# Patient Record
Sex: MALE | Race: WHITE | NOT HISPANIC OR LATINO | Employment: OTHER | URBAN - METROPOLITAN AREA
[De-identification: names, ages, dates, MRNs, and addresses within clinical notes are randomized per-mention and may not be internally consistent; named-entity substitution may affect disease eponyms.]

---

## 2017-02-07 ENCOUNTER — OFFICE VISIT (OUTPATIENT)
Dept: INTERNAL MEDICINE | Facility: CLINIC | Age: 72
End: 2017-02-07

## 2017-02-07 VITALS
BODY MASS INDEX: 24.46 KG/M2 | HEART RATE: 95 BPM | OXYGEN SATURATION: 97 % | HEIGHT: 68 IN | SYSTOLIC BLOOD PRESSURE: 122 MMHG | WEIGHT: 161.4 LBS | DIASTOLIC BLOOD PRESSURE: 78 MMHG

## 2017-02-07 DIAGNOSIS — Z79.899 HIGH RISK MEDICATION USE: ICD-10-CM

## 2017-02-07 DIAGNOSIS — Z00.00 HEALTHCARE MAINTENANCE: ICD-10-CM

## 2017-02-07 DIAGNOSIS — R26.89 POOR BALANCE: ICD-10-CM

## 2017-02-07 DIAGNOSIS — G40.909 SEIZURE DISORDER (HCC): ICD-10-CM

## 2017-02-07 DIAGNOSIS — K64.8 OTHER HEMORRHOIDS: Primary | ICD-10-CM

## 2017-02-07 DIAGNOSIS — F30.9 BIPOLAR I DISORDER, SINGLE MANIC EPISODE (HCC): ICD-10-CM

## 2017-02-07 PROCEDURE — 99214 OFFICE O/P EST MOD 30 MIN: CPT | Performed by: INTERNAL MEDICINE

## 2017-02-07 NOTE — PROGRESS NOTES
Subjective     Andrew Redmond is a 71 y.o. male, who presents with a chief complaint of   Chief Complaint   Patient presents with   • Balance Issues     X1YR. Fell at [employer] and [employer].    • Hemorrhoids     X1YR       HPI   The pt is here because of balance issues and hemorrhoids.  His last OV was Feb of last year.  He has fallen several times.  He is on lamotrigine per neurology.  His dose was decreased and he thinks this has helped his balance issues some.  He has an appt with neurology tomorrow.  He doesn't want to do physical therapy. He is using his walker now     His hemorrhoid issues are chronic.  His colonoscopy was done in April of last year and he is good for 10 years.  His scope showed polyps, extensive diverticulosis and internal hemorrhoids.  He denies constipation or blood in his stool.     He has a hx of bipolar and is on olanzapine.  He has dementia nd takes namenda.      He has been exercising at [employer].  He eats lots of chocolate.      The following portions of the patient's history were reviewed and updated as appropriate: allergies, current medications, past family history, past medical history, past social history, past surgical history and problem list.    Allergies: Review of patient's allergies indicates no known allergies.    Review of Systems   Constitutional: Negative.    HENT: Negative.    Eyes: Negative.    Respiratory: Negative.    Cardiovascular: Negative.    Gastrointestinal: Negative for blood in stool and constipation.        Hemorrhoids   Endocrine: Negative.    Genitourinary: Negative.    Musculoskeletal: Negative.    Skin: Negative.    Allergic/Immunologic: Negative.    Neurological: Negative.    Hematological: Negative.    Psychiatric/Behavioral: Negative.    All other systems reviewed and are negative.      Objective     Wt Readings from Last 3 Encounters:   02/07/17 161 lb 6.4 oz (73.2 kg)   08/15/16 150 lb (68 kg)   04/01/16 164 lb 6.4 oz (74.6 kg)     Temp  Readings from Last 3 Encounters:   08/15/16 97.6 °F (36.4 °C) (Oral)   04/01/16 98.1 °F (36.7 °C) (Oral)   02/17/16 98 °F (36.7 °C)     BP Readings from Last 3 Encounters:   02/07/17 122/78   08/15/16 130/64   04/01/16 133/66     Pulse Readings from Last 3 Encounters:   02/07/17 95   08/15/16 64   04/01/16 72     Body mass index is 24.54 kg/(m^2).  SpO2 Readings from Last 3 Encounters:   02/07/17 97%   08/15/16 95%   04/01/16 97%       Physical Exam   Constitutional: He is oriented to person, place, and time. He appears well-developed and well-nourished. No distress.   HENT:   Head: Normocephalic and atraumatic.   Right Ear: External ear normal.   Left Ear: External ear normal.   Nose: Nose normal.   Mouth/Throat: Oropharynx is clear and moist.   Eyes: Conjunctivae and EOM are normal. Pupils are equal, round, and reactive to light.   Neck: Normal range of motion. Neck supple.   Cardiovascular: Normal rate, regular rhythm, normal heart sounds and intact distal pulses.    Pulmonary/Chest: Effort normal and breath sounds normal. No respiratory distress. He has no wheezes.   Musculoskeletal: He exhibits no edema.   Walks with rollator   Neurological: He is alert and oriented to person, place, and time.   Skin: Skin is warm and dry.   Psychiatric: He has a normal mood and affect. His behavior is normal. Judgment and thought content normal.   Nursing note and vitals reviewed.      Results for orders placed or performed during the hospital encounter of 08/15/16   POCT glucose fingerstick   Result Value Ref Range    Glucose 93 70 - 130 mg/dL       Assessment/Plan   Andrew was seen today for balance issues and hemorrhoids.    Diagnoses and all orders for this visit:    Other hemorrhoidsn- pt wants to try otc meds.   -     CBC & Differential; Future    Seizure disorder - pt needs to discuss meds with neurology.    -     Comprehensive Metabolic Panel; Future    Bipolar I disorder, single manic episode - monitor labs closely since  pt on zyprexa  -     Comprehensive Metabolic Panel; Future  -     CBC & Differential; Future  -     T4, Free; Future  -     TSH; Future  -     Lipid Panel With LDL / HDL Ratio; Future    Healthcare maintenance  -     Comprehensive Metabolic Panel; Future  -     CBC & Differential; Future  -     T4, Free; Future  -     TSH; Future  -     Lipid Panel With LDL / HDL Ratio; Future  -     PSA; Future    High risk medication use - on lamotrigine and zyprexa  -     Comprehensive Metabolic Panel; Future  -     CBC & Differential; Future  -     T4, Free; Future  -     TSH; Future  -     Lipid Panel With LDL / HDL Ratio; Future    Poor balance - offered physical therapy but pt declined.   -     Comprehensive Metabolic Panel; Future  -     CBC & Differential; Future  -     T4, Free; Future  -     TSH; Future    Cont current meds.      Next ov 6 mo    Needs lab appt also - Labs due 2/24/16 or after (fasting)    Outpatient Medications Prior to Visit   Medication Sig Dispense Refill   • lamoTRIgine (LaMICtal) 200 MG tablet Take 1 tablet by mouth 2 (two) times a day.     • memantine (NAMENDA) 10 MG tablet Take by mouth 2 (two) times a day.     • OLANZapine (ZyPREXA) 20 MG tablet Take by mouth daily.     • rivastigmine (EXELON) 6 MG capsule Take by mouth 2 (two) times a day.     • aspirin 325 MG tablet Take 325 mg by mouth every 6 (six) hours as needed for mild pain (1-3).     • diphenhydrAMINE (BENADRYL) 25 mg capsule Take 25 mg by mouth every 6 (six) hours as needed for itching.     • hydrocortisone (ANUSOL-HC) 25 MG suppository Insert 1 suppository into the rectum 2 (two) times a day as needed for hemorrhoids (rectal discomfort). 30 suppository 0   • hydrocortisone-pramoxine (PROCTOFOAM HC) rectal foam Insert 1 applicator into the rectum 2 (two) times a day. 10 g 1   • polyethylene glycol (MIRALAX) powder Take 17 g by mouth daily. 850 g 1     No facility-administered medications prior to visit.      No orders of the defined  types were placed in this encounter.    There are no discontinued medications.      Return in about 6 months (around 8/7/2017) for Recheck.

## 2017-02-12 ENCOUNTER — RESULTS ENCOUNTER (OUTPATIENT)
Dept: INTERNAL MEDICINE | Facility: CLINIC | Age: 72
End: 2017-02-12

## 2017-02-12 DIAGNOSIS — Z79.899 HIGH RISK MEDICATION USE: ICD-10-CM

## 2017-02-12 DIAGNOSIS — Z00.00 HEALTHCARE MAINTENANCE: ICD-10-CM

## 2017-02-12 DIAGNOSIS — R26.89 POOR BALANCE: ICD-10-CM

## 2017-02-12 DIAGNOSIS — F30.9 BIPOLAR I DISORDER, SINGLE MANIC EPISODE (HCC): ICD-10-CM

## 2017-02-12 DIAGNOSIS — G40.909 SEIZURE DISORDER (HCC): ICD-10-CM

## 2017-02-12 DIAGNOSIS — K64.8 OTHER HEMORRHOIDS: ICD-10-CM

## 2017-02-27 LAB
ALBUMIN SERPL-MCNC: 4 G/DL (ref 3.5–5.2)
ALBUMIN/GLOB SERPL: 1.7 G/DL
ALP SERPL-CCNC: 109 U/L (ref 40–129)
ALT SERPL-CCNC: 12 U/L (ref 5–41)
AST SERPL-CCNC: 16 U/L (ref 5–40)
BASOPHILS # BLD AUTO: 0.07 10*3/MM3 (ref 0–0.2)
BASOPHILS NFR BLD AUTO: 1.2 % (ref 0–2)
BILIRUB SERPL-MCNC: 0.5 MG/DL (ref 0.2–1.2)
BUN SERPL-MCNC: 7 MG/DL (ref 8–23)
BUN/CREAT SERPL: 8.4 (ref 7–25)
CALCIUM SERPL-MCNC: 9 MG/DL (ref 8.8–10.5)
CHLORIDE SERPL-SCNC: 97 MMOL/L (ref 98–107)
CHOLEST SERPL-MCNC: 196 MG/DL (ref 0–200)
CO2 SERPL-SCNC: 27.3 MMOL/L (ref 22–29)
CREAT SERPL-MCNC: 0.83 MG/DL (ref 0.76–1.27)
EOSINOPHIL # BLD AUTO: 0.06 10*3/MM3 (ref 0.1–0.3)
EOSINOPHIL NFR BLD AUTO: 1 % (ref 0–4)
ERYTHROCYTE [DISTWIDTH] IN BLOOD BY AUTOMATED COUNT: 16.7 % (ref 11.5–14.5)
GLOBULIN SER CALC-MCNC: 2.3 GM/DL
GLUCOSE SERPL-MCNC: 95 MG/DL (ref 65–99)
HCT VFR BLD AUTO: 45.7 % (ref 42–52)
HDLC SERPL-MCNC: 66 MG/DL (ref 40–60)
HGB BLD-MCNC: 14.8 G/DL (ref 14–18)
IMM GRANULOCYTES # BLD: 0.04 10*3/MM3 (ref 0–0.03)
IMM GRANULOCYTES NFR BLD: 0.7 % (ref 0–0.5)
LDLC SERPL CALC-MCNC: 110 MG/DL (ref 0–100)
LDLC/HDLC SERPL: 1.67 {RATIO}
LYMPHOCYTES # BLD AUTO: 1.84 10*3/MM3 (ref 0.6–4.8)
LYMPHOCYTES NFR BLD AUTO: 30.5 % (ref 20–45)
MCH RBC QN AUTO: 27.2 PG (ref 27–31)
MCHC RBC AUTO-ENTMCNC: 32.4 G/DL (ref 31–37)
MCV RBC AUTO: 84 FL (ref 80–94)
MONOCYTES # BLD AUTO: 0.49 10*3/MM3 (ref 0–1)
MONOCYTES NFR BLD AUTO: 8.1 % (ref 3–8)
NEUTROPHILS # BLD AUTO: 3.54 10*3/MM3 (ref 1.5–8.3)
NEUTROPHILS NFR BLD AUTO: 58.5 % (ref 45–70)
NRBC BLD AUTO-RTO: 0 /100 WBC (ref 0–0)
PLATELET # BLD AUTO: 325 10*3/MM3 (ref 140–500)
POTASSIUM SERPL-SCNC: 4.6 MMOL/L (ref 3.5–5.2)
PROT SERPL-MCNC: 6.3 G/DL (ref 6–8.5)
PSA SERPL-MCNC: 1.26 NG/ML (ref 0–4)
RBC # BLD AUTO: 5.44 10*6/MM3 (ref 4.7–6.1)
SODIUM SERPL-SCNC: 139 MMOL/L (ref 136–145)
T4 FREE SERPL-MCNC: 1.28 NG/DL (ref 0.93–1.7)
TRIGL SERPL-MCNC: 100 MG/DL (ref 0–150)
TSH SERPL DL<=0.005 MIU/L-ACNC: 1.89 MIU/ML (ref 0.27–4.2)
VLDLC SERPL CALC-MCNC: 20 MG/DL (ref 8–32)
WBC # BLD AUTO: 6.04 10*3/MM3 (ref 4.8–10.8)

## 2017-03-02 ENCOUNTER — TELEPHONE (OUTPATIENT)
Dept: INTERNAL MEDICINE | Facility: CLINIC | Age: 72
End: 2017-03-02

## 2017-03-02 NOTE — TELEPHONE ENCOUNTER
Patient has been advised and voiced understanding.    ----- Message from Jessica Birch MD sent at 3/2/2017  8:41 AM EST -----  Call pt about labs.  Labs ok

## 2017-05-24 ENCOUNTER — TELEPHONE (OUTPATIENT)
Dept: INTERNAL MEDICINE | Facility: CLINIC | Age: 72
End: 2017-05-24

## 2017-06-05 ENCOUNTER — OFFICE VISIT (OUTPATIENT)
Dept: INTERNAL MEDICINE | Facility: CLINIC | Age: 72
End: 2017-06-05

## 2017-06-05 VITALS
DIASTOLIC BLOOD PRESSURE: 72 MMHG | HEART RATE: 70 BPM | SYSTOLIC BLOOD PRESSURE: 118 MMHG | WEIGHT: 148 LBS | OXYGEN SATURATION: 96 % | BODY MASS INDEX: 22.43 KG/M2 | HEIGHT: 68 IN

## 2017-06-05 DIAGNOSIS — R06.02 SHORTNESS OF BREATH: Primary | ICD-10-CM

## 2017-06-05 PROCEDURE — 99213 OFFICE O/P EST LOW 20 MIN: CPT | Performed by: INTERNAL MEDICINE

## 2017-06-05 NOTE — PROGRESS NOTES
Subjective     Andrew Redmond is a 71 y.o. male, who presents with a chief complaint of   Chief Complaint   Patient presents with   • Breathing Problem     X1week, he was singing in [employer] and noticed that he could not sing very long due to sob.       HPI   The pt reports that last week he was in [employer] and while singing couldn't sing a stanza without taking a breath.  He denies wheezing.  No increased ayon, cough, congestion, fever, or LE edema.  No chest pain.  No hx lung issues.  He smoked 2ppd x  20 years and quit at age 40.  He denies hx allergies.     The pt has lost some weight but has been exercising at [employer] regularly.   No sx while exercising.    The following portions of the patient's history were reviewed and updated as appropriate: allergies, current medications, past family history, past medical history, past social history, past surgical history and problem list.    Allergies: Review of patient's allergies indicates no known allergies.    Review of Systems   Constitutional: Negative.    HENT: Negative.    Eyes: Negative.    Respiratory: Positive for shortness of breath. Negative for cough and wheezing.    Cardiovascular: Negative.    Gastrointestinal: Negative.    Endocrine: Negative.    Genitourinary: Negative.    Musculoskeletal: Negative.    Skin: Negative.    Allergic/Immunologic: Negative.    Neurological: Negative.    Hematological: Negative.    Psychiatric/Behavioral: Negative.    All other systems reviewed and are negative.      Objective     Wt Readings from Last 3 Encounters:   06/05/17 148 lb (67.1 kg)   02/07/17 161 lb 6.4 oz (73.2 kg)   08/15/16 150 lb (68 kg)     Temp Readings from Last 3 Encounters:   08/15/16 97.6 °F (36.4 °C) (Oral)   04/01/16 98.1 °F (36.7 °C) (Oral)   02/17/16 98 °F (36.7 °C)     BP Readings from Last 3 Encounters:   06/05/17 118/72   02/07/17 122/78   08/15/16 130/64     Pulse Readings from Last 3 Encounters:   06/05/17 70   02/07/17 95   08/15/16 64     Body  mass index is 22.5 kg/(m^2).  SpO2 Readings from Last 3 Encounters:   06/05/17 96%   02/07/17 97%   08/15/16 95%       Physical Exam   Constitutional: He is oriented to person, place, and time. He appears well-developed and well-nourished. No distress.   HENT:   Head: Normocephalic and atraumatic.   Right Ear: External ear normal.   Left Ear: External ear normal.   Nose: Nose normal.   Mouth/Throat: Oropharynx is clear and moist.   Eyes: Conjunctivae and EOM are normal. Pupils are equal, round, and reactive to light.   Neck: Normal range of motion. Neck supple.   Cardiovascular: Normal rate, regular rhythm, normal heart sounds and intact distal pulses.    Pulmonary/Chest: Effort normal and breath sounds normal. No respiratory distress. He has no wheezes.   Musculoskeletal: Normal range of motion.   Normal gait   Neurological: He is alert and oriented to person, place, and time.   Skin: Skin is warm and dry.   Psychiatric: He has a normal mood and affect. His behavior is normal. Judgment and thought content normal.   Nursing note and vitals reviewed.      Results for orders placed or performed in visit on 02/12/17   Comprehensive Metabolic Panel   Result Value Ref Range    Glucose 95 65 - 99 mg/dL    BUN 7 (L) 8 - 23 mg/dL    Creatinine 0.83 0.76 - 1.27 mg/dL    eGFR Non African Am 91 >60 mL/min/1.73    eGFR African Am 111 >60 mL/min/1.73    BUN/Creatinine Ratio 8.4 7.0 - 25.0    Sodium 139 136 - 145 mmol/L    Potassium 4.6 3.5 - 5.2 mmol/L    Chloride 97 (L) 98 - 107 mmol/L    Total CO2 27.3 22.0 - 29.0 mmol/L    Calcium 9.0 8.8 - 10.5 mg/dL    Total Protein 6.3 6.0 - 8.5 g/dL    Albumin 4.00 3.50 - 5.20 g/dL    Globulin 2.3 gm/dL    A/G Ratio 1.7 g/dL    Total Bilirubin 0.5 0.2 - 1.2 mg/dL    Alkaline Phosphatase 109 40 - 129 U/L    AST (SGOT) 16 5 - 40 U/L    ALT (SGPT) 12 5 - 41 U/L   T4, Free   Result Value Ref Range    Free T4 1.28 0.93 - 1.70 ng/dL   TSH   Result Value Ref Range    TSH 1.890 0.270 - 4.200  mIU/mL   Lipid Panel With LDL / HDL Ratio   Result Value Ref Range    Total Cholesterol 196 0 - 200 mg/dL    Triglycerides 100 0 - 150 mg/dL    HDL Cholesterol 66 (H) 40 - 60 mg/dL    VLDL Cholesterol 20 8 - 32 mg/dL    LDL Cholesterol  110 (H) 0 - 100 mg/dL    LDL/HDL Ratio 1.67    PSA   Result Value Ref Range    PSA 1.260 0.000 - 4.000 ng/mL   CBC & Differential   Result Value Ref Range    WBC 6.04 4.80 - 10.80 10*3/mm3    RBC 5.44 4.70 - 6.10 10*6/mm3    Hemoglobin 14.8 14.0 - 18.0 g/dL    Hematocrit 45.7 42.0 - 52.0 %    MCV 84.0 80.0 - 94.0 fL    MCH 27.2 27.0 - 31.0 pg    MCHC 32.4 31.0 - 37.0 g/dL    RDW 16.7 (H) 11.5 - 14.5 %    Platelets 325 140 - 500 10*3/mm3    Neutrophil Rel % 58.5 45.0 - 70.0 %    Lymphocyte Rel % 30.5 20.0 - 45.0 %    Monocyte Rel % 8.1 (H) 3.0 - 8.0 %    Eosinophil Rel % 1.0 0.0 - 4.0 %    Basophil Rel % 1.2 0.0 - 2.0 %    Neutrophils Absolute 3.54 1.50 - 8.30 10*3/mm3    Lymphocytes Absolute 1.84 0.60 - 4.80 10*3/mm3    Monocytes Absolute 0.49 0.00 - 1.00 10*3/mm3    Eosinophils Absolute 0.06 (L) 0.10 - 0.30 10*3/mm3    Basophils Absolute 0.07 0.00 - 0.20 10*3/mm3    Immature Granulocyte Rel % 0.7 (H) 0.0 - 0.5 %    Immature Grans Absolute 0.04 (H) 0.00 - 0.03 10*3/mm3    nRBC 0.0 0.0 - 0.0 /100 WBC       Assessment/Plan   Andrew was seen today for breathing problem.    Diagnoses and all orders for this visit:    Shortness of breath      Sx intermittent when pt singing.  No other episodes or symptoms.  Pt asymptomatic in office.  Monitor for now.  Pt to f/u if any worsening.      Outpatient Medications Prior to Visit   Medication Sig Dispense Refill   • lamoTRIgine (LaMICtal) 200 MG tablet Take 1 tablet by mouth 2 (two) times a day.     • memantine (NAMENDA) 10 MG tablet Take by mouth 2 (two) times a day.     • OLANZapine (ZyPREXA) 20 MG tablet Take by mouth daily.     • rivastigmine (EXELON) 6 MG capsule Take by mouth 2 (two) times a day.     • diphenhydrAMINE (BENADRYL) 25 mg capsule  Take 25 mg by mouth every 6 (six) hours as needed for itching.     • polyethylene glycol (MIRALAX) powder Take 17 g by mouth daily. 850 g 1   • aspirin 325 MG tablet Take 325 mg by mouth every 6 (six) hours as needed for mild pain (1-3).     • hydrocortisone (ANUSOL-HC) 25 MG suppository Insert 1 suppository into the rectum 2 (two) times a day as needed for hemorrhoids (rectal discomfort). 30 suppository 0   • hydrocortisone-pramoxine (PROCTOFOAM HC) rectal foam Insert 1 applicator into the rectum 2 (two) times a day. 10 g 1     No facility-administered medications prior to visit.      No orders of the defined types were placed in this encounter.      Medications Discontinued During This Encounter   Medication Reason   • aspirin 325 MG tablet    • hydrocortisone-pramoxine (PROCTOFOAM HC) rectal foam    • hydrocortisone (ANUSOL-HC) 25 MG suppository          Return if symptoms worsen or fail to improve.

## 2017-10-06 ENCOUNTER — OFFICE VISIT (OUTPATIENT)
Dept: INTERNAL MEDICINE | Facility: CLINIC | Age: 72
End: 2017-10-06

## 2017-10-06 VITALS
DIASTOLIC BLOOD PRESSURE: 80 MMHG | BODY MASS INDEX: 22.73 KG/M2 | SYSTOLIC BLOOD PRESSURE: 116 MMHG | WEIGHT: 150 LBS | HEIGHT: 68 IN | HEART RATE: 74 BPM | OXYGEN SATURATION: 97 % | RESPIRATION RATE: 18 BRPM

## 2017-10-06 DIAGNOSIS — K64.8 OTHER HEMORRHOIDS: ICD-10-CM

## 2017-10-06 DIAGNOSIS — Z79.899 HIGH RISK MEDICATION USE: ICD-10-CM

## 2017-10-06 DIAGNOSIS — F02.80 ALZHEIMER'S DEMENTIA WITHOUT BEHAVIORAL DISTURBANCE, UNSPECIFIED TIMING OF DEMENTIA ONSET: ICD-10-CM

## 2017-10-06 DIAGNOSIS — R53.83 LOW ENERGY: Primary | ICD-10-CM

## 2017-10-06 DIAGNOSIS — G30.9 ALZHEIMER'S DEMENTIA WITHOUT BEHAVIORAL DISTURBANCE, UNSPECIFIED TIMING OF DEMENTIA ONSET: ICD-10-CM

## 2017-10-06 DIAGNOSIS — N52.9 ERECTILE DYSFUNCTION, UNSPECIFIED ERECTILE DYSFUNCTION TYPE: ICD-10-CM

## 2017-10-06 DIAGNOSIS — F30.9 BIPOLAR I DISORDER, SINGLE MANIC EPISODE (HCC): ICD-10-CM

## 2017-10-06 DIAGNOSIS — K59.09 CHRONIC CONSTIPATION: ICD-10-CM

## 2017-10-06 PROCEDURE — 99214 OFFICE O/P EST MOD 30 MIN: CPT | Performed by: INTERNAL MEDICINE

## 2017-10-06 RX ORDER — POLYETHYLENE GLYCOL 3350 17 G/17G
17 POWDER, FOR SOLUTION ORAL DAILY
Qty: 850 G | Refills: 5 | Status: SHIPPED | OUTPATIENT
Start: 2017-10-06

## 2017-10-06 NOTE — PROGRESS NOTES
Subjective     Andrew Redmond is a 72 y.o. male, who presents with a chief complaint of   Chief Complaint   Patient presents with   • Manic Behavior   follow  up    HPI   The pt is here for follow up.  He is worried that he has low testosterone levels.  He has been working out at the gym and is worried bc he doesn't feel like he is building muscle mass.  He says he is eating some protein with most meals.  His ex-wife cooks about 6 days a week when he sees her.  He does have a hx of ED. He denies prostate issues such as nocturia or poor stream  He c/o chronically low energy levels.  He thinks he might sleep a little too much.  He walks with a rolator to prevent falls.    He has alzheimer's and bipolar and is on multiple meds for this.  He is due for monitoring labs.       Constipation - he uses miralax every other day to help with bm's.     The following portions of the patient's history were reviewed and updated as appropriate: allergies, current medications, past family history, past medical history, past social history, past surgical history and problem list.    Allergies: Review of patient's allergies indicates no known allergies.    Review of Systems   Constitutional: Positive for fatigue.   HENT: Negative.    Eyes: Negative.    Respiratory: Negative.    Cardiovascular: Negative.    Gastrointestinal: Negative.    Endocrine: Negative.    Genitourinary: Negative.    Musculoskeletal: Negative.    Skin: Negative.    Allergic/Immunologic: Negative.    Neurological: Negative.    Hematological: Negative.    Psychiatric/Behavioral: Negative.    All other systems reviewed and are negative.      Objective     Wt Readings from Last 3 Encounters:   10/06/17 150 lb (68 kg)   06/05/17 148 lb (67.1 kg)   02/07/17 161 lb 6.4 oz (73.2 kg)     Temp Readings from Last 3 Encounters:   08/15/16 97.6 °F (36.4 °C) (Oral)   04/01/16 98.1 °F (36.7 °C) (Oral)   02/17/16 98 °F (36.7 °C)     BP Readings from Last 3 Encounters:   10/06/17  116/80   06/05/17 118/72   02/07/17 122/78     Pulse Readings from Last 3 Encounters:   10/06/17 74   06/05/17 70   02/07/17 95     Body mass index is 22.81 kg/(m^2).  SpO2 Readings from Last 3 Encounters:   10/06/17 97%   06/05/17 96%   02/07/17 97%       Physical Exam   Constitutional: He is oriented to person, place, and time. He appears well-developed and well-nourished. No distress.   HENT:   Head: Normocephalic and atraumatic.   Right Ear: External ear normal.   Left Ear: External ear normal.   Nose: Nose normal.   Mouth/Throat: Oropharynx is clear and moist.   Eyes: Conjunctivae and EOM are normal. Pupils are equal, round, and reactive to light.   Neck: Normal range of motion. Neck supple.   Cardiovascular: Normal rate, regular rhythm, normal heart sounds and intact distal pulses.    Pulmonary/Chest: Effort normal and breath sounds normal. No respiratory distress. He has no wheezes.   Musculoskeletal:   Walks with rollator    Neurological: He is alert and oriented to person, place, and time.   Skin: Skin is warm and dry.   Psychiatric: He has a normal mood and affect. His behavior is normal. Judgment and thought content normal.   Nursing note and vitals reviewed.      Results for orders placed or performed in visit on 02/12/17   Comprehensive Metabolic Panel   Result Value Ref Range    Glucose 95 65 - 99 mg/dL    BUN 7 (L) 8 - 23 mg/dL    Creatinine 0.83 0.76 - 1.27 mg/dL    eGFR Non African Am 91 >60 mL/min/1.73    eGFR African Am 111 >60 mL/min/1.73    BUN/Creatinine Ratio 8.4 7.0 - 25.0    Sodium 139 136 - 145 mmol/L    Potassium 4.6 3.5 - 5.2 mmol/L    Chloride 97 (L) 98 - 107 mmol/L    Total CO2 27.3 22.0 - 29.0 mmol/L    Calcium 9.0 8.8 - 10.5 mg/dL    Total Protein 6.3 6.0 - 8.5 g/dL    Albumin 4.00 3.50 - 5.20 g/dL    Globulin 2.3 gm/dL    A/G Ratio 1.7 g/dL    Total Bilirubin 0.5 0.2 - 1.2 mg/dL    Alkaline Phosphatase 109 40 - 129 U/L    AST (SGOT) 16 5 - 40 U/L    ALT (SGPT) 12 5 - 41 U/L   T4,  Free   Result Value Ref Range    Free T4 1.28 0.93 - 1.70 ng/dL   TSH   Result Value Ref Range    TSH 1.890 0.270 - 4.200 mIU/mL   Lipid Panel With LDL / HDL Ratio   Result Value Ref Range    Total Cholesterol 196 0 - 200 mg/dL    Triglycerides 100 0 - 150 mg/dL    HDL Cholesterol 66 (H) 40 - 60 mg/dL    VLDL Cholesterol 20 8 - 32 mg/dL    LDL Cholesterol  110 (H) 0 - 100 mg/dL    LDL/HDL Ratio 1.67    PSA   Result Value Ref Range    PSA 1.260 0.000 - 4.000 ng/mL   CBC & Differential   Result Value Ref Range    WBC 6.04 4.80 - 10.80 10*3/mm3    RBC 5.44 4.70 - 6.10 10*6/mm3    Hemoglobin 14.8 14.0 - 18.0 g/dL    Hematocrit 45.7 42.0 - 52.0 %    MCV 84.0 80.0 - 94.0 fL    MCH 27.2 27.0 - 31.0 pg    MCHC 32.4 31.0 - 37.0 g/dL    RDW 16.7 (H) 11.5 - 14.5 %    Platelets 325 140 - 500 10*3/mm3    Neutrophil Rel % 58.5 45.0 - 70.0 %    Lymphocyte Rel % 30.5 20.0 - 45.0 %    Monocyte Rel % 8.1 (H) 3.0 - 8.0 %    Eosinophil Rel % 1.0 0.0 - 4.0 %    Basophil Rel % 1.2 0.0 - 2.0 %    Neutrophils Absolute 3.54 1.50 - 8.30 10*3/mm3    Lymphocytes Absolute 1.84 0.60 - 4.80 10*3/mm3    Monocytes Absolute 0.49 0.00 - 1.00 10*3/mm3    Eosinophils Absolute 0.06 (L) 0.10 - 0.30 10*3/mm3    Basophils Absolute 0.07 0.00 - 0.20 10*3/mm3    Immature Granulocyte Rel % 0.7 (H) 0.0 - 0.5 %    Immature Grans Absolute 0.04 (H) 0.00 - 0.03 10*3/mm3    nRBC 0.0 0.0 - 0.0 /100 WBC       Assessment/Plan   Andrew was seen today for manic behavior.    Diagnoses and all orders for this visit:    Low energy  -     Comprehensive Metabolic Panel; Future  -     CBC & Differential; Future  -     Testosterone, Free, Total; Future    Alzheimer's dementia without behavioral disturbance, unspecified timing of dementia onset  -     Comprehensive Metabolic Panel; Future  -     CBC & Differential; Future    Erectile dysfunction, unspecified erectile dysfunction type  -     Testosterone, Free, Total; Future    High risk medication use  -     Comprehensive Metabolic  Panel; Future  -     CBC & Differential; Future    Bipolar I disorder, single manic episode  -     Comprehensive Metabolic Panel; Future  -     CBC & Differential; Future    Other hemorrhoids  -     polyethylene glycol (MIRALAX) powder; Take 17 g by mouth Daily.    Chronic constipation  -     polyethylene glycol (MIRALAX) powder; Take 17 g by mouth Daily.          Outpatient Medications Prior to Visit   Medication Sig Dispense Refill   • diphenhydrAMINE (BENADRYL) 25 mg capsule Take 25 mg by mouth every 6 (six) hours as needed for itching.     • lamoTRIgine (LaMICtal) 200 MG tablet Take 1 tablet by mouth 2 (two) times a day.     • memantine (NAMENDA) 10 MG tablet Take by mouth 2 (two) times a day.     • OLANZapine (ZyPREXA) 20 MG tablet Take by mouth daily.     • rivastigmine (EXELON) 6 MG capsule Take by mouth 2 (two) times a day.     • polyethylene glycol (MIRALAX) powder Take 17 g by mouth daily. 850 g 1     No facility-administered medications prior to visit.      New Medications Ordered This Visit   Medications   • polyethylene glycol (MIRALAX) powder     Sig: Take 17 g by mouth Daily.     Dispense:  850 g     Refill:  5       Medications Discontinued During This Encounter   Medication Reason   • polyethylene glycol (MIRALAX) powder Reorder         Return in about 6 months (around 4/6/2018) for Recheck, labs.

## 2017-10-08 LAB
ALBUMIN SERPL-MCNC: 4.2 G/DL (ref 3.5–5.2)
ALBUMIN/GLOB SERPL: 1.8 G/DL
ALP SERPL-CCNC: 94 U/L (ref 40–129)
ALT SERPL-CCNC: 12 U/L (ref 5–41)
AST SERPL-CCNC: 19 U/L (ref 5–40)
BASOPHILS # BLD AUTO: 0.08 10*3/MM3 (ref 0–0.2)
BASOPHILS NFR BLD AUTO: 1.3 % (ref 0–2)
BILIRUB SERPL-MCNC: 0.5 MG/DL (ref 0.2–1.2)
BUN SERPL-MCNC: 8 MG/DL (ref 8–23)
BUN/CREAT SERPL: 10.5 (ref 7–25)
CALCIUM SERPL-MCNC: 9.1 MG/DL (ref 8.8–10.5)
CHLORIDE SERPL-SCNC: 97 MMOL/L (ref 98–107)
CO2 SERPL-SCNC: 26.3 MMOL/L (ref 22–29)
CREAT SERPL-MCNC: 0.76 MG/DL (ref 0.76–1.27)
EOSINOPHIL # BLD AUTO: 0.25 10*3/MM3 (ref 0.1–0.3)
EOSINOPHIL NFR BLD AUTO: 4 % (ref 0–4)
ERYTHROCYTE [DISTWIDTH] IN BLOOD BY AUTOMATED COUNT: 17.2 % (ref 11.5–14.5)
GLOBULIN SER CALC-MCNC: 2.3 GM/DL
GLUCOSE SERPL-MCNC: 89 MG/DL (ref 65–99)
HCT VFR BLD AUTO: 46.1 % (ref 42–52)
HGB BLD-MCNC: 15.3 G/DL (ref 14–18)
IMM GRANULOCYTES # BLD: 0.03 10*3/MM3 (ref 0–0.03)
IMM GRANULOCYTES NFR BLD: 0.5 % (ref 0–0.5)
LYMPHOCYTES # BLD AUTO: 1.7 10*3/MM3 (ref 0.6–4.8)
LYMPHOCYTES NFR BLD AUTO: 27.1 % (ref 20–45)
MCH RBC QN AUTO: 28.4 PG (ref 27–31)
MCHC RBC AUTO-ENTMCNC: 33.2 G/DL (ref 31–37)
MCV RBC AUTO: 85.5 FL (ref 80–94)
MONOCYTES # BLD AUTO: 0.62 10*3/MM3 (ref 0–1)
MONOCYTES NFR BLD AUTO: 9.9 % (ref 3–8)
NEUTROPHILS # BLD AUTO: 3.59 10*3/MM3 (ref 1.5–8.3)
NEUTROPHILS NFR BLD AUTO: 57.2 % (ref 45–70)
NRBC BLD AUTO-RTO: 0 /100 WBC (ref 0–0)
PLATELET # BLD AUTO: 285 10*3/MM3 (ref 140–500)
POTASSIUM SERPL-SCNC: 4.7 MMOL/L (ref 3.5–5.2)
PROT SERPL-MCNC: 6.5 G/DL (ref 6–8.5)
RBC # BLD AUTO: 5.39 10*6/MM3 (ref 4.7–6.1)
SODIUM SERPL-SCNC: 139 MMOL/L (ref 136–145)
TESTOST FREE SERPL-MCNC: 4.7 PG/ML (ref 6.6–18.1)
TESTOST SERPL-MCNC: 381 NG/DL (ref 264–916)
WBC # BLD AUTO: 6.27 10*3/MM3 (ref 4.8–10.8)

## 2017-10-11 ENCOUNTER — TELEPHONE (OUTPATIENT)
Dept: INTERNAL MEDICINE | Facility: CLINIC | Age: 72
End: 2017-10-11

## 2017-10-11 NOTE — TELEPHONE ENCOUNTER
Patient notified.   ----- Message from Jessica Birch MD sent at 10/11/2017  2:53 PM EDT -----  Call pt about labs.  Testosterone borderline low.  Other labs ok.

## 2018-01-13 ENCOUNTER — HOSPITAL ENCOUNTER (EMERGENCY)
Facility: HOSPITAL | Age: 73
Discharge: HOME | End: 2018-01-14
Attending: EMERGENCY MEDICINE | Admitting: EMERGENCY MEDICINE

## 2018-01-13 ENCOUNTER — APPOINTMENT (OUTPATIENT)
Dept: GENERAL RADIOLOGY | Facility: HOSPITAL | Age: 73
End: 2018-01-13

## 2018-01-13 ENCOUNTER — APPOINTMENT (OUTPATIENT)
Dept: CT IMAGING | Facility: HOSPITAL | Age: 73
End: 2018-01-13

## 2018-01-13 VITALS
WEIGHT: 150 LBS | TEMPERATURE: 98.1 F | DIASTOLIC BLOOD PRESSURE: 60 MMHG | RESPIRATION RATE: 18 BRPM | SYSTOLIC BLOOD PRESSURE: 142 MMHG | OXYGEN SATURATION: 95 % | BODY MASS INDEX: 22.73 KG/M2 | HEIGHT: 68 IN | HEART RATE: 83 BPM

## 2018-01-13 DIAGNOSIS — S32.010A CLOSED COMPRESSION FRACTURE OF FIRST LUMBAR VERTEBRA, INITIAL ENCOUNTER: Primary | ICD-10-CM

## 2018-01-13 PROCEDURE — 72100 X-RAY EXAM L-S SPINE 2/3 VWS: CPT

## 2018-01-13 PROCEDURE — 72170 X-RAY EXAM OF PELVIS: CPT

## 2018-01-13 PROCEDURE — 99284 EMERGENCY DEPT VISIT MOD MDM: CPT | Performed by: EMERGENCY MEDICINE

## 2018-01-13 PROCEDURE — 72131 CT LUMBAR SPINE W/O DYE: CPT

## 2018-01-13 PROCEDURE — 99283 EMERGENCY DEPT VISIT LOW MDM: CPT

## 2018-01-14 RX ORDER — DOCUSATE SODIUM 100 MG/1
100 CAPSULE, LIQUID FILLED ORAL 2 TIMES DAILY PRN
Qty: 30 CAPSULE | Refills: 0 | Status: SHIPPED | OUTPATIENT
Start: 2018-01-14 | End: 2019-03-06

## 2018-01-14 RX ORDER — HYDROCODONE BITARTRATE AND ACETAMINOPHEN 5; 325 MG/1; MG/1
TABLET ORAL
Qty: 20 TABLET | Refills: 0 | Status: SHIPPED | OUTPATIENT
Start: 2018-01-14 | End: 2018-01-18

## 2018-01-14 RX ORDER — HYDROCODONE BITARTRATE AND ACETAMINOPHEN 5; 325 MG/1; MG/1
1 TABLET ORAL ONCE
Status: COMPLETED | OUTPATIENT
Start: 2018-01-14 | End: 2018-01-14

## 2018-01-14 RX ADMIN — HYDROCODONE BITARTRATE AND ACETAMINOPHEN 1 TABLET: 5; 325 TABLET ORAL at 00:25

## 2018-01-14 NOTE — ED PROVIDER NOTES
Subjective   History of Present Illness  History of Present Illness    Chief complaint: Low back discomfort    Location: Midline, radiating to right    Quality/Severity:  Mild to moderate    Timing/Duration: Abrupt onset this evening status post fall    Modifying Factors: Certain movements and palpation of the back    Associated Symptoms: No focal numbness or weakness, no incontinence, no saddle anesthesia.  Denies any other injuries    Narrative: 72-year-old male sitting up quickly and lost his balance falling backwards striking his low back and buttock against the ground is unsure if he hit anything else.  He's had ongoing discomfort since the fall and decided to come to the emergency department having checked out.  Denies any other injuries.    Review of Systems  All other systems reviewed and are otherwise negative.  Past Medical History:   Diagnosis Date   • Alzheimer disease    • Anxiety and depression    • Bipolar 1 disorder    • Colon polyp    • Constipation    • Depression    • Diarrhea    • Erectile dysfunction    • GERD (gastroesophageal reflux disease)    • Seizures     absence seizures.  well controlled.       No Known Allergies    Past Surgical History:   Procedure Laterality Date   • COLONOSCOPY N/A 4/1/2016    Procedure: COLONOSCOPY with polypectomy;  Surgeon: Akiko Scales MD;  Location: [facility];  Service:    • GALLBLADDER SURGERY     • LIVER SURGERY     • TONSILLECTOMY         Family History   Problem Relation Age of Onset   • Lung cancer Father    • No Known Problems Mother    • No Known Problems Maternal Grandmother    • No Known Problems Maternal Grandfather    • No Known Problems Paternal Grandmother    • No Known Problems Paternal Grandfather        Social History     Social History   • Marital status:      Spouse name: N/A   • Number of children: N/A   • Years of education: N/A     Occupational History   • professor      taught anatomy at [employer]     Social History Main  Topics   • Smoking status: Former Smoker     Packs/day: 2.00     Years: 20.00   • Smokeless tobacco: Former User     Quit date: 1987   • Alcohol use Yes      Comment: few drinks a month    • Drug use: No   • Sexual activity: Defer     Other Topics Concern   • None     Social History Narrative       ED Triage Vitals   Temp Heart Rate Resp BP SpO2   01/13/18 2010 01/13/18 2010 01/13/18 2010 01/13/18 2010 01/13/18 2010   98.1 °F (36.7 °C) 83 18 142/60 95 %      Temp src Heart Rate Source Patient Position BP Location FiO2 (%)   01/13/18 2010 01/13/18 2010 01/13/18 2010 01/13/18 2010 --   Oral Monitor Sitting Right arm          Objective   Physical Exam   Constitutional: He is oriented to person, place, and time. He appears well-developed. No distress.   HENT:   Head: Normocephalic and atraumatic.   Neck:   Painless range of motion.  No midline tenderness to palpation or step-off   Cardiovascular: Normal rate and intact distal pulses.    Pulmonary/Chest: Effort normal and breath sounds normal. No respiratory distress.   Abdominal: Soft. He exhibits no distension. There is no tenderness.   Musculoskeletal:        Back:    Moves all extremities equally.  Normal strength throughout.  Sensation intact throughout bilateral lower extremities.   Neurological: He is alert and oriented to person, place, and time.   Stable gait and station   Skin: Skin is warm and dry.   Psychiatric: He has a normal mood and affect. Thought content normal.   Vitals reviewed.      Procedures         ED Course  ED Course   Comment By Time   CONSULT        Provider: Dr. Molina-neurosurgery    Discussion: Agreeable with pain control, LSO brace, and outpatient follow-up on Monday in the office.    Agreeable c treatment and planned disposition. Jb Baker MD 01/14 4009      RADIOLOGY        Study: L spine/pelvis    Findings: Approximate 30% compression fracture superior endplate L1; no other acute findings identified    Interpreted  contemporaneously with treatment by me, independently viewed by me    RADIOLOGY        Study: CT lumbar spine    Findings: Superior endplate compression fracture of L1 with 10% loss of height.  No other acute findings.  Degenerative change noted    Interpreted contemporaneously with treatment by Dr. Pressley-radiologist, independently viewed by me            NANCY Gauthier query complete. Treatment plan to include limited course of prescribed controlled substance.  Risks including addiction, tolerance, sedation, benefits and alternatives presented to patient.  Final diagnoses:   Closed compression fracture of first lumbar vertebra, initial encounter              Medication List      New Prescriptions          docusate sodium 100 MG capsule   Commonly known as:  COLACE   Take 1 capsule by mouth 2 (Two) Times a Day As Needed for Constipation.   Take 1 tablet by mouth 2 times daily as needed for constipation       HYDROcodone-acetaminophen 5-325 MG per tablet   Commonly known as:  NORCO   Take 1-2 tabs by mouth every 4-6 hours as needed for pain           Follow-up Information     Schedule an appointment as soon as possible for a visit with Jessica Birch MD.    Specialties:  Internal Medicine & Pediatrics, Internal Medicine, Pediatrics    Why:  As needed    Contact information:    [address]  [facility] [address] [phone]          Follow up with Elijah Molina MD. Call in 1 day.    Specialty:  Neurosurgery    Contact information:    [address]  [facility] [address]  [facility] [address] [phone]                 Jb Baker MD  01/14/18 0011

## 2018-01-15 ENCOUNTER — EPISODE CHANGES (OUTPATIENT)
Dept: CASE MANAGEMENT | Facility: OTHER | Age: 73
End: 2018-01-15

## 2018-01-15 ENCOUNTER — OFFICE VISIT (OUTPATIENT)
Dept: INTERNAL MEDICINE | Facility: CLINIC | Age: 73
End: 2018-01-15

## 2018-01-15 VITALS
HEART RATE: 95 BPM | DIASTOLIC BLOOD PRESSURE: 80 MMHG | BODY MASS INDEX: 24.55 KG/M2 | OXYGEN SATURATION: 97 % | WEIGHT: 162 LBS | HEIGHT: 68 IN | SYSTOLIC BLOOD PRESSURE: 124 MMHG

## 2018-01-15 DIAGNOSIS — S32.018D OTHER CLOSED FRACTURE OF FIRST LUMBAR VERTEBRA WITH ROUTINE HEALING, SUBSEQUENT ENCOUNTER: Primary | ICD-10-CM

## 2018-01-15 DIAGNOSIS — G30.9 ALZHEIMER'S DEMENTIA WITHOUT BEHAVIORAL DISTURBANCE, UNSPECIFIED TIMING OF DEMENTIA ONSET: ICD-10-CM

## 2018-01-15 DIAGNOSIS — F02.80 ALZHEIMER'S DEMENTIA WITHOUT BEHAVIORAL DISTURBANCE, UNSPECIFIED TIMING OF DEMENTIA ONSET: ICD-10-CM

## 2018-01-15 PROCEDURE — 99214 OFFICE O/P EST MOD 30 MIN: CPT | Performed by: INTERNAL MEDICINE

## 2018-01-15 RX ORDER — CALCITONIN SALMON 200 [IU]/.09ML
1 SPRAY, METERED NASAL DAILY
Qty: 3.7 ML | Refills: 3 | Status: SHIPPED | OUTPATIENT
Start: 2018-01-15 | End: 2019-07-11

## 2018-01-15 RX ORDER — OXYCODONE AND ACETAMINOPHEN 10; 325 MG/1; MG/1
1 TABLET ORAL EVERY 4 HOURS PRN
Qty: 18 TABLET | Refills: 0 | Status: SHIPPED | OUTPATIENT
Start: 2018-01-15 | End: 2019-02-05

## 2018-01-15 NOTE — PROGRESS NOTES
Subjective     Andrew Redmond is a 72 y.o. male, who presents with a chief complaint of   Chief Complaint   Patient presents with   • Back Pain     Broken back, happened on Saturday, got up out of chair and fell on back, broke first vertebrae       HPI   The pt is here for follow up.  He fell over the weekend and went to the ER with back pain.  He had a fracture of L1.  Pt placed in LSO brace.  He was told to call [facility] for follow up.  The pt has norco for pain control but this isnt touching the pain.  The pt also fell again yesterday.  He isnt sure if the pain is worse or not.  He just feels bad all over.  He denies numbness, weakness, or tingling in his legs.     The pt has alzheimer's disease.  He lives alone and does not drive.  He can call an ex-brother-in-law for emergencies.        The following portions of the patient's history were reviewed and updated as appropriate: allergies, current medications, past family history, past medical history, past social history, past surgical history and problem list.    Allergies: Review of patient's allergies indicates no known allergies.    Review of Systems   Constitutional: Negative.    HENT: Negative.    Eyes: Negative.    Respiratory: Negative.    Cardiovascular: Negative.    Gastrointestinal: Negative.    Endocrine: Negative.    Genitourinary: Negative.    Musculoskeletal: Positive for back pain and gait problem.   Skin: Negative.    Allergic/Immunologic: Negative.    Hematological: Negative.    Psychiatric/Behavioral: Negative.    All other systems reviewed and are negative.      Objective     Wt Readings from Last 3 Encounters:   01/15/18 73.5 kg (162 lb)   01/13/18 68 kg (150 lb)   10/06/17 68 kg (150 lb)     Temp Readings from Last 3 Encounters:   01/13/18 98.1 °F (36.7 °C) (Oral)   08/15/16 97.6 °F (36.4 °C) (Oral)   04/01/16 98.1 °F (36.7 °C) (Oral)     BP Readings from Last 3 Encounters:   01/15/18 124/80   01/13/18 142/60   10/06/17 116/80     Pulse Readings  from Last 3 Encounters:   01/15/18 95   01/13/18 83   10/06/17 74     Body mass index is 24.64 kg/(m^2).  SpO2 Readings from Last 3 Encounters:   01/15/18 97%   01/13/18 95%   10/06/17 97%       Physical Exam   Constitutional: He is oriented to person, place, and time. He appears well-developed and well-nourished.   Pt uncomfortable   HENT:   Head: Normocephalic and atraumatic.   Right Ear: External ear normal.   Left Ear: External ear normal.   Nose: Nose normal.   Mouth/Throat: Oropharynx is clear and moist.   Eyes: Conjunctivae and EOM are normal. Pupils are equal, round, and reactive to light.   Neck: Normal range of motion. Neck supple.   Cardiovascular: Normal rate, regular rhythm, normal heart sounds and intact distal pulses.    Pulmonary/Chest: Effort normal and breath sounds normal. No respiratory distress. He has no wheezes.   Musculoskeletal: Normal range of motion.   Antalgic gait  Low back pain/ttp of spine   Neurological: He is alert and oriented to person, place, and time.   Skin: Skin is warm and dry.   Psychiatric: He has a normal mood and affect. His behavior is normal. Judgment and thought content normal.   Nursing note and vitals reviewed.      Results for orders placed or performed in visit on 10/06/17   Testosterone, Free, Total   Result Value Ref Range    Testosterone, Total 381 264 - 916 ng/dL    Testosterone, Free 4.7 (L) 6.6 - 18.1 pg/mL   Comprehensive Metabolic Panel   Result Value Ref Range    Glucose 89 65 - 99 mg/dL    BUN 8 8 - 23 mg/dL    Creatinine 0.76 0.76 - 1.27 mg/dL    eGFR Non African Am 101 >60 mL/min/1.73    eGFR African Am 122 >60 mL/min/1.73    BUN/Creatinine Ratio 10.5 7.0 - 25.0    Sodium 139 136 - 145 mmol/L    Potassium 4.7 3.5 - 5.2 mmol/L    Chloride 97 (L) 98 - 107 mmol/L    Total CO2 26.3 22.0 - 29.0 mmol/L    Calcium 9.1 8.8 - 10.5 mg/dL    Total Protein 6.5 6.0 - 8.5 g/dL    Albumin 4.20 3.50 - 5.20 g/dL    Globulin 2.3 gm/dL    A/G Ratio 1.8 g/dL    Total  Bilirubin 0.5 0.2 - 1.2 mg/dL    Alkaline Phosphatase 94 40 - 129 U/L    AST (SGOT) 19 5 - 40 U/L    ALT (SGPT) 12 5 - 41 U/L   CBC & Differential   Result Value Ref Range    WBC 6.27 4.80 - 10.80 10*3/mm3    RBC 5.39 4.70 - 6.10 10*6/mm3    Hemoglobin 15.3 14.0 - 18.0 g/dL    Hematocrit 46.1 42.0 - 52.0 %    MCV 85.5 80.0 - 94.0 fL    MCH 28.4 27.0 - 31.0 pg    MCHC 33.2 31.0 - 37.0 g/dL    RDW 17.2 (H) 11.5 - 14.5 %    Platelets 285 140 - 500 10*3/mm3    Neutrophil Rel % 57.2 45.0 - 70.0 %    Lymphocyte Rel % 27.1 20.0 - 45.0 %    Monocyte Rel % 9.9 (H) 3.0 - 8.0 %    Eosinophil Rel % 4.0 0.0 - 4.0 %    Basophil Rel % 1.3 0.0 - 2.0 %    Neutrophils Absolute 3.59 1.50 - 8.30 10*3/mm3    Lymphocytes Absolute 1.70 0.60 - 4.80 10*3/mm3    Monocytes Absolute 0.62 0.00 - 1.00 10*3/mm3    Eosinophils Absolute 0.25 0.10 - 0.30 10*3/mm3    Basophils Absolute 0.08 0.00 - 0.20 10*3/mm3    Immature Granulocyte Rel % 0.5 0.0 - 0.5 %    Immature Grans Absolute 0.03 0.00 - 0.03 10*3/mm3    nRBC 0.0 0.0 - 0.0 /100 WBC       Assessment/Plan   Andrew was seen today for back pain.    Diagnoses and all orders for this visit:    Other closed fracture of first lumbar vertebra with routine healing, subsequent encounter  -     Ambulatory Referral to Neurosurgery  -     oxyCODONE-acetaminophen (PERCOCET)  MG per tablet; Take 1 tablet by mouth Every 4 (Four) Hours As Needed for Severe Pain .  -     calcitonin, salmon, (MIACALCIN) 200 UNIT/ACT nasal spray; 1 spray into each nostril Daily.  -     Ambulatory Referral to Home Health    Alzheimer's dementia without behavioral disturbance, unspecified timing of dementia onset        25 min spent with patient with >50% spent in counseling about lumbar fracture, home health, prevention of opoid induced constipation.  Instructions printed in AVS for patient.     Outpatient Medications Prior to Visit   Medication Sig Dispense Refill   • diphenhydrAMINE (BENADRYL) 25 mg capsule Take 25 mg by mouth  every 6 (six) hours as needed for itching.     • docusate sodium (COLACE) 100 MG capsule Take 1 capsule by mouth 2 (Two) Times a Day As Needed for Constipation. Take 1 tablet by mouth 2 times daily as needed for constipation 30 capsule 0   • HYDROcodone-acetaminophen (NORCO) 5-325 MG per tablet Take 1-2 tabs by mouth every 4-6 hours as needed for pain 20 tablet 0   • lamoTRIgine (LaMICtal) 200 MG tablet Take 1 tablet by mouth 2 (two) times a day.     • memantine (NAMENDA) 10 MG tablet Take by mouth 2 (two) times a day.     • OLANZapine (ZyPREXA) 20 MG tablet Take by mouth daily.     • polyethylene glycol (MIRALAX) powder Take 17 g by mouth Daily. 850 g 5   • rivastigmine (EXELON) 6 MG capsule Take by mouth 2 (two) times a day.       No facility-administered medications prior to visit.      New Medications Ordered This Visit   Medications   • oxyCODONE-acetaminophen (PERCOCET)  MG per tablet     Sig: Take 1 tablet by mouth Every 4 (Four) Hours As Needed for Severe Pain .     Dispense:  18 tablet     Refill:  0   • calcitonin, salmon, (MIACALCIN) 200 UNIT/ACT nasal spray     Si spray into each nostril Daily.     Dispense:  3.7 mL     Refill:  3     [unfilled]  There are no discontinued medications.      Return in about 4 weeks (around 2018), or if symptoms worsen or fail to improve.

## 2018-01-16 ENCOUNTER — EPISODE CHANGES (OUTPATIENT)
Dept: CASE MANAGEMENT | Facility: OTHER | Age: 73
End: 2018-01-16

## 2018-01-18 ENCOUNTER — OFFICE VISIT (OUTPATIENT)
Dept: NEUROSURGERY | Facility: CLINIC | Age: 73
End: 2018-01-18

## 2018-01-18 VITALS
BODY MASS INDEX: 22.73 KG/M2 | HEART RATE: 68 BPM | SYSTOLIC BLOOD PRESSURE: 126 MMHG | HEIGHT: 68 IN | WEIGHT: 150 LBS | DIASTOLIC BLOOD PRESSURE: 80 MMHG

## 2018-01-18 DIAGNOSIS — S32.010A COMPRESSION FRACTURE OF L1 LUMBAR VERTEBRA, CLOSED, INITIAL ENCOUNTER (HCC): Primary | ICD-10-CM

## 2018-01-18 PROCEDURE — 99203 OFFICE O/P NEW LOW 30 MIN: CPT | Performed by: NURSE PRACTITIONER

## 2018-01-18 NOTE — PROGRESS NOTES
Subjective   Patient ID: Andrew Redmond is a 72 y.o. male is being seen for consultation today at the request of Jessica Birch* for lower back pain associated with a fall. Pt was in the ER on 01/13/2018. Per Dr. Molina this patient needed to be worked in. Pt states this was the first incident falling.   Pt has CT lumbar spine, XRAY lumbar spine and XRAY of the pelvis at [facility].   Pt is currently taking Percocet  mg PRN pain.       HPI Comments: This is a 72-year-old male with a history of fall at home 5 days ago.  He states that he landed on his buttocks.  He fell in his bedroom.  He denied any dizziness or syncope leading up to the fall. He was seen in the ER. He states that he had a vertebral fracture in the thoracic spine many years ago. He denies any leg pain or LE weakness/numbness. Lumbar CT revealed an L1 compression fracture and he was fitted with a lumbar brace. He was seen by Dr. Birch and referred her for a neurosurgical opinion.  Notes from today's visit will be forwarded upon completion.     Back Pain   This is a new problem. The current episode started in the past 7 days. The problem occurs constantly. The problem has been gradually worsening since onset. The pain is present in the lumbar spine. The quality of the pain is described as shooting. The pain does not radiate. The pain is at a severity of 5/10. The pain is mild. The symptoms are aggravated by standing, twisting, bending, position and lying down. Stiffness is present all day. Pertinent negatives include no bladder incontinence, bowel incontinence, chest pain, headaches, numbness, tingling or weakness. Risk factors include recent trauma (Patient experienced a fall 5 days ago.). He has tried muscle relaxant for the symptoms. The treatment provided mild relief.       The following portions of the patient's history were reviewed and updated as appropriate: allergies, current medications, past family history, past medical  history, past social history, past surgical history and problem list.    Review of Systems   Constitutional: Positive for activity change.   Respiratory: Negative for chest tightness and shortness of breath.    Cardiovascular: Negative for chest pain.   Gastrointestinal: Negative for bowel incontinence.   Genitourinary: Negative for bladder incontinence.   Musculoskeletal: Positive for back pain. Arthralgias: back    Neurological: Positive for seizures. Negative for dizziness, tingling, weakness, numbness and headaches.   All other systems reviewed and are negative.      Objective   Physical Exam   Constitutional: He is oriented to person, place, and time. He appears well-developed and well-nourished. He is cooperative. No distress.   HENT:   Head: Normocephalic and atraumatic.   Eyes: Conjunctivae are normal.   Neck: Normal range of motion. Neck supple.   Cardiovascular: Normal rate.    Pulmonary/Chest: Effort normal. No respiratory distress.   Abdominal: Soft. He exhibits no distension. There is no tenderness.   Musculoskeletal: Normal range of motion. He exhibits tenderness (To palpation in the upper lumbar spine. ). He exhibits no edema.   Neurological: He is alert and oriented to person, place, and time. He has normal strength and normal reflexes. He displays normal reflexes. No sensory deficit. He exhibits normal muscle tone. Coordination normal. GCS eye subscore is 4. GCS verbal subscore is 5. GCS motor subscore is 6.   Reflex Scores:       Tricep reflexes are 2+ on the right side and 2+ on the left side.       Bicep reflexes are 2+ on the right side and 2+ on the left side.       Brachioradialis reflexes are 2+ on the right side and 2+ on the left side.       Patellar reflexes are 2+ on the right side and 2+ on the left side.       Achilles reflexes are 2+ on the right side and 2+ on the left side.  No motor or sensory deficits on exam. Patient is wearing the lumbar brace. DTR's are normal. Negative  Joyner's; negative clonus. SLR negative bilaterally.    Skin: Skin is warm and dry. No rash noted. He is not diaphoretic.   Psychiatric: He has a normal mood and affect. Thought content normal.   Vitals reviewed.    Neurologic Exam     Mental Status   Oriented to person, place, and time.     Motor Exam     Strength   Strength 5/5 throughout.     Gait, Coordination, and Reflexes     Reflexes   Right brachioradialis: 2+  Left brachioradialis: 2+  Right biceps: 2+  Left biceps: 2+  Right triceps: 2+  Left triceps: 2+  Right patellar: 2+  Left patellar: 2+  Right achilles: 2+  Left achilles: 2+      Assessment/Plan   Independent Review of Radiographic Studies:      I reviewed the CT images of the lumbar spine performed January 13, 2018 today in the office.  The CT showed 10-15% body height loss at L1 secondary to an endplate fracture.  The CT showed minimal retropulsion and mild canal narrowing.  There are multilevel degenerative changes with moderate to severe facet arthropathy at L3-4, L4-5, and L5-S1.  There is moderate to severe central stenosis at L4-5.    Medical Decision Making:      Mr. Redmond was seen today for the above complaints.  He notes that the lumbar brace does help some with his discomfort.  He is not bedbound.  He is able to walk and get around quite well.  He has no weakness on exam and no sensory deficits.    I would like to get an MRI of the lumbar spine to better evaluate the compression fracture and the canal.  He will follow-up with Dr. Molina after the MRI.  We will continue with the lumbar brace.  If he has any worsening pain symptoms in the meantime, Mr. Redmond was encouraged to call the office.    Andrew was seen today for back pain.    Diagnoses and all orders for this visit:    Compression fracture of L1 lumbar vertebra, closed, initial encounter  -     MRI Lumbar Spine Without Contrast; Future      No Follow-up on file.

## 2018-01-24 ENCOUNTER — HOSPITAL ENCOUNTER (OUTPATIENT)
Dept: MRI IMAGING | Facility: HOSPITAL | Age: 73
Discharge: HOME | End: 2018-01-24
Admitting: NURSE PRACTITIONER

## 2018-01-24 DIAGNOSIS — S32.010A COMPRESSION FRACTURE OF L1 LUMBAR VERTEBRA, CLOSED, INITIAL ENCOUNTER (HCC): ICD-10-CM

## 2018-01-24 PROCEDURE — 72148 MRI LUMBAR SPINE W/O DYE: CPT

## 2018-02-07 ENCOUNTER — OFFICE VISIT (OUTPATIENT)
Dept: NEUROSURGERY | Facility: CLINIC | Age: 73
End: 2018-02-07

## 2018-02-07 VITALS
WEIGHT: 150 LBS | HEART RATE: 96 BPM | BODY MASS INDEX: 22.73 KG/M2 | SYSTOLIC BLOOD PRESSURE: 112 MMHG | HEIGHT: 68 IN | DIASTOLIC BLOOD PRESSURE: 66 MMHG

## 2018-02-07 DIAGNOSIS — M54.50 ACUTE BILATERAL LOW BACK PAIN WITHOUT SCIATICA: ICD-10-CM

## 2018-02-07 DIAGNOSIS — S32.010D CLOSED COMPRESSION FRACTURE OF L1 LUMBAR VERTEBRA WITH ROUTINE HEALING, SUBSEQUENT ENCOUNTER: Primary | ICD-10-CM

## 2018-02-07 PROBLEM — S32.010A CLOSED COMPRESSION FRACTURE OF L1 LUMBAR VERTEBRA: Status: ACTIVE | Noted: 2018-02-07

## 2018-02-07 PROCEDURE — 99213 OFFICE O/P EST LOW 20 MIN: CPT | Performed by: NEUROLOGICAL SURGERY

## 2018-02-07 NOTE — PROGRESS NOTES
Subjective   Patient ID: Andrew Redmond is a 72 y.o. male is here today for follow-up on compression fracture.    At the patient's last visit he reported lower back pain.    Today the patient is here with a new lumbar spine MRI. He reports that his back pain has resolved. He has some discomfort at times.    Back Pain   This is a recurrent problem. The current episode started 1 to 4 weeks ago. The problem occurs daily. The problem has been rapidly improving since onset. The pain is present in the lumbar spine. Pertinent negatives include no bladder incontinence, bowel incontinence, numbness, tingling or weakness.       The following portions of the patient's history were reviewed and updated as appropriate: allergies, current medications, past family history, past medical history, past social history, past surgical history and problem list.    Review of Systems   Gastrointestinal: Negative for bowel incontinence.   Genitourinary: Negative for bladder incontinence.   Musculoskeletal: Positive for back pain.   Neurological: Negative for tingling, weakness and numbness.   All other systems reviewed and are negative.    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 He slipped and fell at home and began having severe low back pain without leg pain. No leg weakness. He was found to have an acute L1 traumatic compression fracture. He has been put in a brace. We reviewed the new MRI which did not show any other findings, such as an epidural hematoma. He is wearing his brace which does seem to help, and overall his pain is doing better. No focal motor deficits, particularly in the lower extremities. He asked if we could try and keep the financial cost of imaging to a minimum, and I think that is fine. We will see him in 2 months with flexion-extension films. I think we can forego a CT scan. If flexion-extension films look fine in 2 months, which is the 3-month negin, we can take off the brace. We can  just probably show him simple exercises to do if he wants to minimize financial expenditures.      Objective   Physical Exam   Constitutional: He is oriented to person, place, and time. He appears well-developed and well-nourished.   HENT:   Head: Normocephalic and atraumatic.   Eyes: Conjunctivae and EOM are normal. Pupils are equal, round, and reactive to light.   Fundoscopic exam:       The right eye shows no papilledema. The right eye shows venous pulsations.        The left eye shows no papilledema. The left eye shows venous pulsations.   Neck: Carotid bruit is not present.   Neurological: He is oriented to person, place, and time. He has a normal Finger-Nose-Finger Test and a normal Heel to Shin Test. Gait normal.   Reflex Scores:       Tricep reflexes are 2+ on the right side and 2+ on the left side.       Bicep reflexes are 2+ on the right side and 2+ on the left side.       Brachioradialis reflexes are 2+ on the right side and 2+ on the left side.       Patellar reflexes are 2+ on the right side and 2+ on the left side.       Achilles reflexes are 2+ on the right side and 2+ on the left side.  Psychiatric: His speech is normal.     Neurologic Exam     Mental Status   Oriented to person, place, and time.   Registration of memory: Good recent and remote memory.   Attention: normal. Concentration: normal.   Speech: speech is normal   Level of consciousness: alert  Knowledge: consistent with education.     Cranial Nerves     CN II   Visual fields full to confrontation.   Visual acuity: normal    CN III, IV, VI   Pupils are equal, round, and reactive to light.  Extraocular motions are normal.     CN V   Facial sensation intact.   Right corneal reflex: normal  Left corneal reflex: normal    CN VII   Facial expression full, symmetric.   Right facial weakness: none  Left facial weakness: none    CN VIII   Hearing: intact    CN IX, X   Palate: symmetric    CN XI   Right sternocleidomastoid strength: normal  Left  sternocleidomastoid strength: normal    CN XII   Tongue: not atrophic  Tongue deviation: none    Motor Exam   Muscle bulk: normal  Right arm tone: normal  Left arm tone: normal  Right leg tone: normal  Left leg tone: normal    Strength   Strength 5/5 except as noted.     Sensory Exam   Light touch normal.     Gait, Coordination, and Reflexes     Gait  Gait: normal    Coordination   Finger to nose coordination: normal  Heel to shin coordination: normal    Reflexes   Right brachioradialis: 2+  Left brachioradialis: 2+  Right biceps: 2+  Left biceps: 2+  Right triceps: 2+  Left triceps: 2+  Right patellar: 2+  Left patellar: 2+  Right achilles: 2+  Left achilles: 2+  Right : 2+  Left : 2+      Assessment/Plan   Independent Review of Radiographic Studies:    I reviewed the recently completed MRI done 01/18/2018 which shows a subacute L1 compression fracture without any significant retropulsion, about 30% collapse, degenerative changes otherwise. Agree with the report.      Medical Decision Making:    He seems to be doing better with bracing. We will see him in 2 months with flexion-extension films. Because of the financial issues, we will forego the CT scan which I think is expendable. If he is doing okay, we can take off the brace and consider physical therapy, but he just may want to have exercise shown to him, which I can do next time.      Andrew was seen today for back pain.    Diagnoses and all orders for this visit:    Closed compression fracture of L1 lumbar vertebra with routine healing, subsequent encounter  -     XR Spine Lumbar Complete With Flex & Ext; Future    Acute bilateral low back pain without sciatica  -     XR Spine Lumbar Complete With Flex & Ext; Future    Return in about 2 months (around 4/7/2018).

## 2018-02-12 ENCOUNTER — OFFICE VISIT (OUTPATIENT)
Dept: INTERNAL MEDICINE | Facility: CLINIC | Age: 73
End: 2018-02-12

## 2018-02-12 VITALS
WEIGHT: 159.8 LBS | OXYGEN SATURATION: 97 % | HEIGHT: 68 IN | DIASTOLIC BLOOD PRESSURE: 72 MMHG | SYSTOLIC BLOOD PRESSURE: 128 MMHG | BODY MASS INDEX: 24.22 KG/M2 | HEART RATE: 75 BPM

## 2018-02-12 DIAGNOSIS — Z87.891 FORMER SMOKER: ICD-10-CM

## 2018-02-12 DIAGNOSIS — G30.9 ALZHEIMER'S DEMENTIA WITHOUT BEHAVIORAL DISTURBANCE, UNSPECIFIED TIMING OF DEMENTIA ONSET: ICD-10-CM

## 2018-02-12 DIAGNOSIS — F30.9 BIPOLAR I DISORDER, SINGLE MANIC EPISODE (HCC): ICD-10-CM

## 2018-02-12 DIAGNOSIS — R53.83 LOW ENERGY: ICD-10-CM

## 2018-02-12 DIAGNOSIS — Z00.00 MEDICARE ANNUAL WELLNESS VISIT, INITIAL: Primary | ICD-10-CM

## 2018-02-12 DIAGNOSIS — S32.010D CLOSED COMPRESSION FRACTURE OF L1 LUMBAR VERTEBRA WITH ROUTINE HEALING, SUBSEQUENT ENCOUNTER: ICD-10-CM

## 2018-02-12 DIAGNOSIS — Z79.899 HIGH RISK MEDICATION USE: ICD-10-CM

## 2018-02-12 DIAGNOSIS — F02.80 ALZHEIMER'S DEMENTIA WITHOUT BEHAVIORAL DISTURBANCE, UNSPECIFIED TIMING OF DEMENTIA ONSET: ICD-10-CM

## 2018-02-12 DIAGNOSIS — Z12.5 ENCOUNTER FOR SCREENING FOR MALIGNANT NEOPLASM OF PROSTATE: ICD-10-CM

## 2018-02-12 PROCEDURE — G0438 PPPS, INITIAL VISIT: HCPCS | Performed by: INTERNAL MEDICINE

## 2018-02-12 PROCEDURE — 99213 OFFICE O/P EST LOW 20 MIN: CPT | Performed by: INTERNAL MEDICINE

## 2018-02-12 NOTE — PROGRESS NOTES
Subjective     Andrew Redmond is a 72 y.o. male, who presents with a chief complaint of   Chief Complaint   Patient presents with   • Follow-up   • Back Pain     Broken back       HPI   The pt is here for follow up.  He fell and had a fracture in his back.  It was very painful at first.  He is on calcitoin nasal spray.  He is no longer taking percocet.  He has a back brace on from [facility].  No issues with constipation.  No LE weakness, numbness, or tingling in legs.  He was even shoveling snow some recently.      Hx bipolar I - pt on multiple psych meds.  He is also on namenda for alzheimers.  He says that he has been feeling fine.  He has some low energy levels but otherwise doing ok.  Sleeping ok.  Appetite ok .    The following portions of the patient's history were reviewed and updated as appropriate: allergies, current medications, past family history, past medical history, past social history, past surgical history and problem list.    Allergies: Review of patient's allergies indicates no known allergies.    Review of Systems   Constitutional: Negative.    HENT: Negative.    Eyes: Negative.    Respiratory: Negative.    Cardiovascular: Negative.    Gastrointestinal: Negative.    Endocrine: Negative.    Genitourinary: Negative.    Musculoskeletal: Negative.    Skin: Negative.    Allergic/Immunologic: Negative.    Neurological: Negative.    Hematological: Negative.    Psychiatric/Behavioral: Negative.    All other systems reviewed and are negative.      Objective     Wt Readings from Last 3 Encounters:   02/12/18 72.5 kg (159 lb 12.8 oz)   02/07/18 68 kg (150 lb)   01/18/18 68 kg (150 lb)     Temp Readings from Last 3 Encounters:   01/13/18 98.1 °F (36.7 °C) (Oral)   08/15/16 97.6 °F (36.4 °C) (Oral)   04/01/16 98.1 °F (36.7 °C) (Oral)     BP Readings from Last 3 Encounters:   02/12/18 128/72   02/07/18 112/66   01/18/18 126/80     Pulse Readings from Last 3 Encounters:   02/12/18 75   02/07/18 96   01/18/18 68      Body mass index is 24.3 kg/(m^2).  SpO2 Readings from Last 3 Encounters:   02/12/18 97%   01/15/18 97%   01/13/18 95%       Physical Exam   Constitutional: He is oriented to person, place, and time. He appears well-developed and well-nourished. No distress.   HENT:   Head: Normocephalic and atraumatic.   Right Ear: External ear normal.   Left Ear: External ear normal.   Nose: Nose normal.   Mouth/Throat: Oropharynx is clear and moist.   Eyes: Conjunctivae and EOM are normal. Pupils are equal, round, and reactive to light.   Neck: Normal range of motion. Neck supple.   Cardiovascular: Normal rate, regular rhythm, normal heart sounds and intact distal pulses.    Pulmonary/Chest: Effort normal and breath sounds normal. No respiratory distress. He has no wheezes.   Musculoskeletal: Normal range of motion.   Normal gait   Neurological: He is alert and oriented to person, place, and time.   Skin: Skin is warm and dry.   Psychiatric: He has a normal mood and affect. His behavior is normal. Judgment and thought content normal.   Nursing note and vitals reviewed.      Results for orders placed or performed in visit on 10/06/17   Testosterone, Free, Total   Result Value Ref Range    Testosterone, Total 381 264 - 916 ng/dL    Testosterone, Free 4.7 (L) 6.6 - 18.1 pg/mL   Comprehensive Metabolic Panel   Result Value Ref Range    Glucose 89 65 - 99 mg/dL    BUN 8 8 - 23 mg/dL    Creatinine 0.76 0.76 - 1.27 mg/dL    eGFR Non African Am 101 >60 mL/min/1.73    eGFR African Am 122 >60 mL/min/1.73    BUN/Creatinine Ratio 10.5 7.0 - 25.0    Sodium 139 136 - 145 mmol/L    Potassium 4.7 3.5 - 5.2 mmol/L    Chloride 97 (L) 98 - 107 mmol/L    Total CO2 26.3 22.0 - 29.0 mmol/L    Calcium 9.1 8.8 - 10.5 mg/dL    Total Protein 6.5 6.0 - 8.5 g/dL    Albumin 4.20 3.50 - 5.20 g/dL    Globulin 2.3 gm/dL    A/G Ratio 1.8 g/dL    Total Bilirubin 0.5 0.2 - 1.2 mg/dL    Alkaline Phosphatase 94 40 - 129 U/L    AST (SGOT) 19 5 - 40 U/L    ALT  (SGPT) 12 5 - 41 U/L   CBC & Differential   Result Value Ref Range    WBC 6.27 4.80 - 10.80 10*3/mm3    RBC 5.39 4.70 - 6.10 10*6/mm3    Hemoglobin 15.3 14.0 - 18.0 g/dL    Hematocrit 46.1 42.0 - 52.0 %    MCV 85.5 80.0 - 94.0 fL    MCH 28.4 27.0 - 31.0 pg    MCHC 33.2 31.0 - 37.0 g/dL    RDW 17.2 (H) 11.5 - 14.5 %    Platelets 285 140 - 500 10*3/mm3    Neutrophil Rel % 57.2 45.0 - 70.0 %    Lymphocyte Rel % 27.1 20.0 - 45.0 %    Monocyte Rel % 9.9 (H) 3.0 - 8.0 %    Eosinophil Rel % 4.0 0.0 - 4.0 %    Basophil Rel % 1.3 0.0 - 2.0 %    Neutrophils Absolute 3.59 1.50 - 8.30 10*3/mm3    Lymphocytes Absolute 1.70 0.60 - 4.80 10*3/mm3    Monocytes Absolute 0.62 0.00 - 1.00 10*3/mm3    Eosinophils Absolute 0.25 0.10 - 0.30 10*3/mm3    Basophils Absolute 0.08 0.00 - 0.20 10*3/mm3    Immature Granulocyte Rel % 0.5 0.0 - 0.5 %    Immature Grans Absolute 0.03 0.00 - 0.03 10*3/mm3    nRBC 0.0 0.0 - 0.0 /100 WBC       Assessment/Plan   Andrew was seen today for follow-up and back pain.    Diagnoses and all orders for this visit:    Medicare annual wellness visit, initial  -      AAA Screen Limited; Future  -     Comprehensive Metabolic Panel; Future  -     CBC & Differential; Future  -     T4, Free; Future  -     TSH; Future  -     Lipid Panel With LDL / HDL Ratio; Future  -     Hemoglobin A1c; Future  -     PSA Screen; Future    Former smoker  -      AAA Screen Limited; Future    Alzheimer's dementia without behavioral disturbance, unspecified timing of dementia onset  -     Comprehensive Metabolic Panel; Future  -     CBC & Differential; Future  -     T4, Free; Future  -     TSH; Future  -     Lipid Panel With LDL / HDL Ratio; Future    Closed compression fracture of L1 lumbar vertebra with routine healing, subsequent encounter  -     Comprehensive Metabolic Panel; Future  -     CBC & Differential; Future  -     T4, Free; Future  -     TSH; Future  -     Lipid Panel With LDL / HDL Ratio; Future    Bipolar I disorder, single  manic episode    High risk medication use  -     Comprehensive Metabolic Panel; Future  -     CBC & Differential; Future  -     T4, Free; Future  -     TSH; Future  -     Lipid Panel With LDL / HDL Ratio; Future  -     Hemoglobin A1c; Future  -     PSA Screen; Future    Low energy    Encounter for screening for malignant neoplasm of prostate   -     PSA Screen; Future      Cont current meds.  Ov/labs in 3 mo.     Outpatient Medications Prior to Visit   Medication Sig Dispense Refill   • calcitonin, salmon, (MIACALCIN) 200 UNIT/ACT nasal spray 1 spray into each nostril Daily. 3.7 mL 3   • diphenhydrAMINE (BENADRYL) 25 mg capsule Take 25 mg by mouth every 6 (six) hours as needed for itching.     • docusate sodium (COLACE) 100 MG capsule Take 1 capsule by mouth 2 (Two) Times a Day As Needed for Constipation. Take 1 tablet by mouth 2 times daily as needed for constipation 30 capsule 0   • lamoTRIgine (LaMICtal) 200 MG tablet Take 1 tablet by mouth 2 (two) times a day.     • memantine (NAMENDA) 10 MG tablet Take by mouth 2 (two) times a day.     • OLANZapine (ZyPREXA) 20 MG tablet Take by mouth daily.     • oxyCODONE-acetaminophen (PERCOCET)  MG per tablet Take 1 tablet by mouth Every 4 (Four) Hours As Needed for Severe Pain . 18 tablet 0   • polyethylene glycol (MIRALAX) powder Take 17 g by mouth Daily. 850 g 5   • rivastigmine (EXELON) 6 MG capsule Take by mouth 2 (two) times a day.       No facility-administered medications prior to visit.      No orders of the defined types were placed in this encounter.    [unfilled]  There are no discontinued medications.      Return in about 3 months (around 5/12/2018) for Recheck, labs.

## 2018-02-12 NOTE — PROGRESS NOTES
QUICK REFERENCE INFORMATION:  The ABCs of the Annual Wellness Visit    Initial Medicare Wellness Visit    HEALTH RISK ASSESSMENT    1945    Recent Hospitalizations:  No hospitalization(s) within the last year..    Pt did recently fall and have compression fx of vertebrae    Current Medical Providers:  Patient Care Team:  Jessica Birch MD as PCP - General (Internal Medicine & Pediatrics)  Huan Romano MD as PCP - Family Medicine  [facility], MD as PCP - Claims Attributed  Lachelle Marquez RN as Care Coordinator (Population Health)        Smoking Status:  History   Smoking Status   • Former Smoker   • Packs/day: 2.00   • Years: 20.00   Smokeless Tobacco   • Former User   • Quit date: 1987       Alcohol Consumption:  History   Alcohol Use   • Yes     Comment: few drinks a month        Depression Screen:   PHQ-2/PHQ-9 Depression Screening 2/12/2018   Little interest or pleasure in doing things 0   Feeling down, depressed, or hopeless 0   Total Score 0       Health Habits and Functional and Cognitive Screening:  Functional & Cognitive Status 2/12/2018   Do you have difficulty preparing food and eating? No   Do you have difficulty bathing yourself, getting dressed or grooming yourself? No   Do you have difficulty using the toilet? No   Do you have difficulty moving around from place to place? No   Do you have trouble with steps or getting out of a bed or a chair? No   In the past year have you fallen or experienced a near fall? Yes   Current Diet Well Balanced Diet   Dental Exam Up to date   Eye Exam Up to date   Exercise (times per week) 0 times per week   Current Exercise Activities Include None   Do you need help using the phone?  No   Are you deaf or do you have serious difficulty hearing?  No   Do you need help with transportation? No   Do you need help shopping? No   Do you need help preparing meals?  No   Do you need help with housework?  No   Do you need help with laundry? No   Do  you need help taking your medications? No   Do you need help managing money? No   Have you felt unusual stress, anger or loneliness in the last month? No   Who do you live with? Alone   If you need help, do you have trouble finding someone available to you? No   Have you been bothered in the last four weeks by sexual problems? Yes   Do you have difficulty concentrating, remembering or making decisions? No           Does the patient have evidence of cognitive impairment? No    Asiprin use counseling: Taking ASA appropriately as indicated      Recent Lab Results:    Visual Acuity:  No exam data present    Age-appropriate Screening Schedule:  Refer to the list below for future screening recommendations based on patient's age, sex and/or medical conditions. Orders for these recommended tests are listed in the plan section. The patient has been provided with a written plan.    Health Maintenance   Topic Date Due   • PNEUMOCOCCAL VACCINES (65+ LOW/MEDIUM RISK) (1 of 2 - PCV13) 09/25/2010   • ZOSTER VACCINE  02/17/2016   • TDAP/TD VACCINES (1 - Tdap) 08/16/2016   • COLONOSCOPY  04/01/2026   • INFLUENZA VACCINE  Addressed        Subjective   History of Present Illness    Andrew Redmond is a 72 y.o. male who presents for an Annual Wellness Visit.    The following portions of the patient's history were reviewed and updated as appropriate: allergies, current medications, past family history, past medical history, past social history, past surgical history and problem list.    Outpatient Medications Prior to Visit   Medication Sig Dispense Refill   • calcitonin, salmon, (MIACALCIN) 200 UNIT/ACT nasal spray 1 spray into each nostril Daily. 3.7 mL 3   • diphenhydrAMINE (BENADRYL) 25 mg capsule Take 25 mg by mouth every 6 (six) hours as needed for itching.     • docusate sodium (COLACE) 100 MG capsule Take 1 capsule by mouth 2 (Two) Times a Day As Needed for Constipation. Take 1 tablet by mouth 2 times daily as needed for  constipation 30 capsule 0   • lamoTRIgine (LaMICtal) 200 MG tablet Take 1 tablet by mouth 2 (two) times a day.     • memantine (NAMENDA) 10 MG tablet Take by mouth 2 (two) times a day.     • OLANZapine (ZyPREXA) 20 MG tablet Take by mouth daily.     • oxyCODONE-acetaminophen (PERCOCET)  MG per tablet Take 1 tablet by mouth Every 4 (Four) Hours As Needed for Severe Pain . 18 tablet 0   • polyethylene glycol (MIRALAX) powder Take 17 g by mouth Daily. 850 g 5   • rivastigmine (EXELON) 6 MG capsule Take by mouth 2 (two) times a day.       No facility-administered medications prior to visit.        Patient Active Problem List   Diagnosis   • Alzheimer's disease   • Bipolar I disorder, single manic episode   • Epilepsy   • Chronic constipation   • Hemorrhoids   • Polyp of colon   • Erectile dysfunction   • High risk medication use   • Poor balance   • Subdural hematoma   • Seizure   • Low energy   • Closed compression fracture of L1 lumbar vertebra   • Acute bilateral low back pain without sciatica       Advance Care Planning:  has an advance directive - a copy HAS NOT been provided. Have asked the patient to send this to us to add to record.    Identification of Risk Factors:  Risk factors include: cardiovascular risk, inactivity, increased fall risk, chronic pain and polypharmacy.    Review of Systems   Constitutional: Negative.    HENT: Negative.    Eyes: Negative.    Respiratory: Negative.    Cardiovascular: Negative.    Gastrointestinal: Negative.    Endocrine: Negative.    Genitourinary: Negative.    Musculoskeletal: Negative.    Skin: Negative.    Allergic/Immunologic: Negative.    Neurological: Negative.    Hematological: Negative.    Psychiatric/Behavioral: Negative.    All other systems reviewed and are negative.      Compared to one year ago, the patient feels his physical health is the same.  Compared to one year ago, the patient feels his mental health is the same.    Objective     Physical Exam  "  Constitutional: He is oriented to person, place, and time. He appears well-developed and well-nourished. No distress.   HENT:   Head: Normocephalic and atraumatic.   Right Ear: External ear normal.   Left Ear: External ear normal.   Nose: Nose normal.   Mouth/Throat: Oropharynx is clear and moist.   Eyes: Conjunctivae and EOM are normal. Pupils are equal, round, and reactive to light.   Neck: Normal range of motion. Neck supple.   Cardiovascular: Normal rate, regular rhythm, normal heart sounds and intact distal pulses.    Pulmonary/Chest: Effort normal and breath sounds normal. No respiratory distress. He has no wheezes.   Musculoskeletal: Normal range of motion.   Normal gait   Neurological: He is alert and oriented to person, place, and time.   Skin: Skin is warm and dry.   Psychiatric: He has a normal mood and affect. His behavior is normal. Judgment and thought content normal.   Nursing note and vitals reviewed.      Vitals:    02/12/18 1309   BP: 128/72   Pulse: 75   SpO2: 97%   Weight: 72.5 kg (159 lb 12.8 oz)   Height: 172.7 cm (67.99\")       Body mass index is 24.3 kg/(m^2).  Discussed the patient's BMI with him. BMI is within normal parameters. No follow-up required.    Assessment/Plan   Patient Self-Management and Personalized Health Advice  The patient has been provided with information about: diet, exercise and prevention of cardiac or vascular disease and preventive services including:   · Bone densitometry screening, Fall Risk assessment done, Fall Risk plan of care done, Nutrition counseling provided, Pneumococcal vaccine .    Visit Diagnoses:    ICD-10-CM ICD-9-CM   1. Medicare annual wellness visit, initial Z00.00 V70.0   2. Former smoker Z87.891 V15.82   3. Alzheimer's dementia without behavioral disturbance, unspecified timing of dementia onset G30.9 331.0    F02.80 294.10   4. Closed compression fracture of L1 lumbar vertebra with routine healing, subsequent encounter S32.010D V54.17   5. " Bipolar I disorder, single manic episode F30.9 296.00   6. High risk medication use Z79.899 V58.69   7. Low energy R53.83 780.79   8. Encounter for screening for malignant neoplasm of prostate  Z12.5 V76.44       Orders Placed This Encounter   Procedures   • US AAA Screen Limited     Standing Status:   Future     Standing Expiration Date:   2/12/2019     Order Specific Question:   Is the patient male between 65-75 years old?   The screening exam is only eligible for Medicare patients that are male, between 65-75 years old, have a family history of AAA or has smoked at least 100 cigarettes in their lifetime.     Answer:   Yes     Order Specific Question:   Reason for Exam:     Answer:   screening   • Comprehensive Metabolic Panel     Standing Status:   Future     Standing Expiration Date:   2/13/2019   • T4, Free     Standing Status:   Future     Standing Expiration Date:   2/13/2019   • TSH     Standing Status:   Future     Standing Expiration Date:   2/13/2019   • Lipid Panel With LDL / HDL Ratio     Standing Status:   Future     Standing Expiration Date:   2/13/2019   • Hemoglobin A1c     Standing Status:   Future     Standing Expiration Date:   2/13/2019   • PSA Screen     Standing Status:   Future     Standing Expiration Date:   2/12/2019   • CBC & Differential     Standing Status:   Future     Standing Expiration Date:   2/13/2019     Order Specific Question:   Manual Differential     Answer:   No       Outpatient Encounter Prescriptions as of 2/12/2018   Medication Sig Dispense Refill   • calcitonin, salmon, (MIACALCIN) 200 UNIT/ACT nasal spray 1 spray into each nostril Daily. 3.7 mL 3   • diphenhydrAMINE (BENADRYL) 25 mg capsule Take 25 mg by mouth every 6 (six) hours as needed for itching.     • docusate sodium (COLACE) 100 MG capsule Take 1 capsule by mouth 2 (Two) Times a Day As Needed for Constipation. Take 1 tablet by mouth 2 times daily as needed for constipation 30 capsule 0   • lamoTRIgine (LaMICtal)  200 MG tablet Take 1 tablet by mouth 2 (two) times a day.     • memantine (NAMENDA) 10 MG tablet Take by mouth 2 (two) times a day.     • OLANZapine (ZyPREXA) 20 MG tablet Take by mouth daily.     • oxyCODONE-acetaminophen (PERCOCET)  MG per tablet Take 1 tablet by mouth Every 4 (Four) Hours As Needed for Severe Pain . 18 tablet 0   • polyethylene glycol (MIRALAX) powder Take 17 g by mouth Daily. 850 g 5   • rivastigmine (EXELON) 6 MG capsule Take by mouth 2 (two) times a day.       No facility-administered encounter medications on file as of 2/12/2018.        Reviewed use of high risk medication in the elderly: yes  Reviewed for potential of harmful drug interactions in the elderly: yes    Follow Up:  Return in about 3 months (around 5/12/2018) for Recheck, labs.     An After Visit Summary and PPPS with all of these plans were given to the patient.

## 2018-02-16 ENCOUNTER — APPOINTMENT (OUTPATIENT)
Dept: ULTRASOUND IMAGING | Facility: HOSPITAL | Age: 73
End: 2018-02-16
Attending: INTERNAL MEDICINE

## 2018-02-17 ENCOUNTER — RESULTS ENCOUNTER (OUTPATIENT)
Dept: INTERNAL MEDICINE | Facility: CLINIC | Age: 73
End: 2018-02-17

## 2018-02-17 DIAGNOSIS — Z79.899 HIGH RISK MEDICATION USE: ICD-10-CM

## 2018-02-17 DIAGNOSIS — F02.80 ALZHEIMER'S DEMENTIA WITHOUT BEHAVIORAL DISTURBANCE, UNSPECIFIED TIMING OF DEMENTIA ONSET: ICD-10-CM

## 2018-02-17 DIAGNOSIS — S32.010D CLOSED COMPRESSION FRACTURE OF L1 LUMBAR VERTEBRA WITH ROUTINE HEALING, SUBSEQUENT ENCOUNTER: ICD-10-CM

## 2018-02-17 DIAGNOSIS — Z12.5 ENCOUNTER FOR SCREENING FOR MALIGNANT NEOPLASM OF PROSTATE: ICD-10-CM

## 2018-02-17 DIAGNOSIS — G30.9 ALZHEIMER'S DEMENTIA WITHOUT BEHAVIORAL DISTURBANCE, UNSPECIFIED TIMING OF DEMENTIA ONSET: ICD-10-CM

## 2018-02-17 DIAGNOSIS — Z00.00 MEDICARE ANNUAL WELLNESS VISIT, INITIAL: ICD-10-CM

## 2018-02-23 DIAGNOSIS — Z87.891 HISTORY OF TOBACCO USE: Primary | ICD-10-CM

## 2018-02-23 DIAGNOSIS — Z13.6 SCREENING FOR AAA (AORTIC ABDOMINAL ANEURYSM): ICD-10-CM

## 2018-03-09 ENCOUNTER — HOSPITAL ENCOUNTER (OUTPATIENT)
Dept: ULTRASOUND IMAGING | Facility: HOSPITAL | Age: 73
Discharge: HOME | End: 2018-03-09
Attending: INTERNAL MEDICINE | Admitting: INTERNAL MEDICINE

## 2018-03-09 DIAGNOSIS — Z87.891 FORMER SMOKER: ICD-10-CM

## 2018-03-09 DIAGNOSIS — Z00.00 MEDICARE ANNUAL WELLNESS VISIT, INITIAL: ICD-10-CM

## 2018-03-09 PROCEDURE — 76706 US ABDL AORTA SCREEN AAA: CPT

## 2018-03-12 ENCOUNTER — TELEPHONE (OUTPATIENT)
Dept: INTERNAL MEDICINE | Facility: CLINIC | Age: 73
End: 2018-03-12

## 2018-03-12 NOTE — TELEPHONE ENCOUNTER
LVM with details.     ----- Message from Jessica Birch MD sent at 3/10/2018  7:35 AM EST -----  Call pt about  Us normal

## 2018-04-13 ENCOUNTER — HOSPITAL ENCOUNTER (OUTPATIENT)
Dept: GENERAL RADIOLOGY | Facility: HOSPITAL | Age: 73
Discharge: HOME | End: 2018-04-13
Admitting: NEUROLOGICAL SURGERY

## 2018-04-13 ENCOUNTER — OFFICE VISIT (OUTPATIENT)
Dept: INTERNAL MEDICINE | Facility: CLINIC | Age: 73
End: 2018-04-13

## 2018-04-13 VITALS
DIASTOLIC BLOOD PRESSURE: 74 MMHG | BODY MASS INDEX: 23.95 KG/M2 | WEIGHT: 158 LBS | SYSTOLIC BLOOD PRESSURE: 102 MMHG | HEART RATE: 74 BPM | HEIGHT: 68 IN | OXYGEN SATURATION: 95 %

## 2018-04-13 DIAGNOSIS — Z79.899 HIGH RISK MEDICATION USE: ICD-10-CM

## 2018-04-13 DIAGNOSIS — S32.010D CLOSED COMPRESSION FRACTURE OF L1 LUMBAR VERTEBRA WITH ROUTINE HEALING, SUBSEQUENT ENCOUNTER: ICD-10-CM

## 2018-04-13 DIAGNOSIS — M54.50 ACUTE BILATERAL LOW BACK PAIN WITHOUT SCIATICA: ICD-10-CM

## 2018-04-13 DIAGNOSIS — F30.9 BIPOLAR I DISORDER, SINGLE MANIC EPISODE (HCC): Primary | ICD-10-CM

## 2018-04-13 LAB
ALBUMIN SERPL-MCNC: 4.2 G/DL (ref 3.5–5.2)
ALBUMIN/GLOB SERPL: 1.6 G/DL
ALP SERPL-CCNC: 115 U/L (ref 40–129)
ALT SERPL-CCNC: 14 U/L (ref 5–41)
AST SERPL-CCNC: 17 U/L (ref 5–40)
BASOPHILS # BLD AUTO: 0.09 10*3/MM3 (ref 0–0.2)
BASOPHILS NFR BLD AUTO: 1.5 % (ref 0–2)
BILIRUB SERPL-MCNC: 0.3 MG/DL (ref 0.2–1.2)
BUN SERPL-MCNC: 9 MG/DL (ref 8–23)
BUN/CREAT SERPL: 11.8 (ref 7–25)
CALCIUM SERPL-MCNC: 9.4 MG/DL (ref 8.8–10.5)
CHLORIDE SERPL-SCNC: 96 MMOL/L (ref 98–107)
CO2 SERPL-SCNC: 30.4 MMOL/L (ref 22–29)
CREAT SERPL-MCNC: 0.76 MG/DL (ref 0.76–1.27)
EOSINOPHIL # BLD AUTO: 0.17 10*3/MM3 (ref 0.1–0.3)
EOSINOPHIL NFR BLD AUTO: 2.7 % (ref 0–4)
ERYTHROCYTE [DISTWIDTH] IN BLOOD BY AUTOMATED COUNT: 16.5 % (ref 11.5–14.5)
GFR SERPLBLD CREATININE-BSD FMLA CKD-EPI: 101 ML/MIN/1.73
GFR SERPLBLD CREATININE-BSD FMLA CKD-EPI: 122 ML/MIN/1.73
GLOBULIN SER CALC-MCNC: 2.6 GM/DL
GLUCOSE SERPL-MCNC: 93 MG/DL (ref 65–99)
HCT VFR BLD AUTO: 47.9 % (ref 42–52)
HGB BLD-MCNC: 15.8 G/DL (ref 14–18)
IMM GRANULOCYTES # BLD: 0.04 10*3/MM3 (ref 0–0.03)
IMM GRANULOCYTES NFR BLD: 0.6 % (ref 0–0.5)
LYMPHOCYTES # BLD AUTO: 2.11 10*3/MM3 (ref 0.6–4.8)
LYMPHOCYTES NFR BLD AUTO: 34 % (ref 20–45)
MCH RBC QN AUTO: 28.3 PG (ref 27–31)
MCHC RBC AUTO-ENTMCNC: 33 G/DL (ref 31–37)
MCV RBC AUTO: 85.8 FL (ref 80–94)
MONOCYTES # BLD AUTO: 0.67 10*3/MM3 (ref 0–1)
MONOCYTES NFR BLD AUTO: 10.8 % (ref 3–8)
NEUTROPHILS # BLD AUTO: 3.12 10*3/MM3 (ref 1.5–8.3)
NEUTROPHILS NFR BLD AUTO: 50.4 % (ref 45–70)
NRBC BLD AUTO-RTO: 0 /100 WBC (ref 0–0)
PLATELET # BLD AUTO: 292 10*3/MM3 (ref 140–500)
POTASSIUM SERPL-SCNC: 4.8 MMOL/L (ref 3.5–5.2)
PROT SERPL-MCNC: 6.8 G/DL (ref 6–8.5)
RBC # BLD AUTO: 5.58 10*6/MM3 (ref 4.7–6.1)
SODIUM SERPL-SCNC: 137 MMOL/L (ref 136–145)
WBC # BLD AUTO: 6.2 10*3/MM3 (ref 4.8–10.8)

## 2018-04-13 PROCEDURE — 99214 OFFICE O/P EST MOD 30 MIN: CPT | Performed by: INTERNAL MEDICINE

## 2018-04-13 PROCEDURE — 72114 X-RAY EXAM L-S SPINE BENDING: CPT

## 2018-04-13 NOTE — PROGRESS NOTES
Andrew Redmond is a 72 y.o. male, who presents with a chief complaint of   Chief Complaint   Patient presents with   • Follow-up   • Back Pain       HPI   The pt is here for follow up.  He fell and had a fracture in his back.  It was very painful at first.  He is on calcitoin nasal spray.  He is no longer taking percocet.  He has a back brace on from [facility].  No issues with constipation.  No LE weakness, numbness, or tingling in legs. He is wearing his back brace.  He has follow up x-rays today.  He follows up with dr. brenner on 4/18.  He does well with his rollator and tends to fall if he doesn't use.      Hx bipolar I - pt on multiple psych meds including zyprexa and lamictal.  No hx diabetes or hyperglycemia.  Kidney and liver function have been normal.  He is also on namenda for alzheimers.  He says that he has been feeling fine.  He has some low energy levels but otherwise doing ok.  Sleeping ok.  Appetite ok .    He is doing well on his baby asa.    The following portions of the patient's history were reviewed and updated as appropriate: allergies, current medications, past family history, past medical history, past social history, past surgical history and problem list.    Allergies: Review of patient's allergies indicates no known allergies.    Review of Systems   Constitutional: Negative.    HENT: Negative.    Eyes: Negative.    Respiratory: Negative.    Cardiovascular: Negative.    Gastrointestinal: Negative.    Endocrine: Negative.    Genitourinary: Negative.    Musculoskeletal: Negative.    Skin: Negative.    Allergic/Immunologic: Negative.    Neurological: Negative.    Hematological: Negative.    Psychiatric/Behavioral: Negative.    All other systems reviewed and are negative.            Wt Readings from Last 3 Encounters:   04/13/18 71.7 kg (158 lb)   02/12/18 72.5 kg (159 lb 12.8 oz)   02/07/18 68 kg (150 lb)     Temp Readings from Last 3 Encounters:   01/13/18 98.1 °F (36.7 °C) (Oral)    08/15/16 97.6 °F (36.4 °C) (Oral)   04/01/16 98.1 °F (36.7 °C) (Oral)     BP Readings from Last 3 Encounters:   04/13/18 102/74   02/12/18 128/72   02/07/18 112/66     Pulse Readings from Last 3 Encounters:   04/13/18 74   02/12/18 75   02/07/18 96     Body mass index is 24.03 kg/m².  @LASTSAO2(3)@    Physical Exam   Constitutional: He is oriented to person, place, and time. He appears well-developed and well-nourished. No distress.   HENT:   Head: Normocephalic and atraumatic.   Right Ear: External ear normal.   Left Ear: External ear normal.   Nose: Nose normal.   Mouth/Throat: Oropharynx is clear and moist.   Eyes: Conjunctivae and EOM are normal. Pupils are equal, round, and reactive to light.   Neck: Normal range of motion. Neck supple.   Cardiovascular: Normal rate, regular rhythm, normal heart sounds and intact distal pulses.    Pulmonary/Chest: Effort normal and breath sounds normal. No respiratory distress. He has no wheezes.   Musculoskeletal:   Walks with rollator and has back brace now.    Neurological: He is alert and oriented to person, place, and time.   Skin: Skin is warm and dry.   Psychiatric: He has a normal mood and affect. His behavior is normal. Judgment and thought content normal.   Nursing note and vitals reviewed.      Results for orders placed or performed in visit on 10/06/17   Testosterone, Free, Total   Result Value Ref Range    Testosterone, Total 381 264 - 916 ng/dL    Testosterone, Free 4.7 (L) 6.6 - 18.1 pg/mL   Comprehensive Metabolic Panel   Result Value Ref Range    Glucose 89 65 - 99 mg/dL    BUN 8 8 - 23 mg/dL    Creatinine 0.76 0.76 - 1.27 mg/dL    eGFR Non African Am 101 >60 mL/min/1.73    eGFR African Am 122 >60 mL/min/1.73    BUN/Creatinine Ratio 10.5 7.0 - 25.0    Sodium 139 136 - 145 mmol/L    Potassium 4.7 3.5 - 5.2 mmol/L    Chloride 97 (L) 98 - 107 mmol/L    Total CO2 26.3 22.0 - 29.0 mmol/L    Calcium 9.1 8.8 - 10.5 mg/dL    Total Protein 6.5 6.0 - 8.5 g/dL     Albumin 4.20 3.50 - 5.20 g/dL    Globulin 2.3 gm/dL    A/G Ratio 1.8 g/dL    Total Bilirubin 0.5 0.2 - 1.2 mg/dL    Alkaline Phosphatase 94 40 - 129 U/L    AST (SGOT) 19 5 - 40 U/L    ALT (SGPT) 12 5 - 41 U/L   CBC & Differential   Result Value Ref Range    WBC 6.27 4.80 - 10.80 10*3/mm3    RBC 5.39 4.70 - 6.10 10*6/mm3    Hemoglobin 15.3 14.0 - 18.0 g/dL    Hematocrit 46.1 42.0 - 52.0 %    MCV 85.5 80.0 - 94.0 fL    MCH 28.4 27.0 - 31.0 pg    MCHC 33.2 31.0 - 37.0 g/dL    RDW 17.2 (H) 11.5 - 14.5 %    Platelets 285 140 - 500 10*3/mm3    Neutrophil Rel % 57.2 45.0 - 70.0 %    Lymphocyte Rel % 27.1 20.0 - 45.0 %    Monocyte Rel % 9.9 (H) 3.0 - 8.0 %    Eosinophil Rel % 4.0 0.0 - 4.0 %    Basophil Rel % 1.3 0.0 - 2.0 %    Neutrophils Absolute 3.59 1.50 - 8.30 10*3/mm3    Lymphocytes Absolute 1.70 0.60 - 4.80 10*3/mm3    Monocytes Absolute 0.62 0.00 - 1.00 10*3/mm3    Eosinophils Absolute 0.25 0.10 - 0.30 10*3/mm3    Basophils Absolute 0.08 0.00 - 0.20 10*3/mm3    Immature Granulocyte Rel % 0.5 0.0 - 0.5 %    Immature Grans Absolute 0.03 0.00 - 0.03 10*3/mm3    nRBC 0.0 0.0 - 0.0 /100 WBC           Andrew was seen today for follow-up and back pain.    Diagnoses and all orders for this visit:    Bipolar I disorder, single manic episode  -     Comprehensive Metabolic Panel; Future  -     CBC & Differential; Future    High risk medication use  -     Comprehensive Metabolic Panel; Future  -     CBC & Differential; Future    Closed compression fracture of L1 lumbar vertebra with routine healing, subsequent encounter      Cont f/u with [facility].  Cont current meds. Ov/labs in 6 mo.     Outpatient Medications Prior to Visit   Medication Sig Dispense Refill   • calcitonin, salmon, (MIACALCIN) 200 UNIT/ACT nasal spray 1 spray into each nostril Daily. 3.7 mL 3   • diphenhydrAMINE (BENADRYL) 25 mg capsule Take 25 mg by mouth every 6 (six) hours as needed for itching.     • docusate sodium (COLACE) 100 MG capsule Take 1 capsule by mouth 2  (Two) Times a Day As Needed for Constipation. Take 1 tablet by mouth 2 times daily as needed for constipation 30 capsule 0   • lamoTRIgine (LaMICtal) 200 MG tablet Take 1 tablet by mouth 2 (two) times a day.     • memantine (NAMENDA) 10 MG tablet Take by mouth 2 (two) times a day.     • OLANZapine (ZyPREXA) 20 MG tablet Take by mouth daily.     • oxyCODONE-acetaminophen (PERCOCET)  MG per tablet Take 1 tablet by mouth Every 4 (Four) Hours As Needed for Severe Pain . 18 tablet 0   • polyethylene glycol (MIRALAX) powder Take 17 g by mouth Daily. 850 g 5   • rivastigmine (EXELON) 6 MG capsule Take by mouth 2 (two) times a day.       No facility-administered medications prior to visit.      No orders of the defined types were placed in this encounter.    [unfilled]  There are no discontinued medications.      Return in about 6 months (around 10/13/2018) for Recheck, labs.

## 2018-04-17 ENCOUNTER — TELEPHONE (OUTPATIENT)
Dept: INTERNAL MEDICINE | Facility: CLINIC | Age: 73
End: 2018-04-17

## 2018-04-17 NOTE — PROGRESS NOTES
Subjective   Patient ID: Andrew Redmond is a 72 y.o. male is here today for follow-up on compression fracture.    At the last visit the patient reported that his back pain had resolved. He still had some discomfort at times.    Today the patient is here with a new lumbar spine x-ray. He reports that he has some soreness in his back. He states that today is the first day he has been out of his brace and he can tell a difference.    Back Pain   This is a chronic problem. The current episode started more than 1 month ago. The problem is unchanged. The pain is present in the lumbar spine. The pain does not radiate. Pertinent negatives include no bladder incontinence, bowel incontinence, leg pain, numbness, tingling or weakness.       The following portions of the patient's history were reviewed and updated as appropriate: allergies, current medications, past family history, past medical history, past social history, past surgical history and problem list.    Review of Systems   Gastrointestinal: Negative for bowel incontinence.   Genitourinary: Negative for bladder incontinence.   Musculoskeletal: Positive for back pain.   Neurological: Negative for tingling, weakness and numbness.   All other systems reviewed and are negative.    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  We discussed the x-rays, which showed good healing of the fracture.  The brace can come off. He was not ever using any pain medications.  His activity level never really changed.  He can live with his mild residual pain.  We can keep it open-ended at this point.          Objective   Physical Exam   Constitutional: He is oriented to person, place, and time. He appears well-developed and well-nourished.   HENT:   Head: Normocephalic and atraumatic.   Eyes: Conjunctivae and EOM are normal. Pupils are equal, round, and reactive to light.   Fundoscopic exam:       The right eye shows no  papilledema. The right eye shows venous pulsations.        The left eye shows no papilledema. The left eye shows venous pulsations.   Neck: Carotid bruit is not present.   Neurological: He is oriented to person, place, and time. He has a normal Finger-Nose-Finger Test and a normal Heel to Shin Test. Gait normal.   Reflex Scores:       Tricep reflexes are 2+ on the right side and 2+ on the left side.       Bicep reflexes are 2+ on the right side and 2+ on the left side.       Brachioradialis reflexes are 2+ on the right side and 2+ on the left side.       Patellar reflexes are 2+ on the right side and 2+ on the left side.       Achilles reflexes are 2+ on the right side and 2+ on the left side.  Psychiatric: His speech is normal.     Neurologic Exam     Mental Status   Oriented to person, place, and time.   Registration of memory: Good recent and remote memory.   Attention: normal. Concentration: normal.   Speech: speech is normal   Level of consciousness: alert  Knowledge: consistent with education.     Cranial Nerves     CN II   Visual fields full to confrontation.   Visual acuity: normal    CN III, IV, VI   Pupils are equal, round, and reactive to light.  Extraocular motions are normal.     CN V   Facial sensation intact.   Right corneal reflex: normal  Left corneal reflex: normal    CN VII   Facial expression full, symmetric.   Right facial weakness: none  Left facial weakness: none    CN VIII   Hearing: intact    CN IX, X   Palate: symmetric    CN XI   Right sternocleidomastoid strength: normal  Left sternocleidomastoid strength: normal    CN XII   Tongue: not atrophic  Tongue deviation: none    Motor Exam   Muscle bulk: normal  Right arm tone: normal  Left arm tone: normal  Right leg tone: normal  Left leg tone: normal    Strength   Strength 5/5 except as noted.     Sensory Exam   Light touch normal.     Gait, Coordination, and Reflexes     Gait  Gait: normal    Coordination   Finger to nose coordination:  normal  Heel to shin coordination: normal    Reflexes   Right brachioradialis: 2+  Left brachioradialis: 2+  Right biceps: 2+  Left biceps: 2+  Right triceps: 2+  Left triceps: 2+  Right patellar: 2+  Left patellar: 2+  Right achilles: 2+  Left achilles: 2+  Right : 2+  Left : 2+      Assessment/Plan   Independent Review of Radiographic Studies:    The flexion-extension films done on 4/13/18 show good healing of the fracture with no flexion-extension abnormalities.  Slight increase in the amount of collapse at L1.  Agree with the report.    Medical Decision Making:    Office in a brace can come off.  He doesn't have much significant pain.  We can keep it open-ended.  If symptoms recur, he can semi-come back to the office.      Andrew was seen today for back pain.    Diagnoses and all orders for this visit:    Closed compression fracture of L1 lumbar vertebra with routine healing, subsequent encounter    Acute bilateral low back pain without sciatica      Return if symptoms worsen or fail to improve.

## 2018-04-17 NOTE — TELEPHONE ENCOUNTER
Patient notified.   ----- Message from Jessica Birch MD sent at 4/16/2018  4:56 PM EDT -----  Call pt about labs.  Labs ok

## 2018-04-18 ENCOUNTER — OFFICE VISIT (OUTPATIENT)
Dept: NEUROSURGERY | Facility: CLINIC | Age: 73
End: 2018-04-18

## 2018-04-18 VITALS
HEART RATE: 104 BPM | BODY MASS INDEX: 23.95 KG/M2 | HEIGHT: 68 IN | SYSTOLIC BLOOD PRESSURE: 103 MMHG | WEIGHT: 158 LBS | DIASTOLIC BLOOD PRESSURE: 67 MMHG

## 2018-04-18 DIAGNOSIS — M54.50 ACUTE BILATERAL LOW BACK PAIN WITHOUT SCIATICA: ICD-10-CM

## 2018-04-18 DIAGNOSIS — S32.010D CLOSED COMPRESSION FRACTURE OF L1 LUMBAR VERTEBRA WITH ROUTINE HEALING, SUBSEQUENT ENCOUNTER: Primary | ICD-10-CM

## 2018-04-18 PROCEDURE — 99213 OFFICE O/P EST LOW 20 MIN: CPT | Performed by: NEUROLOGICAL SURGERY

## 2018-09-30 ENCOUNTER — HOSPITAL ENCOUNTER (EMERGENCY)
Facility: HOSPITAL | Age: 73
Discharge: HOME | End: 2018-09-30
Attending: EMERGENCY MEDICINE | Admitting: EMERGENCY MEDICINE

## 2018-09-30 ENCOUNTER — APPOINTMENT (OUTPATIENT)
Dept: GENERAL RADIOLOGY | Facility: HOSPITAL | Age: 73
End: 2018-09-30

## 2018-09-30 VITALS
TEMPERATURE: 97.9 F | HEIGHT: 68 IN | OXYGEN SATURATION: 96 % | BODY MASS INDEX: 22.73 KG/M2 | RESPIRATION RATE: 18 BRPM | WEIGHT: 150 LBS | DIASTOLIC BLOOD PRESSURE: 106 MMHG | HEART RATE: 65 BPM | SYSTOLIC BLOOD PRESSURE: 132 MMHG

## 2018-09-30 DIAGNOSIS — M54.50 ACUTE MIDLINE LOW BACK PAIN WITHOUT SCIATICA: Primary | ICD-10-CM

## 2018-09-30 DIAGNOSIS — S32.010A CLOSED COMPRESSION FRACTURE OF FIRST LUMBAR VERTEBRA, INITIAL ENCOUNTER: ICD-10-CM

## 2018-09-30 PROCEDURE — 99284 EMERGENCY DEPT VISIT MOD MDM: CPT | Performed by: EMERGENCY MEDICINE

## 2018-09-30 PROCEDURE — 72100 X-RAY EXAM L-S SPINE 2/3 VWS: CPT

## 2018-09-30 PROCEDURE — 99283 EMERGENCY DEPT VISIT LOW MDM: CPT

## 2018-10-01 ENCOUNTER — PATIENT OUTREACH (OUTPATIENT)
Dept: CASE MANAGEMENT | Facility: OTHER | Age: 73
End: 2018-10-01

## 2018-10-01 NOTE — OUTREACH NOTE
Care Management Plan 10/1/2018   Lifestyle Goals Routine follow-up with doctor(s)   Barriers Disease education;Transportation issues   Barriers Epilepsy. Uses public transportation/Bus or assitance from family or friends   Self Management Medication Adherence;Other (See Comment)   Self Management At present assistance with personal care and housekeeping   Annual Wellness Visit:  Patient Has Completed   Care Gaps Addressed Flu Shot   Care Gaps Addressed Patient states does not receive flu vaccine   Specific Disease Process Teaching Hypertension   Does patient have depression diagnosis? No   Advanced Directives: Patient Has   Ed Visits past 12 months: 2 or 3   Hospitalizations past 12 months None     The main concerns and/or symptoms the patient would like to address are: Talked with patient. Discussed 9/30/18 ED visit regarding back pain. Patient states compliance with ED recommendations; using back splint and symptoms have improved. He has PCP follow up appointment on 10/5/18. Patient lives alone; has obtained assistance with ADL's regarding personal care through agency while awaiting installment of walk in tub and  housekeeping services. Patient ambulates with walker and has has no recent falls. Patient does not drive due to epilepsy and uses public transportation or has assistance from family or friends. He states to be compliant with medications and medical appointments. He reports no seizures except for one due to forgetting medication which happens rarely. He reports no difficulty with appetite; but has had episodes of  difficulty sleeping and will discuss with PCP.     Education/instruction provided by Care Coordinator: Reviewed with patient information regarding [facility] [phone] at patient request; Advance Directives. My Chart; Medicare Annual Wellness Visit; gaps in care( flu vaccine which he declines) ; and Care Advising program. Patient verbalized understanding. MWV completed;  patient declines  information regarding 24/7 Nurse Line Telephone number and My Chart. No further questions or concerns voiced at this time.     Follow Up Outreach Due: Follow up as needed.     Lachelle Marquez RN

## 2018-10-05 ENCOUNTER — OFFICE VISIT (OUTPATIENT)
Dept: INTERNAL MEDICINE | Facility: CLINIC | Age: 73
End: 2018-10-05

## 2018-10-05 VITALS
DIASTOLIC BLOOD PRESSURE: 60 MMHG | SYSTOLIC BLOOD PRESSURE: 106 MMHG | HEART RATE: 56 BPM | HEIGHT: 67 IN | BODY MASS INDEX: 22.98 KG/M2 | OXYGEN SATURATION: 98 % | WEIGHT: 146.4 LBS

## 2018-10-05 DIAGNOSIS — E78.5 DYSLIPIDEMIA: ICD-10-CM

## 2018-10-05 DIAGNOSIS — G30.9 ALZHEIMER'S DEMENTIA WITHOUT BEHAVIORAL DISTURBANCE, UNSPECIFIED TIMING OF DEMENTIA ONSET: ICD-10-CM

## 2018-10-05 DIAGNOSIS — Z87.891 HISTORY OF TOBACCO USE: ICD-10-CM

## 2018-10-05 DIAGNOSIS — Z13.6 SCREENING FOR AAA (AORTIC ABDOMINAL ANEURYSM): ICD-10-CM

## 2018-10-05 DIAGNOSIS — G47.9 SLEEP DIFFICULTIES: ICD-10-CM

## 2018-10-05 DIAGNOSIS — S32.010D CLOSED COMPRESSION FRACTURE OF L1 LUMBAR VERTEBRA WITH ROUTINE HEALING, SUBSEQUENT ENCOUNTER: Primary | ICD-10-CM

## 2018-10-05 DIAGNOSIS — Z23 NEED FOR VACCINATION: ICD-10-CM

## 2018-10-05 DIAGNOSIS — F02.80 ALZHEIMER'S DEMENTIA WITHOUT BEHAVIORAL DISTURBANCE, UNSPECIFIED TIMING OF DEMENTIA ONSET: ICD-10-CM

## 2018-10-05 PROBLEM — R63.4 WEIGHT LOSS: Status: ACTIVE | Noted: 2018-10-05

## 2018-10-05 LAB
ALBUMIN SERPL-MCNC: 4.4 G/DL (ref 3.5–5.2)
ALBUMIN/GLOB SERPL: 2 G/DL
ALP SERPL-CCNC: 94 U/L (ref 40–129)
ALT SERPL-CCNC: 13 U/L (ref 5–41)
AST SERPL-CCNC: 19 U/L (ref 5–40)
BASOPHILS # BLD AUTO: 0.07 10*3/MM3 (ref 0–0.2)
BASOPHILS NFR BLD AUTO: 0.8 % (ref 0–2)
BILIRUB SERPL-MCNC: 0.6 MG/DL (ref 0.2–1.2)
BUN SERPL-MCNC: 6 MG/DL (ref 8–23)
BUN/CREAT SERPL: 8 (ref 7–25)
CALCIUM SERPL-MCNC: 9.6 MG/DL (ref 8.8–10.5)
CHLORIDE SERPL-SCNC: 96 MMOL/L (ref 98–107)
CHOLEST SERPL-MCNC: 183 MG/DL (ref 0–200)
CO2 SERPL-SCNC: 27.3 MMOL/L (ref 22–29)
CREAT SERPL-MCNC: 0.75 MG/DL (ref 0.76–1.27)
EOSINOPHIL # BLD AUTO: 0.27 10*3/MM3 (ref 0.1–0.3)
EOSINOPHIL NFR BLD AUTO: 3.1 % (ref 0–4)
ERYTHROCYTE [DISTWIDTH] IN BLOOD BY AUTOMATED COUNT: 16.3 % (ref 11.5–14.5)
GLOBULIN SER CALC-MCNC: 2.2 GM/DL
GLUCOSE SERPL-MCNC: 82 MG/DL (ref 65–99)
HCT VFR BLD AUTO: 45.3 % (ref 42–52)
HDLC SERPL-MCNC: 55 MG/DL (ref 40–60)
HGB BLD-MCNC: 14.9 G/DL (ref 14–18)
IMM GRANULOCYTES # BLD: 0.07 10*3/MM3 (ref 0–0.03)
IMM GRANULOCYTES NFR BLD: 0.8 % (ref 0–0.5)
LDLC SERPL CALC-MCNC: 102 MG/DL (ref 0–100)
LDLC/HDLC SERPL: 1.85 {RATIO}
LYMPHOCYTES # BLD AUTO: 2.64 10*3/MM3 (ref 0.6–4.8)
LYMPHOCYTES NFR BLD AUTO: 30.4 % (ref 20–45)
MCH RBC QN AUTO: 27.9 PG (ref 27–31)
MCHC RBC AUTO-ENTMCNC: 32.9 G/DL (ref 31–37)
MCV RBC AUTO: 84.8 FL (ref 80–94)
MONOCYTES # BLD AUTO: 0.77 10*3/MM3 (ref 0–1)
MONOCYTES NFR BLD AUTO: 8.9 % (ref 3–8)
NEUTROPHILS # BLD AUTO: 4.85 10*3/MM3 (ref 1.5–8.3)
NEUTROPHILS NFR BLD AUTO: 56 % (ref 45–70)
NRBC BLD AUTO-RTO: 0 /100 WBC (ref 0–0)
PLATELET # BLD AUTO: 337 10*3/MM3 (ref 140–500)
POTASSIUM SERPL-SCNC: 5.4 MMOL/L (ref 3.5–5.2)
PROT SERPL-MCNC: 6.6 G/DL (ref 6–8.5)
RBC # BLD AUTO: 5.34 10*6/MM3 (ref 4.7–6.1)
SODIUM SERPL-SCNC: 134 MMOL/L (ref 136–145)
T4 FREE SERPL-MCNC: 1.41 NG/DL (ref 0.93–1.7)
TRIGL SERPL-MCNC: 130 MG/DL (ref 0–150)
TSH SERPL DL<=0.005 MIU/L-ACNC: 1.34 MIU/ML (ref 0.27–4.2)
VLDLC SERPL CALC-MCNC: 26 MG/DL (ref 8–32)
WBC # BLD AUTO: 8.67 10*3/MM3 (ref 4.8–10.8)

## 2018-10-05 PROCEDURE — G0009 ADMIN PNEUMOCOCCAL VACCINE: HCPCS | Performed by: INTERNAL MEDICINE

## 2018-10-05 PROCEDURE — 90670 PCV13 VACCINE IM: CPT | Performed by: INTERNAL MEDICINE

## 2018-10-05 PROCEDURE — 99214 OFFICE O/P EST MOD 30 MIN: CPT | Performed by: INTERNAL MEDICINE

## 2018-10-05 RX ORDER — TRAZODONE HYDROCHLORIDE 50 MG/1
50 TABLET ORAL NIGHTLY
Qty: 30 TABLET | Refills: 0 | Status: SHIPPED | OUTPATIENT
Start: 2018-10-05 | End: 2019-03-06

## 2018-10-05 NOTE — PROGRESS NOTES
Andrew Redmond is a 73 y.o. male, who presents with a chief complaint of   Chief Complaint   Patient presents with   • Follow-up   • Back Pain     Tried to put fitted sheet on bed and threw his back out, went to ED on 9/30/18       HPI   The pt is here for follow up.  He was in the ED last week.  He was putting a fitted sheet on his bed and hut his back.  He has a chronic L1 compression fracture.  He is wearing a brace.  The pain is improved.      He is having issues getting in and out of the tub.  He has grab rails and is getting a walk in shower.  He has fallen at home in the past.  He declines home health.  He has someone who can help clean and help him with daily things if needed.      He worries about his money.  He says he has plenty but worries someone else will get it.  He also worries about going in a nursing home some day.      Alzheimer's - stable on exelon.    Poor sleep - he is maxed out out on zyprexa.  Sometimes he just can't fall asleep.    He doesn't drive so would like to do his labs and f/u appt today.  He is losing weight.  He eats out of boxes.  He doesn't like to cook.  He eats with his ex-wife about 1 time a week.        The following portions of the patient's history were reviewed and updated as appropriate: allergies, current medications, past family history, past medical history, past social history, past surgical history and problem list.    Allergies: Patient has no known allergies.    Review of Systems   Constitutional: Negative.    HENT: Negative.    Eyes: Negative.    Respiratory: Negative.    Cardiovascular: Negative.    Gastrointestinal: Negative.    Endocrine: Negative.    Genitourinary: Negative.    Musculoskeletal: Negative.    Skin: Negative.    Allergic/Immunologic: Negative.    Neurological: Negative.    Hematological: Negative.    Psychiatric/Behavioral: Negative.    All other systems reviewed and are negative.            Wt Readings from Last 3 Encounters:   10/05/18 66.4  kg (146 lb 6.4 oz)   09/30/18 68 kg (150 lb)   04/18/18 71.7 kg (158 lb)     Temp Readings from Last 3 Encounters:   09/30/18 97.9 °F (36.6 °C) (Oral)   01/13/18 98.1 °F (36.7 °C) (Oral)   08/15/16 97.6 °F (36.4 °C) (Oral)     BP Readings from Last 3 Encounters:   10/05/18 106/60   09/30/18 (!) 132/106   04/18/18 103/67     Pulse Readings from Last 3 Encounters:   10/05/18 56   09/30/18 65   04/18/18 104     Body mass index is 22.93 kg/m².  @LASTSAO2(3)@    Physical Exam   Constitutional: He is oriented to person, place, and time. He appears well-developed and well-nourished. No distress.   HENT:   Head: Normocephalic and atraumatic.   Right Ear: External ear normal.   Left Ear: External ear normal.   Nose: Nose normal.   Mouth/Throat: Oropharynx is clear and moist.   Eyes: Pupils are equal, round, and reactive to light. Conjunctivae and EOM are normal.   Neck: Normal range of motion. Neck supple.   Cardiovascular: Normal rate, regular rhythm, normal heart sounds and intact distal pulses.    Pulmonary/Chest: Effort normal and breath sounds normal. No respiratory distress. He has no wheezes.   Musculoskeletal: Normal range of motion.   Normal gait   Neurological: He is alert and oriented to person, place, and time.   Skin: Skin is warm and dry.   Psychiatric: He has a normal mood and affect. His behavior is normal. Judgment and thought content normal.   Nursing note and vitals reviewed.      Results for orders placed or performed in visit on 04/13/18   Comprehensive Metabolic Panel   Result Value Ref Range    Glucose 93 65 - 99 mg/dL    BUN 9 8 - 23 mg/dL    Creatinine 0.76 0.76 - 1.27 mg/dL    eGFR Non African Am 101 >60 mL/min/1.73    eGFR African Am 122 >60 mL/min/1.73    BUN/Creatinine Ratio 11.8 7.0 - 25.0    Sodium 137 136 - 145 mmol/L    Potassium 4.8 3.5 - 5.2 mmol/L    Chloride 96 (L) 98 - 107 mmol/L    Total CO2 30.4 (H) 22.0 - 29.0 mmol/L    Calcium 9.4 8.8 - 10.5 mg/dL    Total Protein 6.8 6.0 - 8.5 g/dL     Albumin 4.20 3.50 - 5.20 g/dL    Globulin 2.6 gm/dL    A/G Ratio 1.6 g/dL    Total Bilirubin 0.3 0.2 - 1.2 mg/dL    Alkaline Phosphatase 115 40 - 129 U/L    AST (SGOT) 17 5 - 40 U/L    ALT (SGPT) 14 5 - 41 U/L   CBC & Differential   Result Value Ref Range    WBC 6.20 4.80 - 10.80 10*3/mm3    RBC 5.58 4.70 - 6.10 10*6/mm3    Hemoglobin 15.8 14.0 - 18.0 g/dL    Hematocrit 47.9 42.0 - 52.0 %    MCV 85.8 80.0 - 94.0 fL    MCH 28.3 27.0 - 31.0 pg    MCHC 33.0 31.0 - 37.0 g/dL    RDW 16.5 (H) 11.5 - 14.5 %    Platelets 292 140 - 500 10*3/mm3    Neutrophil Rel % 50.4 45.0 - 70.0 %    Lymphocyte Rel % 34.0 20.0 - 45.0 %    Monocyte Rel % 10.8 (H) 3.0 - 8.0 %    Eosinophil Rel % 2.7 0.0 - 4.0 %    Basophil Rel % 1.5 0.0 - 2.0 %    Neutrophils Absolute 3.12 1.50 - 8.30 10*3/mm3    Lymphocytes Absolute 2.11 0.60 - 4.80 10*3/mm3    Monocytes Absolute 0.67 0.00 - 1.00 10*3/mm3    Eosinophils Absolute 0.17 0.10 - 0.30 10*3/mm3    Basophils Absolute 0.09 0.00 - 0.20 10*3/mm3    Immature Granulocyte Rel % 0.6 (H) 0.0 - 0.5 %    Immature Grans Absolute 0.04 (H) 0.00 - 0.03 10*3/mm3    nRBC 0.0 0.0 - 0.0 /100 WBC           Andrew was seen today for follow-up and back pain.    Diagnoses and all orders for this visit:    Closed compression fracture of L1 lumbar vertebra with routine healing, subsequent encounter  -     Comprehensive Metabolic Panel  -     CBC & Differential    Alzheimer's dementia without behavioral disturbance, unspecified timing of dementia onset  -     Comprehensive Metabolic Panel  -     CBC & Differential  -     TSH  -     T4, Free  -     Lipid Panel With LDL / HDL Ratio    Sleep difficulties  -     traZODone (DESYREL) 50 MG tablet; Take 1 tablet by mouth Every Night.  -     Comprehensive Metabolic Panel  -     CBC & Differential  -     TSH  -     T4, Free    Need for vaccination  -     Pneumococcal Conjugate Vaccine 13-Valent All    Dyslipidemia  -     Comprehensive Metabolic Panel  -     Lipid Panel With LDL /  HDL Ratio      Cont current meds. OV and recheck in 4 mo.    Outpatient Medications Prior to Visit   Medication Sig Dispense Refill   • calcitonin, salmon, (MIACALCIN) 200 UNIT/ACT nasal spray 1 spray into each nostril Daily. 3.7 mL 3   • diphenhydrAMINE (BENADRYL) 25 mg capsule Take 25 mg by mouth every 6 (six) hours as needed for itching.     • docusate sodium (COLACE) 100 MG capsule Take 1 capsule by mouth 2 (Two) Times a Day As Needed for Constipation. Take 1 tablet by mouth 2 times daily as needed for constipation 30 capsule 0   • lamoTRIgine (LaMICtal) 200 MG tablet Take 1 tablet by mouth 2 (two) times a day.     • memantine (NAMENDA) 10 MG tablet Take by mouth 2 (two) times a day.     • OLANZapine (ZyPREXA) 20 MG tablet Take by mouth daily.     • oxyCODONE-acetaminophen (PERCOCET)  MG per tablet Take 1 tablet by mouth Every 4 (Four) Hours As Needed for Severe Pain . 18 tablet 0   • polyethylene glycol (MIRALAX) powder Take 17 g by mouth Daily. 850 g 5   • rivastigmine (EXELON) 6 MG capsule Take by mouth 2 (two) times a day.       No facility-administered medications prior to visit.      New Medications Ordered This Visit   Medications   • traZODone (DESYREL) 50 MG tablet     Sig: Take 1 tablet by mouth Every Night.     Dispense:  30 tablet     Refill:  0     [unfilled]  There are no discontinued medications.      Return in about 4 months (around 2/5/2019) for Recheck.

## 2018-10-10 ENCOUNTER — TELEPHONE (OUTPATIENT)
Dept: INTERNAL MEDICINE | Facility: CLINIC | Age: 73
End: 2018-10-10

## 2018-10-10 DIAGNOSIS — E87.5 SERUM POTASSIUM ELEVATED: ICD-10-CM

## 2018-10-10 DIAGNOSIS — E87.1 LOW SODIUM LEVELS: Primary | ICD-10-CM

## 2018-10-10 NOTE — TELEPHONE ENCOUNTER
Patient has been advised and voiced understanding. Lab apt scheduled for 10/12/18.    ----- Message from Jessica Birch MD sent at 10/8/2018 11:03 AM EDT -----  Call pt about labs. Potassium a little high and sodium a little low.  Recheck cmp this week.

## 2018-10-12 ENCOUNTER — EPISODE CHANGES (OUTPATIENT)
Dept: CASE MANAGEMENT | Facility: OTHER | Age: 73
End: 2018-10-12

## 2018-10-12 LAB
ALBUMIN SERPL-MCNC: 3.7 G/DL (ref 3.5–5.2)
ALBUMIN/GLOB SERPL: 2.1 G/DL
ALP SERPL-CCNC: 102 U/L (ref 40–129)
ALT SERPL-CCNC: 32 U/L (ref 5–41)
AST SERPL-CCNC: 30 U/L (ref 5–40)
BILIRUB SERPL-MCNC: 0.3 MG/DL (ref 0.2–1.2)
BUN SERPL-MCNC: 7 MG/DL (ref 8–23)
BUN/CREAT SERPL: 10.4 (ref 7–25)
CALCIUM SERPL-MCNC: 8.4 MG/DL (ref 8.8–10.5)
CHLORIDE SERPL-SCNC: 103 MMOL/L (ref 98–107)
CO2 SERPL-SCNC: 30.7 MMOL/L (ref 22–29)
CREAT SERPL-MCNC: 0.67 MG/DL (ref 0.76–1.27)
GLOBULIN SER CALC-MCNC: 1.8 GM/DL
GLUCOSE SERPL-MCNC: 115 MG/DL (ref 65–99)
POTASSIUM SERPL-SCNC: 4.4 MMOL/L (ref 3.5–5.2)
PROT SERPL-MCNC: 5.5 G/DL (ref 6–8.5)
SODIUM SERPL-SCNC: 142 MMOL/L (ref 136–145)

## 2018-10-13 ENCOUNTER — RESULTS ENCOUNTER (OUTPATIENT)
Dept: INTERNAL MEDICINE | Facility: CLINIC | Age: 73
End: 2018-10-13

## 2018-10-13 DIAGNOSIS — E87.1 LOW SODIUM LEVELS: ICD-10-CM

## 2018-10-13 DIAGNOSIS — E87.5 SERUM POTASSIUM ELEVATED: ICD-10-CM

## 2018-10-18 ENCOUNTER — TELEPHONE (OUTPATIENT)
Dept: INTERNAL MEDICINE | Facility: CLINIC | Age: 73
End: 2018-10-18

## 2018-10-18 NOTE — TELEPHONE ENCOUNTER
Patient notified.   ----- Message from Jessica Birch MD sent at 10/12/2018  4:37 PM EDT -----  Call pt about labs.  Sodium and potassium better

## 2018-10-29 ENCOUNTER — OFFICE VISIT (OUTPATIENT)
Dept: INTERNAL MEDICINE | Facility: CLINIC | Age: 73
End: 2018-10-29

## 2018-10-29 VITALS
SYSTOLIC BLOOD PRESSURE: 108 MMHG | BODY MASS INDEX: 24.08 KG/M2 | OXYGEN SATURATION: 97 % | HEART RATE: 82 BPM | WEIGHT: 153.4 LBS | HEIGHT: 67 IN | DIASTOLIC BLOOD PRESSURE: 76 MMHG

## 2018-10-29 DIAGNOSIS — Z23 NEEDS FLU SHOT: ICD-10-CM

## 2018-10-29 DIAGNOSIS — R21 RASH: Primary | ICD-10-CM

## 2018-10-29 PROCEDURE — 99213 OFFICE O/P EST LOW 20 MIN: CPT | Performed by: INTERNAL MEDICINE

## 2018-10-29 PROCEDURE — 90662 IIV NO PRSV INCREASED AG IM: CPT | Performed by: INTERNAL MEDICINE

## 2018-10-29 PROCEDURE — G0008 ADMIN INFLUENZA VIRUS VAC: HCPCS | Performed by: INTERNAL MEDICINE

## 2018-10-29 RX ORDER — FLUTICASONE PROPIONATE 0.05 %
CREAM (GRAM) TOPICAL 2 TIMES DAILY
Qty: 30 G | Refills: 0 | Status: SHIPPED | OUTPATIENT
Start: 2018-10-29 | End: 2019-03-06

## 2018-10-29 NOTE — PROGRESS NOTES
Andrew Redmond is a 73 y.o. male, who presents with a chief complaint of   Chief Complaint   Patient presents with   • Follow-up   • Rash     [id number], on L arm, itches a little bit, inititally thought it was from an insect.       HPI   Pt with rash on left antecubital area.  Rash has been there x 6 mo.  He hasnt tried otc treatments.  No itching.  No drainage.  No pain    The following portions of the patient's history were reviewed and updated as appropriate: allergies, current medications, past family history, past medical history, past social history, past surgical history and problem list.    Allergies: Patient has no known allergies.    Review of Systems   Constitutional: Negative.    HENT: Negative.    Eyes: Negative.    Respiratory: Negative.    Cardiovascular: Negative.    Gastrointestinal: Negative.    Endocrine: Negative.    Genitourinary: Negative.    Musculoskeletal: Negative.    Skin: Positive for rash.   Allergic/Immunologic: Negative.    Neurological: Negative.    Hematological: Negative.    Psychiatric/Behavioral: Negative.    All other systems reviewed and are negative.            Wt Readings from Last 3 Encounters:   10/29/18 69.6 kg (153 lb 6.4 oz)   10/05/18 66.4 kg (146 lb 6.4 oz)   09/30/18 68 kg (150 lb)     Temp Readings from Last 3 Encounters:   09/30/18 97.9 °F (36.6 °C) (Oral)   01/13/18 98.1 °F (36.7 °C) (Oral)   08/15/16 97.6 °F (36.4 °C) (Oral)     BP Readings from Last 3 Encounters:   10/29/18 108/76   10/05/18 106/60   09/30/18 (!) 132/106     Pulse Readings from Last 3 Encounters:   10/29/18 82   10/05/18 56   09/30/18 65     Body mass index is 24.03 kg/m².  @LASTSAO2(3)@    Physical Exam   Constitutional: He is oriented to person, place, and time. He appears well-developed and well-nourished. No distress.   HENT:   Head: Normocephalic and atraumatic.   Right Ear: External ear normal.   Left Ear: External ear normal.   Nose: Nose normal.   Mouth/Throat: Oropharynx is clear and  moist.   Eyes: Pupils are equal, round, and reactive to light. Conjunctivae and EOM are normal.   Neck: Normal range of motion. Neck supple.   Cardiovascular: Normal rate, regular rhythm, normal heart sounds and intact distal pulses.    Pulmonary/Chest: Effort normal and breath sounds normal. No respiratory distress. He has no wheezes.   Musculoskeletal: Normal range of motion.   Normal gait   Neurological: He is alert and oriented to person, place, and time.   Skin: Skin is warm and dry.   Left AC area with dry, scaly erythematous colored chronic appearing rash.   Psychiatric: He has a normal mood and affect. His behavior is normal. Judgment and thought content normal.   Nursing note and vitals reviewed.      Results for orders placed or performed in visit on 10/13/18   Comprehensive metabolic panel   Result Value Ref Range    Glucose 115 (H) 65 - 99 mg/dL    BUN 7 (L) 8 - 23 mg/dL    Creatinine 0.67 (L) 0.76 - 1.27 mg/dL    eGFR Non African Am 116 >60 mL/min/1.73    eGFR African Am 141 >60 mL/min/1.73    BUN/Creatinine Ratio 10.4 7.0 - 25.0    Sodium 142 136 - 145 mmol/L    Potassium 4.4 3.5 - 5.2 mmol/L    Chloride 103 98 - 107 mmol/L    Total CO2 30.7 (H) 22.0 - 29.0 mmol/L    Calcium 8.4 (L) 8.8 - 10.5 mg/dL    Total Protein 5.5 (L) 6.0 - 8.5 g/dL    Albumin 3.70 3.50 - 5.20 g/dL    Globulin 1.8 gm/dL    A/G Ratio 2.1 g/dL    Total Bilirubin 0.3 0.2 - 1.2 mg/dL    Alkaline Phosphatase 102 40 - 129 U/L    AST (SGOT) 30 5 - 40 U/L    ALT (SGPT) 32 5 - 41 U/L           Andrew was seen today for follow-up and rash.    Diagnoses and all orders for this visit:    Rash  -     fluticasone (CUTIVATE) 0.05 % cream; Apply  topically to the appropriate area as directed 2 (Two) Times a Day.    Needs flu shot  -     Flu Vaccine High Dose PF 65YR+ ([id number])          Outpatient Medications Prior to Visit   Medication Sig Dispense Refill   • calcitonin, salmon, (MIACALCIN) 200 UNIT/ACT nasal spray 1 spray into each nostril  Daily. 3.7 mL 3   • diphenhydrAMINE (BENADRYL) 25 mg capsule Take 25 mg by mouth every 6 (six) hours as needed for itching.     • docusate sodium (COLACE) 100 MG capsule Take 1 capsule by mouth 2 (Two) Times a Day As Needed for Constipation. Take 1 tablet by mouth 2 times daily as needed for constipation 30 capsule 0   • lamoTRIgine (LaMICtal) 200 MG tablet Take 1 tablet by mouth 2 (two) times a day.     • memantine (NAMENDA) 10 MG tablet Take by mouth 2 (two) times a day.     • OLANZapine (ZyPREXA) 20 MG tablet Take by mouth daily.     • oxyCODONE-acetaminophen (PERCOCET)  MG per tablet Take 1 tablet by mouth Every 4 (Four) Hours As Needed for Severe Pain . 18 tablet 0   • polyethylene glycol (MIRALAX) powder Take 17 g by mouth Daily. 850 g 5   • rivastigmine (EXELON) 6 MG capsule Take by mouth 2 (two) times a day.     • traZODone (DESYREL) 50 MG tablet Take 1 tablet by mouth Every Night. 30 tablet 0     No facility-administered medications prior to visit.      New Medications Ordered This Visit   Medications   • fluticasone (CUTIVATE) 0.05 % cream     Sig: Apply  topically to the appropriate area as directed 2 (Two) Times a Day.     Dispense:  30 g     Refill:  0     [unfilled]  There are no discontinued medications.      No Follow-up on file.

## 2018-12-11 NOTE — PROGRESS NOTES
Subjective   Patient ID: Andrew Redmond is a 73 y.o. male is here today for follow-up for back pain. His pain in his back has resolved.    Back Pain   This is a chronic problem. The problem has been resolved since onset. The pain is at a severity of 0/10. The patient is experiencing no pain. Pertinent negatives include no chest pain. Treatments tried: Brace.       The following portions of the patient's history were reviewed and updated as appropriate: allergies, current medications, past family history, past medical history, past social history, past surgical history and problem list.    Review of Systems   Respiratory: Negative for chest tightness and shortness of breath.    Cardiovascular: Negative for chest pain.   Musculoskeletal: Positive for back pain.   All other systems reviewed and are negative.    I have been treating this patient for an L1 compression fracture which got better with time and bracing. He still wears his brace from time to time. He came back because about 2-1/2 months ago he had an acute episode of back pain while he was lifting an object. He went to the emergency room and x-rays were reviewed which did not show any change in the healed fracture. The symptoms resolved pretty much after 1 or 2 days and he is here for followup. He is back to his baseline. No significant symptoms of pain really at this point. We can keep it open-ended at this point.       Objective   Physical Exam   Constitutional: He is oriented to person, place, and time. He appears well-developed and well-nourished.   HENT:   Head: Normocephalic and atraumatic.   Eyes: Conjunctivae and EOM are normal. Pupils are equal, round, and reactive to light.   Fundoscopic exam:       The right eye shows no papilledema. The right eye shows venous pulsations.        The left eye shows no papilledema. The left eye shows venous pulsations.   Neck: Carotid bruit is not present.   Neurological: He is oriented to person, place, and time. He  has a normal Finger-Nose-Finger Test and a normal Heel to Shin Test. Gait normal.   Reflex Scores:       Tricep reflexes are 2+ on the right side and 2+ on the left side.       Bicep reflexes are 2+ on the right side and 2+ on the left side.       Brachioradialis reflexes are 2+ on the right side and 2+ on the left side.       Patellar reflexes are 2+ on the right side and 2+ on the left side.       Achilles reflexes are 2+ on the right side and 2+ on the left side.  Psychiatric: His speech is normal.     Neurologic Exam     Mental Status   Oriented to person, place, and time.   Registration of memory: Good recent and remote memory.   Attention: normal. Concentration: normal.   Speech: speech is normal   Level of consciousness: alert  Knowledge: consistent with education.     Cranial Nerves     CN II   Visual fields full to confrontation.   Visual acuity: normal    CN III, IV, VI   Pupils are equal, round, and reactive to light.  Extraocular motions are normal.     CN V   Facial sensation intact.   Right corneal reflex: normal  Left corneal reflex: normal    CN VII   Facial expression full, symmetric.   Right facial weakness: none  Left facial weakness: none    CN VIII   Hearing: intact    CN IX, X   Palate: symmetric    CN XI   Right sternocleidomastoid strength: normal  Left sternocleidomastoid strength: normal    CN XII   Tongue: not atrophic  Tongue deviation: none    Motor Exam   Muscle bulk: normal  Right arm tone: normal  Left arm tone: normal  Right leg tone: normal  Left leg tone: normal    Strength   Strength 5/5 except as noted.     Sensory Exam   Light touch normal.     Gait, Coordination, and Reflexes     Gait  Gait: normal    Coordination   Finger to nose coordination: normal  Heel to shin coordination: normal    Reflexes   Right brachioradialis: 2+  Left brachioradialis: 2+  Right biceps: 2+  Left biceps: 2+  Right triceps: 2+  Left triceps: 2+  Right patellar: 2+  Left patellar: 2+  Right achilles:  2+  Left achilles: 2+  Right : 2+  Left : 2+      Assessment/Plan   Independent Review of Radiographic Studies:    I reviewed the plain x-rays that were done in September of admission when he went to the emergency room in the status of the closed compression fracture at L1 is stable.  There was no new fracture.      Medical Decision Making:    He is return to his baseline.  We can keep it open-ended if symptoms recur, he can come back to see us.      Andrew was seen today for back pain.    Diagnoses and all orders for this visit:    Acute bilateral low back pain without sciatica    Closed compression fracture of L1 lumbar vertebra with routine healing, subsequent encounter      Return if symptoms worsen or fail to improve.

## 2018-12-12 ENCOUNTER — OFFICE VISIT (OUTPATIENT)
Dept: NEUROSURGERY | Facility: CLINIC | Age: 73
End: 2018-12-12

## 2018-12-12 VITALS — HEART RATE: 65 BPM | SYSTOLIC BLOOD PRESSURE: 122 MMHG | DIASTOLIC BLOOD PRESSURE: 71 MMHG

## 2018-12-12 DIAGNOSIS — M54.50 ACUTE BILATERAL LOW BACK PAIN WITHOUT SCIATICA: Primary | ICD-10-CM

## 2018-12-12 DIAGNOSIS — S32.010D CLOSED COMPRESSION FRACTURE OF L1 LUMBAR VERTEBRA WITH ROUTINE HEALING, SUBSEQUENT ENCOUNTER: ICD-10-CM

## 2018-12-12 PROCEDURE — 99213 OFFICE O/P EST LOW 20 MIN: CPT | Performed by: NEUROLOGICAL SURGERY

## 2018-12-17 ENCOUNTER — TELEPHONE (OUTPATIENT)
Dept: INTERNAL MEDICINE | Facility: CLINIC | Age: 73
End: 2018-12-17

## 2018-12-17 NOTE — TELEPHONE ENCOUNTER
Patient advised and voiced understanding.     ----- Message from Jessica Birch MD sent at 12/17/2018  4:35 PM EST -----  Regarding: RE: FOOD POISONING  Contact: [phone]  No diarrhea meds.  Let it run its course    ----- Message -----  From: Kathy Oliver CMA  Sent: 12/17/2018   2:30 PM  To: Jessica Birch MD  Subject: FW: FOOD POISONING                               Please advise, thank you !    ----- Message -----  From: Salima Griggs  Sent: 12/17/2018   1:56 PM  To: [facility]  Subject: FOOD POISONING                                   donal    Patient says he has food poisoning x 3-4 days with diarrhea.  Says it has improved some but asking for advice to alleviate the diarrhea.

## 2018-12-22 ENCOUNTER — HOSPITAL ENCOUNTER (EMERGENCY)
Facility: HOSPITAL | Age: 73
Discharge: HOME | End: 2018-12-22
Attending: EMERGENCY MEDICINE | Admitting: EMERGENCY MEDICINE

## 2018-12-22 VITALS
OXYGEN SATURATION: 98 % | RESPIRATION RATE: 18 BRPM | TEMPERATURE: 98.2 F | SYSTOLIC BLOOD PRESSURE: 133 MMHG | HEART RATE: 84 BPM | BODY MASS INDEX: 22.73 KG/M2 | DIASTOLIC BLOOD PRESSURE: 75 MMHG | WEIGHT: 150 LBS | HEIGHT: 68 IN

## 2018-12-22 DIAGNOSIS — S69.92XA INJURY OF NAIL BED OF FINGER OF LEFT HAND, INITIAL ENCOUNTER: Primary | ICD-10-CM

## 2018-12-22 PROCEDURE — 90471 IMMUNIZATION ADMIN: CPT

## 2018-12-22 PROCEDURE — 90714 TD VACC NO PRESV 7 YRS+ IM: CPT | Performed by: EMERGENCY MEDICINE

## 2018-12-22 PROCEDURE — 99283 EMERGENCY DEPT VISIT LOW MDM: CPT

## 2018-12-22 PROCEDURE — 99282 EMERGENCY DEPT VISIT SF MDM: CPT | Performed by: EMERGENCY MEDICINE

## 2018-12-22 PROCEDURE — 25010000002 TETANUS-DIPHTHERIA TOXOIDS (ADULT) PER 0.5 ML: Performed by: EMERGENCY MEDICINE

## 2018-12-22 PROCEDURE — 64450 NJX AA&/STRD OTHER PN/BRANCH: CPT | Performed by: EMERGENCY MEDICINE

## 2018-12-22 RX ORDER — GINSENG 100 MG
CAPSULE ORAL ONCE
Status: DISCONTINUED | OUTPATIENT
Start: 2018-12-22 | End: 2018-12-22 | Stop reason: CLARIF

## 2018-12-22 RX ORDER — LIDOCAINE HYDROCHLORIDE 20 MG/ML
10 INJECTION, SOLUTION INFILTRATION; PERINEURAL ONCE
Status: COMPLETED | OUTPATIENT
Start: 2018-12-22 | End: 2018-12-22

## 2018-12-22 RX ORDER — BACITRACIN ZINC 500 [USP'U]/G
OINTMENT TOPICAL EVERY 12 HOURS SCHEDULED
Status: DISCONTINUED | OUTPATIENT
Start: 2018-12-22 | End: 2018-12-22 | Stop reason: HOSPADM

## 2018-12-22 RX ADMIN — LIDOCAINE HYDROCHLORIDE 10 ML: 20 INJECTION, SOLUTION INFILTRATION; PERINEURAL at 15:14

## 2018-12-22 RX ADMIN — CLOSTRIDIUM TETANI TOXOID ANTIGEN (FORMALDEHYDE INACTIVATED) AND CORYNEBACTERIUM DIPHTHERIAE TOXOID ANTIGEN (FORMALDEHYDE INACTIVATED) 0.5 ML: 5; 2 INJECTION, SUSPENSION INTRAMUSCULAR at 15:10

## 2018-12-22 NOTE — ED PROVIDER NOTES
Subjective   Mr. Andrew Redmond is a 74 yo WM who presents secondary to left thumb injury.   Pt pulling his fingernails off and them sticking them under his thumbnail.  Earlier today he pulled off a fingernail and started under his left thumbnail.  He has been unable to remove it since that time.  Patient has trimmed his thumbnail back considerably.  However he still has a foreign body sensation.  He presents for evaluation.        History provided by:  Patient  Upper Extremity Issue   Location:  Finger  Finger location:  L thumb  Injury: yes    Time since incident:  2 hours  Mechanism of injury comment:  As above  Pain details:     Severity:  Mild  Handedness:  Right-handed  Dislocation: no    Foreign body present: finger nail clipping.  Tetanus status:  Unknown  Prior injury to area:  No  Relieved by:  Nothing  Worsened by:  Nothing  Associated symptoms: no back pain, no fever, no muscle weakness, no neck pain, no numbness, no stiffness, no swelling and no tingling    Risk factors: no concern for non-accidental trauma and no known bone disorder        Review of Systems   Constitutional: Negative for chills, diaphoresis and fever.   HENT: Negative for congestion, ear pain and sore throat.    Eyes: Negative for pain and discharge.   Respiratory: Negative for chest tightness, shortness of breath, wheezing and stridor.    Cardiovascular: Negative for chest pain, palpitations and leg swelling.   Gastrointestinal: Negative for abdominal pain, diarrhea, nausea and vomiting.   Genitourinary: Negative for dysuria, flank pain, frequency and hematuria.   Musculoskeletal: Negative for back pain, myalgias, neck pain, neck stiffness and stiffness.   Skin: Negative for color change, pallor and rash.   Neurological: Negative for dizziness, seizures, syncope and headaches.   Psychiatric/Behavioral: Negative for agitation and confusion. The patient is not nervous/anxious.    All other systems reviewed and are negative.      Past  Medical History:   Diagnosis Date   • Alzheimer disease    • Anxiety and depression    • Bipolar 1 disorder (CMS/HCC)    • Colon polyp    • Constipation    • Depression    • Diarrhea    • Erectile dysfunction    • GERD (gastroesophageal reflux disease)    • L1 vertebral fracture (CMS/HCC)    • Seizures (CMS/HCC)     absence seizures.  well controlled.       No Known Allergies    Past Surgical History:   Procedure Laterality Date   • COLONOSCOPY N/A 4/1/2016    Procedure: COLONOSCOPY with polypectomy;  Surgeon: Akiko Scales MD;  Location: [facility];  Service:    • GALLBLADDER SURGERY     • LIVER SURGERY     • TONSILLECTOMY         Family History   Problem Relation Age of Onset   • Lung cancer Father    • No Known Problems Mother    • No Known Problems Maternal Grandmother    • No Known Problems Maternal Grandfather    • No Known Problems Paternal Grandmother    • No Known Problems Paternal Grandfather        Social History     Socioeconomic History   • Marital status:      Spouse name: Not on file   • Number of children: 0   • Years of education: Ph.D   • Highest education level: Not on file   Occupational History   • Occupation: professor     Comment: taught anatomy at [employer]   Tobacco Use   • Smoking status: Former Smoker     Packs/day: 2.00     Years: 20.00     Pack years: 40.00   • Smokeless tobacco: Former User     Quit date: 1987   Substance and Sexual Activity   • Alcohol use: No   • Drug use: No   • Sexual activity: Defer           Objective   Physical Exam   Constitutional: He is oriented to person, place, and time. He appears well-developed and well-nourished. No distress.   74 yo WM sitting in chair reading on his iPad. He appears in good overall health.  Vital signs unremarkable.   Musculoskeletal: Normal range of motion.        Hands:  Neurological: He is alert and oriented to person, place, and time.   Skin: Skin is warm and dry. He is not diaphoretic.   Psychiatric: He has a normal  mood and affect. His behavior is normal.   Nursing note and vitals reviewed.      Procedures    Digital block performed using lidocaine 2% without epi.  Patient's thumb was cleaned with Betadine.  I closely examined patient's fall.  Using tip of scissors I explored beneath patient's thumbnail distally.  No foreign body seen or found.  Patient tolerated the procedure well.         ED Course  ED Course as of Dec 22 2347   Sat Dec 22, 2018   1519 I do not see a nail clipping. Pt requests that I further explore. Digital block performed using 2% lidocaine.  Will elevate the distal fingernail to see if nail clipping is present.  [SS]   1539 I explored under patient's finger now.  No foreign body found.  Bacitracin applied.  DC home.  [SS]      ED Course User Index  [SS] Gerardo Martinez MD                  MDM  Number of Diagnoses or Management Options  Injury of nail bed of finger of left hand, initial encounter: new and does not require workup  Risk of Complications, Morbidity, and/or Mortality  Presenting problems: low  Diagnostic procedures: moderate  Management options: low    Patient Progress  Patient progress: improved        Final diagnoses:   Injury of nail bed of finger of left hand, initial encounter            Gerardo Martinez MD  12/22/18 0888

## 2018-12-22 NOTE — DISCHARGE INSTRUCTIONS
Wash 3 times a day with antibacterial soap, pat dry and apply bacitracin.  Continue until healed.  Follow-up with Dr. Birch in 5 days for wound check, sooner for signs of infection.  Return to ED for signs of infection, medical emergencies.

## 2018-12-24 ENCOUNTER — EPISODE CHANGES (OUTPATIENT)
Dept: CASE MANAGEMENT | Facility: OTHER | Age: 73
End: 2018-12-24

## 2018-12-27 ENCOUNTER — PATIENT OUTREACH (OUTPATIENT)
Dept: CASE MANAGEMENT | Facility: OTHER | Age: 73
End: 2018-12-27

## 2018-12-27 NOTE — OUTREACH NOTE
"Talked with patient. Discussed 12/22/18 ED visit regarding injury to nail bed. Patient states to be compliant with ED recommendations of cleaning finger 3 times per day; and applying neosporin (unable to obtain Bacitracin). Patient reports symptoms have improved; no redness or warmth to finger. States his finger nail looks \"short\". Patient declines to make PCP appointment for ED follow up at this time. Has PCP follow up in 6 months. Reviewed with patient signs/symptoms of infection; benefit of PCP follow up; 24/7 Nurse Line Telephone number (patient declines phone number) and Care Advising program. Patient verbalized understanding and appreciative of phone call. Patient declines to participate .   "

## 2018-12-31 ENCOUNTER — EPISODE CHANGES (OUTPATIENT)
Dept: CASE MANAGEMENT | Facility: OTHER | Age: 73
End: 2018-12-31

## 2019-02-05 ENCOUNTER — OFFICE VISIT (OUTPATIENT)
Dept: INTERNAL MEDICINE | Facility: CLINIC | Age: 74
End: 2019-02-05

## 2019-02-05 VITALS
HEIGHT: 68 IN | WEIGHT: 155 LBS | SYSTOLIC BLOOD PRESSURE: 116 MMHG | HEART RATE: 73 BPM | TEMPERATURE: 98.1 F | BODY MASS INDEX: 23.49 KG/M2 | RESPIRATION RATE: 18 BRPM | OXYGEN SATURATION: 96 % | DIASTOLIC BLOOD PRESSURE: 60 MMHG

## 2019-02-05 DIAGNOSIS — Z12.5 ENCOUNTER FOR SCREENING FOR MALIGNANT NEOPLASM OF PROSTATE: ICD-10-CM

## 2019-02-05 DIAGNOSIS — R39.12 WEAK URINE STREAM: Primary | ICD-10-CM

## 2019-02-05 LAB
BILIRUB BLD-MCNC: NEGATIVE MG/DL
BUN SERPL-MCNC: 14 MG/DL (ref 8–23)
BUN/CREAT SERPL: 16.1 (ref 7–25)
CALCIUM SERPL-MCNC: 9.1 MG/DL (ref 8.8–10.5)
CHLORIDE SERPL-SCNC: 98 MMOL/L (ref 98–107)
CLARITY, POC: CLEAR
CO2 SERPL-SCNC: 29.2 MMOL/L (ref 22–29)
COLOR UR: YELLOW
CREAT SERPL-MCNC: 0.87 MG/DL (ref 0.76–1.27)
GLUCOSE SERPL-MCNC: 73 MG/DL (ref 65–99)
GLUCOSE UR STRIP-MCNC: NEGATIVE MG/DL
KETONES UR QL: ABNORMAL
LEUKOCYTE EST, POC: NEGATIVE
NITRITE UR-MCNC: NEGATIVE MG/ML
PH UR: 5.5 [PH] (ref 5–8)
POTASSIUM SERPL-SCNC: 4.9 MMOL/L (ref 3.5–5.2)
PROT UR STRIP-MCNC: ABNORMAL MG/DL
PSA SERPL-MCNC: 1.36 NG/ML (ref 0–4)
RBC # UR STRIP: NEGATIVE /UL
SODIUM SERPL-SCNC: 136 MMOL/L (ref 136–145)
SP GR UR: 1.02 (ref 1–1.03)
UROBILINOGEN UR QL: NORMAL

## 2019-02-05 PROCEDURE — 81003 URINALYSIS AUTO W/O SCOPE: CPT | Performed by: INTERNAL MEDICINE

## 2019-02-05 PROCEDURE — 99213 OFFICE O/P EST LOW 20 MIN: CPT | Performed by: INTERNAL MEDICINE

## 2019-02-05 RX ORDER — TAMSULOSIN HYDROCHLORIDE 0.4 MG/1
1 CAPSULE ORAL NIGHTLY
Qty: 30 CAPSULE | Refills: 2 | Status: SHIPPED | OUTPATIENT
Start: 2019-02-05 | End: 2019-05-01

## 2019-02-05 NOTE — PROGRESS NOTES
Andrew Redmond is a 73 y.o. male, who presents with a chief complaint of   Chief Complaint   Patient presents with   • Urinary Retention       HPI   Pt has had weakened urinary stream for a while.  He came intoday bc of this.  No dysuria, frequency.  + occ urgency.  + hesitation.  No nocturia.  He has never had to see urology.     The following portions of the patient's history were reviewed and updated as appropriate: allergies, current medications, past family history, past medical history, past social history, past surgical history and problem list.    Allergies: Patient has no known allergies.    Review of Systems   Constitutional: Negative.    HENT: Negative.    Eyes: Negative.    Respiratory: Negative.    Cardiovascular: Negative.    Gastrointestinal: Negative.    Endocrine: Negative.    Genitourinary: Positive for difficulty urinating.   Musculoskeletal: Negative.    Skin: Negative.    Allergic/Immunologic: Negative.    Neurological: Negative.    Hematological: Negative.    Psychiatric/Behavioral: Negative.    All other systems reviewed and are negative.            Wt Readings from Last 3 Encounters:   02/05/19 70.3 kg (155 lb)   12/22/18 68 kg (150 lb)   10/29/18 69.6 kg (153 lb 6.4 oz)     Temp Readings from Last 3 Encounters:   02/05/19 98.1 °F (36.7 °C) (Oral)   12/22/18 98.2 °F (36.8 °C) (Oral)   09/30/18 97.9 °F (36.6 °C) (Oral)     BP Readings from Last 3 Encounters:   02/05/19 116/60   12/22/18 133/75   12/12/18 122/71     Pulse Readings from Last 3 Encounters:   02/05/19 73   12/22/18 84   12/12/18 65     Body mass index is 23.57 kg/m².  @LASTSAO2(3)@    Physical Exam   Constitutional: He is oriented to person, place, and time. He appears well-developed and well-nourished. No distress.   HENT:   Head: Normocephalic and atraumatic.   Right Ear: External ear normal.   Left Ear: External ear normal.   Nose: Nose normal.   Mouth/Throat: Oropharynx is clear and moist.   Eyes: Conjunctivae and EOM are  normal. Pupils are equal, round, and reactive to light.   Neck: Normal range of motion. Neck supple.   Cardiovascular: Normal rate, regular rhythm, normal heart sounds and intact distal pulses.   Pulmonary/Chest: Effort normal and breath sounds normal. No respiratory distress. He has no wheezes.   Musculoskeletal: Normal range of motion.   Normal gait   Neurological: He is alert and oriented to person, place, and time.   Skin: Skin is warm and dry.   Psychiatric: He has a normal mood and affect. His behavior is normal. Judgment and thought content normal.   Nursing note and vitals reviewed.      Results for orders placed or performed in visit on 02/05/19   POCT urinalysis dipstick, automated   Result Value Ref Range    Color Yellow Yellow, Straw, Dark Yellow, Antonina    Clarity, UA Clear Clear    Specific Gravity  1.025 1.005 - 1.030    pH, Urine 5.5 5.0 - 8.0    Leukocytes Negative Negative    Nitrite, UA Negative Negative    Protein, POC 30 mg/dL (A) Negative mg/dL    Glucose, UA Negative Negative, 1000 mg/dL (3+) mg/dL    Ketones, UA Trace (A) Negative    Urobilinogen, UA Normal Normal    Bilirubin Negative Negative    Blood, UA Negative Negative           Andrew was seen today for urinary retention.    Diagnoses and all orders for this visit:    Weak urine stream  -     POCT urinalysis dipstick, automated  -     PSA SCREENING; Future  -     Ambulatory Referral to Urology  -     Basic Metabolic Panel; Future  -     tamsulosin (FLOMAX) 0.4 MG capsule 24 hr capsule; Take 1 capsule by mouth Every Night.    Encounter for screening for malignant neoplasm of prostate   -     PSA SCREENING; Future          Outpatient Medications Prior to Visit   Medication Sig Dispense Refill   • calcitonin, salmon, (MIACALCIN) 200 UNIT/ACT nasal spray 1 spray into each nostril Daily. 3.7 mL 3   • diphenhydrAMINE (BENADRYL) 25 mg capsule Take 25 mg by mouth every 6 (six) hours as needed for itching.     • docusate sodium (COLACE) 100 MG  capsule Take 1 capsule by mouth 2 (Two) Times a Day As Needed for Constipation. Take 1 tablet by mouth 2 times daily as needed for constipation 30 capsule 0   • fluticasone (CUTIVATE) 0.05 % cream Apply  topically to the appropriate area as directed 2 (Two) Times a Day. 30 g 0   • lamoTRIgine (LaMICtal) 200 MG tablet Take 1 tablet by mouth 2 (two) times a day.     • memantine (NAMENDA) 10 MG tablet Take by mouth 2 (two) times a day.     • OLANZapine (ZyPREXA) 20 MG tablet Take by mouth daily.     • polyethylene glycol (MIRALAX) powder Take 17 g by mouth Daily. 850 g 5   • rivastigmine (EXELON) 6 MG capsule Take by mouth 2 (two) times a day.     • traZODone (DESYREL) 50 MG tablet Take 1 tablet by mouth Every Night. 30 tablet 0   • oxyCODONE-acetaminophen (PERCOCET)  MG per tablet Take 1 tablet by mouth Every 4 (Four) Hours As Needed for Severe Pain . 18 tablet 0     No facility-administered medications prior to visit.      New Medications Ordered This Visit   Medications   • tamsulosin (FLOMAX) 0.4 MG capsule 24 hr capsule     Sig: Take 1 capsule by mouth Every Night.     Dispense:  30 capsule     Refill:  2     [unfilled]  Medications Discontinued During This Encounter   Medication Reason   • oxyCODONE-acetaminophen (PERCOCET)  MG per tablet *Therapy completed         Return for Next scheduled follow up.

## 2019-02-07 ENCOUNTER — TELEPHONE (OUTPATIENT)
Dept: INTERNAL MEDICINE | Facility: CLINIC | Age: 74
End: 2019-02-07

## 2019-02-07 NOTE — TELEPHONE ENCOUNTER
All numbers are disconnected. Will attempted [place] message.     ----- Message from Jessica Birch MD sent at 2/6/2019  9:39 AM EST -----  Call pt about labs.  Labs all ok

## 2019-02-10 ENCOUNTER — RESULTS ENCOUNTER (OUTPATIENT)
Dept: INTERNAL MEDICINE | Facility: CLINIC | Age: 74
End: 2019-02-10

## 2019-02-10 DIAGNOSIS — Z12.5 ENCOUNTER FOR SCREENING FOR MALIGNANT NEOPLASM OF PROSTATE: ICD-10-CM

## 2019-02-10 DIAGNOSIS — R39.12 WEAK URINE STREAM: ICD-10-CM

## 2019-03-06 ENCOUNTER — APPOINTMENT (OUTPATIENT)
Dept: GENERAL RADIOLOGY | Facility: HOSPITAL | Age: 74
End: 2019-03-06

## 2019-03-06 ENCOUNTER — HOSPITAL ENCOUNTER (INPATIENT)
Facility: HOSPITAL | Age: 74
LOS: 5 days | Discharge: NURSING, REHAB OR LONG-TERM CARE | End: 2019-03-11
Attending: EMERGENCY MEDICINE | Admitting: INTERNAL MEDICINE

## 2019-03-06 DIAGNOSIS — S72.001A DISPLACED FRACTURE OF RIGHT FEMORAL NECK (HCC): Primary | ICD-10-CM

## 2019-03-06 LAB
ALBUMIN SERPL-MCNC: 3.7 G/DL (ref 3.5–5.2)
ALBUMIN/GLOB SERPL: 1.5 G/DL
ALP SERPL-CCNC: 122 U/L (ref 40–129)
ALT SERPL W P-5'-P-CCNC: 21 U/L (ref 5–41)
ANION GAP SERPL CALCULATED.3IONS-SCNC: 8 MMOL/L
AST SERPL-CCNC: 20 U/L (ref 5–40)
BASOPHILS # BLD AUTO: 0.09 10*3/MM3 (ref 0–0.2)
BASOPHILS NFR BLD AUTO: 1 % (ref 0–1.5)
BILIRUB SERPL-MCNC: 0.2 MG/DL (ref 0.2–1.2)
BUN BLD-MCNC: 13 MG/DL (ref 8–23)
BUN/CREAT SERPL: 14.6 (ref 7–25)
CALCIUM SPEC-SCNC: 8.8 MG/DL (ref 8.8–10.5)
CHLORIDE SERPL-SCNC: 96 MMOL/L (ref 98–107)
CO2 SERPL-SCNC: 31 MMOL/L (ref 22–29)
CREAT BLD-MCNC: 0.89 MG/DL (ref 0.76–1.27)
DEPRECATED RDW RBC AUTO: 47.8 FL (ref 37–54)
EOSINOPHIL # BLD AUTO: 0.32 10*3/MM3 (ref 0–0.4)
EOSINOPHIL NFR BLD AUTO: 3.4 % (ref 0.3–6.2)
ERYTHROCYTE [DISTWIDTH] IN BLOOD BY AUTOMATED COUNT: 15 % (ref 12.3–15.4)
GFR SERPL CREATININE-BSD FRML MDRD: 84 ML/MIN/1.73
GLOBULIN UR ELPH-MCNC: 2.5 GM/DL
GLUCOSE BLD-MCNC: 102 MG/DL (ref 65–99)
HCT VFR BLD AUTO: 41.3 % (ref 37.5–51)
HGB BLD-MCNC: 13.6 G/DL (ref 13–17.7)
IMM GRANULOCYTES # BLD AUTO: 0.14 10*3/MM3 (ref 0–0.05)
IMM GRANULOCYTES NFR BLD AUTO: 1.5 % (ref 0–0.5)
LYMPHOCYTES # BLD AUTO: 3.11 10*3/MM3 (ref 0.7–3.1)
LYMPHOCYTES NFR BLD AUTO: 32.9 % (ref 19.6–45.3)
MCH RBC QN AUTO: 28.8 PG (ref 26.6–33)
MCHC RBC AUTO-ENTMCNC: 32.9 G/DL (ref 31.5–35.7)
MCV RBC AUTO: 87.5 FL (ref 79–97)
MONOCYTES # BLD AUTO: 0.95 10*3/MM3 (ref 0.1–0.9)
MONOCYTES NFR BLD AUTO: 10.1 % (ref 5–12)
NEUTROPHILS # BLD AUTO: 4.84 10*3/MM3 (ref 1.4–7)
NEUTROPHILS NFR BLD AUTO: 51.1 % (ref 42.7–76)
NRBC BLD AUTO-RTO: 0 /100 WBC (ref 0–0)
PLATELET # BLD AUTO: 303 10*3/MM3 (ref 140–450)
PMV BLD AUTO: 9.1 FL (ref 6–12)
POTASSIUM BLD-SCNC: 4.5 MMOL/L (ref 3.5–5.2)
PROT SERPL-MCNC: 6.2 G/DL (ref 6–8.5)
RBC # BLD AUTO: 4.72 10*6/MM3 (ref 4.14–5.8)
SODIUM BLD-SCNC: 135 MMOL/L (ref 136–145)
WBC NRBC COR # BLD: 9.45 10*3/MM3 (ref 3.4–10.8)

## 2019-03-06 PROCEDURE — 85025 COMPLETE CBC W/AUTO DIFF WBC: CPT | Performed by: EMERGENCY MEDICINE

## 2019-03-06 PROCEDURE — 93010 ELECTROCARDIOGRAM REPORT: CPT | Performed by: INTERNAL MEDICINE

## 2019-03-06 PROCEDURE — 86901 BLOOD TYPING SEROLOGIC RH(D): CPT | Performed by: EMERGENCY MEDICINE

## 2019-03-06 PROCEDURE — 99283 EMERGENCY DEPT VISIT LOW MDM: CPT

## 2019-03-06 PROCEDURE — 73030 X-RAY EXAM OF SHOULDER: CPT

## 2019-03-06 PROCEDURE — 99284 EMERGENCY DEPT VISIT MOD MDM: CPT | Performed by: EMERGENCY MEDICINE

## 2019-03-06 PROCEDURE — 25010000002 HYDROMORPHONE PER 4 MG: Performed by: EMERGENCY MEDICINE

## 2019-03-06 PROCEDURE — 73560 X-RAY EXAM OF KNEE 1 OR 2: CPT

## 2019-03-06 PROCEDURE — 86850 RBC ANTIBODY SCREEN: CPT | Performed by: EMERGENCY MEDICINE

## 2019-03-06 PROCEDURE — 93005 ELECTROCARDIOGRAM TRACING: CPT | Performed by: EMERGENCY MEDICINE

## 2019-03-06 PROCEDURE — 80053 COMPREHEN METABOLIC PANEL: CPT | Performed by: EMERGENCY MEDICINE

## 2019-03-06 PROCEDURE — 86900 BLOOD TYPING SEROLOGIC ABO: CPT | Performed by: EMERGENCY MEDICINE

## 2019-03-06 PROCEDURE — 86901 BLOOD TYPING SEROLOGIC RH(D): CPT

## 2019-03-06 PROCEDURE — 73502 X-RAY EXAM HIP UNI 2-3 VIEWS: CPT

## 2019-03-06 PROCEDURE — 25010000002 ONDANSETRON PER 1 MG: Performed by: EMERGENCY MEDICINE

## 2019-03-06 PROCEDURE — 86900 BLOOD TYPING SEROLOGIC ABO: CPT

## 2019-03-06 RX ORDER — ONDANSETRON 2 MG/ML
INJECTION INTRAMUSCULAR; INTRAVENOUS
Status: DISPENSED
Start: 2019-03-06 | End: 2019-03-07

## 2019-03-06 RX ORDER — HYDROMORPHONE HCL 110MG/55ML
PATIENT CONTROLLED ANALGESIA SYRINGE INTRAVENOUS
Status: DISPENSED
Start: 2019-03-06 | End: 2019-03-07

## 2019-03-06 RX ORDER — SODIUM CHLORIDE 0.9 % (FLUSH) 0.9 %
10 SYRINGE (ML) INJECTION AS NEEDED
Status: DISCONTINUED | OUTPATIENT
Start: 2019-03-06 | End: 2019-03-11 | Stop reason: HOSPADM

## 2019-03-06 RX ORDER — HYDROMORPHONE HCL 110MG/55ML
0.5 PATIENT CONTROLLED ANALGESIA SYRINGE INTRAVENOUS ONCE
Status: COMPLETED | OUTPATIENT
Start: 2019-03-06 | End: 2019-03-06

## 2019-03-06 RX ORDER — ONDANSETRON 2 MG/ML
4 INJECTION INTRAMUSCULAR; INTRAVENOUS ONCE
Status: COMPLETED | OUTPATIENT
Start: 2019-03-06 | End: 2019-03-06

## 2019-03-06 RX ORDER — HYDROMORPHONE HCL 110MG/55ML
1 PATIENT CONTROLLED ANALGESIA SYRINGE INTRAVENOUS ONCE
Status: COMPLETED | OUTPATIENT
Start: 2019-03-06 | End: 2019-03-06

## 2019-03-06 RX ADMIN — ONDANSETRON 4 MG: 2 INJECTION, SOLUTION INTRAMUSCULAR; INTRAVENOUS at 21:49

## 2019-03-06 RX ADMIN — HYDROMORPHONE HYDROCHLORIDE 0.5 MG: 2 INJECTION, SOLUTION INTRAMUSCULAR; INTRAVENOUS; SUBCUTANEOUS at 21:49

## 2019-03-06 RX ADMIN — HYDROMORPHONE HYDROCHLORIDE 1 MG: 2 INJECTION, SOLUTION INTRAMUSCULAR; INTRAVENOUS; SUBCUTANEOUS at 23:19

## 2019-03-07 ENCOUNTER — APPOINTMENT (OUTPATIENT)
Dept: GENERAL RADIOLOGY | Facility: HOSPITAL | Age: 74
End: 2019-03-07

## 2019-03-07 ENCOUNTER — EPISODE CHANGES (OUTPATIENT)
Dept: CASE MANAGEMENT | Facility: OTHER | Age: 74
End: 2019-03-07

## 2019-03-07 LAB
ABO GROUP BLD: NORMAL
ABO GROUP BLD: NORMAL
ANION GAP SERPL CALCULATED.3IONS-SCNC: 8 MMOL/L
APTT PPP: 31.1 SECONDS (ref 24.3–38.1)
BASOPHILS # BLD AUTO: 0.08 10*3/MM3 (ref 0–0.2)
BASOPHILS NFR BLD AUTO: 0.6 % (ref 0–1.5)
BLD GP AB SCN SERPL QL: NEGATIVE
BUN BLD-MCNC: 14 MG/DL (ref 8–23)
BUN/CREAT SERPL: 18.7 (ref 7–25)
CALCIUM SPEC-SCNC: 8.6 MG/DL (ref 8.8–10.5)
CHLORIDE SERPL-SCNC: 100 MMOL/L (ref 98–107)
CO2 SERPL-SCNC: 31 MMOL/L (ref 22–29)
CREAT BLD-MCNC: 0.75 MG/DL (ref 0.76–1.27)
DEPRECATED RDW RBC AUTO: 47.5 FL (ref 37–54)
EOSINOPHIL # BLD AUTO: 0.2 10*3/MM3 (ref 0–0.4)
EOSINOPHIL NFR BLD AUTO: 1.6 % (ref 0.3–6.2)
ERYTHROCYTE [DISTWIDTH] IN BLOOD BY AUTOMATED COUNT: 14.9 % (ref 12.3–15.4)
GFR SERPL CREATININE-BSD FRML MDRD: 102 ML/MIN/1.73
GLUCOSE BLD-MCNC: 93 MG/DL (ref 65–99)
HCT VFR BLD AUTO: 41.7 % (ref 37.5–51)
HGB BLD-MCNC: 13.6 G/DL (ref 13–17.7)
IMM GRANULOCYTES # BLD AUTO: 0.13 10*3/MM3 (ref 0–0.05)
IMM GRANULOCYTES NFR BLD AUTO: 1 % (ref 0–0.5)
INR PPP: 1.08 (ref 0.9–1.1)
LYMPHOCYTES # BLD AUTO: 2.09 10*3/MM3 (ref 0.7–3.1)
LYMPHOCYTES NFR BLD AUTO: 16.2 % (ref 19.6–45.3)
MCH RBC QN AUTO: 28.6 PG (ref 26.6–33)
MCHC RBC AUTO-ENTMCNC: 32.6 G/DL (ref 31.5–35.7)
MCV RBC AUTO: 87.8 FL (ref 79–97)
MONOCYTES # BLD AUTO: 1.03 10*3/MM3 (ref 0.1–0.9)
MONOCYTES NFR BLD AUTO: 8 % (ref 5–12)
NEUTROPHILS # BLD AUTO: 9.35 10*3/MM3 (ref 1.4–7)
NEUTROPHILS NFR BLD AUTO: 72.6 % (ref 42.7–76)
NRBC BLD AUTO-RTO: 0 /100 WBC (ref 0–0)
PLATELET # BLD AUTO: 297 10*3/MM3 (ref 140–450)
PMV BLD AUTO: 9 FL (ref 6–12)
POTASSIUM BLD-SCNC: 4.3 MMOL/L (ref 3.5–5.2)
PROTHROMBIN TIME: 13.7 SECONDS (ref 12.1–15)
RBC # BLD AUTO: 4.75 10*6/MM3 (ref 4.14–5.8)
RH BLD: POSITIVE
RH BLD: POSITIVE
SODIUM BLD-SCNC: 139 MMOL/L (ref 136–145)
T&S EXPIRATION DATE: NORMAL
WBC NRBC COR # BLD: 12.88 10*3/MM3 (ref 3.4–10.8)

## 2019-03-07 PROCEDURE — 94799 UNLISTED PULMONARY SVC/PX: CPT

## 2019-03-07 PROCEDURE — 99222 1ST HOSP IP/OBS MODERATE 55: CPT | Performed by: INTERNAL MEDICINE

## 2019-03-07 PROCEDURE — 80048 BASIC METABOLIC PNL TOTAL CA: CPT | Performed by: INTERNAL MEDICINE

## 2019-03-07 PROCEDURE — 85025 COMPLETE CBC W/AUTO DIFF WBC: CPT | Performed by: INTERNAL MEDICINE

## 2019-03-07 PROCEDURE — 25010000002 MORPHINE PER 10 MG: Performed by: INTERNAL MEDICINE

## 2019-03-07 PROCEDURE — 73552 X-RAY EXAM OF FEMUR 2/>: CPT

## 2019-03-07 PROCEDURE — 85730 THROMBOPLASTIN TIME PARTIAL: CPT | Performed by: ORTHOPAEDIC SURGERY

## 2019-03-07 PROCEDURE — 85610 PROTHROMBIN TIME: CPT | Performed by: ORTHOPAEDIC SURGERY

## 2019-03-07 PROCEDURE — 71045 X-RAY EXAM CHEST 1 VIEW: CPT

## 2019-03-07 RX ORDER — FEXOFENADINE HCL AND PSEUDOEPHEDRINE HCI 60; 120 MG/1; MG/1
1 TABLET, EXTENDED RELEASE ORAL 2 TIMES DAILY PRN
COMMUNITY
End: 2019-07-11

## 2019-03-07 RX ORDER — LAMOTRIGINE 200 MG/1
200 TABLET ORAL NIGHTLY
COMMUNITY

## 2019-03-07 RX ORDER — CETIRIZINE HYDROCHLORIDE, PSEUDOEPHEDRINE HYDROCHLORIDE 5; 120 MG/1; MG/1
1 TABLET, FILM COATED, EXTENDED RELEASE ORAL ONCE
Status: DISCONTINUED | OUTPATIENT
Start: 2019-03-07 | End: 2019-03-11 | Stop reason: HOSPADM

## 2019-03-07 RX ORDER — LAMOTRIGINE 100 MG/1
TABLET ORAL
Status: COMPLETED
Start: 2019-03-07 | End: 2019-03-07

## 2019-03-07 RX ORDER — SODIUM CHLORIDE 0.9 % (FLUSH) 0.9 %
3 SYRINGE (ML) INJECTION EVERY 12 HOURS SCHEDULED
Status: DISCONTINUED | OUTPATIENT
Start: 2019-03-07 | End: 2019-03-08

## 2019-03-07 RX ORDER — OLANZAPINE 5 MG/1
20 TABLET ORAL DAILY
Status: DISCONTINUED | OUTPATIENT
Start: 2019-03-07 | End: 2019-03-09

## 2019-03-07 RX ORDER — NALOXONE HCL 0.4 MG/ML
0.4 VIAL (ML) INJECTION
Status: DISCONTINUED | OUTPATIENT
Start: 2019-03-07 | End: 2019-03-09

## 2019-03-07 RX ORDER — LAMOTRIGINE 100 MG/1
100 TABLET ORAL DAILY
Status: DISCONTINUED | OUTPATIENT
Start: 2019-03-07 | End: 2019-03-11 | Stop reason: HOSPADM

## 2019-03-07 RX ORDER — MEMANTINE HYDROCHLORIDE 5 MG/1
10 TABLET ORAL DAILY
Status: DISCONTINUED | OUTPATIENT
Start: 2019-03-07 | End: 2019-03-11 | Stop reason: HOSPADM

## 2019-03-07 RX ORDER — SODIUM CHLORIDE 9 MG/ML
75 INJECTION, SOLUTION INTRAVENOUS CONTINUOUS
Status: DISCONTINUED | OUTPATIENT
Start: 2019-03-07 | End: 2019-03-09

## 2019-03-07 RX ORDER — SODIUM CHLORIDE 9 MG/ML
40 INJECTION, SOLUTION INTRAVENOUS AS NEEDED
Status: DISCONTINUED | OUTPATIENT
Start: 2019-03-07 | End: 2019-03-11 | Stop reason: HOSPADM

## 2019-03-07 RX ORDER — LAMOTRIGINE 100 MG/1
100 TABLET ORAL DAILY
COMMUNITY
End: 2020-08-12

## 2019-03-07 RX ORDER — CHOLECALCIFEROL (VITAMIN D3) 125 MCG
5 CAPSULE ORAL NIGHTLY PRN
Status: DISCONTINUED | OUTPATIENT
Start: 2019-03-07 | End: 2019-03-09

## 2019-03-07 RX ORDER — LAMOTRIGINE 100 MG/1
200 TABLET ORAL NIGHTLY
Status: DISCONTINUED | OUTPATIENT
Start: 2019-03-07 | End: 2019-03-11 | Stop reason: HOSPADM

## 2019-03-07 RX ORDER — RIVASTIGMINE TARTRATE 1.5 MG/1
6 CAPSULE ORAL 2 TIMES DAILY WITH MEALS
Status: DISCONTINUED | OUTPATIENT
Start: 2019-03-07 | End: 2019-03-11 | Stop reason: HOSPADM

## 2019-03-07 RX ORDER — TAMSULOSIN HYDROCHLORIDE 0.4 MG/1
0.4 CAPSULE ORAL NIGHTLY
Status: DISCONTINUED | OUTPATIENT
Start: 2019-03-07 | End: 2019-03-11 | Stop reason: HOSPADM

## 2019-03-07 RX ORDER — SODIUM CHLORIDE 0.9 % (FLUSH) 0.9 %
3-10 SYRINGE (ML) INJECTION AS NEEDED
Status: DISCONTINUED | OUTPATIENT
Start: 2019-03-07 | End: 2019-03-11 | Stop reason: HOSPADM

## 2019-03-07 RX ADMIN — LAMOTRIGINE 100 MG: 100 TABLET ORAL at 09:14

## 2019-03-07 RX ADMIN — TAMSULOSIN HYDROCHLORIDE 0.4 MG: 0.4 CAPSULE ORAL at 20:35

## 2019-03-07 RX ADMIN — RIVASTIGMINE TARTRATE 6 MG: 1.5 CAPSULE ORAL at 18:51

## 2019-03-07 RX ADMIN — SODIUM CHLORIDE 75 ML/HR: 9 INJECTION, SOLUTION INTRAVENOUS at 15:25

## 2019-03-07 RX ADMIN — SODIUM CHLORIDE 75 ML/HR: 9 INJECTION, SOLUTION INTRAVENOUS at 01:27

## 2019-03-07 RX ADMIN — OLANZAPINE 20 MG: 5 TABLET, FILM COATED ORAL at 09:14

## 2019-03-07 RX ADMIN — Medication 3 ML: at 01:28

## 2019-03-07 RX ADMIN — Medication 3 ML: at 09:15

## 2019-03-07 RX ADMIN — MORPHINE SULFATE 4 MG: 4 INJECTION INTRAVENOUS at 13:20

## 2019-03-07 RX ADMIN — RIVASTIGMINE TARTRATE 6 MG: 1.5 CAPSULE ORAL at 09:15

## 2019-03-07 RX ADMIN — LAMOTRIGINE 200 MG: 100 TABLET ORAL at 20:35

## 2019-03-07 RX ADMIN — LAMOTRIGINE 200 MG: 100 TABLET ORAL at 01:28

## 2019-03-07 RX ADMIN — MEMANTINE HYDROCHLORIDE 10 MG: 5 TABLET ORAL at 09:14

## 2019-03-07 RX ADMIN — MORPHINE SULFATE 4 MG: 4 INJECTION INTRAVENOUS at 19:32

## 2019-03-07 NOTE — NURSING NOTE
Discharge Planning Assessment   Angi Feliciano     Patient Name: Andrew Redmond  MRN: [id number]  Today's Date: 3/7/2019    Admit Date: 3/6/2019    Discharge Needs Assessment     Row Name 03/07/19 1011       Living Environment    Lives With  alone    Current Living Arrangements  home/apartment/condo    Primary Care Provided by  self    Provides Primary Care For  no one    Family Caregiver if Needed  other (see comments) ex wife    Able to Return to Prior Arrangements  other (see comments) plan to be determined       Resource/Environmental Concerns    Resource/Environmental Concerns  none    Transportation Concerns  other (see comments) uses [employer] service       Transition Planning    Transportation Anticipated  other (see comments) plan to be determined       Discharge Needs Assessment    Concerns to be Addressed  basic needs    Equipment Currently Used at Home  walker, rolling;rollator;ramp;other (see comments) walk in tub        Discharge Plan     Row Name 03/07/19 1019       Plan    Plan  Plan to be determined.    Plan Comments  [facility] patient; referral made to [facility] in pharmacy.  Patient in agreement with speaking to  at bedside.  Verified home address, phone number and PCP. He lives alone in a one level ramp accessible home.  He is independent with ADLs.  He has someone that assists with housekeeping and yard work.  He does not work;  he uses the [employer] for tranportation to get to MD appointments, grocery and pharmacy.  He has a rollator, rolling walker, and walk in tub.  He does not have home health, nebulizer, CPAP or home oxygen.  [facility] in [place] and has no issues getting or paying for his medicines.  Discussed disposition options.  Patient in agreement with short term inpatient rehab prior to home.  [facility] choice [facility] be [facility] and second [facility] be [facility].  Patient has had [facility] in past and would like referral  to that agency when ready for discharge home.  Patient's ulimate goal is home.  Gave patient [facility] Action brochure for local assistance. Patient says that his ex wife, Ellie Hill is his POA;  asked for paperwork for medical record.  Per request referral made to Stefani Ocampo to assist with completing a Living Will.    will continue to follow and assist with d/c planning.         Destination      No service coordination in this encounter.      Durable Medical Equipment      No service coordination in this encounter.      Dialysis/Infusion      No service coordination in this encounter.      Home Medical Care      No service coordination in this encounter.      Community Resources      No service coordination in this encounter.          Demographic Summary     Row Name 03/07/19 1011       General Information    Admission Type  inpatient    Arrived From  home    Required Notices Provided  Important Message from Medicare    Referral Source  admission list    Reason for Consult  discharge planning    Preferred Language  English     Used During This Interaction  no       Contact Information    Permission Granted to Share Info With          Functional Status    No documentation.       Psychosocial    No documentation.       Abuse/Neglect    No documentation.       Legal    No documentation.       Substance Abuse    No documentation.       Patient Forms    No documentation.           Nadira Claros RN

## 2019-03-07 NOTE — PLAN OF CARE
Problem: Patient Care Overview  Goal: Plan of Care Review  Outcome: Ongoing (interventions implemented as appropriate)   03/07/19 1826   Coping/Psychosocial   Plan of Care Reviewed With patient   Plan of Care Review   Progress no change   OTHER   Outcome Summary pt will be NPO after midnight for surgery tomorrow. Pain meds given as requested.        Problem: Fall Risk (Adult)  Goal: Absence of Fall  Outcome: Ongoing (interventions implemented as appropriate)   03/07/19 1826   Fall Risk (Adult)   Absence of Fall making progress toward outcome

## 2019-03-07 NOTE — ED PROVIDER NOTES
Subjective   Mr. Andrew Redmond is a 74 yo WM who presents secondary to injury sustained in fall.  Patient states he was getting out of his chair.  He slipped and fell on his right side.  He complains of pain in the right shoulder and right hip.  Any movement of right lower extremity causes severe hip pain.  EMS was called to the scene.  Patient was placed on backboard for transport.  He was stable in route to the ER.  Patient initially stated he might have hit his head on a doorknob.  However he does not have any headache, no loss of consciousness.  Patient denies any other injuries.  Patient received fentanyl 100 mcg in route.  However he continues to have pain.        History provided by:  Patient and EMS personnel  Fall   Mechanism of injury: fall    Injury location:  Pelvis and shoulder/arm  Shoulder/arm injury location:  R shoulder  Pelvic injury location:  R hip  Incident location:  Home  Arrived directly from scene: yes    Fall:     Fall occurred: Patient getting out of his chair.    Impact surface:  Hard floor    Point of impact: Right shoulder and right hip.    Entrapped after fall: no    Suspicion of alcohol use: no    Suspicion of drug use: no    Prior to arrival data:     Patient ambulatory at scene: no      Blood loss:  None    Responsiveness at scene:  Alert    Orientation at scene:  Person, situation and place    Loss of consciousness: no      Amnesic to event: no      Breathing interventions:  None    IV access status:  Established    Fluids administered:  None  Associated symptoms: no abdominal pain, no back pain, no blindness, no chest pain, no difficulty breathing, no headaches, no hearing loss, no loss of consciousness, no nausea, no neck pain, no seizures and no vomiting    Risk factors: no anticoagulation therapy, no asthma, no beta blocker therapy, no CABG, no CAD, no CHF, no COPD, no diabetes, no dialysis, no hemophilia and no kidney disease    Risk factors comment:  Epilepsy, bipolar, anxiety  and depression, GERD      Review of Systems   Constitutional: Negative for diaphoresis.   HENT: Negative for hearing loss.    Eyes: Negative for blindness.   Respiratory: Negative for chest tightness and shortness of breath.    Cardiovascular: Negative for chest pain.   Gastrointestinal: Negative for abdominal pain, nausea and vomiting.   Genitourinary: Negative for difficulty urinating.   Musculoskeletal: Negative for back pain and neck pain.   Skin: Negative for color change.   Neurological: Negative for seizures, loss of consciousness and headaches.       Past Medical History:   Diagnosis Date   • Alzheimer disease    • Alzheimer disease    • Alzheimer disease 03/06/2019   • Anxiety and depression    • Bipolar 1 disorder (CMS/HCC)    • Colon polyp    • Constipation    • Depression    • Diarrhea    • Epilepsy (CMS/HCC)    • Epilepsy (CMS/HCC) 03/06/2019   • Erectile dysfunction    • GERD (gastroesophageal reflux disease)    • L1 vertebral fracture (CMS/HCC)    • Seizures (CMS/HCC)     absence seizures.  well controlled.       No Known Allergies    Past Surgical History:   Procedure Laterality Date   • COLONOSCOPY N/A 4/1/2016    Procedure: COLONOSCOPY with polypectomy;  Surgeon: Akiko Scales MD;  Location: [facility];  Service:    • GALLBLADDER SURGERY     • LIVER SURGERY     • TONSILLECTOMY         Family History   Problem Relation Age of Onset   • Lung cancer Father    • No Known Problems Mother    • No Known Problems Maternal Grandmother    • No Known Problems Maternal Grandfather    • No Known Problems Paternal Grandmother    • No Known Problems Paternal Grandfather        Social History     Socioeconomic History   • Marital status:      Spouse name: Not on file   • Number of children: 0   • Years of education: Ph.D   • Highest education level: Not on file   Occupational History   • Occupation: professor     Comment: taught anatomy at [employer]   Tobacco Use   • Smoking status: Former Smoker      Packs/day: 2.00     Years: 20.00     Pack years: 40.00   • Smokeless tobacco: Former User     Quit date: 1987   Substance and Sexual Activity   • Alcohol use: No   • Drug use: No   • Sexual activity: Defer           Objective   Physical Exam   Constitutional: He appears well-developed and well-nourished. No distress.   73-year-old white male laying in bed.  Patient appears in fair overall health.  Vital signs are unremarkable.   HENT:   Head: Normocephalic and atraumatic.   Right Ear: External ear normal.   Left Ear: External ear normal.   Nose: Nose normal.   Mouth/Throat: Oropharynx is clear and moist.   Eyes: Conjunctivae and EOM are normal. Pupils are equal, round, and reactive to light.   Neck: Normal range of motion. Neck supple.   Cardiovascular: Normal rate, regular rhythm, normal heart sounds and intact distal pulses. Exam reveals no gallop and no friction rub.   No murmur heard.  Pulmonary/Chest: Effort normal and breath sounds normal. No stridor. No respiratory distress. He has no wheezes. He has no rales.   Abdominal: Soft. He exhibits no distension. There is no tenderness.   Musculoskeletal: He exhibits no edema.        Right shoulder: He exhibits tenderness. He exhibits normal range of motion, no bony tenderness, no crepitus and no deformity.        Right hip: He exhibits decreased range of motion, tenderness, bony tenderness and deformity.        Arms:       Legs:  Neurological: He is alert. He has normal reflexes. No cranial nerve deficit.   Skin: Skin is warm and dry. No rash noted. He is not diaphoretic. No erythema.   Psychiatric: He has a normal mood and affect. His behavior is normal.   Nursing note and vitals reviewed.      ECG 12 Lead    Date/Time: 3/6/2019 11:08 PM  Performed by: Gerardo Martinez MD  Authorized by: Gerardo Martinez MD   Interpreted by physician  Previous ECG: no previous ECG available  Rhythm: sinus rhythm  Rate: normal  Conduction: incomplete RBBB and LAFB  Clinical  impression: abnormal ECG                     ED Course  ED Course as of Mar 06 2315   Wed Mar 06, 2019   2144 Patient has pain in her right shoulder and right hip.  Right lower extremity is shortened and externally rotated.  Concerning for hip fracture.  Will obtain x-rays of right shoulder and right hip.  Given pain medications.  Patient had fentanyl 100 mg in route.  However he states he did not help his pain.  We will also obtain baseline labs.  [SS]   2249 Patient has a right femoral neck fracture.  This will require surgical repair.  Shoulder series appears to have a scapular fracture.  However patient does not have scapular pain nor tenderness.  [SS]   2300 Discussed with Dr. Turk - orthopedics.  Calling hospitalist for admission.  [SS]   2314 Discussed with Dr. Elliott.  She will admit.  Dr. Turk will consult.  [SS]      ED Course User Index  [SS] Gerardo Martinez MD      Labs Reviewed   COMPREHENSIVE METABOLIC PANEL - Abnormal; Notable for the following components:       Result Value    Glucose 102 (*)     Sodium 135 (*)     Chloride 96 (*)     CO2 31.0 (*)     All other components within normal limits    Narrative:     The MDRD GFR formula is only valid for adults with stable renal function between ages 18 and 70.   CBC WITH AUTO DIFFERENTIAL - Abnormal; Notable for the following components:    Immature Grans % 1.5 (*)     Lymphocytes, Absolute 3.11 (*)     Monocytes, Absolute 0.95 (*)     Immature Grans, Absolute 0.14 (*)     All other components within normal limits   TYPE AND SCREEN   CBC AND DIFFERENTIAL    Narrative:     The following orders were created for panel order CBC & Differential.  Procedure                               Abnormality         Status                     ---------                               -----------         ------                     CBC Auto Differential[[id number]]        Abnormal            Final result                 Please view results for these tests on the  individual orders.                 MDM  Number of Diagnoses or Management Options  Displaced fracture of right femoral neck (CMS/HCC): new and requires workup     Amount and/or Complexity of Data Reviewed  Clinical lab tests: reviewed and ordered  Tests in the radiology section of CPT®: reviewed  Tests in the medicine section of CPT®: ordered and reviewed  Discuss the patient with other providers: yes (Dr. Elliott-hospitalist  Dr. Turk-orthopedics)  Independent visualization of images, tracings, or specimens: yes    Risk of Complications, Morbidity, and/or Mortality  Presenting problems: moderate  Diagnostic procedures: moderate  Management options: moderate    Patient Progress  Patient progress: stable        Final diagnoses:   Displaced fracture of right femoral neck (CMS/HCC)            Gerardo Martinez MD  03/06/19 8625       Gerardo Martinez MD  03/07/19 0012       Gerardo Martinez MD  03/07/19 0019       Gerardo Martinez MD  03/07/19 0055

## 2019-03-07 NOTE — ED NOTES
Called hospitalist (Dr. Elliott) for consult. Call completed.     Margarita Garcia  03/06/19 2992

## 2019-03-07 NOTE — PROGRESS NOTES
Patient is a 73-year-old male admitted for tripping and falling and injuring his right hip.  Been seen and examined today.  Has been found to have right femoral neck fracture.  Orthopedics is consulted and the timing of possible surgery will be determined by them.  We will continue pain control in the interim.  If no surgery is planned today will be guarded on diet.

## 2019-03-07 NOTE — H&P
[facility] HOSPITALIST     Jessica Birch MD    CHIEF COMPLAINT: fall with right hip, knee and shoulder pain    HISTORY OF PRESENT ILLNESS:    Patient is a 72 y/o male who presented to the ED via EMS after a fall at home. Patient reports he was standing up from a chair when he lost his balance and fell landing on his right hip. He was unable to stand but was able to crawl to a phone and call 911. He reports severe pain in his right hip, knee and shoulder worse with movement. He received fentanyl 100 mcg without much relief in his pain. He does report pain in now tolerable after receiving dilaudid but still very painful with any movements. Patient denies any associated lightheadedness or dizziness prior to fall.   He normally uses a rollator to get around.       Past Medical History:   Diagnosis Date   • Alzheimer disease    • Alzheimer disease    • Alzheimer disease 03/06/2019   • Anxiety and depression    • Bipolar 1 disorder (CMS/HCC)    • Colon polyp    • Constipation    • Depression    • Diarrhea    • Epilepsy (CMS/HCC)    • Epilepsy (CMS/HCC) 03/06/2019   • Erectile dysfunction    • GERD (gastroesophageal reflux disease)    • L1 vertebral fracture (CMS/HCC)    • Seizures (CMS/HCC)     absence seizures.  well controlled.     Past Surgical History:   Procedure Laterality Date   • COLONOSCOPY N/A 4/1/2016    Procedure: COLONOSCOPY with polypectomy;  Surgeon: Akiko Scales MD;  Location: [facility];  Service:    • GALLBLADDER SURGERY     • LIVER SURGERY     • TONSILLECTOMY       Family History   Problem Relation Age of Onset   • Lung cancer Father    • No Known Problems Mother    • No Known Problems Maternal Grandmother    • No Known Problems Maternal Grandfather    • No Known Problems Paternal Grandmother    • No Known Problems Paternal Grandfather      Social History     Tobacco Use   • Smoking status: Former Smoker     Packs/day: 2.00     Years: 20.00     Pack years: 40.00   •  "Smokeless tobacco: Former User     Quit date: 1987   • Tobacco comment: quit 30 yrs ago    Substance Use Topics   • Alcohol use: No   • Drug use: No     Medications Prior to Admission   Medication Sig Dispense Refill Last Dose   • fexofenadine-pseudoephedrine (ALLEGRA-D)  MG per 12 hr tablet Take 1 tablet by mouth 2 (Two) Times a Day As Needed for Allergies.      • lamoTRIgine (LaMICtal) 100 MG tablet Take 100 mg by mouth Daily.      • lamoTRIgine (LAMICTAL) 200 MG tablet Take 200 mg by mouth Every Night.      • memantine (NAMENDA) 10 MG tablet Take 10 mg by mouth 2 (Two) Times a Day.   Taking   • OLANZapine (ZyPREXA) 20 MG tablet Take 20 mg by mouth Daily.   Taking   • polyethylene glycol (MIRALAX) powder Take 17 g by mouth Daily. (Patient taking differently: Take 17 g by mouth Daily As Needed.) 850 g 5 Taking   • rivastigmine (EXELON) 6 MG capsule Take 6 mg by mouth 2 (Two) Times a Day.   Taking   • tamsulosin (FLOMAX) 0.4 MG capsule 24 hr capsule Take 1 capsule by mouth Every Night. 30 capsule 2    • calcitonin, salmon, (MIACALCIN) 200 UNIT/ACT nasal spray 1 spray into each nostril Daily. 3.7 mL 3 Taking   • lamoTRIgine (LaMICtal) 200 MG tablet Take 100 mg by mouth Daily.   Taking     Allergies:  Patient has no known allergies.    Immunization History   Administered Date(s) Administered   • Flu Vaccine High Dose PF 65YR+ 10/29/2018   • Pneumococcal Conjugate 13-Valent (PCV13) 10/05/2018   • TD Preservative Free 12/22/2015, 08/15/2016, 12/22/2018           REVIEW OF SYSTEMS:  Please see the above history of present illness for pertinent positives and negatives.  The remainder of the patient's systems have been reviewed and are negative.    Vital Signs  Temp:  [97.9 °F (36.6 °C)-98.1 °F (36.7 °C)] 97.9 °F (36.6 °C)  Heart Rate:  [74-81] 81  Resp:  [18-19] 19  BP: (122-142)/(71-77) 142/72    Flowsheet Rows      First Filed Value   Admission Height  172.7 cm (68\") Documented at 03/06/2019 2133   Admission " Weight  70.3 kg (155 lb) Documented at 03/06/2019 2133           Physical Exam:  Physical Exam   Constitutional: Patient appears well-developed and well-nourished and in no acute distress     HEENT:   Head: Normocephalic and atraumatic.   Eyes:  Pupils are equal, round, and reactive to light. EOM are intact. Sclera are anicteric and non-injected.  Mouth and Throat: Patient has moist mucous membranes. Oropharynx is clear of any erythema or exudate.     Cardiovascular: Regular rate, regular rhythm, S1 normal and S2 normal.  Exam reveals no gallop and no friction rub.  No murmur heard.  Pulmonary/Chest: Lungs are clear to auscultation bilaterally. No respiratory distress. No wheezes. No rhonchi. No rales.   Abdominal: Soft. Bowel sounds are normal. No distension and no mass. There is no tenderness.   Extremities: No edema. RLE is shortened and externally rotated, knee is nontender to palpation with no swelling noted  Neurological: Patient is alert and oriented to person, place, and time. Cranial nerves II-XII are grossly intact with no focal deficits.  Skin: Skin is warm. No rash noted. Nails show no clubbing.  No cyanosis or erythema.       Results Review:    I reviewed the patient's new clinical results.  Lab Results (most recent)     Procedure Component Value Units Date/Time    Comprehensive Metabolic Panel [[id number]]  (Abnormal) Collected:  03/06/19 2146    Specimen:  Blood Updated:  03/06/19 2214     Glucose 102 mg/dL      BUN 13 mg/dL      Creatinine 0.89 mg/dL      Sodium 135 mmol/L      Potassium 4.5 mmol/L      Chloride 96 mmol/L      CO2 31.0 mmol/L      Calcium 8.8 mg/dL      Total Protein 6.2 g/dL      Albumin 3.70 g/dL      ALT (SGPT) 21 U/L      AST (SGOT) 20 U/L      Alkaline Phosphatase 122 U/L      Total Bilirubin 0.2 mg/dL      eGFR Non African Amer 84 mL/min/1.73      Globulin 2.5 gm/dL      A/G Ratio 1.5 g/dL      BUN/Creatinine Ratio 14.6     Anion Gap 8.0 mmol/L     Narrative:       The MDRD GFR  formula is only valid for adults with stable renal function between ages 18 and 70.    CBC & Differential [[id number]] Collected:  03/06/19 2146    Specimen:  Blood Updated:  03/06/19 2201    Narrative:       The following orders were created for panel order CBC & Differential.  Procedure                               Abnormality         Status                     ---------                               -----------         ------                     CBC Auto Differential[[id number]]        Abnormal            Final result                 Please view results for these tests on the individual orders.    CBC Auto Differential [[id number]]  (Abnormal) Collected:  03/06/19 2146    Specimen:  Blood Updated:  03/06/19 2201     WBC 9.45 10*3/mm3      RBC 4.72 10*6/mm3      Hemoglobin 13.6 g/dL      Hematocrit 41.3 %      MCV 87.5 fL      MCH 28.8 pg      MCHC 32.9 g/dL      RDW 15.0 %      RDW-SD 47.8 fl      MPV 9.1 fL      Platelets 303 10*3/mm3      Neutrophil % 51.1 %      Lymphocyte % 32.9 %      Monocyte % 10.1 %      Eosinophil % 3.4 %      Basophil % 1.0 %      Immature Grans % 1.5 %      Neutrophils, Absolute 4.84 10*3/mm3      Lymphocytes, Absolute 3.11 10*3/mm3      Monocytes, Absolute 0.95 10*3/mm3      Eosinophils, Absolute 0.32 10*3/mm3      Basophils, Absolute 0.09 10*3/mm3      Immature Grans, Absolute 0.14 10*3/mm3      nRBC 0.0 /100 WBC           Imaging Results (most recent)     Procedure Component Value Units Date/Time    XR Chest 1 View [[id number]] Updated:  03/07/19 0023    XR Knee 1 or 2 View Right [[id number]] Resulted:  03/06/19 2303     Updated:  03/06/19 2303    XR Shoulder 2+ View Right [[id number]] Resulted:  03/06/19 2303     Updated:  03/06/19 2303    XR Hip With or Without Pelvis 2 - 3 View Right [[id number]] Resulted:  03/06/19 2302     Updated:  03/06/19 2303        Right hip xray personally reviewed and shows r femoral neck fx    ECG/EMG Results (most recent)     Procedure Component Value Units  Date/Time    ECG 12 Lead [[id number]] Collected:  03/06/19 2308     Updated:  03/07/19 0026    Narrative:       HEART RATE= 76  bpm  RR Interval= 784  ms  SC Interval= 195  ms  P Horizontal Axis= -9  deg  P Front Axis= 59  deg  QRSD Interval= 112  ms  QT Interval= 388  ms  QRS Axis= 0  deg  T Wave Axis= 61  deg  - ABNORMAL ECG -  Sinus rhythm  Incomplete RBBB and LAFB  Borderline ST elevation, lateral leads  Electronically Signed By:   Date and Time of Study: 2019-03-06 23:08:20        reviewed    Assessment/Plan     Right femoral neck fx  - morphine prn pain  - npo after midnight  - consult ortho  - will need PT/OT consult at some point    Epilepsy  - resume home meds     Alzheimer's Dementia  - continue home meds     BPH  - c/w flomax    DVT Prophylaxis  - SCD to left    I discussed the patients findings and my recommendations with patient.     Elyssa Elliott DO  03/07/19  12:38 AM

## 2019-03-07 NOTE — PLAN OF CARE
Problem: Patient Care Overview  Goal: Discharge Needs Assessment  Outcome: Ongoing (interventions implemented as appropriate)   03/07/19 0002 03/07/19 1011   Disability   Equipment Currently Used at Home --  walker, rolling;rollator;ramp;other (see comments)  (walk in tub)   Discharge Needs Assessment   Concerns to be Addressed --  basic needs   Patient/Family Anticipates Transition to home --    Patient/Family Anticipated Services at Transition none --    Transportation Concerns --  other (see comments)  (uses [facility])   Transportation Anticipated --  other (see comments)  (plan to be determined)

## 2019-03-07 NOTE — PLAN OF CARE
Problem: Patient Care Overview  Goal: Plan of Care Review  Outcome: Ongoing (interventions implemented as appropriate)   03/07/19 0546   Coping/Psychosocial   Plan of Care Reviewed With patient   Plan of Care Review   Progress no change   OTHER   Outcome Summary Pt admitted with right hip fracture -- states pain only with movement -- VSS -- no overmight events -- will continue to monitor     Goal: Individualization and Mutuality  Outcome: Ongoing (interventions implemented as appropriate)      Problem: Fall Risk (Adult)  Goal: Identify Related Risk Factors and Signs and Symptoms  Outcome: Ongoing (interventions implemented as appropriate)    Goal: Absence of Fall  Outcome: Ongoing (interventions implemented as appropriate)      Problem: Skin Injury Risk (Adult)  Goal: Identify Related Risk Factors and Signs and Symptoms  Outcome: Ongoing (interventions implemented as appropriate)    Goal: Skin Health and Integrity  Outcome: Ongoing (interventions implemented as appropriate)

## 2019-03-07 NOTE — ACP (ADVANCE CARE PLANNING)
Advance Care Planning   Advance Care Planning Discussion:    Patient agreed to complete and sign a new living will. I am waiting on phone number for healthcare surrogate before notarizing and scanning to chart.        Stefani Ocampo

## 2019-03-08 ENCOUNTER — ANESTHESIA (OUTPATIENT)
Dept: PERIOP | Facility: HOSPITAL | Age: 74
End: 2019-03-08

## 2019-03-08 ENCOUNTER — ANESTHESIA EVENT (OUTPATIENT)
Dept: PERIOP | Facility: HOSPITAL | Age: 74
End: 2019-03-08

## 2019-03-08 ENCOUNTER — APPOINTMENT (OUTPATIENT)
Dept: GENERAL RADIOLOGY | Facility: HOSPITAL | Age: 74
End: 2019-03-08

## 2019-03-08 LAB
HCT VFR BLD AUTO: 37.1 % (ref 37.5–51)
HGB BLD-MCNC: 12 G/DL (ref 13–17.7)

## 2019-03-08 PROCEDURE — C1776 JOINT DEVICE (IMPLANTABLE): HCPCS | Performed by: ORTHOPAEDIC SURGERY

## 2019-03-08 PROCEDURE — 25010000002 PROPOFOL 10 MG/ML EMULSION: Performed by: NURSE ANESTHETIST, CERTIFIED REGISTERED

## 2019-03-08 PROCEDURE — 25010000002 FENTANYL CITRATE (PF) 100 MCG/2ML SOLUTION: Performed by: NURSE ANESTHETIST, CERTIFIED REGISTERED

## 2019-03-08 PROCEDURE — 27236 TREAT THIGH FRACTURE: CPT | Performed by: ORTHOPAEDIC SURGERY

## 2019-03-08 PROCEDURE — 85014 HEMATOCRIT: CPT | Performed by: ORTHOPAEDIC SURGERY

## 2019-03-08 PROCEDURE — 99222 1ST HOSP IP/OBS MODERATE 55: CPT | Performed by: ORTHOPAEDIC SURGERY

## 2019-03-08 PROCEDURE — 94799 UNLISTED PULMONARY SVC/PX: CPT

## 2019-03-08 PROCEDURE — 25010000002 VANCOMYCIN 1 G RECONSTITUTED SOLUTION 1 EACH VIAL: Performed by: ORTHOPAEDIC SURGERY

## 2019-03-08 PROCEDURE — 25010000002 VANCOMYCIN 1 G RECONSTITUTED SOLUTION: Performed by: ORTHOPAEDIC SURGERY

## 2019-03-08 PROCEDURE — 25010000003 BUPIVACAINE LIPOSOME 1.3 % SUSPENSION 20 ML VIAL: Performed by: ORTHOPAEDIC SURGERY

## 2019-03-08 PROCEDURE — C9290 INJ, BUPIVACAINE LIPOSOME: HCPCS | Performed by: ORTHOPAEDIC SURGERY

## 2019-03-08 PROCEDURE — 23570 CLTX SCAPULAR FX W/O MNPJ: CPT | Performed by: ORTHOPAEDIC SURGERY

## 2019-03-08 PROCEDURE — 73501 X-RAY EXAM HIP UNI 1 VIEW: CPT

## 2019-03-08 PROCEDURE — 0SRR0J9 REPLACEMENT OF RIGHT HIP JOINT, FEMORAL SURFACE WITH SYNTHETIC SUBSTITUTE, CEMENTED, OPEN APPROACH: ICD-10-PCS | Performed by: ORTHOPAEDIC SURGERY

## 2019-03-08 PROCEDURE — 85018 HEMOGLOBIN: CPT | Performed by: ORTHOPAEDIC SURGERY

## 2019-03-08 PROCEDURE — 25010000002 MIDAZOLAM PER 1 MG: Performed by: NURSE ANESTHETIST, CERTIFIED REGISTERED

## 2019-03-08 PROCEDURE — C1713 ANCHOR/SCREW BN/BN,TIS/BN: HCPCS | Performed by: ORTHOPAEDIC SURGERY

## 2019-03-08 PROCEDURE — 99231 SBSQ HOSP IP/OBS SF/LOW 25: CPT | Performed by: HOSPITALIST

## 2019-03-08 PROCEDURE — 25010000002 ONDANSETRON PER 1 MG: Performed by: NURSE ANESTHETIST, CERTIFIED REGISTERED

## 2019-03-08 DEVICE — IMPLANTABLE DEVICE: Type: IMPLANTABLE DEVICE | Site: HIP | Status: FUNCTIONAL

## 2019-03-08 DEVICE — CAP PRT HIP BIPOL FX: Type: IMPLANTABLE DEVICE | Site: HIP | Status: FUNCTIONAL

## 2019-03-08 DEVICE — SUT FW #2 W/TPR NDL 1/2 CIR 38IN 97CM 26.5MM BLU: Type: IMPLANTABLE DEVICE | Site: HIP | Status: FUNCTIONAL

## 2019-03-08 DEVICE — SHLL VERSYS M/BIPOL MTL OD 49MM: Type: IMPLANTABLE DEVICE | Site: HIP | Status: FUNCTIONAL

## 2019-03-08 DEVICE — CENTRLZR VERSYS DIST CMT 11MM: Type: IMPLANTABLE DEVICE | Site: HIP | Status: FUNCTIONAL

## 2019-03-08 DEVICE — BONE PREPARATION KIT
Type: IMPLANTABLE DEVICE | Site: HIP | Status: FUNCTIONAL
Brand: BIOPREP

## 2019-03-08 DEVICE — HD FEM/HIP VERSYS COCR 12/14TPR 28MM PLS7MM: Type: IMPLANTABLE DEVICE | Site: HIP | Status: FUNCTIONAL

## 2019-03-08 DEVICE — SMARTSET GMV HIGH PERFORMANCE GENTAMICIN MEDIUM VISCOSITY BONE CEMENT 40G
Type: IMPLANTABLE DEVICE | Site: HIP | Status: FUNCTIONAL
Brand: SMARTSET

## 2019-03-08 DEVICE — LINER VERSYS ASMBL POLY 47/48/49MM OD X28MM: Type: IMPLANTABLE DEVICE | Site: HIP | Status: FUNCTIONAL

## 2019-03-08 RX ORDER — MIDAZOLAM HYDROCHLORIDE 1 MG/ML
1 INJECTION INTRAMUSCULAR; INTRAVENOUS
Status: DISCONTINUED | OUTPATIENT
Start: 2019-03-08 | End: 2019-03-08 | Stop reason: HOSPADM

## 2019-03-08 RX ORDER — PROPOFOL 10 MG/ML
VIAL (ML) INTRAVENOUS CONTINUOUS PRN
Status: DISCONTINUED | OUTPATIENT
Start: 2019-03-08 | End: 2019-03-08 | Stop reason: SURG

## 2019-03-08 RX ORDER — VANCOMYCIN HYDROCHLORIDE 1 G/20ML
INJECTION, POWDER, LYOPHILIZED, FOR SOLUTION INTRAVENOUS AS NEEDED
Status: DISCONTINUED | OUTPATIENT
Start: 2019-03-08 | End: 2019-03-08 | Stop reason: HOSPADM

## 2019-03-08 RX ORDER — MEPERIDINE HYDROCHLORIDE 25 MG/ML
12.5 INJECTION INTRAMUSCULAR; INTRAVENOUS; SUBCUTANEOUS
Status: DISCONTINUED | OUTPATIENT
Start: 2019-03-08 | End: 2019-03-08 | Stop reason: HOSPADM

## 2019-03-08 RX ORDER — NALOXONE HCL 0.4 MG/ML
0.4 VIAL (ML) INJECTION
Status: DISCONTINUED | OUTPATIENT
Start: 2019-03-08 | End: 2019-03-09

## 2019-03-08 RX ORDER — ONDANSETRON 4 MG/1
4 TABLET, ORALLY DISINTEGRATING ORAL EVERY 6 HOURS PRN
Status: DISCONTINUED | OUTPATIENT
Start: 2019-03-08 | End: 2019-03-11 | Stop reason: HOSPADM

## 2019-03-08 RX ORDER — HYDROCODONE BITARTRATE AND ACETAMINOPHEN 7.5; 325 MG/1; MG/1
1 TABLET ORAL EVERY 4 HOURS PRN
Status: DISCONTINUED | OUTPATIENT
Start: 2019-03-08 | End: 2019-03-11 | Stop reason: HOSPADM

## 2019-03-08 RX ORDER — ONDANSETRON 2 MG/ML
4 INJECTION INTRAMUSCULAR; INTRAVENOUS ONCE AS NEEDED
Status: DISCONTINUED | OUTPATIENT
Start: 2019-03-08 | End: 2019-03-08 | Stop reason: HOSPADM

## 2019-03-08 RX ORDER — SODIUM CHLORIDE, SODIUM LACTATE, POTASSIUM CHLORIDE, CALCIUM CHLORIDE 600; 310; 30; 20 MG/100ML; MG/100ML; MG/100ML; MG/100ML
100 INJECTION, SOLUTION INTRAVENOUS CONTINUOUS
Status: DISCONTINUED | OUTPATIENT
Start: 2019-03-08 | End: 2019-03-09

## 2019-03-08 RX ORDER — MAGNESIUM HYDROXIDE 1200 MG/15ML
LIQUID ORAL AS NEEDED
Status: DISCONTINUED | OUTPATIENT
Start: 2019-03-08 | End: 2019-03-08 | Stop reason: HOSPADM

## 2019-03-08 RX ORDER — FENTANYL CITRATE 50 UG/ML
INJECTION, SOLUTION INTRAMUSCULAR; INTRAVENOUS AS NEEDED
Status: DISCONTINUED | OUTPATIENT
Start: 2019-03-08 | End: 2019-03-08 | Stop reason: SURG

## 2019-03-08 RX ORDER — SODIUM CHLORIDE, SODIUM LACTATE, POTASSIUM CHLORIDE, CALCIUM CHLORIDE 600; 310; 30; 20 MG/100ML; MG/100ML; MG/100ML; MG/100ML
9 INJECTION, SOLUTION INTRAVENOUS CONTINUOUS
Status: DISCONTINUED | OUTPATIENT
Start: 2019-03-08 | End: 2019-03-08

## 2019-03-08 RX ORDER — MORPHINE SULFATE 10 MG/ML
6 INJECTION INTRAMUSCULAR; INTRAVENOUS; SUBCUTANEOUS
Status: DISCONTINUED | OUTPATIENT
Start: 2019-03-08 | End: 2019-03-09

## 2019-03-08 RX ORDER — ASPIRIN 325 MG
325 TABLET, DELAYED RELEASE (ENTERIC COATED) ORAL EVERY 12 HOURS SCHEDULED
Status: DISCONTINUED | OUTPATIENT
Start: 2019-03-09 | End: 2019-03-11 | Stop reason: HOSPADM

## 2019-03-08 RX ORDER — ACETAMINOPHEN 650 MG/1
650 SUPPOSITORY RECTAL EVERY 4 HOURS PRN
Status: DISCONTINUED | OUTPATIENT
Start: 2019-03-08 | End: 2019-03-11 | Stop reason: HOSPADM

## 2019-03-08 RX ORDER — BUPIVACAINE HYDROCHLORIDE 5 MG/ML
INJECTION, SOLUTION EPIDURAL; INTRACAUDAL AS NEEDED
Status: DISCONTINUED | OUTPATIENT
Start: 2019-03-08 | End: 2019-03-08 | Stop reason: SURG

## 2019-03-08 RX ORDER — ONDANSETRON 4 MG/1
4 TABLET, FILM COATED ORAL EVERY 6 HOURS PRN
Status: DISCONTINUED | OUTPATIENT
Start: 2019-03-08 | End: 2019-03-11 | Stop reason: HOSPADM

## 2019-03-08 RX ORDER — ONDANSETRON 2 MG/ML
4 INJECTION INTRAMUSCULAR; INTRAVENOUS EVERY 6 HOURS PRN
Status: DISCONTINUED | OUTPATIENT
Start: 2019-03-08 | End: 2019-03-11 | Stop reason: HOSPADM

## 2019-03-08 RX ORDER — HYDROCODONE BITARTRATE AND ACETAMINOPHEN 7.5; 325 MG/1; MG/1
2 TABLET ORAL EVERY 4 HOURS PRN
Status: DISCONTINUED | OUTPATIENT
Start: 2019-03-08 | End: 2019-03-09

## 2019-03-08 RX ORDER — MIDAZOLAM HYDROCHLORIDE 1 MG/ML
2 INJECTION INTRAMUSCULAR; INTRAVENOUS
Status: DISCONTINUED | OUTPATIENT
Start: 2019-03-08 | End: 2019-03-08 | Stop reason: HOSPADM

## 2019-03-08 RX ORDER — ONDANSETRON 2 MG/ML
4 INJECTION INTRAMUSCULAR; INTRAVENOUS ONCE AS NEEDED
Status: COMPLETED | OUTPATIENT
Start: 2019-03-08 | End: 2019-03-08

## 2019-03-08 RX ORDER — ACETAMINOPHEN 120 MG/1
SUPPOSITORY RECTAL EVERY 4 HOURS PRN
Status: CANCELLED | OUTPATIENT
Start: 2019-03-08

## 2019-03-08 RX ORDER — FENTANYL CITRATE 50 UG/ML
50 INJECTION, SOLUTION INTRAMUSCULAR; INTRAVENOUS
Status: DISCONTINUED | OUTPATIENT
Start: 2019-03-08 | End: 2019-03-08 | Stop reason: HOSPADM

## 2019-03-08 RX ADMIN — EPHEDRINE SULFATE 10 MG: 50 INJECTION INTRAMUSCULAR; INTRAVENOUS; SUBCUTANEOUS at 16:35

## 2019-03-08 RX ADMIN — MIDAZOLAM HYDROCHLORIDE 1 MG: 1 INJECTION, SOLUTION INTRAMUSCULAR; INTRAVENOUS at 14:22

## 2019-03-08 RX ADMIN — VANCOMYCIN HYDROCHLORIDE 1000 MG: 1 INJECTION, POWDER, LYOPHILIZED, FOR SOLUTION INTRAVENOUS at 13:35

## 2019-03-08 RX ADMIN — EPHEDRINE SULFATE 10 MG: 50 INJECTION INTRAMUSCULAR; INTRAVENOUS; SUBCUTANEOUS at 16:52

## 2019-03-08 RX ADMIN — PROPOFOL 25 MCG/KG/MIN: 10 INJECTION, EMULSION INTRAVENOUS at 16:05

## 2019-03-08 RX ADMIN — SODIUM CHLORIDE, POTASSIUM CHLORIDE, SODIUM LACTATE AND CALCIUM CHLORIDE 100 ML/HR: 600; 310; 30; 20 INJECTION, SOLUTION INTRAVENOUS at 20:28

## 2019-03-08 RX ADMIN — EPHEDRINE SULFATE 10 MG: 50 INJECTION INTRAMUSCULAR; INTRAVENOUS; SUBCUTANEOUS at 17:00

## 2019-03-08 RX ADMIN — TAMSULOSIN HYDROCHLORIDE 0.4 MG: 0.4 CAPSULE ORAL at 20:24

## 2019-03-08 RX ADMIN — ACETAMINOPHEN 650 MG: 650 SUPPOSITORY RECTAL at 06:04

## 2019-03-08 RX ADMIN — MEMANTINE HYDROCHLORIDE 10 MG: 5 TABLET ORAL at 20:25

## 2019-03-08 RX ADMIN — ONDANSETRON 4 MG: 2 INJECTION, SOLUTION INTRAMUSCULAR; INTRAVENOUS at 14:22

## 2019-03-08 RX ADMIN — SODIUM CHLORIDE 75 ML/HR: 9 INJECTION, SOLUTION INTRAVENOUS at 04:33

## 2019-03-08 RX ADMIN — MELATONIN TAB 5 MG 5 MG: 5 TAB at 20:37

## 2019-03-08 RX ADMIN — BUPIVACAINE HYDROCHLORIDE 3 ML: 5 INJECTION, SOLUTION EPIDURAL; INTRACAUDAL; PERINEURAL at 15:45

## 2019-03-08 RX ADMIN — SODIUM CHLORIDE 1000 MG: 9 INJECTION, SOLUTION INTRAVENOUS at 17:41

## 2019-03-08 RX ADMIN — SODIUM CHLORIDE, POTASSIUM CHLORIDE, SODIUM LACTATE AND CALCIUM CHLORIDE: 600; 310; 30; 20 INJECTION, SOLUTION INTRAVENOUS at 15:54

## 2019-03-08 RX ADMIN — SODIUM CHLORIDE, POTASSIUM CHLORIDE, SODIUM LACTATE AND CALCIUM CHLORIDE 9 ML/HR: 600; 310; 30; 20 INJECTION, SOLUTION INTRAVENOUS at 14:17

## 2019-03-08 RX ADMIN — EPHEDRINE SULFATE 10 MG: 50 INJECTION INTRAMUSCULAR; INTRAVENOUS; SUBCUTANEOUS at 16:30

## 2019-03-08 RX ADMIN — LAMOTRIGINE 200 MG: 100 TABLET ORAL at 20:24

## 2019-03-08 RX ADMIN — OLANZAPINE 20 MG: 5 TABLET, FILM COATED ORAL at 20:25

## 2019-03-08 RX ADMIN — SODIUM CHLORIDE, POTASSIUM CHLORIDE, SODIUM LACTATE AND CALCIUM CHLORIDE: 600; 310; 30; 20 INJECTION, SOLUTION INTRAVENOUS at 15:26

## 2019-03-08 RX ADMIN — FAMOTIDINE 20 MG: 10 INJECTION, SOLUTION INTRAVENOUS at 14:21

## 2019-03-08 RX ADMIN — EPHEDRINE SULFATE 10 MG: 50 INJECTION INTRAMUSCULAR; INTRAVENOUS; SUBCUTANEOUS at 16:42

## 2019-03-08 RX ADMIN — FENTANYL CITRATE 50 MCG: 50 INJECTION, SOLUTION INTRAMUSCULAR; INTRAVENOUS at 15:35

## 2019-03-08 RX ADMIN — FENTANYL CITRATE 50 MCG: 50 INJECTION, SOLUTION INTRAMUSCULAR; INTRAVENOUS at 15:25

## 2019-03-08 NOTE — CONSULTS
Orthopedic Consult      Patient: Andrew Redmond    Date of Admission: 3/6/2019  9:34 PM    YOB: 1945    Medical Record Number: [id number]    Attending Physician: Elyssa Elliott DO  Consulting Physician: Dr. Harrison Turk      Chief Complaints: Displaced fracture of right femoral neck (CMS/HCC) [S72.001A]      History of Present Illness: 73 y.o. male admitted to [facility] to services of Elyssa Elliott DO with Displaced fracture of right femoral neck (CMS/HCC) [S72.001A]. I was consulted for further evaluation and treatment.  Patient was brought to [facility] on the range of motion department after tripping and falling onto his right lower extremity, states that he also hit his right shoulder at that point time. Has noted majority of pain to the right hip and groin region, denies pain to this region prior to the fall.  Denies any significant radiation pain down into his right lower extremity, denies any associated numbness or tingling.  Rates right hip pain is a 8-9 out of 10 and right shoulder pain is a 6 out of 10, both of which are aching in nature and sharp with any attempts at motion, mild improvement with bedrest and IV pain medication.  Denies radiation of pain into the right arm or right leg.  He denies any chest pain, shortness of breath, palpitations, loss of consciousness, diarrhea, or vomiting associated with fall.    No Known Allergies    Medications:   Home Medications:  Medications Prior to Admission   Medication Sig Dispense Refill Last Dose   • fexofenadine-pseudoephedrine (ALLEGRA-D)  MG per 12 hr tablet Take 1 tablet by mouth 2 (Two) Times a Day As Needed for Allergies.      • lamoTRIgine (LaMICtal) 100 MG tablet Take 100 mg by mouth Daily.      • lamoTRIgine (LAMICTAL) 200 MG tablet Take 200 mg by mouth Every Night.      • memantine (NAMENDA) 10 MG tablet Take 10 mg by mouth 2 (Two) Times a Day.   Taking   • OLANZapine (ZyPREXA) 20 MG tablet Take 20 mg by mouth  Daily.   Taking   • polyethylene glycol (MIRALAX) powder Take 17 g by mouth Daily. (Patient taking differently: Take 17 g by mouth Daily As Needed.) 850 g 5 Taking   • rivastigmine (EXELON) 6 MG capsule Take 6 mg by mouth 2 (Two) Times a Day.   Taking   • tamsulosin (FLOMAX) 0.4 MG capsule 24 hr capsule Take 1 capsule by mouth Every Night. 30 capsule 2    • calcitonin, salmon, (MIACALCIN) 200 UNIT/ACT nasal spray 1 spray into each nostril Daily. 3.7 mL 3 Taking   • lamoTRIgine (LaMICtal) 200 MG tablet Take 100 mg by mouth Daily.   Taking       Current Medications:  Scheduled Meds:  bupivacaine liposome 20 mL Injection Once   [MAR Hold] cetirizine-pseudoephedrine 1 tablet Oral Once   famotidine 20 mg Intravenous 60 Min Pre-Op   lamoTRIgine 100 mg Oral Daily   lamoTRIgine 200 mg Oral Nightly   [MAR Hold] memantine 10 mg Oral Daily   [MAR Hold] OLANZapine 20 mg Oral Daily   [MAR Hold] rivastigmine 6 mg Oral BID With Meals   [MAR Hold] sodium chloride 3 mL Intravenous Q12H   [MAR Hold] tamsulosin 0.4 mg Oral Nightly     Continuous Infusions:  lactated ringers 9 mL/hr Last Rate: 9 mL/hr (03/08/19 1417)   sodium chloride 75 mL/hr Last Rate: 75 mL/hr (03/08/19 0433)     PRN Meds:.•  [MAR Hold] acetaminophen  •  [MAR Hold] melatonin  •  midazolam **OR** midazolam  •  [MAR Hold] Morphine **AND** [MAR Hold] naloxone  •  sodium chloride  •  Insert peripheral IV **AND** [MAR Hold] sodium chloride  •  [MAR Hold] sodium chloride  •  [MAR Hold] sodium chloride  •  BH OR LOCAL MIXTURE BUILDER  •  BH OR ANTIBIOTIC IRRIGATION BUILDER  •  sterile water       Past Medical History:   Diagnosis Date   • Alzheimer disease    • Alzheimer disease    • Alzheimer disease 03/06/2019   • Anxiety and depression    • Bipolar 1 disorder (CMS/HCC)    • Colon polyp    • Constipation    • Depression    • Diarrhea    • Epilepsy (CMS/HCC)    • Epilepsy (CMS/HCC) 03/06/2019   • Erectile dysfunction    • GERD (gastroesophageal reflux disease)    • L1  vertebral fracture (CMS/HCC)    • Seizures (CMS/HCC)     absence seizures.  well controlled.        Past Surgical History:   Procedure Laterality Date   • COLONOSCOPY N/A 4/1/2016    Procedure: COLONOSCOPY with polypectomy;  Surgeon: Akiko Scales MD;  Location: [facility];  Service:    • GALLBLADDER SURGERY     • LIVER SURGERY     • TONSILLECTOMY          Social History     Occupational History   • Occupation: professor     Comment: taught anatomy at [employer]   Tobacco Use   • Smoking status: Former Smoker     Packs/day: 2.00     Years: 20.00     Pack years: 40.00   • Smokeless tobacco: Former User     Quit date: 1987   • Tobacco comment: quit 30 yrs ago    Substance and Sexual Activity   • Alcohol use: No   • Drug use: No   • Sexual activity: Defer    Social History     Social History Narrative   • Not on file        Family History   Problem Relation Age of Onset   • Lung cancer Father    • No Known Problems Mother    • No Known Problems Maternal Grandmother    • No Known Problems Maternal Grandfather    • No Known Problems Paternal Grandmother    • No Known Problems Paternal Grandfather          Review of Systems:   HEENT: Patient denies any headaches, vision changes, change in hearing, or tinnitus, Patient denies any rhinorrhea,epistaxis, sinus pain, mouth or dental problems, sore throat or hoarseness, or dysphagia  Pulmonary: Patient denies any cough, congestion, SOA, or wheezing  Cardiovascular: Patient denies any chest pain, dyspnea, palpitations, weakness, intolerance of exercise, varicosities, swelling of extremities, known murmur  Gastrointestinal:  Patient denies nausea, vomiting, diarrhea, constipation, loss  of appetite, change in appetite, dysphagia, gas, heartburn, melena, change in bowel habits, use of laxatives or other drugs to alter the function of the gastrointestinal tract.  Genital/Urinary: Patient denies dysuria, change in color of urine, change in frequency of urination, pain with  urgency, incontinence, retention, or nocturia.  Musculoskeletal: Patient denies increased warmth; redness; or swelling of joints; limitation of function; deformity; crepitation: notes pain in right shoulder and right hip as documented above in HPI.  Denies pain in low back or neck.    Neurological: Patient denies dizziness, tremor, ataxia, difficulty in speaking, change in speech, paresthesia, loss of sensation, seizures, syncope, changes in memory.  Endocrine system: Patient denies tremors, palpitations, intolerance of heat or cold, polyuria, polydipsia, polyphagia, diaphoresis, exophthalmos, or goiter.  Psychological: Patient denies thoughts/plans or harming self or other; depression,  insomnia, night terrors, mariana, memory loss, disorientation.  Skin: Patient denies any bruising, rashes, discoloration, pruritus, wounds, ulcers, decubiti, changes in the hair or nails  Hematopoietic: Patient denies history of spontaneous or excessive bleeding, epistaxis, hematuria, melena, fatigue, enlarged or tender lymph nodes, pallor, history of anemia.    Physical Exam: 73 y.o. male  Vitals:    03/08/19 0847 03/08/19 0920 03/08/19 1205 03/08/19 1330   BP:   108/56 121/54   BP Location:   Left arm Left arm   Patient Position:   Lying Lying   Pulse: 98  82 83   Resp:   18 16   Temp:  (!) 100.7 °F (38.2 °C) 97.4 °F (36.3 °C) 98.2 °F (36.8 °C)   TempSrc:  Oral Oral Temporal   SpO2: 92%  97% 96%   Weight:       Height:         General Appearance:    Alert, cooperative, in no acute distress                      Head:    Normocephalic, without obvious abnormality, atraumatic   Eyes:            Lids and lashes normal, conjunctivae and sclerae normal, no   icterus, no pallor, corneas clear, PERRLA   Ears:    Ears appear intact with no abnormalities noted   Throat:   No oral lesions, no thrush, oral mucosa moist   Neck:   No adenopathy, supple, trachea midline, no thyromegaly, no   carotid bruit, no JVD   Back:     No kyphosis present,  no scoliosis present, no skin lesions,      erythema or scars, no tenderness to percussion or                   palpation,   range of motion normal   Lungs:     Clear to auscultation,respirations regular, even and                  unlabored    Heart:    Regular rhythm and normal rate, normal S1 and S2, no            murmur, no gallop, no rub, no click   Chest Wall:    No abnormalities observed   Abdomen:     Normal bowel sounds, no masses, no organomegaly, soft        non-tender, non-distended, no guarding, no rebound                tenderness   Rectal:     Deferred   Extremities:   Tenderness noted at right hip and groin region exacerbated with gentle logroll exam, no tenderness about the medial or lateral femoral condyle of right knee.  Patient is able to dorsiflex and plantar flex right ankle as well as flex and extend toes with 4 out of 5 strength.  No calf pain, negative Homans sign.  Brisk cap refill all digits, 2+ dorsalis pedis pulse.  Positive sensation light touch all distributions right foot symmetric to the left.  Right shoulder-maximal tenderness over Posterior scapular spine with some localized tenderness over anterior joint line, pain exacerbated with active abduction beyond 60 degrees as well as external rotation beyond 40 degrees, 4+ out of 5 strength on resisted forward flexion and external rotation as well as belly press test. Moves all remaining extremities well, no edema, no cyanosis, no redness   Pulses:   Pulses palpable and equal bilaterally   Skin:   No bleeding, bruising or rash   Lymph nodes:   No palpable adenopathy   Neurologic:   Cranial nerves 2 - 12 grossly intact, sensation intact, DTR       present and equal bilaterally           Diagnostic Tests:  Admission on 03/06/2019   Component Date Value Ref Range Status   • Glucose 03/06/2019 102* 65 - 99 mg/dL Final   • BUN 03/06/2019 13  8 - 23 mg/dL Final   • Creatinine 03/06/2019 0.89  0.76 - 1.27 mg/dL Final   • Sodium 03/06/2019 135*  136 - 145 mmol/L Final   • Potassium 03/06/2019 4.5  3.5 - 5.2 mmol/L Final   • Chloride 03/06/2019 96* 98 - 107 mmol/L Final   • CO2 03/06/2019 31.0* 22.0 - 29.0 mmol/L Final   • Calcium 03/06/2019 8.8  8.8 - 10.5 mg/dL Final   • Total Protein 03/06/2019 6.2  6.0 - 8.5 g/dL Final   • Albumin 03/06/2019 3.70  3.50 - 5.20 g/dL Final   • ALT (SGPT) 03/06/2019 21  5 - 41 U/L Final   • AST (SGOT) 03/06/2019 20  5 - 40 U/L Final   • Alkaline Phosphatase 03/06/2019 122  40 - 129 U/L Final   • Total Bilirubin 03/06/2019 0.2  0.2 - 1.2 mg/dL Final   • eGFR Non African Amer 03/06/2019 84  >60 mL/min/1.73 Final   • Globulin 03/06/2019 2.5  gm/dL Final   • A/G Ratio 03/06/2019 1.5  g/dL Final   • BUN/Creatinine Ratio 03/06/2019 14.6  7.0 - 25.0 Final   • Anion Gap 03/06/2019 8.0  mmol/L Final   • WBC 03/06/2019 9.45  3.40 - 10.80 10*3/mm3 Final   • RBC 03/06/2019 4.72  4.14 - 5.80 10*6/mm3 Final   • Hemoglobin 03/06/2019 13.6  13.0 - 17.7 g/dL Final   • Hematocrit 03/06/2019 41.3  37.5 - 51.0 % Final   • MCV 03/06/2019 87.5  79.0 - 97.0 fL Final   • MCH 03/06/2019 28.8  26.6 - 33.0 pg Final   • MCHC 03/06/2019 32.9  31.5 - 35.7 g/dL Final   • RDW 03/06/2019 15.0  12.3 - 15.4 % Final   • RDW-SD 03/06/2019 47.8  37.0 - 54.0 fl Final   • MPV 03/06/2019 9.1  6.0 - 12.0 fL Final   • Platelets 03/06/2019 303  140 - 450 10*3/mm3 Final   • Neutrophil % 03/06/2019 51.1  42.7 - 76.0 % Final   • Lymphocyte % 03/06/2019 32.9  19.6 - 45.3 % Final   • Monocyte % 03/06/2019 10.1  5.0 - 12.0 % Final   • Eosinophil % 03/06/2019 3.4  0.3 - 6.2 % Final   • Basophil % 03/06/2019 1.0  0.0 - 1.5 % Final   • Immature Grans % 03/06/2019 1.5* 0.0 - 0.5 % Final   • Neutrophils, Absolute 03/06/2019 4.84  1.40 - 7.00 10*3/mm3 Final   • Lymphocytes, Absolute 03/06/2019 3.11* 0.70 - 3.10 10*3/mm3 Final   • Monocytes, Absolute 03/06/2019 0.95* 0.10 - 0.90 10*3/mm3 Final   • Eosinophils, Absolute 03/06/2019 0.32  0.00 - 0.40 10*3/mm3 Final   • Basophils,  Absolute 03/06/2019 0.09  0.00 - 0.20 10*3/mm3 Final   • Immature Grans, Absolute 03/06/2019 0.14* 0.00 - 0.05 10*3/mm3 Final   • nRBC 03/06/2019 0.0  0.0 - 0.0 /100 WBC Final   • ABO Type 03/06/2019 O   Final   • RH type 03/06/2019 Positive   Final   • Antibody Screen 03/06/2019 Negative   Final   • T&S Expiration Date 03/06/2019 3/9/2019 11:59:59 PM   Final   • ABO Type 03/06/2019 O   Final   • RH type 03/06/2019 Positive   Final   • Glucose 03/07/2019 93  65 - 99 mg/dL Final   • BUN 03/07/2019 14  8 - 23 mg/dL Final   • Creatinine 03/07/2019 0.75* 0.76 - 1.27 mg/dL Final   • Sodium 03/07/2019 139  136 - 145 mmol/L Final   • Potassium 03/07/2019 4.3  3.5 - 5.2 mmol/L Final   • Chloride 03/07/2019 100  98 - 107 mmol/L Final   • CO2 03/07/2019 31.0* 22.0 - 29.0 mmol/L Final   • Calcium 03/07/2019 8.6* 8.8 - 10.5 mg/dL Final   • eGFR Non African Amer 03/07/2019 102  >60 mL/min/1.73 Final   • BUN/Creatinine Ratio 03/07/2019 18.7  7.0 - 25.0 Final   • Anion Gap 03/07/2019 8.0  mmol/L Final   • WBC 03/07/2019 12.88* 3.40 - 10.80 10*3/mm3 Final   • RBC 03/07/2019 4.75  4.14 - 5.80 10*6/mm3 Final   • Hemoglobin 03/07/2019 13.6  13.0 - 17.7 g/dL Final   • Hematocrit 03/07/2019 41.7  37.5 - 51.0 % Final   • MCV 03/07/2019 87.8  79.0 - 97.0 fL Final   • MCH 03/07/2019 28.6  26.6 - 33.0 pg Final   • MCHC 03/07/2019 32.6  31.5 - 35.7 g/dL Final   • RDW 03/07/2019 14.9  12.3 - 15.4 % Final   • RDW-SD 03/07/2019 47.5  37.0 - 54.0 fl Final   • MPV 03/07/2019 9.0  6.0 - 12.0 fL Final   • Platelets 03/07/2019 297  140 - 450 10*3/mm3 Final   • Neutrophil % 03/07/2019 72.6  42.7 - 76.0 % Final   • Lymphocyte % 03/07/2019 16.2* 19.6 - 45.3 % Final   • Monocyte % 03/07/2019 8.0  5.0 - 12.0 % Final   • Eosinophil % 03/07/2019 1.6  0.3 - 6.2 % Final   • Basophil % 03/07/2019 0.6  0.0 - 1.5 % Final   • Immature Grans % 03/07/2019 1.0* 0.0 - 0.5 % Final   • Neutrophils, Absolute 03/07/2019 9.35* 1.40 - 7.00 10*3/mm3 Final   • Lymphocytes,  Absolute 03/07/2019 2.09  0.70 - 3.10 10*3/mm3 Final   • Monocytes, Absolute 03/07/2019 1.03* 0.10 - 0.90 10*3/mm3 Final   • Eosinophils, Absolute 03/07/2019 0.20  0.00 - 0.40 10*3/mm3 Final   • Basophils, Absolute 03/07/2019 0.08  0.00 - 0.20 10*3/mm3 Final   • Immature Grans, Absolute 03/07/2019 0.13* 0.00 - 0.05 10*3/mm3 Final   • nRBC 03/07/2019 0.0  0.0 - 0.0 /100 WBC Final   • Protime 03/07/2019 13.7  12.1 - 15.0 Seconds Final   • INR 03/07/2019 1.08  0.90 - 1.10 Final   • PTT 03/07/2019 31.1  24.3 - 38.1 seconds Final     Xr Shoulder 2+ View Right    Result Date: 3/7/2019  Impression: Incomplete fracture through the neck of the right scapula.  This report was finalized on 3/7/2019 8:33 AM by Dr. Jose Ramon Pineda MD.      Xr Knee 1 Or 2 View Right    Result Date: 3/7/2019  Impression: Impression: No acute fracture, dislocation or osseous destructive process of the right knee demonstrated on this study.   This report was finalized on 3/7/2019 8:25 AM by Dr. Jose Ramon Pineda MD.      Xr Chest 1 View    Result Date: 3/7/2019  Impression: Impression: No acute cardiopulmonary pathology.  Incomplete fracture of the neck of the right scapula.   This report was finalized on 3/7/2019 8:35 AM by Dr. Jose Ramon Pineda MD.      Xr Hip With Or Without Pelvis 2 - 3 View Right    Result Date: 3/7/2019  Impression: Transverse fracture through the right femoral neck with lateral angulation.  This report was finalized on 3/7/2019 8:28 AM by Dr. Jose Ramon Pineda MD.        Assessment:  Patient Active Problem List   Diagnosis   • Alzheimer's disease   • Bipolar I disorder, single manic episode (CMS/HCC)   • Epilepsy (CMS/HCC)   • Chronic constipation   • Hemorrhoids   • Polyp of colon   • Erectile dysfunction   • High risk medication use   • Poor balance   • Subdural hematoma (CMS/HCC)   • Seizure (CMS/HCC)   • Low energy   • Closed compression fracture of L1 lumbar vertebra (CMS/HCC)   • Acute bilateral low back pain without sciatica   • Weight loss    • Displaced fracture of right femoral neck (CMS/[facility])       Plan:  The patient voiced understanding of the risks, benefits, and alternative forms of treatment that were discussed and the patient consents to proceed with right hip hemiarthroplasty and all associated procedures.  Patient was explained details of procedure as well as risks, benefits, and alternatives with the risks including but not limited to neurovascular damage, bleeding, infection, chronic pain, periprosthetic fracture, dislocation, loss of motion, leg length discrepancy, weakness, DVT, pulmonary embolus, death, stroke, complex regional pain syndrome, myocardial infarction, need for additional procedures.  Patient understood all these had all questions answered prior to signing the operative consent form.  No guarantees were given in regards to results of the surgery.  We will plan on proceeding with surgery once patient is medically optimized.    Will treat right scapular fracture with sling and closed treatment without manipulation at this time.      Harrison Turk MD      Dictated utilizing Dragon dictation

## 2019-03-08 NOTE — ANESTHESIA PREPROCEDURE EVALUATION
Anesthesia Evaluation     Patient summary reviewed and Nursing notes reviewed   no history of anesthetic complications:  NPO Solid Status: > 8 hours  NPO Liquid Status: > 8 hours           Airway   Mallampati: II  TM distance: <3 FB  Neck ROM: full  no difficulty expected and No difficulty expected  Dental    (+) poor dentition        Pulmonary - normal exam    breath sounds clear to auscultation  (+) a smoker (30 yrs ago) Former,   Cardiovascular - negative cardio ROS and normal exam    Rhythm: regular        Neuro/Psych  (+) seizures (last summer, meds keep stable now) well controlled, dementia,     GI/Hepatic/Renal/Endo    (+)  GERD well controlled,      Musculoskeletal (-) negative ROS    Abdominal  - normal exam   Substance History   (+) alcohol use (occas),      OB/GYN          Other - negative ROS                         Anesthesia Plan    ASA 2     spinal and MAC     intravenous induction   Anesthetic plan, all risks, benefits, and alternatives have been provided, discussed and informed consent has been obtained with: patient.  Use of blood products discussed with patient  Consented to blood products.

## 2019-03-08 NOTE — ACP (ADVANCE CARE PLANNING)
Advance Care Planning   Advance Care Planning Discussion:    Living will completed by patient. Copy scanned to chart.    Stefani Ocampo

## 2019-03-08 NOTE — ANESTHESIA POSTPROCEDURE EVALUATION
Patient: Andrew Redmond    Procedure Summary     Date:  03/08/19 Room / Location:   [facility] /  [facility]    Anesthesia Start:  1559 Anesthesia Stop:  1840    Procedure:  HIP HEMIARTHROPLASTY and all associated procedures (Right Hip) Diagnosis:       Displaced fracture of right femoral neck (CMS/HCC)      (Displaced fracture of right femoral neck (CMS/HCC) [S72.001A])    Surgeon:  Harrison Turk MD Provider:  Alvaro Hopkins CRNA    Anesthesia Type:  spinal, MAC ASA Status:  2          Anesthesia Type: spinal, MAC  Last vitals  BP   121/59 (03/08/19 1550)   Temp   98.2 °F (36.8 °C) (03/08/19 1330)   Pulse   86 (03/08/19 1550)   Resp   18 (03/08/19 1550)     SpO2   98 % (03/08/19 1550)     Post Anesthesia Care and Evaluation    Patient location during evaluation: bedside  Patient participation: complete - patient participated  Level of consciousness: awake and alert  Pain score: 0  Pain management: adequate  Airway patency: patent  Anesthetic complications: No anesthetic complications  PONV Status: none  Cardiovascular status: acceptable  Respiratory status: acceptable  Hydration status: acceptable

## 2019-03-08 NOTE — PROGRESS NOTES
"Hospitalist Team      Patient Care Team:  Jessica Birch MD as PCP - General (Internal Medicine & Pediatrics)  Huan Romano MD as PCP - Family Medicine  Jessica Birch MD as PCP - [facility]  Lachelle Marquez RN as Care Coordinator (Population Health)        Chief Complaint: Follow-up right femoral neck fracture    Subjective    Feels well.  Denies chest pain and dyspnea.  Tolerating PO.  Pain controlled.    Objective    Vital Signs  Temp:  [97.4 °F (36.3 °C)-101.2 °F (38.4 °C)] 97.4 °F (36.3 °C)  Heart Rate:  [] 82  Resp:  [18-20] 18  BP: ()/(51-99) 108/56  Oxygen Therapy  SpO2: 97 %  Pulse Oximetry Type: Intermittent  Device (Oxygen Therapy): room air}    Flowsheet Rows      First Filed Value   Admission Height  172.7 cm (68\") Documented at 03/06/2019 2133   Admission Weight  70.3 kg (155 lb) Documented at 03/06/2019 2133          Physical Exam:    General Appearance:    Alert, cooperative, in no acute distress   Lungs:     Clear to auscultation,respirations regular, even and                  unlabored    Heart:    Regular rhythm and normal rate, normal S1 and S2, no            murmur, no gallop, no rub, no click   Abdomen:     Soft and non-tender w/ active bowel sounds   Extremities:   No edema or cyanosis   Pulses:   Pulses palpable and equal bilaterally   Neurologic:   Cranial nerves 2 - 12 grossly intact, sensation intact, DTR       present and equal bilaterally       Results Review:     I reviewed the patient's new clinical results.    Lab Results (last 24 hours)     ** No results found for the last 24 hours. **          Imaging Results (last 24 hours)     ** No results found for the last 24 hours. **            ECG/EMG Results (most recent)     Procedure Component Value Units Date/Time    ECG 12 Lead [[id number]] Collected:  03/06/19 2308     Updated:  03/07/19 0830    Narrative:       HEART RATE= 76  bpm  RR Interval= 784  ms  FL Interval= 195  ms  P Horizontal " Axis= -9  deg  P Front Axis= 59  deg  QRSD Interval= 112  ms  QT Interval= 388  ms  QRS Axis= 0  deg  T Wave Axis= 61  deg  - ABNORMAL ECG -  Sinus rhythm  Incomplete RBBB and LAFB  Borderline ST elevation, lateral leads  NO SIGNIFICANT CHANGE FROM PREVIOUS ECG  Electronically Signed By: Tristin Talbert ([facility] 07-Mar-2019 08:35:17  Date and Time of Study: 2019-03-06 23:08:20          Medication Review:   I have reviewed the patient's current medication list    Current Facility-Administered Medications:   •  acetaminophen (TYLENOL) suppository 650 mg, 650 mg, Rectal, Q4H PRN, Elliott, Birrilla, DO, 650 mg at 03/08/19 0604  •  cetirizine-pseudoephedrine (ZyrTEC-D) 5-120 MG per 12 hr tablet 1 tablet, 1 tablet, Oral, Once, Elliott, Birrilla, DO  •  lamoTRIgine (LaMICtal) tablet 100 mg, 100 mg, Oral, Daily, Elliott, Birrilla, DO, 100 mg at 03/07/19 0914  •  lamoTRIgine (LaMICtal) tablet 200 mg, 200 mg, Oral, Nightly, Elliott, Birrilla, DO, 200 mg at 03/07/19 2035  •  melatonin tablet 5 mg, 5 mg, Oral, Nightly PRN, Elliott, Birrilla, DO  •  memantine (NAMENDA) tablet 10 mg, 10 mg, Oral, Daily, Elliott, Birrilla, DO, 10 mg at 03/07/19 0914  •  morphine injection 4 mg, 4 mg, Intravenous, Q4H PRN, 4 mg at 03/07/19 1932 **AND** naloxone (NARCAN) injection 0.4 mg, 0.4 mg, Intravenous, Q5 Min PRN, Elliott, Birrilla, DO  •  OLANZapine (zyPREXA) tablet 20 mg, 20 mg, Oral, Daily, Elliott, Birrilla, DO, 20 mg at 03/07/19 0914  •  rivastigmine (EXELON) capsule 6 mg, 6 mg, Oral, BID With Meals, Elliott, Birrilla, DO, 6 mg at 03/07/19 1851  •  Insert peripheral IV, , , Once **AND** sodium chloride 0.9 % flush 10 mL, 10 mL, Intravenous, PRN, Gerardo Martinez MD  •  sodium chloride 0.9 % flush 3 mL, 3 mL, Intravenous, Q12H, Elliott, Birrilla, DO, 3 mL at 03/07/19 0915  •  sodium chloride 0.9 % flush 3-10 mL, 3-10 mL, Intravenous, PRN, Elliott, Birrilla, DO  •  sodium chloride 0.9 % infusion 40 mL, 40 mL, Intravenous, PRN, Elliott, Birrilla, DO  •   sodium chloride 0.9 % infusion, 75 mL/hr, Intravenous, Continuous, Elliott, Birrilla, DO, Last Rate: 75 mL/hr at 03/08/19 0433, 75 mL/hr at 03/08/19 0433  •  tamsulosin (FLOMAX) 24 hr capsule 0.4 mg, 0.4 mg, Oral, Nightly, Ellitot, Birrilla, DO, 0.4 mg at 03/07/19 2035      Assessment/Plan     1.  Right Femoral Neck Fracture: To OR today.    2.  Epilepsy: Nothing acute.  Continue home medicine.    3.  BPH: Continue Flomax    Plan for disposition: Likely rehab.    Talib White MD  03/08/19  12:56 PM

## 2019-03-08 NOTE — OP NOTE
Date of Operation: 3/8/2019    PREOPERATIVE DIAGNOSIS: Right hip femoral neck fracture.     POSTOPERATIVE DIAGNOSIS: Right hip femoral neck fracture.     PROCEDURE PERFORMED: Right hip bipolar hemiarthroplasty for treatment of femoral neck fracture.     SURGEON: Harrison Turk MD     ASSISTANT: Simon    ANESTHESIA: Spinal anesthesia with regional block.     ESTIMATED BLOOD LOSS:100 mL     URINE OUTPUT: Not recorded.     FLUIDS: Per Anesthesia.     COMPLICATION: None.     SPECIMEN:none    DRAIN: None.     IMPLANT: Natalee VerSys bipolar femoral system with a size 13 femoral stem, 49 mm outer diameter shell with 28 mm inner liner, +7 mm neck length, antibiotic cement.     INDICATIONS: The patient is a pleasant 73 y.o. who has been evaluated for right hip pain, noted to have femoral neck fracture on preoperative imaging. I discussed treatment options with the patient and family. Wished to proceed with the above-mentioned surgery. Reviewed risks, benefits, and alternatives of surgery as documented on preoperative history and physical. Patient and family expressed understanding of these and had all questions answered prior to signing operative consent. No guarantees were given in regards to the surgery.     DESCRIPTION OF PROCEDURE: The patient was seen, evaluated, and cleared for surgery by Anesthesia, met in the preoperative holding area. The operative site was marked, consent was reviewed, History and Physical was updated, and preoperative labs were reviewed. A regional block was then placed per Anesthesia. The patient was then taken to the operating room and placed in the supine position on a regular OR table. After spinal placement per Anesthesia, the patient was turned in the lateral decubitus position and positioned with the universal hip positioner. An axillary roll was placed. The patient was secured to the table by a strap. All bony prominences were well-padded. The right lower extremity was then sterilely  prepped and draped in a standard fashion.   Formal timeout was completed, including confirmation of History and Physical, operative consent, surgical site, patient identification number, and preoperative antibiotic administration. The procedure was begun with the a 10 cm curvilinear incision over the right hip through skin and subcutaneous tissue with a #15 blade. The incision was then carried down to the level of the IT band which was opened in a longitudinal fashion in line with the femur and curving posterior proximally at the posterosuperior edge of the greater trochanter. A Charnley retractor was placed at this point in time. Trochanteric bursa was excised. Sciatic nerve was identified and protected throughout the remainder of the case. Piriformis tendon was then released followed by short external rotators consistent with a standard posterior approach to the hip. Capsule was then opened in a T-shaped fashion with the leaflets of the capsule being tagged with #2 FiberWire. The hip was then maximally internally rotated and flexed identifying the fracture site at this point in time. A revised femoral neck cut was made at this point in time with templating from a flag, 1 fingerbreadth above the lesser trochanter. The neck osteotomy was completed laterally with the use of an osteotome protecting the greater trochanter. Once this was completed the bone hook was used to pull the proximal femur out of the way while the femoral head was captured with a corkscrew and extracted from the acetabulum. Ligamentum teres was resected at this point in time. Acetabular cartilage was evaluated with no significant full-thickness focal defect noted. Femoral head was sized as a 49 at this point in time. A 49 trial was placed in the acetabulum with good suction fit noted. Labrum was noted to be carefully preserved.   Acetabulum was then thoroughly irrigated with normal saline at this point in time, as well as proximal femur.  The  proximal femur was then internally rotated and flexed with preparation of the femoral side with a box osteotome, followed by a canal entering reamer, followed by a lateralizing reamer. Attention was then turned to broaching which was then accomplished starting with a size 9 proceeding up to a size 13 with good fit noted proximally. Trial reduction was carried out at this point in time with a +7 neck noted to be appropriate length, assessed for stability on reduction as well as position of femoral head in relation to tip of trochanter, and these were noted to be appropriate.   Trial components were removed at this point in time. The femoral canal was thoroughly irrigated with normal saline and the final components were opened on the back table except for the head and neck portions. The distal cement plug was placed as well as the centralizer on the distal end of the stem. Antibiotic cement was prepared on the back table and inserted with third-generation cement technique under pressurization into the femoral canal. Femoral stem was then placed with appropriate anteversion to match the patient's native anteversion with extraneous cement being removed with freers. Once cement had appropriately cured, a 2nd trial reduction was carried out at this point in time with a +7 neck and leg lengths were noted to be grossly symmetric at this point in time with good stability to the hip to 90° of flexion and 80° of internal rotation. No evidence of anterior or posterior impingement through a full range of motion. Negative quadriceps reflex test.   The femur was once again dislocated at this point in time. The trial head and neck components were removed. Acetabulum and stem were then thoroughly irrigated with normal saline. The bipolar components were assembled on the back table and placed onto the femoral stem and impacted, engaging the Ware taper at this time. The hip was then once again reduced and stability noted to be  appropriate. Capsulotomy was closed with #2 FiberWire. Piriformis was repaired to the gluteus medius insertion with #2 FiberWire. The wound was then thoroughly irrigated additionally in the deep layer at this point in time, with a betadine lavage, followed by irrigation with normal saline.   Charnley retractor was then removed and IT band was closed with 0 Vicryl in interrupted fashion and #1 Strata-fix followed by closure of the subcutaneous layer with 2-0 Vicryl and skin closure with 3-0 Strata-fix, Prineo mesh, and glue. The wound was dressed with Telfa, 4 x 4's, Tegaderm, ABD pad, and Medipore tape. The patient was placed in an abduction pillow.   At the end of the procedure all lap, needle, and sponge, and instrument counts were correct x2. The patient had brisk capillary refill to all digits of the right lower extremity. Compartments were soft and easily compressible at the end of the procedure.   DISPOSITION: The patient was taken to the recovery room in stable condition. Will be admitted to the Hospitalist service postoperatively. Weight-bear as tolerated to right lower extremity and posterior hip precautions for 6 weeks. Results of the procedure were discussed immediately postoperatively with the patient's family and they had all questions answered at that point in time.

## 2019-03-08 NOTE — ANESTHESIA PROCEDURE NOTES
Spinal Block      Patient location during procedure: pre-op  Performed By  CRNA: Alvaro Hopkins CRNA  Preanesthetic Checklist  Completed: patient identified, site marked, surgical consent, pre-op evaluation, timeout performed, IV checked, risks and benefits discussed and monitors and equipment checked  Spinal Block Prep:  Patient Position:sitting  Sterile Tech:cap, gloves, mask and sterile barriers  Prep:Chloraprep  Patient Monitoring:blood pressure monitoring, continuous pulse oximetry and EKG  Spinal Block Procedure  Approach:midline  Guidance:landmark technique and palpation technique  Location:L2-L3  Needle Type:Sprotte  Needle Gauge:24 G  Placement of Spinal needle event:cerebrospinal fluid aspirated  Paresthesia: no  Fluid Appearance:clear     Post Assessment  Patient Tolerance:patient tolerated the procedure well with no apparent complications  Complications no  Additional Notes  1% lido infiltrated skin.  Multiple attempts.  No paresthesias.  VSS

## 2019-03-08 NOTE — PLAN OF CARE
Problem: Patient Care Overview  Goal: Individualization and Mutuality  Outcome: Ongoing (interventions implemented as appropriate)    Goal: Interprofessional Rounds/Family Conf  Outcome: Ongoing (interventions implemented as appropriate)      Problem: Fall Risk (Adult)  Goal: Identify Related Risk Factors and Signs and Symptoms  Outcome: Ongoing (interventions implemented as appropriate)      Problem: Skin Injury Risk (Adult)  Goal: Identify Related Risk Factors and Signs and Symptoms  Outcome: Ongoing (interventions implemented as appropriate)    Goal: Skin Health and Integrity  Outcome: Ongoing (interventions implemented as appropriate)      Problem: Pain, Acute (Adult)  Goal: Identify Related Risk Factors and Signs and Symptoms  Outcome: Ongoing (interventions implemented as appropriate)    Goal: Acceptable Pain Control/Comfort Level  Outcome: Ongoing (interventions implemented as appropriate)      Problem: Surgery Nonspecified (Adult)  Goal: Signs and Symptoms of Listed Potential Problems Will be Absent, Minimized or Managed (Surgery Nonspecified)  Outcome: Ongoing (interventions implemented as appropriate)    Goal: Anesthesia/Sedation Recovery  Outcome: Ongoing (interventions implemented as appropriate)

## 2019-03-09 ENCOUNTER — APPOINTMENT (OUTPATIENT)
Dept: GENERAL RADIOLOGY | Facility: HOSPITAL | Age: 74
End: 2019-03-09

## 2019-03-09 LAB
ANION GAP SERPL CALCULATED.3IONS-SCNC: 9.2 MMOL/L
BUN BLD-MCNC: 11 MG/DL (ref 8–23)
BUN/CREAT SERPL: 14.5 (ref 7–25)
CALCIUM SPEC-SCNC: 7.4 MG/DL (ref 8.8–10.5)
CHLORIDE SERPL-SCNC: 99 MMOL/L (ref 98–107)
CO2 SERPL-SCNC: 23.8 MMOL/L (ref 22–29)
CREAT BLD-MCNC: 0.76 MG/DL (ref 0.76–1.27)
GFR SERPL CREATININE-BSD FRML MDRD: 101 ML/MIN/1.73
GLUCOSE BLD-MCNC: 133 MG/DL (ref 65–99)
HCT VFR BLD AUTO: 33.6 % (ref 37.5–51)
HGB BLD-MCNC: 10.7 G/DL (ref 13–17.7)
POTASSIUM BLD-SCNC: 4 MMOL/L (ref 3.5–5.2)
SODIUM BLD-SCNC: 132 MMOL/L (ref 136–145)

## 2019-03-09 PROCEDURE — 97162 PT EVAL MOD COMPLEX 30 MIN: CPT

## 2019-03-09 PROCEDURE — 80048 BASIC METABOLIC PNL TOTAL CA: CPT | Performed by: ORTHOPAEDIC SURGERY

## 2019-03-09 PROCEDURE — 25010000002 VANCOMYCIN 1 G RECONSTITUTED SOLUTION 1 EACH VIAL: Performed by: ORTHOPAEDIC SURGERY

## 2019-03-09 PROCEDURE — 73030 X-RAY EXAM OF SHOULDER: CPT

## 2019-03-09 PROCEDURE — 85014 HEMATOCRIT: CPT | Performed by: ORTHOPAEDIC SURGERY

## 2019-03-09 PROCEDURE — 82607 VITAMIN B-12: CPT | Performed by: NURSE PRACTITIONER

## 2019-03-09 PROCEDURE — 99231 SBSQ HOSP IP/OBS SF/LOW 25: CPT | Performed by: HOSPITALIST

## 2019-03-09 PROCEDURE — 85018 HEMOGLOBIN: CPT | Performed by: ORTHOPAEDIC SURGERY

## 2019-03-09 PROCEDURE — 82746 ASSAY OF FOLIC ACID SERUM: CPT | Performed by: NURSE PRACTITIONER

## 2019-03-09 RX ORDER — SODIUM CHLORIDE 9 MG/ML
INJECTION, SOLUTION INTRAVENOUS
Status: DISPENSED
Start: 2019-03-09 | End: 2019-03-09

## 2019-03-09 RX ORDER — VANCOMYCIN HYDROCHLORIDE 1 G/20ML
INJECTION, POWDER, LYOPHILIZED, FOR SOLUTION INTRAVENOUS
Status: DISPENSED
Start: 2019-03-09 | End: 2019-03-09

## 2019-03-09 RX ADMIN — MEMANTINE HYDROCHLORIDE 10 MG: 5 TABLET ORAL at 08:41

## 2019-03-09 RX ADMIN — TAMSULOSIN HYDROCHLORIDE 0.4 MG: 0.4 CAPSULE ORAL at 20:48

## 2019-03-09 RX ADMIN — LAMOTRIGINE 200 MG: 100 TABLET ORAL at 20:48

## 2019-03-09 RX ADMIN — VANCOMYCIN HYDROCHLORIDE 1000 MG: 1 INJECTION, POWDER, LYOPHILIZED, FOR SOLUTION INTRAVENOUS at 01:12

## 2019-03-09 RX ADMIN — LAMOTRIGINE 100 MG: 100 TABLET ORAL at 08:41

## 2019-03-09 RX ADMIN — HYDROCODONE BITARTRATE AND ACETAMINOPHEN 1 TABLET: 7.5; 325 TABLET ORAL at 01:30

## 2019-03-09 RX ADMIN — ASPIRIN 325 MG: 325 TABLET, DELAYED RELEASE ORAL at 08:50

## 2019-03-09 RX ADMIN — RIVASTIGMINE TARTRATE 6 MG: 1.5 CAPSULE ORAL at 17:24

## 2019-03-09 RX ADMIN — ASPIRIN 325 MG: 325 TABLET, DELAYED RELEASE ORAL at 20:48

## 2019-03-09 RX ADMIN — OLANZAPINE 20 MG: 5 TABLET, FILM COATED ORAL at 08:41

## 2019-03-09 RX ADMIN — RIVASTIGMINE TARTRATE 6 MG: 1.5 CAPSULE ORAL at 08:41

## 2019-03-09 RX ADMIN — SODIUM CHLORIDE, POTASSIUM CHLORIDE, SODIUM LACTATE AND CALCIUM CHLORIDE 100 ML/HR: 600; 310; 30; 20 INJECTION, SOLUTION INTRAVENOUS at 12:01

## 2019-03-09 RX ADMIN — HYDROCODONE BITARTRATE AND ACETAMINOPHEN 2 TABLET: 7.5; 325 TABLET ORAL at 08:50

## 2019-03-09 NOTE — PLAN OF CARE
"Problem: Patient Care Overview  Goal: Plan of Care Review  Outcome: Ongoing (interventions implemented as appropriate)   03/09/19 1052   Coping/Psychosocial   Plan of Care Reviewed With patient   OTHER   Outcome Summary Physical therapy evaluation complete. Patient with significant confusion, reporting current time as \"december 1917\" and unable to state current state or city. Patient unable to provide any information regarding PLOF and responses frequently off topic with periods of unintelligible speech. Patient does not recall having hip surgery and is unable to verbally recall any of 3 hip precautions provided approximately 30 seconds prior. Patient requires max assist to perform sit to stand from chair and upon standing, demonstrates significant shaking of all 4 extremities and unable to maintain walker contact with floor. Will attempt to see for physical therapy to address deficits in functional mobility, however progress may be limited by current cognitive status. Recommend 24 hour assistance at discharge, pt likely will require further rehab with potential to remain in facility for duration of hip precautions due to current cognitive status.         "

## 2019-03-09 NOTE — NURSING NOTE
Continued Stay Note  RITA Diaz     Patient Name: Andrew Redmond  MRN: [id number]  Today's Date: 3/9/2019    Admit Date: 3/6/2019    Discharge Plan     Row Name 03/09/19 1443       Plan    Plan Comments  Patient sleeping soundly.   will follow up to assist with d/c plannng.          Discharge Codes    No documentation.             Nadira Claros RN

## 2019-03-09 NOTE — PROGRESS NOTES
Patient: Andrew Redmond  Procedure(s):  HIP HEMIARTHROPLASTY and all associated procedures  Anesthesia type: [unfilled]    Patient location: [facility] Surgical Floor  Vitals:    03/09/19 0347 03/09/19 0618 03/09/19 1108 03/09/19 1501   BP: 141/65 141/68 120/67 140/63   BP Location: Left arm Left arm Right arm Left arm   Patient Position: Lying Lying Sitting Lying   Pulse: 85 95 87 84   Resp: 18 20 18 18   Temp: 97.9 °F (36.6 °C) 98.1 °F (36.7 °C) 99.3 °F (37.4 °C) 97.8 °F (36.6 °C)   TempSrc: Oral Oral Oral Oral   SpO2: 94% 94% 94% 95%   Weight:       Height:         Level of consciousness: awake, alert and oriented    Post-anesthesia pain: adequate analgesia  Airway patency: patent  Respiratory: unassisted  Cardiovascular: stable and blood pressure at baseline  Hydration: euvolemic    Anesthetic complications: no

## 2019-03-09 NOTE — NURSING NOTE
Continued Stay Note  RITA Diaz     Patient Name: Andrew Redmond  MRN: [id number]  Today's Date: 3/9/2019    Admit Date: 3/6/2019    Discharge Plan     Row Name 03/09/19 1702       Plan    Plan Comments  Patient up in chair and awake.  Discussed disposition options.  Patient in agreement with inpatient stay at [facility].   will make referral on Monday 3/11.      Row Name 03/09/19 1443       Plan    Plan Comments  Patient sleeping soundly.   will follow up to assist with d/c plannng.          Discharge Codes    No documentation.             Nadira Claros RN

## 2019-03-09 NOTE — PROGRESS NOTES
"Hospitalist Team      Patient Care Team:  Jessica Birch MD as PCP - General (Internal Medicine & Pediatrics)  Huan Romano MD as PCP - Family Medicine  Jessica Birch MD as PCP - [facility]  Lachelle Marquez RN as Care Coordinator (Population Health)        Chief Complaint: Follow-up right femoral neck fracture    Subjective    Mr. Redmond has been pretty sedated this morning and when I just checked on him.  Received two Norco this morning per staff.    Objective    Vital Signs  Temp:  [97.2 °F (36.2 °C)-98.2 °F (36.8 °C)] 98.1 °F (36.7 °C)  Heart Rate:  [77-95] 95  Resp:  [12-20] 20  BP: ()/() 141/68  Oxygen Therapy  SpO2: 94 %  Pulse Oximetry Type: Continuous  Device (Oxygen Therapy): room air  ETCO2 (mmHg): 38 mmHg}    Flowsheet Rows      First Filed Value   Admission Height  172.7 cm (68\") Documented at 03/06/2019 2133   Admission Weight  70.3 kg (155 lb) Documented at 03/06/2019 2133          Physical Exam:    General Appearance:    Elderly appearing male in NAD.   Lungs:     Clear to auscultation,respirations regular, even and                  unlabored    Heart:    Regular rhythm and normal rate, normal S1 and S2, no            murmur, no gallop, no rub, no click   Abdomen:     Soft and non-tender w/ diminished bowel sounds   Extremities:   No edema, no cyanosis, no redness   Pulses:   Pulses palpable and equal bilaterally   Neurologic:   Cranial nerves 2 - 12 grossly intact       Results Review:     I reviewed the patient's new clinical results.    Lab Results (last 24 hours)     Procedure Component Value Units Date/Time    Basic Metabolic Panel [[id number]]  (Abnormal) Collected:  03/09/19 0434    Specimen:  Blood Updated:  03/09/19 0537     Glucose 133 mg/dL      BUN 11 mg/dL      Creatinine 0.76 mg/dL      Sodium 132 mmol/L      Potassium 4.0 mmol/L      Chloride 99 mmol/L      CO2 23.8 mmol/L      Calcium 7.4 mg/dL      eGFR Non African Amer 101 mL/min/1.73     "  BUN/Creatinine Ratio 14.5     Anion Gap 9.2 mmol/L     Narrative:       The MDRD GFR formula is only valid for adults with stable renal function between ages 18 and 70.    Hemoglobin & Hematocrit, Blood [[id number]]  (Abnormal) Collected:  03/09/19 0434    Specimen:  Blood Updated:  03/09/19 0507     Hemoglobin 10.7 g/dL      Hematocrit 33.6 %     Hemoglobin & Hematocrit, Blood [[id number]]  (Abnormal) Collected:  03/08/19 1935    Specimen:  Blood Updated:  03/08/19 1941     Hemoglobin 12.0 g/dL      Hematocrit 37.1 %           Imaging Results (last 24 hours)     Procedure Component Value Units Date/Time    XR Hip With or Without Pelvis 1 View Right [[id number]] Collected:  03/09/19 1003     Updated:  03/09/19 1006    Narrative:       RIGHT HIP 03/08/2019     HISTORY:  Hip fracture. Status post right hip replacement     COMPARISON:  03/07/2019     TECHNIQUE:  AP view of the right hip was obtained     FINDINGS:  A right total hip prosthesis present. There is no fracture or  dislocation       Impression:       Is post right total hip replacement     This report was finalized on 3/9/2019 10:04 AM by Dr. Frantz Pressley MD.             Medication Review:   I have reviewed the patient's current medication list    Current Facility-Administered Medications:   •  acetaminophen (TYLENOL) suppository 650 mg, 650 mg, Rectal, Q4H PRN, Elliott, Birrilla, DO, 650 mg at 03/08/19 0604  •  aspirin EC tablet 325 mg, 325 mg, Oral, Q12H, Harrison Turk MD, 325 mg at 03/09/19 0850  •  cetirizine-pseudoephedrine (ZyrTEC-D) 5-120 MG per 12 hr tablet 1 tablet, 1 tablet, Oral, Once, Elliott, Birrilla, DO  •  HYDROcodone-acetaminophen (NORCO) 7.5-325 MG per tablet 1 tablet, 1 tablet, Oral, Q4H PRN, Harrison Turk MD, 1 tablet at 03/09/19 0130  •  HYDROcodone-acetaminophen (NORCO) 7.5-325 MG per tablet 2 tablet, 2 tablet, Oral, Q4H PRN, Harrison Turk MD, 2 tablet at 03/09/19 0850  •  lactated ringers infusion, 100 mL/hr, Intravenous,  Continuous, Breeding, Alvaro Ley, FELIX, Last Rate: 100 mL/hr at 03/08/19 2028, 100 mL/hr at 03/08/19 2028  •  lamoTRIgine (LaMICtal) tablet 100 mg, 100 mg, Oral, Daily, Elliott, Birrilla, DO, 100 mg at 03/09/19 0841  •  lamoTRIgine (LaMICtal) tablet 200 mg, 200 mg, Oral, Nightly, Elliott, Birrilla, DO, 200 mg at 03/08/19 2024  •  melatonin tablet 5 mg, 5 mg, Oral, Nightly PRN, Elliott, Birrilla, DO, 5 mg at 03/08/19 2037  •  memantine (NAMENDA) tablet 10 mg, 10 mg, Oral, Daily, Elliott, Birrilla, DO, 10 mg at 03/09/19 0841  •  morphine injection 4 mg, 4 mg, Intravenous, Q4H PRN, 4 mg at 03/07/19 1932 **AND** naloxone (NARCAN) injection 0.4 mg, 0.4 mg, Intravenous, Q5 Min PRN, Elliott, Birrilla, DO  •  Morphine injection 6 mg, 6 mg, Intravenous, Q2H PRN **AND** naloxone (NARCAN) injection 0.4 mg, 0.4 mg, Intravenous, Q5 Min PRN, Harrison Turk MD  •  OLANZapine (zyPREXA) tablet 20 mg, 20 mg, Oral, Daily, Elliott, Birrilla, DO, 20 mg at 03/09/19 0841  •  ondansetron (ZOFRAN) tablet 4 mg, 4 mg, Oral, Q6H PRN **OR** ondansetron ODT (ZOFRAN-ODT) disintegrating tablet 4 mg, 4 mg, Oral, Q6H PRN **OR** ondansetron (ZOFRAN) injection 4 mg, 4 mg, Intravenous, Q6H PRN, Harrison Turk MD  •  rivastigmine (EXELON) capsule 6 mg, 6 mg, Oral, BID With Meals, Elliott, Birrilla, DO, 6 mg at 03/09/19 0841  •  Insert peripheral IV, , , Once **AND** sodium chloride 0.9 % flush 10 mL, 10 mL, Intravenous, PRN, Gerardo Martinez MD  •  sodium chloride 0.9 % flush 3-10 mL, 3-10 mL, Intravenous, PRN, Elliott, Birrilla, DO  •  sodium chloride 0.9 % infusion  - ADS Override Pull, , , ,   •  sodium chloride 0.9 % infusion 40 mL, 40 mL, Intravenous, PRN, Elliott, Birrilla, DO  •  sodium chloride 0.9 % infusion, 75 mL/hr, Intravenous, Continuous, Elliott, Birrilla, DO, Last Rate: 75 mL/hr at 03/08/19 0433, 75 mL/hr at 03/08/19 0433  •  tamsulosin (FLOMAX) 24 hr capsule 0.4 mg, 0.4 mg, Oral, Nightly, Elliott, Birrilla, DO, 0.4 mg at 03/08/19  2024      Assessment/Plan     1.  Right Femoral Neck Fracture: POD #1.  Ortho following.  Going to reduce pain medicine regimen.  Would not challenge his kidneys w/ both toradol and full dose ASA.    2.  Epilepsy: Nothing acute.  No change to regimen.    3.  BPH: No acute issues.  Continue Flomax.    Plan for disposition: Likely rehab.    Talib White MD  03/09/19  10:11 AM

## 2019-03-09 NOTE — DISCHARGE INSTRUCTIONS
The Donts  • Don't cross your legs at the knees for at least 6 to 8 weeks.  • Don't bring your knee up higher than your hip.  • Don't lean forward while sitting or as you sit down.  • Don't try to  something on the floor while you are sitting.  • Don't turn your feet excessively inward or outward when you bend down.  • Don't reach down to pull up blankets when lying in bed.  • Don't bend at the waist beyond 90 degrees.  The Dos  • Do keep the leg facing forward.  • Do keep the affected leg in front as you sit or stand.  • Do use a high kitchen or barstool in the kitchen.  • Do kneel on the knee on the operated leg (the bad side).  • Do use ice to reduce pain and swelling, but remember that ice will diminish sensation. Don't apply ice directly to the skin; use an ice pack or wrap it in a damp towel.  • Do apply heat before exercising to assist with range of motion. Use a heating pad or hot, damp towel for 15 to 20 minutes.  • Do cut back on your exercises if your muscles begin to ache, but don't stop doing them!  .nk

## 2019-03-09 NOTE — PROGRESS NOTES
POD#1  s/p right hip hemiarthroplasty, right scapula fracture closed treatment    Subjective: Patient states he is having some pain to his right hip but overall doing fairly well, he is confused and does not recall that he had surgery yesterday on his right hip.  Nurse states that they got him up to a chair and he tolerated this fairly well.  States shoulder is doing well at this time.  He is complaining of some left shoulder pain currently    Objective:  Vitals:    03/08/19 2312 03/09/19 0347 03/09/19 0618 03/09/19 1108   BP: 130/64 141/65 141/68 120/67   BP Location: Left arm Left arm Left arm Right arm   Patient Position: Lying Lying Lying Sitting   Pulse: 89 85 95 87   Resp: 18 18 20 18   Temp: 97.5 °F (36.4 °C) 97.9 °F (36.6 °C) 98.1 °F (36.7 °C) 99.3 °F (37.4 °C)   TempSrc: Oral Oral Oral Oral   SpO2: 96% 94% 94% 94%   Weight:       Height:           Results from last 7 days   Lab Units 03/09/19  0434 03/08/19  1935 03/07/19  0345 03/06/19  2146   WBC 10*3/mm3  --   --  12.88* 9.45   HEMOGLOBIN g/dL 10.7* 12.0* 13.6 13.6   HEMATOCRIT % 33.6* 37.1* 41.7 41.3   PLATELETS 10*3/mm3  --   --  297 303       Results from last 7 days   Lab Units 03/09/19  0434 03/07/19  0345 03/06/19  2146   SODIUM mmol/L 132* 139 135*   POTASSIUM mmol/L 4.0 4.3 4.5   CHLORIDE mmol/L 99 100 96*   CO2 mmol/L 23.8 31.0* 31.0*   BUN mg/dL 11 14 13   CREATININE mg/dL 0.76 0.75* 0.89   GLUCOSE mg/dL 133* 93 102*   CALCIUM mg/dL 7.4* 8.6* 8.8       Exam:    Right hip-dressing clean dry and intact   Flex and extend toes   Brisk cap refill all digits   Positive sensation right foot   Compartment soft easily compressible   No calf pain, negative Homans sign    Impression: s/p right hip hemiarthroplasty    Plan:  1. PT/OT-weight-bear as tolerated right lower extremity with posterior hip precautions, sling to right upper extremity except for ambulation when patient may weight-bear as tolerated through right upper extremity  2. Pain  control-Norco  3. DVT prophylaxis-aspirin  4. Wound care-keep dressing in place until follow-up  5. Disposition-likely will need rehab given underlying dementia, admitted to hospitalist.  6.  We will obtain x-ray left shoulder to rule out any other injury from secondary survey

## 2019-03-09 NOTE — PLAN OF CARE
Problem: Patient Care Overview  Goal: Plan of Care Review   03/09/19 0527   Coping/Psychosocial   Plan of Care Reviewed With patient   Plan of Care Review   Progress improving   OTHER   Outcome Summary VSS; alert and oriented, sometimes confused; stood up at the bedside, pain managed with oral pain medication; will continue to monitor.

## 2019-03-09 NOTE — THERAPY EVALUATION
Acute Care - Physical Therapy Initial Evaluation   Angi Feliciano     Patient Name: Andrew Redmond  : 1945  MRN: [id number]  Today's Date: 3/9/2019   Onset of Illness/Injury or Date of Surgery: 19  Date of Referral to PT: 19  Referring Physician: Dr Turk      Admit Date: 3/6/2019    Visit Dx:     ICD-10-CM ICD-9-CM   1. Displaced fracture of right femoral neck (CMS/HCC) S72.001A 820.8     Patient Active Problem List   Diagnosis   • Alzheimer's disease   • Bipolar I disorder, single manic episode (CMS/HCC)   • Epilepsy (CMS/HCC)   • Chronic constipation   • Hemorrhoids   • Polyp of colon   • Erectile dysfunction   • High risk medication use   • Poor balance   • Subdural hematoma (CMS/HCC)   • Seizure (CMS/HCC)   • Low energy   • Closed compression fracture of L1 lumbar vertebra (CMS/HCC)   • Acute bilateral low back pain without sciatica   • Weight loss   • Displaced fracture of right femoral neck (CMS/HCC)     Past Medical History:   Diagnosis Date   • Alzheimer disease    • Alzheimer disease    • Alzheimer disease 2019   • Anxiety and depression    • Bipolar 1 disorder (CMS/HCC)    • Colon polyp    • Constipation    • Depression    • Diarrhea    • Epilepsy (CMS/HCC)    • Epilepsy (CMS/HCC) 2019   • Erectile dysfunction    • GERD (gastroesophageal reflux disease)    • L1 vertebral fracture (CMS/HCC)    • Seizures (CMS/HCC)     absence seizures.  well controlled.     Past Surgical History:   Procedure Laterality Date   • COLONOSCOPY N/A 2016    Procedure: COLONOSCOPY with polypectomy;  Surgeon: Akiko Scales MD;  Location: [facility];  Service:    • GALLBLADDER SURGERY     • LIVER SURGERY     • TONSILLECTOMY          PT ASSESSMENT (last 12 hours)      Physical Therapy Evaluation     Row Name 19 0926          PT Evaluation Time/Intention    Subjective Information  complains of;fatigue;pain  -JW     Document Type  evaluation  -JW     Mode of Treatment  physical therapy  -JW      "Patient Effort  adequate  -JW     Symptoms Noted During/After Treatment  fatigue  -JW     Comment  pt with frequent responses off topic to questions, requires cues and continued redirection  -JW     Row Name 03/09/19 0926          General Information    Patient Profile Reviewed?  yes  -JW     Onset of Illness/Injury or Date of Surgery  03/06/19  -JW     Referring Physician  Dr Turk  -     Patient Observations  alert;cooperative;agree to therapy  -JW     Patient/Family Observations  pt sitting in chair, agreeable to evaluation with encouragement  -JW     Prior Level of Function  -- please see pertinent history  -JW     Equipment Currently Used at Home  rollator;walker, rolling;ramp  -JW     Pertinent History of Current Functional Problem  pt is poor historian regarding PLOF.  pt responses frequently off topic with periods of unintellible speech.  All PLOF information obtained from chart review.  Per chart, pt lives alone in 1 story house.  pt independent with mobility and has hired assistance for housekeeping and yardwork.  Per chart, pt fell at home while getting up from chair and suffered right femoral neck fracture.  pt s/p right hemiarthroplasty with posterior hip precautions.  -JW     Existing Precautions/Restrictions  fall;right;hip, posterior  -JW     Limitations/Impairments  safety/cognitive  -JW     Risks Reviewed  patient:;increased discomfort  -JW     Benefits Reviewed  patient:;improve function;increase independence;increase strength;increase balance  -JW     Barriers to Rehab  cognitive status  -JW     Row Name 03/09/19 0926          Relationship/Environment    Lives With  alone  -JW     Row Name 03/09/19 0926          Resource/Environmental Concerns    Current Living Arrangements  home/apartment/condo 1 story house  -JW     Row Name 03/09/19 0926          Cognitive Assessment/Intervention- PT/OT    Orientation Status (Cognition)  oriented to;person reports date as \"December 1917\", unable to recall " city/state  -     Follows Commands (Cognition)  follows one step commands;0-24% accuracy;delayed response/completion;increased processing time needed;initiation impaired;physical/tactile prompts required;repetition of directions required;verbal cues/prompting required  -     Safety Deficit (Cognitive)  impulsivity;insight into deficits/self awareness;judgment;problem solving;safety precautions awareness  -     Cognitive Assessment/Intervention Comment  pt does not recall having hip surgery, unable to verbally recall any hip precautions provided approximately 30 seconds prior  -     Row Name 03/09/19 0926          Safety Issues, Functional Mobility    Safety Issues Affecting Function (Mobility)  insight into deficits/self awareness;impulsivity;judgment;problem solving;safety precaution awareness  -[facility] Name 03/09/19 0926          Bed Mobility Assessment/Treatment    Comment (Bed Mobility)  deferred-up in chair  -[facility] Name 03/09/19 0926          Transfer Assessment/Treatment    Transfer Assessment/Treatment  sit-stand transfer;stand-sit transfer  -     Comment (Transfers)  pt requires repeated verbal cues for hand and foot placement.  Patient requires assistance for controlled descent into sitting.  Upon standing, pt with significant jerking of all 4 extremities and unable to maintain walker contact with floor.  -     Sit-Stand [place] (Transfers)  maximum assist (25% patient effort);verbal cues  -     Stand-Sit [place] (Transfers)  moderate assist (50% patient effort);verbal cues  -[facility] Name 03/09/19 0926          Sit-Stand Transfer    Assistive Device (Sit-Stand Transfers)  walker, front-wheeled  -[facility] Name 03/09/19 0926          Stand-Sit Transfer    Assistive Device (Stand-Sit Transfers)  walker, front-wheeled  -[facility] Name 03/09/19 0926          Gait/Stairs Assessment/Training    Comment (Gait/Stairs)  not safe to attempt at this time  -     Row Name 03/09/19  0926          General ROM    GENERAL ROM COMMENTS  pt unable to follow directions for ROM and MMT  -     Row Name 03/09/19 0926          Sensory Assessment/Intervention    Sensory General Assessment  -- unable to appropriately participate  -     Row Name 03/09/19 0926          Pain Assessment    Additional Documentation  -- reports no pain  -     Row Name             Wound 03/08/19 1757 Right hip incision    Wound - Properties Group Date first assessed: 03/08/19  - Time first assessed: 1757  - Side: Right  - Location: hip  -JM Type: incision  -JM    Row Name 03/09/19 0926          Plan of Care Review    Plan of Care Reviewed With  patient  -     Row Name 03/09/19 0926          Physical Therapy Clinical Impression    Date of Referral to PT  03/08/19  -     Patient/Family Goals Statement (PT Clinical Impression)  pt unable to state goals  -     Criteria for Skilled Interventions Met (PT Clinical Impression)  yes;treatment indicated  -     Rehab Potential (PT Clinical Summary)  fair, will monitor progress closely  -     Predicted Duration of Therapy (PT)  3 days  -     Care Plan Review (PT)  evaluation/treatment results reviewed;care plan/treatment goals reviewed;patient/other agree to care plan  -     Row Name 03/09/19 0926          Physical Therapy Goals    Bed Mobility Goal Selection (PT)  bed mobility, PT goal 1  -     Transfer Goal Selection (PT)  transfer, PT goal 1  -     Gait Training Goal Selection (PT)  gait training, PT goal 1  -     Precaution Goal Selection (PT)  precaution, PT goal 1  -     Additional Documentation  Precaution Goal Selection (PT) (Row)  -     Row Name 03/09/19 0926          Bed Mobility Goal 1 (PT)    Activity/Assistive Device (Bed Mobility Goal 1, PT)  bed mobility activities, all  -     [place] Level/Cues Needed (Bed Mobility Goal 1, PT)  minimum assist (75% or more patient effort)  -     Time Frame (Bed Mobility Goal 1, PT)  3 days  -      Barriers (Bed Mobility Goal 1, PT)  cognition  -JW     Progress/Outcomes (Bed Mobility Goal 1, PT)  goal ongoing  -     Row Name 03/09/19 0926          Transfer Goal 1 (PT)    Activity/Assistive Device (Transfer Goal 1, PT)  transfers, all;walker, rolling  -JW     [place] Level/Cues Needed (Transfer Goal 1, PT)  minimum assist (75% or more patient effort)  -JW     Time Frame (Transfer Goal 1, PT)  3 days  -JW     Barriers (Transfers Goal 1, PT)  cognition  -JW     Progress/Outcome (Transfer Goal 1, PT)  goal ongoing  -     Row Name 03/09/19 0926          Gait Training Goal 1 (PT)    Activity/Assistive Device (Gait Training Goal 1, PT)  gait (walking locomotion);assistive device use  -JW     [place] Level (Gait Training Goal 1, PT)  minimum assist (75% or more patient effort)  -JW     Distance (Gait Goal 1, PT)  25  -JW     Time Frame (Gait Training Goal 1, PT)  3 days  -JW     Barriers (Gait Training Goal 1, PT)  cognition  -JW     Progress/Outcome (Gait Training Goal 1, PT)  goal ongoing  -     Row Name 03/09/19 0926          Precaution Goal 1 (PT)    Activity (Precaution Goal 1, PT)  hip precautions  -JW     [place] Level/Cues Needed (Precautions Goal 1, PT)  independently  -JW     Time Frame (Precaution Goal 1, PT)  3 days  -JW     Barriers (Precaution Goal 1, PT)  cognition  -JW     Progress/Outcome (Precaution Goal 1, PT)  goal ongoing  -     Row Name 03/09/19 0926          Positioning and Restraints    Pre-Treatment Position  sitting in chair/recliner  -JW     Post Treatment Position  chair  -JW     In Chair  reclined;call light within reach;encouraged to call for assist;pillow between legs  -     Row Name 03/09/19 0926          Living Environment    Home Accessibility  other (see comments) per chart review, pt has ramp to enter  -JW       User Key  (r) = Recorded By, (t) = Taken By, (c) = Cosigned By    Initials Name Provider Type    Siomara Olsen, PT Physical Therapist    DURAN  "Melinda Ac, RN Registered Nurse        Physical Therapy Education     Title: PT OT SLP Therapies (In Progress)     Topic: Physical Therapy (In Progress)     Point: Mobility training (In Progress)     Learning Progress Summary           Patient Acceptance, E,TB, NR by JENNIFER at 3/9/2019 10:52 AM                   Point: Precautions (In Progress)     Learning Progress Summary           Patient Acceptance, E,TB, NR by JENNIFER at 3/9/2019 10:52 AM                               User Key     Initials Effective Dates Name Provider Type Discipline    JENNIFER 04/03/18 -  Siomara Cedillo, PT Physical Therapist PT              PT Recommendation and Plan  Anticipated Discharge Disposition (PT): skilled nursing facility, extended care facility  Planned Therapy Interventions (PT Eval): balance training, bed mobility training, gait training, home exercise program, strengthening, transfer training, patient/family education  Therapy Frequency (PT Clinical Impression): (QD-BID, 7x/week)  Outcome Summary/Treatment Plan (PT)  Anticipated Equipment Needs at Discharge (PT): bedside commode  Anticipated Discharge Disposition (PT): skilled nursing facility, extended care facility  Plan of Care Reviewed With: patient  Outcome Summary: Physical therapy evaluation complete.  Patient with significant confusion, reporting current time as \"december 1917\" and unable to state current state or city.  Patient unable to provide any information regarding PLOF and responses frequently off topic with periods of unintelligible speech.  Patient does not recall having hip surgery and is unable to verbally recall any of 3 hip precautions provided approximately 30 seconds prior.  Patient requires max assist to perform sit to stand from chair and upon standing, demonstrates significant shaking of all 4 extremities and unable to maintain walker contact with floor.  Will attempt to see for physical therapy to address deficits in functional mobility, however progress may " be limited by current cognitive status.  Recommend 24 hour assistance at discharge, pt likely will require further rehab with potential to remain in facility for duration of hip precautions due to current cognitive status.  Outcome Measures     Row Name 03/09/19 0926             How much help from another person do you currently need...    Turning from your back to your side while in flat bed without using bedrails?  2  -JW      Moving from lying on back to sitting on the side of a flat bed without bedrails?  2  -JW      Moving to and from a bed to a chair (including a wheelchair)?  2  -JW      Standing up from a chair using your arms (e.g., wheelchair, bedside chair)?  2  -JW      Climbing 3-5 steps with a railing?  1  -JW      To walk in hospital room?  1  -JW      AM-PAC 6 Clicks Score  10  -         Functional Assessment    Outcome Measure Options  AM-PAC 6 Clicks Basic Mobility (PT)  -        User Key  (r) = Recorded By, (t) = Taken By, (c) = Cosigned By    Initials Name Provider Type    Siomara Olsen PT Physical Therapist         Time Calculation:   PT Charges     Row Name 03/09/19 1058             Time Calculation    Start Time  0926  -      PT Received On  03/09/19  -JENNIFER      PT - Next Appointment  03/10/19  -        User Key  (r) = Recorded By, (t) = Taken By, (c) = Cosigned By    Initials Name Provider Type    Siomara Olsen PT Physical Therapist        Therapy Suggested Charges     Code   Minutes Charges    None           Therapy Charges for Today     Code Description Service Date Service Provider Modifiers Qty    [id number] HC PT EVAL MOD COMPLEXITY 2 3/9/2019 Siomara Cedillo, PT GP 1          PT G-Codes  Outcome Measure Options: AM-PAC 6 Clicks Basic Mobility (PT)  AM-PAC 6 Clicks Score: 10      Siomara Cedillo PT  3/9/2019

## 2019-03-10 ENCOUNTER — APPOINTMENT (OUTPATIENT)
Dept: GENERAL RADIOLOGY | Facility: HOSPITAL | Age: 74
End: 2019-03-10

## 2019-03-10 LAB
HCT VFR BLD AUTO: 35.5 % (ref 37.5–51)
HGB BLD-MCNC: 11.6 G/DL (ref 13–17.7)

## 2019-03-10 PROCEDURE — 99231 SBSQ HOSP IP/OBS SF/LOW 25: CPT | Performed by: HOSPITALIST

## 2019-03-10 PROCEDURE — 85018 HEMOGLOBIN: CPT | Performed by: ORTHOPAEDIC SURGERY

## 2019-03-10 PROCEDURE — 97166 OT EVAL MOD COMPLEX 45 MIN: CPT

## 2019-03-10 PROCEDURE — 97116 GAIT TRAINING THERAPY: CPT

## 2019-03-10 PROCEDURE — 73030 X-RAY EXAM OF SHOULDER: CPT

## 2019-03-10 PROCEDURE — 85014 HEMATOCRIT: CPT | Performed by: ORTHOPAEDIC SURGERY

## 2019-03-10 PROCEDURE — 97110 THERAPEUTIC EXERCISES: CPT

## 2019-03-10 RX ADMIN — RIVASTIGMINE TARTRATE 6 MG: 1.5 CAPSULE ORAL at 08:26

## 2019-03-10 RX ADMIN — HYDROCODONE BITARTRATE AND ACETAMINOPHEN 1 TABLET: 7.5; 325 TABLET ORAL at 09:52

## 2019-03-10 RX ADMIN — RIVASTIGMINE TARTRATE 6 MG: 1.5 CAPSULE ORAL at 17:12

## 2019-03-10 RX ADMIN — LAMOTRIGINE 200 MG: 100 TABLET ORAL at 20:44

## 2019-03-10 RX ADMIN — MEMANTINE HYDROCHLORIDE 10 MG: 5 TABLET ORAL at 08:25

## 2019-03-10 RX ADMIN — LAMOTRIGINE 100 MG: 100 TABLET ORAL at 08:26

## 2019-03-10 RX ADMIN — SODIUM CHLORIDE, PRESERVATIVE FREE 10 ML: 5 INJECTION INTRAVENOUS at 08:31

## 2019-03-10 RX ADMIN — ASPIRIN 325 MG: 325 TABLET, DELAYED RELEASE ORAL at 20:44

## 2019-03-10 RX ADMIN — ASPIRIN 325 MG: 325 TABLET, DELAYED RELEASE ORAL at 08:29

## 2019-03-10 RX ADMIN — HYDROCODONE BITARTRATE AND ACETAMINOPHEN 1 TABLET: 7.5; 325 TABLET ORAL at 06:06

## 2019-03-10 NOTE — PLAN OF CARE
Problem: Patient Care Overview  Goal: Plan of Care Review   03/10/19 1037   OTHER   Outcome Summary OT evaluation completed. Patient requried mod assist for sit to stand and reported pain 10/10 in the right hip during standing. Patient mod to max assist for LB ADLs due to hip precautions. Patient unable to activiey move right shoulder due to pain from scapula fracture. Patient's L UE rom and strength were WFL. OT to follow while in hospital. Would benefiit from SNF at discharge..

## 2019-03-10 NOTE — PROGRESS NOTES
"Hospitalist Team      Patient Care Team:  Jessica Birch MD as PCP - General (Internal Medicine & Pediatrics)  Huan Romano MD as PCP - Family Medicine  Jessica Birch MD as PCP - [facility]  Lachelle Marquez RN as Care Coordinator (Population Health)        Chief Complaint: Follow-up right femoral neck fracture    Subjective    Much more alert ths morning w/ good pain control on augmented regimen.  He had some difficulty eating due to his right arm sling and the continuous pulse ox on his left hand.  He denies chest pain and dyspnea.    Objective    Vital Signs  Temp:  [97.8 °F (36.6 °C)-98.1 °F (36.7 °C)] 98.1 °F (36.7 °C)  Heart Rate:  [] 99  Resp:  [18-20] 18  BP: (140-160)/(63-73) 149/66  Oxygen Therapy  SpO2: 91 %  Pulse Oximetry Type: Continuous  Device (Oxygen Therapy): room air  ETCO2 (mmHg): 38 mmHg}    Flowsheet Rows      First Filed Value   Admission Height  172.7 cm (68\") Documented at 03/06/2019 2133   Admission Weight  70.3 kg (155 lb) Documented at 03/06/2019 2133          Physical Exam:    General Appearance:    Alert, cooperative, in no acute distress   Lungs:     Diminished to auscultation,respirations regular, even and         unlabored    Heart:    Regular rhythm and normal rate, normal S1 and S2, no            murmur, no gallop, no rub, no click   Abdomen:     Soft and non-tender w/ diminished bowel sounds   Extremities:   Moves all extremities well, no edema, no cyanosis, no             redness   Pulses:   Pulses palpable and equal bilaterally   Neurologic:   Cranial nerves 2 - 12 grossly intact       Results Review:     I reviewed the patient's new clinical results.    Lab Results (last 24 hours)     Procedure Component Value Units Date/Time    Hemoglobin & Hematocrit, Blood [[id number]]  (Abnormal) Collected:  03/10/19 0417    Specimen:  Blood Updated:  03/10/19 0447     Hemoglobin 11.6 g/dL      Hematocrit 35.5 %           Imaging Results (last 24 " hours)     Procedure Component Value Units Date/Time    XR Shoulder 2+ View Left [[id number]] Collected:  03/10/19 1000     Updated:  03/10/19 1004    Narrative:       LEFT SHOULDER SERIES 03/10/2019         HISTORY:  Fall 5 days ago with shoulder pain     COMPARISON:  None     TECHNIQUE:  3 views left shoulder were obtained     FINDINGS:  There is a healed mid clavicle fracture. The glenohumeral joint appears  normal.       Impression:       Old clavicle fracture  other wise normal     This report was finalized on 3/10/2019 10:01 AM by Dr. Frantz Pressley MD.               Medication Review:   I have reviewed the patient's current medication list    Current Facility-Administered Medications:   •  acetaminophen (TYLENOL) suppository 650 mg, 650 mg, Rectal, Q4H PRN, Elliott, Birrilla, DO, 650 mg at 03/08/19 0604  •  aspirin EC tablet 325 mg, 325 mg, Oral, Q12H, Harrison Turk MD, 325 mg at 03/10/19 0829  •  cetirizine-pseudoephedrine (ZyrTEC-D) 5-120 MG per 12 hr tablet 1 tablet, 1 tablet, Oral, Once, Elliott, Birrilla, DO  •  HYDROcodone-acetaminophen (NORCO) 7.5-325 MG per tablet 1 tablet, 1 tablet, Oral, Q4H PRN, Harrison Turk MD, 1 tablet at 03/10/19 0952  •  lamoTRIgine (LaMICtal) tablet 100 mg, 100 mg, Oral, Daily, Elliott, Birrilla, DO, 100 mg at 03/10/19 0826  •  lamoTRIgine (LaMICtal) tablet 200 mg, 200 mg, Oral, Nightly, Elliott, Birrilla, DO, 200 mg at 03/09/19 2048  •  memantine (NAMENDA) tablet 10 mg, 10 mg, Oral, Daily, Elliott, Birrilla, DO, 10 mg at 03/10/19 0825  •  ondansetron (ZOFRAN) tablet 4 mg, 4 mg, Oral, Q6H PRN **OR** ondansetron ODT (ZOFRAN-ODT) disintegrating tablet 4 mg, 4 mg, Oral, Q6H PRN **OR** ondansetron (ZOFRAN) injection 4 mg, 4 mg, Intravenous, Q6H PRN, Harrison Turk MD  •  rivastigmine (EXELON) capsule 6 mg, 6 mg, Oral, BID With Meals, Elyssa Elliott DO, 6 mg at 03/10/19 0826  •  Insert peripheral IV, , , Once **AND** sodium chloride 0.9 % flush 10 mL, 10 mL, Intravenous,  DAVIDN, Gerardo Martinez MD  •  sodium chloride 0.9 % flush 3-10 mL, 3-10 mL, Intravenous, PRN, Elliott, Birrilla, DO, 10 mL at 03/10/19 0831  •  sodium chloride 0.9 % infusion 40 mL, 40 mL, Intravenous, PRN, Elliott, Birrilla, DO  •  tamsulosin (FLOMAX) 24 hr capsule 0.4 mg, 0.4 mg, Oral, Nightly, Elliott, Birrilla, DO, 0.4 mg at 03/09/19 2048      Assessment/Plan     1.  Right Femoral Neck Fracture: POD #2.  Ortho following.    2.  Alzheimer's Dementia: Appears at baseline.  I did hold his Zyprexa to minimize his sedation.    3.  Epilepsy: No acute issues.  Continue current regimen.    4.  BPH: Nothing acute.  Continue Flomax.    Plan for disposition: [facility] tomorrow.    Talib White MD  03/10/19  11:14 AM

## 2019-03-10 NOTE — THERAPY TREATMENT NOTE
Acute Care - Physical Therapy Treatment Note   [place]     Patient Name: Andrew Redmond  : 1945  MRN: [id number]  Today's Date: 3/10/2019  Onset of Illness/Injury or Date of Surgery: 19  Date of Referral to PT: 19  Referring Physician: Dr Turk    Admit Date: 3/6/2019    Visit Dx:    ICD-10-CM ICD-9-CM   1. Displaced fracture of right femoral neck (CMS/HCC) S72.001A 820.8     Patient Active Problem List   Diagnosis   • Alzheimer's disease   • Bipolar I disorder, single manic episode (CMS/HCC)   • Epilepsy (CMS/HCC)   • Chronic constipation   • Hemorrhoids   • Polyp of colon   • Erectile dysfunction   • High risk medication use   • Poor balance   • Subdural hematoma (CMS/HCC)   • Seizure (CMS/HCC)   • Low energy   • Closed compression fracture of L1 lumbar vertebra (CMS/HCC)   • Acute bilateral low back pain without sciatica   • Weight loss   • Displaced fracture of right femoral neck (CMS/HCC)       Therapy Treatment    Rehabilitation Treatment Summary     Row Name 03/10/19 0830             Treatment Time/Intention    Discipline  physical therapy assistant  -EA      Document Type  therapy note (daily note)  -EA      Subjective Information  complains of;pain  -EA      Mode of Treatment  physical therapy  -EA      Patient/Family Observations  -- Patient in chair this AM, agreeable to therapy.  -EA      Patient Effort  good  -EA      Existing Precautions/Restrictions  fall;right;hip, posterior  -EA      Recorded by [EA] Sonja Talbert PTA 03/10/19 0904      Row Name 03/10/19 0830             Bed Mobility Assessment/Treatment    Comment (Bed Mobility)  -- deferred  -EA      Recorded by [EA] Sonja Talbert PTA 03/10/19 0904      Row Name 03/10/19 0830             Transfer Assessment/Treatment    Transfer Assessment/Treatment  sit-stand transfer;stand-sit transfer  -EA      Comment (Transfers)  impulsive during transfers, unable to follow instructions for hand placment. C/o shoulder pain when  pushing up. Verbal cues for increased eccentric control when sitting and pt able to follow directions.  -EA      Recorded by [EA] Nitish Sonja EASTON, PTA 03/10/19 0904      Row Name 03/10/19 0830             Sit-Stand Transfer    Sit-Stand [place] (Transfers)  moderate assist (50% patient effort)  -EA      Assistive Device (Sit-Stand Transfers)  walker, front-wheeled  -EA      Recorded by [EA] Sonja Talbert, PTA 03/10/19 0904      Row Name 03/10/19 0830             Stand-Sit Transfer    Stand-Sit [place] (Transfers)  moderate assist (50% patient effort);verbal cues  -EA      Assistive Device (Stand-Sit Transfers)  walker, front-wheeled  -EA      Recorded by [EA] Sonja Talbert, PTA 03/10/19 0904      Row Name 03/10/19 0830             Gait/Stairs Assessment/Training    [place] Level (Gait)  contact guard;1 person assist chair following for safety but did not need to sit during.  -EA      Assistive Device (Gait)  walker, front-wheeled  -EA      Distance in Feet (Gait)  80  -EA      Deviations/Abnormal Patterns (Gait)  base of support, narrow;clover decreased;antalgic;stride length decreased  -EA      Comment (Gait/Stairs)  Verbal cues for slow pace during turns, wider SHAHID, and navigation around obstacles in room. Patient improved with gait mechanics with increased distance.  -EA      Recorded by [EA] Sonja Talbert, PTA 03/10/19 0904      Row Name 03/10/19 0830             Motor Skills Assessment/Interventions    Additional Documentation  Therapeutic Exercise (Group)  -EA      Recorded by [EA] Sonja Talbert, PTA 03/10/19 0910      Row Name 03/10/19 0830             Therapeutic Exercise    Lower Extremity (Therapeutic Exercise)  marching while seated;marching while standing;LAQ (long arc quad), bilateral;SLR (straight leg raise), left Seated B hip abd  x 10 reps of each with demo  -EA      Recorded by [EA] Sonja Talbert, PTA 03/10/19 0910      Row Name 03/10/19 0830             Positioning and  Restraints    Pre-Treatment Position  sitting in chair/recliner  -EA      Post Treatment Position  chair  -EA      In Chair  notified nsg;reclined;call light within reach;encouraged to call for assist;with nsg;pillow between legs;legs elevated  -EA      Recorded by [EA] Sonja Talbert, PTA 03/10/19 0904      Row Name                Wound 03/08/19 1757 Right hip incision    Wound - Properties Group Date first assessed: 03/08/19 [JM] Time first assessed: 1757 [JM] Side: Right [JM] Location: hip [JM] Type: incision [JM] Recorded by:  [JM] Melinda Ac RN 03/08/19 1757    Row Name 03/10/19 0830             Outcome Summary/Treatment Plan (PT)    Daily Summary of Progress (PT)  progress toward functional goals is good  -EA      Barriers to Overall Progress (PT)  pain and weakness  -EA      Plan for Continued Treatment (PT)  continue poc progression as appropriate  -EA      Recorded by [EA] Sonja Talbert PTA 03/10/19 0904        User Key  (r) = Recorded By, (t) = Taken By, (c) = Cosigned By    Initials Name Effective Dates Discipline    EA Sonja Talbert, LEANDRA 06/12/16 -  PT    Melinda Adame RN 06/06/18 -  Nurse          Wound 03/08/19 1757 Right hip incision (Active)   Dressing Appearance dry;intact;no drainage 3/10/2019  8:10 AM   Closure JOSE MANUEL 3/9/2019  8:46 PM           Physical Therapy Education     Title: PT OT SLP Therapies (Done)     Topic: Physical Therapy (Done)     Point: Mobility training (Done)     Learning Progress Summary           Patient Acceptance, E,D, VU,NR by TOMÁS at 3/10/2019  9:04 AM    Acceptance, E,TB, NR by JENNIFER at 3/9/2019 10:52 AM                   Point: Home exercise program (Done)     Learning Progress Summary           Patient Acceptance, E,D, VU,NR by TOMÁS at 3/10/2019  9:04 AM                   Point: Precautions (Done)     Learning Progress Summary           Patient Acceptance, E,D, VU,NR by TOMÁS at 3/10/2019  9:04 AM    Acceptance, E,TB, NR by JENNIFER at 3/9/2019 10:52 AM                                User Key     Initials Effective Dates Name Provider Type Discipline    JW 04/03/18 -  Siomara Cedillo, PT Physical Therapist PT    TOMÁS 06/12/16 -  Sonja Talbert PTA Physical Therapy Assistant PT                PT Recommendation and Plan     Outcome Summary/Treatment Plan (PT)  Daily Summary of Progress (PT): progress toward functional goals is good  Barriers to Overall Progress (PT): pain and weakness  Plan for Continued Treatment (PT): continue poc progression as appropriate  Plan of Care Reviewed With: patient  Progress: improving  Outcome Summary: Patient had improved therapy participation today. Patient able to ambulate 80 ft with RW and CGA, chair following for safety. Required increased verbal/tactile cues for sit to stands from recliner and required Moderate assistance. Reviewed and performed seated BLE HEP and hip precautions.   Outcome Measures     Row Name 03/10/19 0830 03/09/19 0926          How much help from another person do you currently need...    Turning from your back to your side while in flat bed without using bedrails?  2  -EA  2  -JW     Moving from lying on back to sitting on the side of a flat bed without bedrails?  2  -EA  2  -JW     Moving to and from a bed to a chair (including a wheelchair)?  2  -EA  2  -JW     Standing up from a chair using your arms (e.g., wheelchair, bedside chair)?  2  -EA  2  -JW     Climbing 3-5 steps with a railing?  1  -EA  1  -JW     To walk in hospital room?  3  -EA  1  -JW     AM-PAC 6 Clicks Score  12  -EA  10  -JW        Functional Assessment    Outcome Measure Options  AM-PAC 6 Clicks Basic Mobility (PT)  -  AM-PAC 6 Clicks Basic Mobility (PT)  -       User Key  (r) = Recorded By, (t) = Taken By, (c) = Cosigned By    Initials Name Provider Type     Siomara Cedillo, PT Physical Therapist    Sonja Addison, LEANDRA Physical Therapy Assistant         Time Calculation:   PT Charges     Row Name 03/10/19 0959             Time Calculation    Start Time   0830  -EA      Stop Time  0857  -EA      Time Calculation (min)  27 min  -EA        User Key  (r) = Recorded By, (t) = Taken By, (c) = Cosigned By    Initials Name Provider Type    Sonja Addison PTA Physical Therapy Assistant        Therapy Suggested Charges     Code   Minutes Charges    None           Therapy Charges for Today     Code Description Service Date Service Provider Modifiers Qty    69186183434 HC PT THER PROC EA 15 MIN 3/10/2019 Sonja Talbert, PTA GP 1    81169214827 HC GAIT TRAINING EA 15 MIN 3/10/2019 Sonja Talbert, PTA GP 1    82039939057 HC PT THER SUPP EA 15 MIN 3/10/2019 Sonja Talbert, PTA GP 1          PT G-Codes  Outcome Measure Options: AM-PAC 6 Clicks Basic Mobility (PT)  AM-PAC 6 Clicks Score: 12    Sonja Talbert PTA  3/10/2019

## 2019-03-10 NOTE — PROGRESS NOTES
POD#2  s/p right hip hemiarthroplasty, right scapula fracture closed treatment    Subjective: Patient states his hip is doing reasonably well at this point time, still has some pain in his shoulder, sling for stabilization on that side.  Denies numbness or tingling right lower extremity or right upper extremity.    Objective:  Vitals:    03/09/19 1957 03/09/19 2344 03/10/19 0343 03/10/19 0745   BP: 149/72 153/69 160/73 149/66   BP Location: Left arm Left arm Left arm Left arm   Patient Position: Sitting Lying Lying Lying   Pulse: 101 94 94 99   Resp: 20 19 18 18   Temp: 98.1 °F (36.7 °C) 97.8 °F (36.6 °C) 98 °F (36.7 °C) 98.1 °F (36.7 °C)   TempSrc: Oral Oral Oral Oral   SpO2: 94% 94% 93% 91%   Weight:       Height:           Results from last 7 days   Lab Units 03/10/19  0417 03/09/19  0434 03/08/19  1935 03/07/19  0345 03/06/19  2146   WBC 10*3/mm3  --   --   --  12.88* 9.45   HEMOGLOBIN g/dL 11.6* 10.7* 12.0* 13.6 13.6   HEMATOCRIT % 35.5* 33.6* 37.1* 41.7 41.3   PLATELETS 10*3/mm3  --   --   --  297 303       Results from last 7 days   Lab Units 03/09/19  0434 03/07/19  0345 03/06/19  2146   SODIUM mmol/L 132* 139 135*   POTASSIUM mmol/L 4.0 4.3 4.5   CHLORIDE mmol/L 99 100 96*   CO2 mmol/L 23.8 31.0* 31.0*   BUN mg/dL 11 14 13   CREATININE mg/dL 0.76 0.75* 0.89   GLUCOSE mg/dL 133* 93 102*   CALCIUM mg/dL 7.4* 8.6* 8.8       Exam:    Right hip-dressing clean dry and intact   Flex and extend toes   Brisk cap refill all digits   Positive sensation right foot   Compartment soft easily compressible   No calf pain, negative Homans sign    Impression: s/p right hip hemiarthroplasty    Plan:  1. PT/OT-weight-bear as tolerated right lower extremity with posterior hip precautions, sling to right upper extremity except for ambulation when patient may weight-bear as tolerated through right upper extremity  2. Pain control-Norco  3. DVT prophylaxis-aspirin  4. Wound care-keep dressing in place until follow-up  5.  Disposition-likely will need rehab given underlying dementia, admitted to hospitalist.  6.  Awaiting left shoulder x-ray.  Right shoulder x-ray was inadvertently repeated yesterday.  Follow-up in those results.

## 2019-03-10 NOTE — PLAN OF CARE
Problem: Patient Care Overview  Goal: Plan of Care Review  Outcome: Ongoing (interventions implemented as appropriate)   03/10/19 0904   Coping/Psychosocial   Plan of Care Reviewed With patient   Plan of Care Review   Progress improving   OTHER   Outcome Summary Patient had improved therapy participation today. Patient able to ambulate 80 ft with RW and CGA, chair following for safety. Required increased verbal/tactile cues for sit to stands from recliner and required Moderate assistance. Reviewed and performed seated BLE HEP and hip precautions.

## 2019-03-10 NOTE — PLAN OF CARE
Problem: Patient Care Overview  Goal: Plan of Care Review  Outcome: Ongoing (interventions implemented as appropriate)   03/10/19 8090   Coping/Psychosocial   Plan of Care Reviewed With patient   Plan of Care Review   Progress improving   OTHER   Outcome Summary VSS; no falls this shift; up in chair and then back to bed; confused at times; educated on call light and not getting up on own; will continue to monitor.

## 2019-03-10 NOTE — THERAPY EVALUATION
Acute Care - Occupational Therapy Initial Evaluation   [facility]     Patient Name: Andrew Redmond  : 1945  MRN: [id number]  Today's Date: 3/10/2019  Onset of Illness/Injury or Date of Surgery: 19     Referring Physician: Dr. Turk    Admit Date: 3/6/2019       ICD-10-CM ICD-9-CM   1. Displaced fracture of right femoral neck (CMS/HCC) S72.001A 820.8     Patient Active Problem List   Diagnosis   • Alzheimer's disease   • Bipolar I disorder, single manic episode (CMS/HCC)   • Epilepsy (CMS/HCC)   • Chronic constipation   • Hemorrhoids   • Polyp of colon   • Erectile dysfunction   • High risk medication use   • Poor balance   • Subdural hematoma (CMS/HCC)   • Seizure (CMS/HCC)   • Low energy   • Closed compression fracture of L1 lumbar vertebra (CMS/HCC)   • Acute bilateral low back pain without sciatica   • Weight loss   • Displaced fracture of right femoral neck (CMS/HCC)     Past Medical History:   Diagnosis Date   • Alzheimer disease    • Alzheimer disease    • Alzheimer disease 2019   • Anxiety and depression    • Bipolar 1 disorder (CMS/HCC)    • Colon polyp    • Constipation    • Depression    • Diarrhea    • Epilepsy (CMS/HCC)    • Epilepsy (CMS/HCC) 2019   • Erectile dysfunction    • GERD (gastroesophageal reflux disease)    • L1 vertebral fracture (CMS/HCC)    • Seizures (CMS/HCC)     absence seizures.  well controlled.     Past Surgical History:   Procedure Laterality Date   • COLONOSCOPY N/A 2016    Procedure: COLONOSCOPY with polypectomy;  Surgeon: Akiko Scales MD;  Location: [facility] OR;  Service:    • GALLBLADDER SURGERY     • LIVER SURGERY     • TONSILLECTOMY            OT ASSESSMENT FLOWSHEET (last 72 hours)      Occupational Therapy Evaluation     Row Name 03/10/19 0920                   OT Evaluation Time/Intention    Subjective Information  complains of;pain with movement  -EN        Document Type  evaluation  -EN        Mode of Treatment  occupational therapy  -EN         Patient Effort  adequate  -EN        Comment  No pain at rest however reported right hip pain with sit to stand up to 10/10  -EN           General Information    Patient Profile Reviewed?  yes  -EN        Onset of Illness/Injury or Date of Surgery  03/06/19  -EN        Referring Physician  Dr. Turk  -EN        Patient Observations  alert;agree to therapy  -EN        Patient/Family Observations  Patient reclined in chair, agreed to OT  -EN        Prior Level of Function  -- see pertinent history  -EN        Equipment Currently Used at Home  ramp;rollator;walker, rolling has step in tub with seat  -EN        Pertinent History of Current Functional Problem  Patient lives alone in a one stoty home with a ramp.  Patient reported he was ind with ADLs and mobility and has a .  Patient fell at home getting out of his recliner and suffered a right femoral neck fracture and is now s/p R hemiarthroplasty with posterior hip precations. Patient also fractured his right scapula from the fall.    -EN        Existing Precautions/Restrictions  fall;hip, posterior  -EN        Risks Reviewed  patient:;increased discomfort;LOB  -EN           Relationship/Environment    Lives With  alone  -EN           Resource/Environmental Concerns    Current Living Arrangements  home/apartment/condo  -EN           Cognitive Assessment/Interventions    Additional Documentation  Cognitive Assessment/Intervention (Group)  -EN           Cognitive Assessment/Intervention- PT/OT    Orientation Status (Cognition)  oriented x 4  -EN        Follows Commands (Cognition)  WFL  -EN        Safety Deficit (Cognitive)  other (see comments) unaware of hip precautions  -EN        Personal Safety Interventions  gait belt;nonskid shoes/slippers when out of bed  -EN        Cognitive Assessment/Intervention Comment  Patient unaware of hip precautions.    -EN           Bed Mobility Assessment/Treatment    Comment (Bed Mobility)  deferred, up in chair  -EN            Transfer Assessment/Treatment    Transfer Assessment/Treatment  sit-stand transfer;stand-sit transfer  -EN        Comment (Transfers)  Required cues for hand placement.  C/o pain up to 10/10 with standing.    -EN           Sit-Stand Transfer    Sit-Stand [place] (Transfers)  verbal cues;moderate assist (50% patient effort)  -EN        Assistive Device (Sit-Stand Transfers)  walker, front-wheeled  -EN           Stand-Sit Transfer    Stand-Sit [place] (Transfers)  moderate assist (50% patient effort);verbal cues  -EN           Bathing Assessment/Intervention    Comment (Bathing)  Mod to max assist due to hip precautions, will provide Long handled sponge.  -EN           Upper Body Dressing Assessment/Training    Upper Body Dressing [place] Level  upper body dressing skills;minimum assist (75% patient effort)  -EN           Lower Body Dressing Assessment/Training    Comment (Lower Body Dressing)  Mod to max assist for LBD due to hip precautions  -EN           General ROM    GENERAL ROM COMMENTS  R shoulder 0 AROM (right elbow and digits WFL).  L UE AROM WFL.  -EN           MMT (Manual Muscle Testing)    General MMT Comments  R UE deferred due to pain.  L shoulder 3+/5, elbow/hand WFL  -EN           Positioning and Restraints    Pre-Treatment Position  sitting in chair/recliner  -EN        Post Treatment Position  chair  -EN        In Chair  notified nsg;reclined;call light within reach;encouraged to call for assist  -EN           Pain Assessment    Additional Documentation  Pain Scale: Numbers Pre/Post-Treatment (Group)  -EN           Pain Scale: Numbers Pre/Post-Treatment    Pain Scale: Numbers, Pretreatment  0/10 - no pain  -EN        Pain Scale: Numbers, Post-Treatment  0/10 - no pain 10/10 with standing  -EN        Pain Intervention(s)  Repositioned  -EN           Wound 03/08/19 1757 Right hip incision    Wound - Properties Group Date first assessed: 03/08/19  - Time first assessed: 1757   -JM Side: Right  -JM Location: hip  -JM Type: incision  -JM       Plan of Care Review    Plan of Care Reviewed With  patient  -EN           Clinical Impression (OT)    OT Diagnosis  weakness, decreased ADL  -EN        Functional Level at Time of Evaluation (OT Eval)  Patient performed sit to stand with mod assist and verbal cues.  Patient mod to max Assist for LB ADLs due to hip precautions.  R shoulder with 0 AROM and c/o pain from fracture.  L UE AROM and strength wfl.    -EN        Criteria for Skilled Therapeutic Interventions Met (OT Eval)  yes;treatment indicated  -EN        Rehab Potential (OT Eval)  good, to achieve stated therapy goals  -EN        Therapy Frequency (OT Eval)  5 times/wk  -EN        Predicted Duration of Therapy Intervention (Therapy Eval)  will continue to assess  -EN        Care Plan Review (OT)  evaluation/treatment results reviewed  -EN        Anticipated Discharge Disposition (OT)  skilled nursing facility  -EN           Planned OT Interventions    Planned Therapy Interventions (OT Eval)  adaptive equipment training;BADL retraining;transfer/mobility retraining  -EN           OT Goals    Transfer Goal Selection (OT)  transfer, OT goal 1  -EN        Dressing Goal Selection (OT)  dressing, OT goal 1;dressing, OT goal 2  -EN           Transfer Goal 1 (OT)    Activity/Assistive Device (Transfer Goal 1, OT)  sit-to-stand/stand-to-sit;toilet  -EN        [place] Level/Cues Needed (Transfer Goal 1, OT)  minimum assist (75% or more patient effort)  -EN        Time Frame (Transfer Goal 1, OT)  4 days  -EN        Progress/Outcome (Transfer Goal 1, OT)  other (see comments) new goal  -EN           Dressing Goal 1 (OT)    Activity/Assistive Device (Dressing Goal 1, OT)  lower body dressing;reacher;sock-aid;long handled shoe horn  -EN        [place]/Cues Needed (Dressing Goal 1, OT)  minimum assist (75% or more patient effort)  -EN        Time Frame (Dressing Goal 1, OT)  4 days  -EN         Progress/Outcome (Dressing Goal 1, OT)  other (see comments) new goal  -EN           Dressing Goal 2 (OT)    Activity/Assistive Device (Dressing Goal 2, OT)  upper body dressing  -EN        [place]/Cues Needed (Dressing Goal 2, OT)  set-up required;supervision required  -EN        Time Frame (Dressing Goal 2, OT)  3 days  -EN        Progress/Outcome (Dressing Goal 2, OT)  other (see comments) new goal  -EN           Living Environment    Home Accessibility  other (see comments) ramp to enter home  -EN          User Key  (r) = Recorded By, (t) = Taken By, (c) = Cosigned By    Initials Name Effective Dates    Lilia Bradley OTR 06/22/16 -     Melinda Adame RN 06/06/18 -          Occupational Therapy Education     Title: PT OT SLP Therapies (Done)     Topic: Occupational Therapy (Done)     Point: ADL training (Done)     Description: Instruct learner(s) on proper safety adaptation and remediation techniques during self care or transfers.   Instruct in proper use of assistive devices.    Learning Progress Summary           Patient Acceptance, E, VU by EN at 3/10/2019 10:37 AM    Comment:  Educated on safety during transfers and hip precautioins.                               User Key     Initials Effective Dates Name Provider Type Discipline    EN 06/22/16 -  Lilia Berg OTR Occupational Therapist OT                  OT Recommendation and Plan  Outcome Summary/Treatment Plan (OT)  Anticipated Discharge Disposition (OT): skilled nursing facility  Planned Therapy Interventions (OT Eval): adaptive equipment training, BADL retraining, transfer/mobility retraining  Therapy Frequency (OT Eval): 5 times/wk  Plan of Care Review  Plan of Care Reviewed With: patient  Plan of Care Reviewed With: patient  Outcome Summary: OT evaluation completed.  Patient requried mod assist for sit to stand and reported pain 10/10 in the right hip during standing.  Patient mod to max assist for LB ADLs due to hip  precautions.  Patient unable to activiey move right shoulder due to pain from scapula fracture.  Patient's L UE rom and strength were WFL.  OT to follow while in hospital.  Would benefiit from SNF at discharge..    Outcome Measures     Row Name 03/10/19 1000 03/10/19 0830 03/09/19 0926       How much help from another person do you currently need...    Turning from your back to your side while in flat bed without using bedrails?  --  2  -EA  2  -JW    Moving from lying on back to sitting on the side of a flat bed without bedrails?  --  2  -EA  2  -JW    Moving to and from a bed to a chair (including a wheelchair)?  --  2  -EA  2  -JW    Standing up from a chair using your arms (e.g., wheelchair, bedside chair)?  --  2  -EA  2  -JW    Climbing 3-5 steps with a railing?  --  1  -EA  1  -JW    To walk in hospital room?  --  3  -EA  1  -JW    AM-PAC 6 Clicks Score  --  12  -EA  10  -JW       How much help from another is currently needed...    Putting on and taking off regular lower body clothing?  2  -EN  --  --    Bathing (including washing, rinsing, and drying)  2  -EN  --  --    Toileting (which includes using toilet bed pan or urinal)  2  -EN  --  --    Putting on and taking off regular upper body clothing  2  -EN  --  --    Taking care of personal grooming (such as brushing teeth)  3  -EN  --  --    Eating meals  4  -EN  --  --    Score  15  -EN  --  --       Functional Assessment    Outcome Measure Options  AM-PAC 6 Clicks Daily Activity (OT)  -EN  AM-PAC 6 Clicks Basic Mobility (PT)  -EA  AM-PAC 6 Clicks Basic Mobility (PT)  -      User Key  (r) = Recorded By, (t) = Taken By, (c) = Cosigned By    Initials Name Provider Type    Lilia Bradley, OTR Occupational Therapist    Siomara Olsen, PT Physical Therapist    Sonja Addison, PTA Physical Therapy Assistant          Time Calculation:   Time Calculation- OT     Row Name 03/10/19 1042             Time Calculation- OT    OT Start Time  0920  -EN         User Key  (r) = Recorded By, (t) = Taken By, (c) = Cosigned By    Initials Name Provider Type    EN Lilia Berg OTR Occupational Therapist        Therapy Suggested Charges     Code   Minutes Charges    None           Therapy Charges for Today     Code Description Service Date Service Provider Modifiers Qty    98375106190 HC OT EVAL MOD COMPLEXITY 2 3/10/2019 Lilia Berg OTR GO 1               HOLDEN Gutierrez  3/10/2019

## 2019-03-11 VITALS
DIASTOLIC BLOOD PRESSURE: 64 MMHG | RESPIRATION RATE: 16 BRPM | HEIGHT: 68 IN | HEART RATE: 76 BPM | BODY MASS INDEX: 23.42 KG/M2 | WEIGHT: 154.5 LBS | TEMPERATURE: 99.2 F | SYSTOLIC BLOOD PRESSURE: 115 MMHG | OXYGEN SATURATION: 97 %

## 2019-03-11 LAB
FERRITIN SERPL-MCNC: 452 NG/ML (ref 30–400)
FOLATE SERPL-MCNC: 14.6 NG/ML (ref 4.78–24.2)
HCT VFR BLD AUTO: 32.1 % (ref 37.5–51)
HGB BLD-MCNC: 10.6 G/DL (ref 13–17.7)
IRON 24H UR-MRATE: 23 MCG/DL (ref 59–158)
IRON SATN MFR SERPL: 21 % (ref 20–50)
MAGNESIUM SERPL-MCNC: 1.9 MG/DL (ref 1.6–2.4)
TIBC SERPL-MCNC: 110 MCG/DL (ref 298–536)
UIBC SERPL-MCNC: 87 MCG/DL (ref 112–346)
VIT B12 BLD-MCNC: 310 PG/ML (ref 211–946)

## 2019-03-11 PROCEDURE — 97535 SELF CARE MNGMENT TRAINING: CPT

## 2019-03-11 PROCEDURE — 83540 ASSAY OF IRON: CPT | Performed by: NURSE PRACTITIONER

## 2019-03-11 PROCEDURE — 82728 ASSAY OF FERRITIN: CPT | Performed by: NURSE PRACTITIONER

## 2019-03-11 PROCEDURE — 99239 HOSP IP/OBS DSCHRG MGMT >30: CPT | Performed by: NURSE PRACTITIONER

## 2019-03-11 PROCEDURE — 85014 HEMATOCRIT: CPT | Performed by: ORTHOPAEDIC SURGERY

## 2019-03-11 PROCEDURE — 97116 GAIT TRAINING THERAPY: CPT

## 2019-03-11 PROCEDURE — 83550 IRON BINDING TEST: CPT | Performed by: NURSE PRACTITIONER

## 2019-03-11 PROCEDURE — 83735 ASSAY OF MAGNESIUM: CPT | Performed by: INTERNAL MEDICINE

## 2019-03-11 PROCEDURE — 85018 HEMOGLOBIN: CPT | Performed by: ORTHOPAEDIC SURGERY

## 2019-03-11 RX ORDER — HYDROCODONE BITARTRATE AND ACETAMINOPHEN 7.5; 325 MG/1; MG/1
1 TABLET ORAL EVERY 6 HOURS PRN
Start: 2019-03-11 | End: 2019-03-18

## 2019-03-11 RX ORDER — ONDANSETRON 4 MG/1
4 TABLET, FILM COATED ORAL EVERY 6 HOURS PRN
Start: 2019-03-11 | End: 2019-07-11

## 2019-03-11 RX ADMIN — RIVASTIGMINE TARTRATE 6 MG: 1.5 CAPSULE ORAL at 08:28

## 2019-03-11 RX ADMIN — HYDROCODONE BITARTRATE AND ACETAMINOPHEN 1 TABLET: 7.5; 325 TABLET ORAL at 04:05

## 2019-03-11 RX ADMIN — ASPIRIN 325 MG: 325 TABLET, DELAYED RELEASE ORAL at 08:41

## 2019-03-11 RX ADMIN — MEMANTINE HYDROCHLORIDE 10 MG: 5 TABLET ORAL at 08:27

## 2019-03-11 RX ADMIN — HYDROCODONE BITARTRATE AND ACETAMINOPHEN 1 TABLET: 7.5; 325 TABLET ORAL at 08:41

## 2019-03-11 RX ADMIN — HYDROCODONE BITARTRATE AND ACETAMINOPHEN 1 TABLET: 7.5; 325 TABLET ORAL at 13:28

## 2019-03-11 RX ADMIN — LAMOTRIGINE 100 MG: 100 TABLET ORAL at 08:27

## 2019-03-11 NOTE — PLAN OF CARE
Problem: Patient Care Overview  Goal: Plan of Care Review   03/11/19 1030   OTHER   Outcome Summary OT: Pt required mod assist with bed mobility and min assist with sit to stand tranfer from EOB. Pt performed transfer from bed to chair with CGA. Pt unable to recall hip precautions prior to treatment.  Pt recalled 2/3 after education. Pt requires verbal cues to adhere to TH precautions during transfers. Pt reports no pain at rest, yet c/o pain at 10/10 while standing. Plan is for transfer to SNF for continued therapy.

## 2019-03-11 NOTE — NURSING NOTE
Continued Stay Note  RITA Diaz     Patient Name: Andrew Redmond  MRN: [id number]  Today's Date: 3/11/2019    Admit Date: 3/6/2019    Discharge Plan     Row Name 03/11/19 1304       Plan    Plan Comments  [facility] called and they will accept patient today.  Will continue to follow        Discharge Codes    No documentation.             Aida Montes RN

## 2019-03-11 NOTE — NURSING NOTE
Case Management Discharge Note    Final Note: Discharged to skilled nursing facility    Destination - Selection Complete      Service Provider Request Status Selected Services Address Phone Number Fax Number    [facility] [address] [place] [address] [phone]      Durable Medical Equipment      No service has been selected for the patient.      Dialysis/Infusion      No service has been selected for the patient.      Home Medical Care      No service has been selected for the patient.      Community Resources      No service has been selected for the patient.             Final Discharge Disposition Code: 03 - skilled nursing facility (SNF)

## 2019-03-11 NOTE — PLAN OF CARE
Problem: Patient Care Overview  Goal: Plan of Care Review  Outcome: Ongoing (interventions implemented as appropriate)   03/11/19 1122   Coping/Psychosocial   Plan of Care Reviewed With patient   OTHER   Outcome Summary PT: Patient able to recall 2/3 precautions with cues to adhere to precautions during activity. Patient performs sit to stand transfer with min assist and gait with rolling walker x95 feet, CGA. Patient requires verbal cues for proper distance from walker and safety around obstacles. Patient continues to demonstrate decreased insight into current deficits. Continue to recommend SNF at discharge.

## 2019-03-11 NOTE — DISCHARGE SUMMARY
"Andrew Redmond  1945  [id number]    Hospitalists Discharge Summary     ADMIT TO DR. FELIX GROSS    Date of Admission: 3/6/2019  Date of Discharge:  3/11/2019    History of Present Illness: Taken from [facility] on admit:  \"Patient is a 72 y/o male who presented to the ED via EMS after a fall at home. Patient reports he was standing up from a chair when he lost his balance and fell landing on his right hip. He was unable to stand but was able to crawl to a phone and call 911. He reports severe pain in his right hip, knee and shoulder worse with movement. He received fentanyl 100 mcg without much relief in his pain. He does report pain in now tolerable after receiving dilaudid but still very painful with any movements. Patient denies any associated lightheadedness or dizziness prior to fall.   He normally uses a rollator to get around.\"    Hospital Course Summary/Primary Discharge Diagnoses:   Right femoral neck fracture secondary to fall, status post right hip hemiarthroplasty, right scapula fracture, postop day 3:  Orthopedic surgery managing with weightbearing as tolerated right lower extremity, posterior hip precautions, sling to right upper extremity except for ambulation, patient may bear weight on right upper extremity with ambulation  DVT prophylaxis with aspirin 325 mg twice daily  Follow-up Dr. Turk 1 week  Follow-up Jessica Birch MD 1 week  Secondary Discharge Diagnoses:  Acute post operative blood loss anemia:  Likely some dilutional effect with IVFs  Check anemia studies on blood in lab  Hemoglobin on admission 13.6, currently 10.6 without active blood loss noted  Follow-up Jessica Birch MD     Hyponatremia: mild, no acute issues    Alzheimer's dementia: No acute issues on Namenda 10 mg daily, Exelon 6 mg twice daily    Epilepsy: no acute issues    Bipolar disorder, anxiety, depression: No acute issues on Lamictal 100 mg a.m., 200 mg p.m.  Zyprexa 20 mg daily held during " admission    BPH: No acute issues on Flomax 0.4 mg daily    PCP  Patient Care Team:  Jessica Birch MD as PCP - General (Internal Medicine & Pediatrics)  Huan Romano MD as PCP - Family Medicine  Jessica Birch MD as PCP - Claims Attributed  Lachelle Marquez, RN as Care Coordinator (Population Health)    Consults:   Consults     Date and Time Order Name Status Description    3/7/2019 0654 Inpatient Orthopedic Surgery Consult Completed         Operations and Procedures Performed:  Procedure(s):  HIP HEMIARTHROPLASTY and all associated procedures     Xr Shoulder 2+ View Left    Result Date: 3/10/2019  Narrative: LEFT SHOULDER SERIES 03/10/2019     HISTORY: Fall 5 days ago with shoulder pain  COMPARISON: None  TECHNIQUE: 3 views left shoulder were obtained  FINDINGS: There is a healed mid clavicle fracture. The glenohumeral joint appears normal.      Impression: Old clavicle fracture  other wise normal  This report was finalized on 3/10/2019 10:01 AM by Dr. Frantz Pressley MD.      Xr Shoulder 2+ View Right    Result Date: 3/7/2019  Narrative: 2 VIEWS OF THE RIGHT SHOULDER  HISTORY: Fall today  Comparison: None  FINDINGS: This exam demonstrates an incomplete fracture through the neck of the scapula. No dislocation is seen. No evidence of bone destruction. No unexpected radiopaque foreign bodies.      Impression: Incomplete fracture through the neck of the right scapula.  This report was finalized on 3/7/2019 8:33 AM by Dr. Jose Ramon Pineda MD.      Xr Femur 2 View Right    Result Date: 3/7/2019  Narrative: RIGHT FEMUR 3/7/2019     HISTORY: Recent right femoral neck fracture.  Surgery pain after fall  COMPARISON: Yesterday  TECHNIQUE: AP and lateral views the right femur were obtained  FINDINGS: There Is an acute mildly angulated fractures of the lower femoral neck.  The bone is normal  This report was finalized on 3/7/2019 11:37 AM by Dr. Frantz Pressley MD.      Xr Knee 1 Or 2 View Right    Result Date:  3/7/2019  Narrative:  XR KNEE 1 OR 2 VW RIGHT- Clinical history: Fall today  Findings:  No acute fracture, dislocation or osseous destructive process is identified. Joint spaces are maintained. No radiopaque foreign bodies are seen.      Impression: Impression: No acute fracture, dislocation or osseous destructive process of the right knee demonstrated on this study.   This report was finalized on 3/7/2019 8:25 AM by Dr. Jose Ramon Pineda MD.      Xr Chest 1 View    Result Date: 3/7/2019  Narrative:    XR CHEST 1 VW-  Clinical history: Fall today  Comparison: None  Single frontal portable view of chest  Findings: The heart size is within normal limits. Mediastinal structures are midline.  The pulmonary vascularity is within normal limits. The lungs are free of  focal infiltrates, effusions or significant nodules. No pneumothorax is seen.  Regional osseous structures redemonstrate incomplete fracture of the neck of the right scapula.      Impression: Impression: No acute cardiopulmonary pathology.  Incomplete fracture of the neck of the right scapula.   This report was finalized on 3/7/2019 8:35 AM by Dr. Jose Ramon Pineda MD.      Xr Hip With Or Without Pelvis 1 View Right    Result Date: 3/9/2019  Narrative: RIGHT HIP 03/08/2019  HISTORY: Hip fracture. Status post right hip replacement  COMPARISON: 03/07/2019  TECHNIQUE: AP view of the right hip was obtained  FINDINGS: A right total hip prosthesis present. There is no fracture or dislocation      Impression: Is post right total hip replacement  This report was finalized on 3/9/2019 10:04 AM by Dr. Frantz Pressley MD.      Xr Hip With Or Without Pelvis 2 - 3 View Right    Result Date: 3/7/2019  Narrative: RIGHT HIP, 3 VIEWS  HISTORY: Fall today  Comparison: 1/13/2018  FINDINGS: The study demonstrates a complete transverse fracture through the right femoral neck. There is lateral angulation. Femoral head remains in the right acetabulum.  Bony structures of the pelvis are intact. Left  hip demonstrates no acute findings.      Impression: Transverse fracture through the right femoral neck with lateral angulation.  This report was finalized on 3/7/2019 8:28 AM by Dr. Jose Ramon Pineda MD.      Family History   Problem Relation Age of Onset   • Lung cancer Father    • No Known Problems Mother    • No Known Problems Maternal Grandmother    • No Known Problems Maternal Grandfather    • No Known Problems Paternal Grandmother    • No Known Problems Paternal Grandfather      Past Surgical History:   Procedure Laterality Date   • COLONOSCOPY N/A 4/1/2016    Procedure: COLONOSCOPY with polypectomy;  Surgeon: Akiko Scales MD;  Location: [facility] OR;  Service:    • GALLBLADDER SURGERY     • HIP HEMIARTHROPLASTY Right 3/8/2019    Procedure: HIP HEMIARTHROPLASTY and all associated procedures;  Surgeon: Harrison Turk MD;  Location: [facility];  Service: Orthopedics   • LIVER SURGERY     • TONSILLECTOMY       Social History     Socioeconomic History   • Marital status:      Spouse name: Not on file   • Number of children: 0   • Years of education: Ph.D   • Highest education level: Not on file   Occupational History   • Occupation: professor     Comment: taught anatomy at [employer]   Tobacco Use   • Smoking status: Former Smoker     Packs/day: 2.00     Years: 20.00     Pack years: 40.00   • Smokeless tobacco: Former User     Quit date: 1987   • Tobacco comment: quit 30 yrs ago    Substance and Sexual Activity   • Alcohol use: No   • Drug use: No   • Sexual activity: Defer     Allergies:  has No Known Allergies.    Abrahan  No medications noted per report of 3/6/2019, reviewed by me     discharge Medications:     Discharge Medications      New Medications      Instructions Start Date   aspirin 325 MG EC tablet   325 mg, Oral, Every 12 Hours Scheduled      HYDROcodone-acetaminophen 7.5-325 MG per tablet  Commonly known as:  NORCO   1 tablet, Oral, Every 6 Hours PRN      ondansetron 4 MG  tablet  Commonly known as:  ZOFRAN   4 mg, Oral, Every 6 Hours PRN         Changes to Medications      Instructions Start Date   LAMICTAL 200 MG tablet  Generic drug:  lamoTRIgine  What changed:  Another medication with the same name was removed. Continue taking this medication, and follow the directions you see here.   200 mg, Oral, Nightly      lamoTRIgine 100 MG tablet  Commonly known as:  LaMICtal  What changed:  Another medication with the same name was removed. Continue taking this medication, and follow the directions you see here.   100 mg, Oral, Daily      polyethylene glycol powder  Commonly known as:  MIRALAX  What changed:    · when to take this  · reasons to take this   17 g, Oral, Daily         Continue These Medications      Instructions Start Date   calcitonin (salmon) 200 UNIT/ACT nasal spray  Commonly known as:  MIACALCIN   1 spray, Nasal, Daily      fexofenadine-pseudoephedrine  MG per 12 hr tablet  Commonly known as:  ALLEGRA-D   1 tablet, Oral, 2 Times Daily PRN      memantine 10 MG tablet  Commonly known as:  NAMENDA   10 mg, Oral, 2 Times Daily      rivastigmine 6 MG capsule  Commonly known as:  EXELON   6 mg, Oral, 2 Times Daily      tamsulosin 0.4 MG capsule 24 hr capsule  Commonly known as:  FLOMAX   1 capsule, Oral, Nightly         Stop These Medications    OLANZapine 20 MG tablet  Commonly known as:  zyPREXA            Last Lab Results:   Lab Results (most recent)     Procedure Component Value Units Date/Time    Hemoglobin & Hematocrit, Blood [[id number]]  (Abnormal) Collected:  03/11/19 0350    Specimen:  Blood Updated:  03/11/19 0428     Hemoglobin 10.6 g/dL      Hematocrit 32.1 %     Hemoglobin & Hematocrit, Blood [[id number]]  (Abnormal) Collected:  03/10/19 0417    Specimen:  Blood Updated:  03/10/19 0447     Hemoglobin 11.6 g/dL      Hematocrit 35.5 %     Basic Metabolic Panel [[id number]]  (Abnormal) Collected:  03/09/19 0434    Specimen:  Blood Updated:  03/09/19 0577      Glucose 133 mg/dL      BUN 11 mg/dL      Creatinine 0.76 mg/dL      Sodium 132 mmol/L      Potassium 4.0 mmol/L      Chloride 99 mmol/L      CO2 23.8 mmol/L      Calcium 7.4 mg/dL      eGFR Non African Amer 101 mL/min/1.73      BUN/Creatinine Ratio 14.5     Anion Gap 9.2 mmol/L     Narrative:       The MDRD GFR formula is only valid for adults with stable renal function between ages 18 and 70.    aPTT [[id number]]  (Normal) Collected:  03/07/19 1044    Specimen:  Blood Updated:  03/07/19 1110     PTT 31.1 seconds     Narrative:       PTT = The equivalent PTT values for the therapeutic range of heparin levels at 0.1 to 0.7 U/ml are 53 to 110 seconds.    Protime-INR [[id number]]  (Normal) Collected:  03/07/19 1044    Specimen:  Blood Updated:  03/07/19 1110     Protime 13.7 Seconds      INR 1.08    Narrative:       Therapeutic Ranges for INR: 2.0-3.0 (PT 20-30)                              2.5-3.5 (PT 25-34)    Basic Metabolic Panel [[id number]]  (Abnormal) Collected:  03/07/19 0345    Specimen:  Blood Updated:  03/07/19 0545     Glucose 93 mg/dL      BUN 14 mg/dL      Creatinine 0.75 mg/dL      Sodium 139 mmol/L      Potassium 4.3 mmol/L      Chloride 100 mmol/L      CO2 31.0 mmol/L      Calcium 8.6 mg/dL      eGFR Non African Amer 102 mL/min/1.73      BUN/Creatinine Ratio 18.7     Anion Gap 8.0 mmol/L     Narrative:       The MDRD GFR formula is only valid for adults with stable renal function between ages 18 and 70.    CBC Auto Differential [[id number]]  (Abnormal) Collected:  03/07/19 0345    Specimen:  Blood Updated:  03/07/19 0455     WBC 12.88 10*3/mm3      RBC 4.75 10*6/mm3      Hemoglobin 13.6 g/dL      Hematocrit 41.7 %      MCV 87.8 fL      MCH 28.6 pg      MCHC 32.6 g/dL      RDW 14.9 %      RDW-SD 47.5 fl      MPV 9.0 fL      Platelets 297 10*3/mm3      Neutrophil % 72.6 %      Lymphocyte % 16.2 %      Monocyte % 8.0 %      Eosinophil % 1.6 %      Basophil % 0.6 %      Immature Grans % 1.0 %      Neutrophils,  Absolute 9.35 10*3/mm3      Lymphocytes, Absolute 2.09 10*3/mm3      Monocytes, Absolute 1.03 10*3/mm3      Eosinophils, Absolute 0.20 10*3/mm3      Basophils, Absolute 0.08 10*3/mm3      Immature Grans, Absolute 0.13 10*3/mm3      nRBC 0.0 /100 WBC     Comprehensive Metabolic Panel [[id number]]  (Abnormal) Collected:  03/06/19 2146    Specimen:  Blood Updated:  03/06/19 2214     Glucose 102 mg/dL      BUN 13 mg/dL      Creatinine 0.89 mg/dL      Sodium 135 mmol/L      Potassium 4.5 mmol/L      Chloride 96 mmol/L      CO2 31.0 mmol/L      Calcium 8.8 mg/dL      Total Protein 6.2 g/dL      Albumin 3.70 g/dL      ALT (SGPT) 21 U/L      AST (SGOT) 20 U/L      Alkaline Phosphatase 122 U/L      Total Bilirubin 0.2 mg/dL      eGFR Non African Amer 84 mL/min/1.73      Globulin 2.5 gm/dL      A/G Ratio 1.5 g/dL      BUN/Creatinine Ratio 14.6     Anion Gap 8.0 mmol/L     Narrative:       The MDRD GFR formula is only valid for adults with stable renal function between ages 18 and 70.    CBC & Differential [[id number]] Collected:  03/06/19 2146    Specimen:  Blood Updated:  03/06/19 2201    Narrative:       The following orders were created for panel order CBC & Differential.  Procedure                               Abnormality         Status                     ---------                               -----------         ------                     CBC Auto Differential[[id number]]        Abnormal            Final result                 Please view results for these tests on the individual orders.    CBC Auto Differential [[id number]]  (Abnormal) Collected:  03/06/19 2146    Specimen:  Blood Updated:  03/06/19 2201     WBC 9.45 10*3/mm3      RBC 4.72 10*6/mm3      Hemoglobin 13.6 g/dL      Hematocrit 41.3 %      MCV 87.5 fL      MCH 28.8 pg      MCHC 32.9 g/dL      RDW 15.0 %      RDW-SD 47.8 fl      MPV 9.1 fL      Platelets 303 10*3/mm3      Neutrophil % 51.1 %      Lymphocyte % 32.9 %      Monocyte % 10.1 %      Eosinophil % 3.4  %      Basophil % 1.0 %      Immature Grans % 1.5 %      Neutrophils, Absolute 4.84 10*3/mm3      Lymphocytes, Absolute 3.11 10*3/mm3      Monocytes, Absolute 0.95 10*3/mm3      Eosinophils, Absolute 0.32 10*3/mm3      Basophils, Absolute 0.09 10*3/mm3      Immature Grans, Absolute 0.14 10*3/mm3      nRBC 0.0 /100 WBC         Imaging Results (most recent)     Procedure Component Value Units Date/Time    XR Shoulder 2+ View Left [[id number]] Collected:  03/10/19 1000     Updated:  03/10/19 1004    Narrative:       LEFT SHOULDER SERIES 03/10/2019         HISTORY:  Fall 5 days ago with shoulder pain     COMPARISON:  None     TECHNIQUE:  3 views left shoulder were obtained     FINDINGS:  There is a healed mid clavicle fracture. The glenohumeral joint appears  normal.       Impression:       Old clavicle fracture  other wise normal     This report was finalized on 3/10/2019 10:01 AM by Dr. Frantz Pressley MD.       XR Hip With or Without Pelvis 1 View Right [[id number]] Collected:  03/09/19 1003     Updated:  03/09/19 1006    Narrative:       RIGHT HIP 03/08/2019     HISTORY:  Hip fracture. Status post right hip replacement     COMPARISON:  03/07/2019     TECHNIQUE:  AP view of the right hip was obtained     FINDINGS:  A right total hip prosthesis present. There is no fracture or  dislocation       Impression:       Is post right total hip replacement     This report was finalized on 3/9/2019 10:04 AM by Dr. Frantz Pressley MD.       XR Femur 2 View Right [[id number]] Collected:  03/07/19 1136     Updated:  03/07/19 1139    Narrative:       RIGHT FEMUR 3/7/2019         HISTORY:  Recent right femoral neck fracture.     Surgery pain after fall     COMPARISON:  Yesterday     TECHNIQUE:  AP and lateral views the right femur were obtained     FINDINGS:  There Is an acute mildly angulated fractures of the lower femoral neck.     The bone is normal     This report was finalized on 3/7/2019 11:37 AM by Dr. Frantz Pressley MD.       XR Chest 1  View [[id number]] Collected:  03/07/19 0833     Updated:  03/07/19 0837    Narrative:              XR CHEST 1 VW-      Clinical history: Fall today     Comparison: None     Single frontal portable view of chest      Findings:  The heart size is within normal limits. Mediastinal structures are  midline.     The pulmonary vascularity is within normal limits. The lungs are free of   focal infiltrates, effusions or significant nodules. No pneumothorax is  seen.     Regional osseous structures redemonstrate incomplete fracture of the  neck of the right scapula.       Impression:       Impression:  No acute cardiopulmonary pathology.     Incomplete fracture of the neck of the right scapula.        This report was finalized on 3/7/2019 8:35 AM by Dr. Jose Ramon Pineda MD.       XR Shoulder 2+ View Right [[id number]] Collected:  03/07/19 0829     Updated:  03/07/19 0835    Narrative:       2 VIEWS OF THE RIGHT SHOULDER     HISTORY:   Fall today     Comparison:  None     FINDINGS:  This exam demonstrates an incomplete fracture through the neck of the  scapula. No dislocation is seen. No evidence of bone destruction. No  unexpected radiopaque foreign bodies.       Impression:       Incomplete fracture through the neck of the right scapula.     This report was finalized on 3/7/2019 8:33 AM by Dr. Jose Ramon Pineda MD.       XR Hip With or Without Pelvis 2 - 3 View Right [[id number]] Collected:  03/07/19 0825     Updated:  03/07/19 0830    Narrative:       RIGHT HIP, 3 VIEWS     HISTORY:   Fall today     Comparison:  1/13/2018     FINDINGS:  The study demonstrates a complete transverse fracture through the right  femoral neck. There is lateral angulation. Femoral head remains in the  right acetabulum.     Bony structures of the pelvis are intact. Left hip demonstrates no acute  findings.       Impression:       Transverse fracture through the right femoral neck with  lateral angulation.     This report was finalized on 3/7/2019 8:28 AM by   Jose Ramon Pineda MD.       XR Knee 1 or 2 View Right [[id number]] Collected:  03/07/19 0823     Updated:  03/07/19 0827    Narrative:          XR KNEE 1 OR 2 VW RIGHT-  Clinical history: Fall today     Findings:     No acute fracture, dislocation or osseous destructive process is  identified. Joint spaces are maintained. No radiopaque foreign bodies  are seen.       Impression:       Impression:  No acute fracture, dislocation or osseous destructive process of the  right knee demonstrated on this study.         This report was finalized on 3/7/2019 8:25 AM by Dr. Jose Ramon Pineda MD.           PROCEDURES  Procedure(s):  HIP HEMIARTHROPLASTY and all associated procedures    Condition on Discharge: Stable    Physical Exam at Discharge  Vital Signs  Temp:  [97.9 °F (36.6 °C)-99.3 °F (37.4 °C)] 99.2 °F (37.3 °C)  Heart Rate:  [76-94] 76  Resp:  [16-19] 16  BP: (115-143)/(56-64) 115/64    Physical Exam:  Physical Exam   Constitutional: Patient appears well-developed and well-nourished and in no acute distress   HEENT:   Head: Normocephalic and atraumatic.   Eyes:  Pupils are equal, round, and reactive to light. EOM are intact. Sclera are anicteric and non-injected.  Mouth and Throat: Patient has moist mucous membranes. Oropharynx is clear of any erythema or exudate.     Neck: Neck supple. No JVD present. No thyromegaly present. No lymphadenopathy present.  Cardiovascular: Regular rate, regular rhythm, S1 normal and S2 normal.  Exam reveals no gallop and no friction rub.  No murmur heard.  Pulmonary/Chest: Lungs are clear to auscultation bilaterally. No respiratory distress. No wheezes. No rhonchi. No rales.   Abdominal: Soft. Bowel sounds are normal. No distension and no mass. There is no hepatosplenomegaly. There is no tenderness.   Musculoskeletal: Normal Muscle tone  Extremities: RUE in sling, right hand fingers edematous, CMS check to RUE/RLE intact. Right hand with ace in place. Pulses are palpable in all 4  extremities.  Neurological: Patient is alert and oriented to person, place, and time. Cranial nerves II-XII are grossly intact with no focal deficits.  Skin: right hip incision clean and drySkin is warm. No rash noted. Nails show no clubbing.  No cyanosis or erythema.    Discharge Disposition  [facility]    Visiting Nurse:    Yes     Home PT/OT:  Yes     Home Safety Evaluation:  Yes     DME   as per ortho surgery    Discharge Diet:      Dietary Orders (From admission, onward)    Start     Ordered    03/08/19 1917  Diet Regular  Diet Effective Now     Question:  Diet Texture / Consistency  Answer:  Regular    03/08/19 1916          Activity at Discharge:  As per ortho surgery    Pre-discharge education  Wound Care, medications, follow up    Follow-up Appointments  Future Appointments   Date Time Provider Department Center   4/12/2019 10:30 AM [facility] None   4/17/2019 11:15 AM Jessica Birch MD [facility] None     Additional Instructions for the Follow-ups that You Need to Schedule     Discharge Follow-up with PCP   As directed       Currently Documented PCP:    Jessica Birch MD    PCP Phone Number:    [phone]     Follow Up Details:  1 week         Discharge Follow-up with Specialty: Dr. Turk; 1 Week   As directed      Specialty:  Dr. Turk    Follow Up:  1 Week           Test Results Pending at Discharge: NONE   Order Current Status    Folate In process    Vitamin B12 In process           LYNETTE Field  03/11/19  1:11 PM    Time: Discharge over 30 min (if over 30 minutes give explanation as to why it took greater than 30 minutes)  Secondary to:   Coordination of care/follow up  Medication reconciliation  D/W patient, case management, therapists

## 2019-03-11 NOTE — PROGRESS NOTES
POD# 3 s/p right hip hemiarthroplasty, right scapula fracture closed treatment      Subjective: Andrew Redmond resting in bed this am, states pain controlled while sitting however increases with activity, continues with use of sling right upper extremity for stability. Denies numbness or tingling right lower extremity or right upper extremity.    Objective:  Vitals:    03/10/19 1510 03/10/19 2000 03/10/19 2307 03/11/19 0646   BP: 127/61 143/59 123/56 119/56   BP Location: Left arm Left arm Left arm Left arm   Patient Position: Sitting Lying Lying Lying   Pulse: 90 94 87 78   Resp: 18 19 18 19   Temp: 97.9 °F (36.6 °C) 98.7 °F (37.1 °C) 99.3 °F (37.4 °C) 98.4 °F (36.9 °C)   TempSrc: Oral Oral Oral Oral   SpO2: 96% 96% 93% 95%   Weight:       Height:           Results from last 7 days   Lab Units 03/11/19  0350 03/10/19  0417 03/09/19 0434 03/07/19 0345 03/06/19  2146   WBC 10*3/mm3  --   --   --   --  12.88* 9.45   HEMOGLOBIN g/dL 10.6* 11.6* 10.7*   < > 13.6 13.6   HEMATOCRIT % 32.1* 35.5* 33.6*   < > 41.7 41.3   PLATELETS 10*3/mm3  --   --   --   --  297 303    < > = values in this interval not displayed.       Results from last 7 days   Lab Units 03/09/19  0434 03/07/19 0345 03/06/19  2146   SODIUM mmol/L 132* 139 135*   POTASSIUM mmol/L 4.0 4.3 4.5   CHLORIDE mmol/L 99 100 96*   CO2 mmol/L 23.8 31.0* 31.0*   BUN mg/dL 11 14 13   CREATININE mg/dL 0.76 0.75* 0.89   GLUCOSE mg/dL 133* 93 102*   CALCIUM mg/dL 7.4* 8.6* 8.8       Exam:    Right lower extremity-  Flex and extend toes  Brisk cap refill all digits  Positive sensation right foot, thigh and lower leg  All compartments soft easily compressible  2+ pedal pulse  No calf pain, negative Homans sign  4+/5 strength compared to contralateral side    Impression: s/p right hip hemiarthroplasty    Plan:  1. PT/OT- weight-bear as tolerated right lower extremity with posterior hip precautions, sling to right upper extremity except for ambulation when patient may  weight-bear as tolerated through right upper extremity  2. Pain control- Norco  3. DVT prophylaxis-  mg BID  4. Wound care- Prineo dermabond dressing remain intact until follow-up in office in 2 weeks, x-rays to be completed prior to arrival   5. Disposition- Plan for short-term rehab per Hospitalist

## 2019-03-11 NOTE — NURSING NOTE
Continued Stay Note  RITA Diaz     Patient Name: Andrew Redmond  MRN: [id number]  Today's Date: 3/11/2019    Admit Date: 3/6/2019    Discharge Plan     Row Name 03/11/19 0843       Plan    Plan Comments  Referral made to Antonina at [facility] [facility] for rehab.  Will continue to follow        Discharge Codes    No documentation.             Aida Montes RN

## 2019-03-11 NOTE — THERAPY TREATMENT NOTE
Acute Care - Occupational Therapy Treatment Note   Angi Feliciano     Patient Name: Andrew Redmond  : 1945  MRN: [id number]  Today's Date: 3/11/2019  Onset of Illness/Injury or Date of Surgery: 19     Referring Physician: Dr. Turk    Admit Date: 3/6/2019       ICD-10-CM ICD-9-CM   1. Displaced fracture of right femoral neck (CMS/HCC) S72.001A 820.8     Patient Active Problem List   Diagnosis   • Alzheimer's disease   • Bipolar I disorder, single manic episode (CMS/HCC)   • Epilepsy (CMS/HCC)   • Chronic constipation   • Hemorrhoids   • Polyp of colon   • Erectile dysfunction   • High risk medication use   • Poor balance   • Subdural hematoma (CMS/HCC)   • Seizure (CMS/HCC)   • Low energy   • Closed compression fracture of L1 lumbar vertebra (CMS/HCC)   • Acute bilateral low back pain without sciatica   • Weight loss   • Displaced fracture of right femoral neck (CMS/HCC)     Past Medical History:   Diagnosis Date   • Alzheimer disease    • Alzheimer disease    • Alzheimer disease 2019   • Anxiety and depression    • Bipolar 1 disorder (CMS/HCC)    • Colon polyp    • Constipation    • Depression    • Diarrhea    • Epilepsy (CMS/HCC)    • Epilepsy (CMS/HCC) 2019   • Erectile dysfunction    • GERD (gastroesophageal reflux disease)    • L1 vertebral fracture (CMS/HCC)    • Seizures (CMS/[facility])     absence seizures.  well controlled.     Past Surgical History:   Procedure Laterality Date   • COLONOSCOPY N/A 2016    Procedure: COLONOSCOPY with polypectomy;  Surgeon: Akiko Scales MD;  Location: [facility] OR;  Service:    • GALLBLADDER SURGERY     • HIP HEMIARTHROPLASTY Right 3/8/2019    Procedure: HIP HEMIARTHROPLASTY and all associated procedures;  Surgeon: Harrison Turk MD;  Location: [facility] OR;  Service: Orthopedics   • LIVER SURGERY     • TONSILLECTOMY         Therapy Treatment    Rehabilitation Treatment Summary     Row Name 19 0835             Treatment Time/Intention    Discipline   occupational therapist  -SD      Document Type  therapy note (daily note)  -SD      Subjective Information  complains of;pain  -SD      Mode of Treatment  occupational therapy  -SD      Patient/Family Observations  Pt was in bed.  pt agreeable to therapy.  -SD      Patient Effort  adequate  -SD      Treatment Considerations/Comments  Education with TH precautions.  Pt initially was unable to verbalize any of the hip precautions.  After verbal instruction and demonstration, pt was able to recall 2/3 hip precuations.    -SD      Recorded by [SD] Pako Ortega, OTR 03/11/19 1026      Row Name 03/11/19 0835             Cognitive Assessment/Intervention- PT/OT    Follows Commands (Cognition)  WFL  -SD      Safety Deficit (Cognitive)  safety precautions awareness  -SD      Personal Safety Interventions  gait belt;fall prevention program maintained;nonskid shoes/slippers when out of bed;supervised activity  -SD      Cognitive Assessment/Intervention Comment  Pt requires verbal cues nad continued education regarding hip precautions and impact on transfers/daily tasks.  -SD      Recorded by [SD] Pako Ortega, OTR 03/11/19 1026      Row Name 03/11/19 0835             Bed Mobility Assessment/Treatment    Supine-Sit [place] (Bed Mobility)  moderate assist (50% patient effort);verbal cues  -SD      Bed Mobility, Safety Issues  decreased use of arms for pushing/pulling;decreased use of legs for bridging/pushing limited use of RUE/LE due to pain  -SD      Assistive Device (Bed Mobility)  draw sheet;head of bed elevated  -SD      Recorded by [SD] Pako Ortega, OTR 03/11/19 1026      Row Name 03/11/19 0835             Functional Mobility    Functional Mobility- Ind. Level  contact guard assist;verbal cues required  -SD      Functional Mobility- Device  rolling walker  -SD      Functional Mobility-Distance (Feet)  5  -SD      Recorded by [SD] Pako Ortega, OTR 03/11/19 1026      Row Name 03/11/19 0835              Sit-Stand Transfer    Sit-Stand [place] (Transfers)  minimum assist (75% patient effort);verbal cues  -SD      Assistive Device (Sit-Stand Transfers)  walker, front-wheeled  -SD      Recorded by [SD] Pako Ortega OTR 03/11/19 1026      Row Name 03/11/19 0835             Stand-Sit Transfer    Stand-Sit [place] (Transfers)  contact guard;verbal cues  -SD      Assistive Device (Stand-Sit Transfers)  walker, front-wheeled  -SD      Recorded by [SD] Pako Ortega OTR 03/11/19 1026      Row Name 03/11/19 0835             Bathing Assessment/Intervention    Comment (Bathing)  education regarding impact of TH precautions on management of lower body adl's  -SD      Recorded by [SD] Pako Ortega OTR 03/11/19 1026      Row Name 03/11/19 0835             Upper Body Dressing Assessment/Training    Upper Body Dressing [place] Level  moderate assist (50% patient effort)  -SD      Recorded by [SD] Pako Ortega OTR 03/11/19 1030      Row Name 03/11/19 0835             Lower Body Dressing Assessment/Training    Comment (Lower Body Dressing)  education regarding impact of TH precautions on management of lower body adl's pt required mod assist for management of sling  -SD      Recorded by [SD] Pako Ortega OTR 03/11/19 1030      Row Name 03/11/19 0835             BADL Safety/Performance    Impairments, BADL Safety/Performance  pain;range of motion;strength;cognition TH precautions  -SD      Cognitive Impairments, BADL Safety/Performance  safety precaution awareness;problem solving/reasoning  -SD      Skilled BADL Treatment/Intervention  BADL process/adaptation training  -SD      Progress in BADL Status  improvement noted  -SD      Recorded by [SD] Pako Ortega OTR 03/11/19 1026      Row Name 03/11/19 0835             Positioning and Restraints    Pre-Treatment Position  in bed  -SD      Post Treatment Position  chair  -SD      In Chair  reclined;call light within reach;encouraged to  call for assist ice on right hip, sling on right shoulder  -SD      Recorded by [SD] Paok Ortega, OTR 03/11/19 1026      Row Name 03/11/19 0835             Pain Scale: Numbers Pre/Post-Treatment    Pain Scale: Numbers, Pretreatment  0/10 - no pain no pain reported at rest  -SD      Pain Scale: Numbers, Post-Treatment  0/10 - no pain pt reported pain as 10/10 upon standing  -SD      Pain Intervention(s)  Repositioned;Ambulation/increased activity;Cold pack  -SD      Recorded by [SD] Pako Ortega, OTR 03/11/19 1026      Row Name                Wound 03/08/19 1757 Right hip incision    Wound - Properties Group Date first assessed: 03/08/19 [JM] Time first assessed: 1757 [JM] Side: Right [JM] Location: hip [JM] Type: incision [JM] Recorded by:  [JM] Melinda Ac RN 03/08/19 1757    Row Name 03/11/19 0835             Outcome Summary/Treatment Plan (OT)    Daily Summary of Progress (OT)  prepare for discharge;progress towards functional goals is fair  -SD      Barriers to Overall Progress (OT)  limited use of RUE due to scapular fracture.  cognition  -SD      Plan for Continued Treatment (OT)  plan is for pt to transfer to SNF today and continue with therapy  -SD      Anticipated Discharge Disposition (OT)  skilled nursing facility  -SD      Recorded by [SD] Pako Ortega, OTR 03/11/19 1026        User Key  (r) = Recorded By, (t) = Taken By, (c) = Cosigned By    Initials Name Effective Dates Discipline    SD Pako Ortega OTR 06/22/16 -  OT    JM Melinda Ac RN 06/06/18 -  Nurse        Wound 03/08/19 1757 Right hip incision (Active)   Dressing Appearance dry;intact;no drainage 3/10/2019  8:44 PM   Closure JOSE MANUEL 3/10/2019  8:44 PM     Rehab Goal Summary     Row Name 03/11/19 1000             Transfer Goal 1 (OT)    Activity/Assistive Device (Transfer Goal 1, OT)  sit-to-stand/stand-to-sit;toilet  -SD      [place] Level/Cues Needed (Transfer Goal 1, OT)  minimum assist (75% or more patient  effort)  -SD      Time Frame (Transfer Goal 1, OT)  4 days  -SD      Progress/Outcome (Transfer Goal 1, OT)  goal partially met met with min assist, still needs vc's for TH precautions  -SD         Dressing Goal 1 (OT)    Activity/Assistive Device (Dressing Goal 1, OT)  lower body dressing;reacher;sock-aid;long handled shoe horn  -SD      [place]/Cues Needed (Dressing Goal 1, OT)  minimum assist (75% or more patient effort)  -SD      Time Frame (Dressing Goal 1, OT)  4 days  -SD      Progress/Outcome (Dressing Goal 1, OT)  goal ongoing  -SD         Dressing Goal 2 (OT)    Activity/Assistive Device (Dressing Goal 2, OT)  upper body dressing  -SD      [place]/Cues Needed (Dressing Goal 2, OT)  set-up required;supervision required  -SD      Time Frame (Dressing Goal 2, OT)  3 days  -SD      Progress/Outcome (Dressing Goal 2, OT)  goal ongoing pt required mod assist for management of sling  -SD        User Key  (r) = Recorded By, (t) = Taken By, (c) = Cosigned By    Initials Name Provider Type Discipline    Pako Zhou, OTR Occupational Therapist OT        Occupational Therapy Education     Title: PT OT SLP Therapies (In Progress)     Topic: Occupational Therapy (In Progress)     Point: ADL training (In Progress)     Description: Instruct learner(s) on proper safety adaptation and remediation techniques during self care or transfers.   Instruct in proper use of assistive devices.    Learning Progress Summary           Patient Acceptance, E,TB,D, NR by SD at 3/11/2019 10:26 AM    Comment:  Education with bed mobilit, functional transfers, TH precautions and the impact on daily routine.  Pt unable to recall any TH precautions initially.  Pt verbalized 2/3 TH precautions after education.    Acceptance, E, VU by EN at 3/10/2019 10:37 AM    Comment:  Educated on safety during transfers and hip precautioins.                               User Key     Initials Effective Dates Name Provider Type Discipline     EN 06/22/16 -  Lilia Berg, OTR Occupational Therapist OT    SD 06/22/16 -  Pako Ortega, OTR Occupational Therapist OT                OT Recommendation and Plan  Outcome Summary/Treatment Plan (OT)  Daily Summary of Progress (OT): prepare for discharge, progress towards functional goals is fair  Barriers to Overall Progress (OT): limited use of RUE due to scapular fracture.  cognition  Plan for Continued Treatment (OT): plan is for pt to transfer to SNF today and continue with therapy  Anticipated Discharge Disposition (OT): skilled nursing facility  Daily Summary of Progress (OT): prepare for discharge, progress towards functional goals is fair  Outcome Summary: OT:  Pt required mod assist with bed mobility and min assist with sit to stand tranfer from EOB.  Pt performed transfer from bed to chair with CGA.  Pt requires verbal cues for TH precautions.  Pt reports no pain at rest, yet c/o pain at 10/10 while standing.  Plan is for transfer to SNF for continued therapy.    Outcome Measures     Row Name 03/11/19 1000 03/10/19 1000             Putting on and taking off regular lower body clothing?  2  -SD  2  -EN    Bathing (including washing, rinsing, and drying)  2  -SD  2  -EN    Toileting (which includes using toilet bed pan or urinal)  2  -SD  2  -EN    Putting on and taking off regular upper body clothing  2  -SD  2  -EN    Taking care of personal grooming (such as brushing teeth)  3  -SD  3  -EN    Eating meals  4  -SD  4  -EN    Score  15  -SD  15  -EN          Outcome Measure Options  --AM-PAC 6 Clicks Daily Activity (OT)  -SD  AM-PAC 6 Clicks Daily Activity (OT)  -EN            User Key  (r) = Recorded By, (t) = Taken By, (c) = Cosigned By    Initials Name Provider Type    EN Lilia Berg, OTR Occupational Therapist    Pako Zhou, OTR Occupational Therapist    Siomara Olsen, PT Physical Therapist    Sonja Addison, PTA Physical Therapy Assistant           Time  Calculation:   Time Calculation- OT     Row Name 03/11/19 1034             Time Calculation- OT    OT Start Time  0835  -SD        User Key  (r) = Recorded By, (t) = Taken By, (c) = Cosigned By    Initials Name Provider Type    Pako Zhou OTR Occupational Therapist           Therapy Suggested Charges     Code   Minutes Charges    None           Therapy Charges for Today     Code Description Service Date Service Provider Modifiers Qty    [id number] HC OT SELF CARE/MGMT/TRAIN EA 15 MIN 3/11/2019 Pako Ortega OTR GO 2               HOLDEN Padilla  3/11/2019

## 2019-03-11 NOTE — PLAN OF CARE
Problem: Patient Care Overview  Goal: Plan of Care Review  Outcome: Outcome(s) achieved Date Met: 03/11/19 03/11/19 1402   Coping/Psychosocial   Plan of Care Reviewed With patient   Plan of Care Review   Progress improving   OTHER   Outcome Summary discharge to [place]     Goal: Individualization and Mutuality  Outcome: Ongoing (interventions implemented as appropriate)    Goal: Discharge Needs Assessment  Outcome: Outcome(s) achieved Date Met: 03/11/19      Problem: Fall Risk (Adult)  Goal: Identify Related Risk Factors and Signs and Symptoms  Outcome: Outcome(s) achieved Date Met: 03/11/19    Goal: Absence of Fall  Outcome: Outcome(s) achieved Date Met: 03/11/19      Problem: Skin Injury Risk (Adult)  Goal: Identify Related Risk Factors and Signs and Symptoms  Outcome: Outcome(s) achieved Date Met: 03/11/19    Goal: Skin Health and Integrity  Outcome: Outcome(s) achieved Date Met: 03/11/19      Problem: Pain, Acute (Adult)  Goal: Identify Related Risk Factors and Signs and Symptoms  Outcome: Outcome(s) achieved Date Met: 03/11/19    Goal: Acceptable Pain Control/Comfort Level  Outcome: Outcome(s) achieved Date Met: 03/11/19      Problem: Surgery Nonspecified (Adult)  Goal: Signs and Symptoms of Listed Potential Problems Will be Absent, Minimized or Managed (Surgery Nonspecified)  Outcome: Outcome(s) achieved Date Met: 03/11/19    Goal: Anesthesia/Sedation Recovery  Outcome: Outcome(s) achieved Date Met: 03/11/19

## 2019-03-11 NOTE — PLAN OF CARE
Problem: Patient Care Overview  Goal: Plan of Care Review  Outcome: Ongoing (interventions implemented as appropriate)   03/11/19 0515   Coping/Psychosocial   Plan of Care Reviewed With patient   Plan of Care Review   Progress improving   OTHER   Outcome Summary Pt keeps right arm in sling to help with pain from scapula fracture - VSS - Pt states not in pain with no movement - pain med given only once through night - no overnight events - will continue to monitor     Goal: Individualization and Mutuality  Outcome: Ongoing (interventions implemented as appropriate)      Problem: Fall Risk (Adult)  Goal: Identify Related Risk Factors and Signs and Symptoms  Outcome: Ongoing (interventions implemented as appropriate)    Goal: Absence of Fall  Outcome: Ongoing (interventions implemented as appropriate)      Problem: Skin Injury Risk (Adult)  Goal: Identify Related Risk Factors and Signs and Symptoms  Outcome: Ongoing (interventions implemented as appropriate)    Goal: Skin Health and Integrity  Outcome: Ongoing (interventions implemented as appropriate)      Problem: Pain, Acute (Adult)  Goal: Identify Related Risk Factors and Signs and Symptoms  Outcome: Ongoing (interventions implemented as appropriate)    Goal: Acceptable Pain Control/Comfort Level  Outcome: Ongoing (interventions implemented as appropriate)

## 2019-03-11 NOTE — THERAPY TREATMENT NOTE
Acute Care - Physical Therapy Treatment Note   Angi Feliciano     Patient Name: Andrew Redmond  : 1945  MRN: [id number]  Today's Date: 3/11/2019  Onset of Illness/Injury or Date of Surgery: 19  Date of Referral to PT: 19  Referring Physician: Dr. Turk    Admit Date: 3/6/2019    Visit Dx:    ICD-10-CM ICD-9-CM   1. Displaced fracture of right femoral neck (CMS/HCC) S72.001A 820.8     Patient Active Problem List   Diagnosis   • Alzheimer's disease   • Bipolar I disorder, single manic episode (CMS/HCC)   • Epilepsy (CMS/HCC)   • Chronic constipation   • Hemorrhoids   • Polyp of colon   • Erectile dysfunction   • High risk medication use   • Poor balance   • Subdural hematoma (CMS/HCC)   • Seizure (CMS/HCC)   • Low energy   • Closed compression fracture of L1 lumbar vertebra (CMS/HCC)   • Acute bilateral low back pain without sciatica   • Weight loss   • Displaced fracture of right femoral neck (CMS/HCC)       Therapy Treatment    Rehabilitation Treatment Summary     Row Name 19 0912 19 0835          Treatment Time/Intention    Discipline  physical therapist  -JW  occupational therapist  -SD     Document Type  therapy note (daily note)  -JW  therapy note (daily note)  -SD     Subjective Information  complains of;pain  -JW  complains of;pain  -SD     Mode of Treatment  physical therapy  -JW  occupational therapy  -SD     Patient/Family Observations  pt sitting in chair, agreeable to therapy with encouragement  -JW  Pt was in bed.  pt agreeable to therapy.  -SD     Care Plan Review  care plan/treatment goals reviewed;patient/other agree to care plan  -JW  --     Patient Effort  adequate  -JW  adequate  -SD     Comment  pt reports increased pain with mobility activities  -JW  --     Existing Precautions/Restrictions  fall;hip, posterior  -JW  --     Treatment Considerations/Comments  pt able to recall 2/3 hip precautions during session, requires  cues to adhere to precautions within activity   -  Education with TH precautions.  Pt initially was unable to verbalize any of the hip precautions.  After verbal instruction and demonstration, pt was able to recall 2/3 hip precuations.    -SD     Recorded by [JW] Siomara Cedillo, PT 03/11/19 1113 [SD] Pako Ortega, OTR 03/11/19 1026     Row Name 03/11/19 0912 03/11/19 0835          Cognitive Assessment/Intervention- PT/OT    Follows Commands (Cognition)  follows one step commands  -  WFL  -SD     Safety Deficit (Cognitive)  insight into deficits/self awareness;problem solving;safety precautions awareness  -  safety precautions awareness  -SD     Personal Safety Interventions  gait belt;nonskid shoes/slippers when out of bed  -  gait belt;fall prevention program maintained;nonskid shoes/slippers when out of bed;supervised activity  -SD     Cognitive Assessment/Intervention Comment  --  Pt requires verbal cues nad continued education regarding hip precautions and impact on transfers/daily tasks.  -SD     Recorded by [JW] Siomara Cedillo, PT 03/11/19 1113 [SD] Pako Ortega, OTR 03/11/19 1026     Row Name 03/11/19 0912 03/11/19 0835          Bed Mobility Assessment/Treatment    Supine-Sit [place] (Bed Mobility)  --  moderate assist (50% patient effort);verbal cues  -SD     Bed Mobility, Safety Issues  --  decreased use of arms for pushing/pulling;decreased use of legs for bridging/pushing limited use of RUE/LE due to pain  -SD     Assistive Device (Bed Mobility)  --  draw sheet;head of bed elevated  -SD     Comment (Bed Mobility)  deferred-up in chair  -  --     Recorded by [JW] Siomara Cedillo, PT 03/11/19 1113 [SD] Pako Ortega, OTR 03/11/19 1026     Row Name 03/11/19 0835             Functional Mobility    Functional Mobility- Ind. Level  contact guard assist;verbal cues required  -SD      Functional Mobility- Device  rolling walker  -SD      Functional Mobility-Distance (Feet)  5  -SD      Recorded by [SD] Pako Ortega, OTR  03/11/19 1026      Row Name 03/11/19 0912 03/11/19 0835          Sit-Stand Transfer    Sit-Stand [place] (Transfers)  minimum assist (75% patient effort);verbal cues  -JW  minimum assist (75% patient effort);verbal cues  -SD     Assistive Device (Sit-Stand Transfers)  walker, front-wheeled  -JW  walker, front-wheeled  -SD     Recorded by [JW] Siomara Cedillo, PT 03/11/19 1113 [SD] Pako Ortega, OTR 03/11/19 1026     Row Name 03/11/19 0912 03/11/19 0835          Stand-Sit Transfer    Stand-Sit [place] (Transfers)  contact guard;verbal cues  -JW  contact guard;verbal cues  -SD     Assistive Device (Stand-Sit Transfers)  walker, front-wheeled  -JW  walker, front-wheeled  -SD     Recorded by [JW] Siomara Cedillo, PT 03/11/19 1113 [SD] Pako Ortega, OTR 03/11/19 1026     Row Name 03/11/19 0912             Gait/Stairs Assessment/Training    [place] Level (Gait)  contact guard;verbal cues  -JW      Assistive Device (Gait)  walker, front-wheeled  -JW      Distance in Feet (Gait)  95  -JW      Deviations/Abnormal Patterns (Gait)  base of support, narrow;clover decreased;antalgic  -JW      Comment (Gait/Stairs)  pt requires verbal cues for upright posture and proper distance from walker. patient requires verbal cues for safety and sequencing during direction changes  -JW      Recorded by [JW] Siomara Cedillo, PT 03/11/19 1113      Row Name 03/11/19 0835             Bathing Assessment/Intervention    Comment (Bathing)  education regarding impact of TH precautions on management of lower body adl's  -SD      Recorded by [SD] Pako Ortega, OTR 03/11/19 1026      Row Name 03/11/19 0835             Upper Body Dressing Assessment/Training    Upper Body Dressing [place] Level  moderate assist (50% patient effort)  -SD      Recorded by [SD] Pako Ortega, OTR 03/11/19 1030      Row Name 03/11/19 0835             Lower Body Dressing Assessment/Training    Comment (Lower Body Dressing)  education  regarding impact of TH precautions on management of lower body adl's pt required mod assist for management of sling  -SD      Recorded by [SD] Pako Ortega, OTR 03/11/19 1030      Row Name 03/11/19 0835             BADL Safety/Performance    Impairments, BADL Safety/Performance  pain;range of motion;strength;cognition TH precautions  -SD      Cognitive Impairments, BADL Safety/Performance  safety precaution awareness;problem solving/reasoning  -SD      Skilled BADL Treatment/Intervention  BADL process/adaptation training  -SD      Progress in BADL Status  improvement noted  -SD      Recorded by [SD] Pako Ortega, OTR 03/11/19 1026      Row Name 03/11/19 0912             Motor Skills Assessment/Interventions    Additional Documentation  Therapeutic Exercise Interventions (Group);Therapeutic Exercise (Group)  -JW      Recorded by [JW] Siomara Cedillo, PT 03/11/19 1113      Row Name 03/11/19 0912             Therapeutic Exercise    Therapeutic Exercise  seated, lower extremities;supine, lower extremities  -JW      Recorded by [JW] Siomara Cedillo, PT 03/11/19 1113      Row Name 03/11/19 0912             Lower Extremity Seated Therapeutic Exercise    Performed, Seated Lower Extremity (Therapeutic Exercise)  LAQ (long arc quad), knee extension  -JW      Exercise Type, Seated Lower Extremity (Therapeutic Exercise)  AROM (active range of motion)  -JW      Sets/Reps Detail, Seated Lower Extremity (Therapeutic Exercise)  1/10  -JW      Comment, Seated Lower Extremity (Therapeutic Exercise)  pt requires verbal cues for proper technique   -JW      Recorded by [JW] Siomara Cedillo, PT 03/11/19 1113      Row Name 03/11/19 0912             Lower Extremity Supine Therapeutic Exercise    Performed, Supine Lower Extremity (Therapeutic Exercise)  quadriceps sets;heel slides  -JW      Exercise Type, Supine Lower Extremity (Therapeutic Exercise)  AROM (active range of motion)  -JW      Sets/Reps Detail, Supine Lower Extremity  (Therapeutic Exercise)  1/10  -JW      Comment, Supine Lower Extremity (Therapeutic Exercise)  requires verbal cues for technique  -JW      Recorded by [JW] Siomara Cedillo, PT 03/11/19 1113      Row Name 03/11/19 0912 03/11/19 0835          Positioning and Restraints    Pre-Treatment Position  sitting in chair/recliner  -JW  in bed  -SD     Post Treatment Position  chair  -JW  chair  -SD     In Chair  reclined;call light within reach;encouraged to call for assist  -JW  reclined;call light within reach;encouraged to call for assist ice on right hip, sling on right shoulder  -SD     Recorded by [JW] Siomara Cedillo, PT 03/11/19 1113 [SD] Pako Ortega, OTR 03/11/19 1026     Row Name 03/11/19 0912 03/11/19 0835          Pain Scale: Numbers Pre/Post-Treatment    Pain Scale: Numbers, Pretreatment  -- reports pain with mobility, does not rate  -JW  0/10 - no pain no pain reported at rest  -SD     Pain Scale: Numbers, Post-Treatment  --  0/10 - no pain pt reported pain as 10/10 upon standing  -SD     Pain Intervention(s)  --  Repositioned;Ambulation/increased activity;Cold pack  -SD     Recorded by [JW] Siomara Cedillo, PT 03/11/19 1113 [SD] Pako Ortega, OTR 03/11/19 1026     Row Name                Wound 03/08/19 1757 Right hip incision    Wound - Properties Group Date first assessed: 03/08/19 [JM] Time first assessed: 1757 [JM] Side: Right [JM] Location: hip [JM] Type: incision [JM] Recorded by:  [JM] Melinda Ac, RN 03/08/19 1757    Row Name 03/11/19 0912             Plan of Care Review    Plan of Care Reviewed With  patient  -JW      Recorded by [JW] Siomara Cedillo, PT 03/11/19 1113      Row Name 03/11/19 0835             Outcome Summary/Treatment Plan (OT)    Daily Summary of Progress (OT)  prepare for discharge;progress towards functional goals is fair  -SD      Barriers to Overall Progress (OT)  limited use of RUE due to scapular fracture.  cognition  -SD      Plan for Continued Treatment (OT)  plan is  for pt to transfer to SNF today and continue with therapy  -SD      Anticipated Discharge Disposition (OT)  skilled nursing facility  -SD      Recorded by [SD] Pako Ortega, OTR 03/11/19 1026      Row Name 03/11/19 0912             Outcome Summary/Treatment Plan (PT)    Daily Summary of Progress (PT)  progress toward functional goals is good  -JW      Barriers to Overall Progress (PT)  pain, cognition  -JW      Recorded by [JW] Siomara Cedillo, PT 03/11/19 1113        User Key  (r) = Recorded By, (t) = Taken By, (c) = Cosigned By    Initials Name Effective Dates Discipline    SD Pako Ortega, OTR 06/22/16 -  OT    Siomara Olsen, PT 04/03/18 -  PT    Melinda Adame RN 06/06/18 -  Nurse          Wound 03/08/19 0676 Right hip incision (Active)   Dressing Appearance dry;intact;no drainage 3/11/2019  8:45 AM   Closure JOSE MANUEL 3/10/2019  8:44 PM       Rehab Goal Summary     Row Name 03/11/19 1000             Transfer Goal 1 (OT)    Activity/Assistive Device (Transfer Goal 1, OT)  sit-to-stand/stand-to-sit;toilet  -SD      [place] Level/Cues Needed (Transfer Goal 1, OT)  minimum assist (75% or more patient effort)  -SD      Time Frame (Transfer Goal 1, OT)  4 days  -SD      Progress/Outcome (Transfer Goal 1, OT)  goal partially met met with min assist, still needs vc's for TH precautions  -SD         Dressing Goal 1 (OT)    Activity/Assistive Device (Dressing Goal 1, OT)  lower body dressing;reacher;sock-aid;long handled shoe horn  -SD      [place]/Cues Needed (Dressing Goal 1, OT)  minimum assist (75% or more patient effort)  -SD      Time Frame (Dressing Goal 1, OT)  4 days  -SD      Progress/Outcome (Dressing Goal 1, OT)  goal ongoing  -SD         Dressing Goal 2 (OT)    Activity/Assistive Device (Dressing Goal 2, OT)  upper body dressing  -SD      [place]/Cues Needed (Dressing Goal 2, OT)  set-up required;supervision required  -SD      Time Frame (Dressing Goal 2, OT)  3 days  -SD       Progress/Outcome (Dressing Goal 2, OT)  goal ongoing pt required mod assist for management of sling  -SD        User Key  (r) = Recorded By, (t) = Taken By, (c) = Cosigned By    Initials Name Provider Type Discipline    Pako Zhou, OTR Occupational Therapist OT          Physical Therapy Education     Title: PT OT SLP Therapies (In Progress)     Topic: Physical Therapy (In Progress)     Point: Mobility training (In Progress)     Learning Progress Summary           Patient Acceptance, E,TB, NR by JENNIFER at 3/11/2019 11:13 AM    Acceptance, E,D, VU,NR by TOMÁS at 3/10/2019  9:04 AM    Acceptance, E,TB, NR by JENNIFER at 3/9/2019 10:52 AM                   Point: Home exercise program (Done)     Learning Progress Summary           Patient Acceptance, E,D, VU,NR by TOMÁS at 3/10/2019  9:04 AM                   Point: Precautions (In Progress)     Learning Progress Summary           Patient Acceptance, E,TB, NR by JENNIFER at 3/11/2019 11:13 AM    Acceptance, E,D, VU,NR by TOMÁS at 3/10/2019  9:04 AM    Acceptance, E,TB, NR by JENNIFER at 3/9/2019 10:52 AM                               User Key     Initials Effective Dates Name Provider Type Discipline     04/03/18 -  Siomara Cedillo, PT Physical Therapist PT    TOMÁS 06/12/16 -  Sonja Talbert, PTA Physical Therapy Assistant PT                PT Recommendation and Plan  Anticipated Discharge Disposition (PT): skilled nursing facility, extended care facility  Planned Therapy Interventions (PT Eval): balance training, bed mobility training, gait training, home exercise program, strengthening, transfer training, patient/family education  Therapy Frequency (PT Clinical Impression): (QD-BID, 7x/week)  Outcome Summary/Treatment Plan (PT)  Daily Summary of Progress (PT): progress toward functional goals is good  Barriers to Overall Progress (PT): pain, cognition  Anticipated Equipment Needs at Discharge (PT): bedside commode  Anticipated Discharge Disposition (PT): skilled nursing facility, extended  care facility  Plan of Care Reviewed With: patient  Outcome Summary: PT: Patient able to recall 2/3 precautions with cues to adhere to precautions during activity.  Patient performs sit to stand transfer with min assist and gait with rolling walker x95 feet, CGA.  Patient requires verbal cues for proper distance from walker and safety around obstacles.  Patient continues to demonstrate decreased insight into current deficits.  Continue to recommend SNF at discharge.  Outcome Measures     Row Name 03/11/19 1000 03/11/19 0912 03/10/19 1000       How much help from another person do you currently need...    Turning from your back to your side while in flat bed without using bedrails?  --  2  -JW  --    Moving from lying on back to sitting on the side of a flat bed without bedrails?  --  2  -JW  --    Moving to and from a bed to a chair (including a wheelchair)?  --  2  -JW  --    Standing up from a chair using your arms (e.g., wheelchair, bedside chair)?  --  3  -JW  --    Climbing 3-5 steps with a railing?  --  2  -JW  --    To walk in hospital room?  --  3  -JW  --    AM-PAC 6 Clicks Score  --  14  -JW  --       How much help from another is currently needed...    Putting on and taking off regular lower body clothing?  2  -SD  --  2  -EN    Bathing (including washing, rinsing, and drying)  2  -SD  --  2  -EN    Toileting (which includes using toilet bed pan or urinal)  2  -SD  --  2  -EN    Putting on and taking off regular upper body clothing  2  -SD  --  2  -EN    Taking care of personal grooming (such as brushing teeth)  3  -SD  --  3  -EN    Eating meals  4  -SD  --  4  -EN    Score  15  -SD  --  15  -EN       Functional Assessment    Outcome Measure Options  --  AM-PAC 6 Clicks Basic Mobility (PT)  -JW  AM-PAC 6 Clicks Daily Activity (OT)  -EN    Row Name 03/10/19 0830 03/09/19 0926          How much help from another person do you currently need...    Turning from your back to your side while in flat bed  without using bedrails?  2  -EA  2  -JW     Moving from lying on back to sitting on the side of a flat bed without bedrails?  2  -EA  2  -JW     Moving to and from a bed to a chair (including a wheelchair)?  2  -EA  2  -JW     Standing up from a chair using your arms (e.g., wheelchair, bedside chair)?  2  -EA  2  -JW     Climbing 3-5 steps with a railing?  1  -EA  1  -JW     To walk in hospital room?  3  -EA  1  -JW     AM-PAC 6 Clicks Score  12  -EA  10  -JW        Functional Assessment    Outcome Measure Options  AM-PAC 6 Clicks Basic Mobility (PT)  -EA  AM-PAC 6 Clicks Basic Mobility (PT)  -JW       User Key  (r) = Recorded By, (t) = Taken By, (c) = Cosigned By    Initials Name Provider Type    EN Lilia Berg, OTR Occupational Therapist    Pako Zhou, OTR Occupational Therapist    Siomara Olsen, PT Physical Therapist    Sonja Addison, PTA Physical Therapy Assistant         Time Calculation:   PT Charges     Row Name 03/11/19 1124             Time Calculation    Start Time  0912  -      Stop Time  0930  -      Time Calculation (min)  18 min  -      PT Received On  03/11/19  -      PT - Next Appointment  03/11/19  -         Timed Charges    74620 - Gait Training Minutes   18  -        User Key  (r) = Recorded By, (t) = Taken By, (c) = Cosigned By    Initials Name Provider Type    JW Siomara Cedillo, PT Physical Therapist        Therapy Suggested Charges     Code   Minutes Charges    89665 (CPT®) Hc Pt Neuromusc Re Education Ea 15 Min      06687 (CPT®) Hc Pt Ther Proc Ea 15 Min      54072 (CPT®) Hc Gait Training Ea 15 Min 18 1    53491 (CPT®) Hc Pt Therapeutic Act Ea 15 Min      49211 (CPT®) Hc Pt Manual Therapy Ea 15 Min      18438 (CPT®) Hc Pt Iontophoresis Ea 15 Min      04435 (CPT®) Hc Pt Elec Stim Ea-Per 15 Min      71977 (CPT®) Hc Pt Ultrasound Ea 15 Min      40546 (CPT®) Hc Pt Self Care/Mgmt/Train Ea 15 Min      47636 (CPT®) Hc Pt Prosthetic (S) Train Initial Encounter,  Each 15 Min      66570 (CPT®) Hc Pt Orthotic(S)/Prosthetic(S) Encounter, Each 15 Min      33665 (CPT®) Hc Orthotic(S) Mgmt/Train Initial Encounter, Each 15min      Total  18 1        Therapy Charges for Today     Code Description Service Date Service Provider Modifiers Qty    46289835076 HC GAIT TRAINING EA 15 MIN 3/11/2019 Siomara Cedillo, PT GP 1    09423707658 HC PT THER SUPP EA 15 MIN 3/11/2019 Siomara Cedillo, PT GP 1          PT G-Codes  Outcome Measure Options: AM-PAC 6 Clicks Basic Mobility (PT)  AM-PAC 6 Clicks Score: 14  Score: 15    Siomara Cedillo, PT  3/11/2019

## 2019-03-12 ENCOUNTER — EPISODE CHANGES (OUTPATIENT)
Dept: CASE MANAGEMENT | Facility: OTHER | Age: 74
End: 2019-03-12

## 2019-03-12 ENCOUNTER — OUTSIDE FACILITY SERVICE (OUTPATIENT)
Dept: HOSPITALIST | Facility: HOSPITAL | Age: 74
End: 2019-03-12

## 2019-03-12 PROCEDURE — 99304 1ST NF CARE SF/LOW MDM 25: CPT | Performed by: HOSPITALIST

## 2019-03-14 ENCOUNTER — EPISODE CHANGES (OUTPATIENT)
Dept: CASE MANAGEMENT | Facility: OTHER | Age: 74
End: 2019-03-14

## 2019-03-20 ENCOUNTER — OFFICE VISIT (OUTPATIENT)
Dept: ORTHOPEDIC SURGERY | Facility: CLINIC | Age: 74
End: 2019-03-20

## 2019-03-20 VITALS — HEIGHT: 68 IN | WEIGHT: 154 LBS | BODY MASS INDEX: 23.34 KG/M2

## 2019-03-20 DIAGNOSIS — S42.001A CLOSED NONDISPLACED FRACTURE OF RIGHT CLAVICLE, UNSPECIFIED PART OF CLAVICLE, INITIAL ENCOUNTER: ICD-10-CM

## 2019-03-20 DIAGNOSIS — R52 PAIN: Primary | ICD-10-CM

## 2019-03-20 DIAGNOSIS — S42.151A: ICD-10-CM

## 2019-03-20 DIAGNOSIS — M75.52 SUBACROMIAL BURSITIS OF LEFT SHOULDER JOINT: ICD-10-CM

## 2019-03-20 DIAGNOSIS — Z96.649 STATUS POST HIP HEMIARTHROPLASTY: ICD-10-CM

## 2019-03-20 PROCEDURE — 99024 POSTOP FOLLOW-UP VISIT: CPT | Performed by: NURSE PRACTITIONER

## 2019-03-20 PROCEDURE — 73030 X-RAY EXAM OF SHOULDER: CPT | Performed by: NURSE PRACTITIONER

## 2019-03-20 PROCEDURE — 20610 DRAIN/INJ JOINT/BURSA W/O US: CPT | Performed by: NURSE PRACTITIONER

## 2019-03-20 RX ADMIN — LIDOCAINE HYDROCHLORIDE 4 ML: 10 INJECTION, SOLUTION EPIDURAL; INFILTRATION; INTRACAUDAL; PERINEURAL at 15:21

## 2019-03-20 RX ADMIN — TRIAMCINOLONE ACETONIDE 80 MG: 40 INJECTION, SUSPENSION INTRA-ARTICULAR; INTRAMUSCULAR at 15:21

## 2019-03-20 NOTE — PROGRESS NOTES
Procedure   Large Joint Arthrocentesis: L subacromial bursa  Date/Time: 3/20/2019 3:21 PM  Consent given by: patient  Site marked: site marked  Timeout: Immediately prior to procedure a time out was called to verify the correct patient, procedure, equipment, support staff and site/side marked as required   Supporting Documentation  Indications: pain   Procedure Details  Location: shoulder - L subacromial bursa  Preparation: Patient was prepped and draped in the usual sterile fashion  Needle size: 22 G  Approach: lateral  Medications administered: 4 mL lidocaine PF 1% 1 %; 80 mg triamcinolone acetonide 40 MG/ML  Patient tolerance: patient tolerated the procedure well with no immediate complications

## 2019-03-20 NOTE — PROGRESS NOTES
Subjective:     Patient ID: Andrew Redmond is a 73 y.o. male.    Chief Complaint:  Status post right hip hemiarthroplasty, date of surgery 3/8/2019  Left shoulder pain, new issue to examiner  Scapular neck fracture, follow-up    History of Present Illness  Andrew Redmond  presents with [facility] from [facility] for 2-week follow-up right hip.  He has continued with physical therapy weightbearing as tolerated to the right lower extremity.  He denies that he is experiencing pain at the right lower extremity but does report that he has had significant weakness.  In regards to right shoulder, states that he has continued with use of sling for immobilization right shoulder and not using. States he did use the right upper extremity to assist self with repositioning in bed one the day that he arrived at the facility but not since that time. X-rays of right shoulder and right hip have been completed and imaging available for viewing. Denies knowledge of known further injury, fall or hitting right shoulder.     He would also like to be seen for pain left shoulder. Maximal tenderness left shoulder lateral aspect and posterior shoulder, radiating inferiorly but stops mid humerus. Greatest pain present at night and sore to touch all over. Pain has been present since initial fall while he was at home. X-rays were completed while he was inpatient at [facility] which are available for viewing. Denies presence of numbness or tingling left upper extremity. Denies all other concerns present at this time.        Social History     Occupational History   • Occupation: professor     Comment: taught anatomy at [employer]   Tobacco Use   • Smoking status: Former Smoker     Packs/day: 2.00     Years: 20.00     Pack years: 40.00   • Smokeless tobacco: Former User     Quit date: 1987   • Tobacco comment: quit 30 yrs ago    Substance and Sexual Activity   • Alcohol use: No   • Drug use: No   • Sexual activity: Defer      Past Medical  History:   Diagnosis Date   • Alzheimer disease    • Alzheimer disease    • Alzheimer disease 03/06/2019   • Anxiety and depression    • Bipolar 1 disorder (CMS/HCC)    • Colon polyp    • Constipation    • Depression    • Diarrhea    • Epilepsy (CMS/HCC)    • Epilepsy (CMS/HCC) 03/06/2019   • Erectile dysfunction    • GERD (gastroesophageal reflux disease)    • L1 vertebral fracture (CMS/HCC)    • Seizures (CMS/HCC)     absence seizures.  well controlled.     Past Surgical History:   Procedure Laterality Date   • COLONOSCOPY N/A 4/1/2016    Procedure: COLONOSCOPY with polypectomy;  Surgeon: Akiko Scales MD;  Location: [facility];  Service:    • GALLBLADDER SURGERY     • HIP HEMIARTHROPLASTY Right 3/8/2019    Procedure: HIP HEMIARTHROPLASTY and all associated procedures;  Surgeon: Harrison Turk MD;  Location: [facility];  Service: Orthopedics   • LIVER SURGERY     • TONSILLECTOMY         Family History   Problem Relation Age of Onset   • Lung cancer Father    • No Known Problems Mother    • No Known Problems Maternal Grandmother    • No Known Problems Maternal Grandfather    • No Known Problems Paternal Grandmother    • No Known Problems Paternal Grandfather          Review of Systems   Constitutional: Negative for chills, diaphoresis, fever and unexpected weight change.   HENT: Positive for hearing loss and sore throat. Negative for nosebleeds and tinnitus.    Eyes: Positive for visual disturbance. Negative for pain.   Respiratory: Negative for cough, shortness of breath and wheezing.    Cardiovascular: Negative for chest pain and palpitations.   Gastrointestinal: Negative for abdominal pain, diarrhea, nausea and vomiting.   Endocrine: Negative for cold intolerance, heat intolerance and polydipsia.   Genitourinary: Negative for difficulty urinating, dysuria and hematuria.   Musculoskeletal: Negative for arthralgias, joint swelling and myalgias.   Skin: Positive for wound. Negative for rash.  "  Allergic/Immunologic: Negative for environmental allergies.   Neurological: Negative for dizziness, syncope and numbness.   Hematological: Does not bruise/bleed easily.   Psychiatric/Behavioral: Positive for sleep disturbance. Negative for dysphoric mood. The patient is nervous/anxious.            Objective:  Physical Exam     General: No acute distress.  Eyes: conjunctiva clear; pupils equally round and reactive  ENT: external ears and nose atraumatic; oropharynx clear  CV: no peripheral edema  Resp: normal respiratory effort  Skin: no rashes or wounds; normal turgor  Psych: mood and affect appropriate; recent and remote memory intact per patients baseline    Vitals:    03/20/19 1442   Weight: 69.9 kg (154 lb)   Height: 172.7 cm (68\")         03/20/19  1442   Weight: 69.9 kg (154 lb)     Body mass index is 23.42 kg/m².     Right hip exam  Incision clean, dry, intact and healing, negative for erythema, drainage or any s/s of infection surrounding the incision. Prineo dermabond remains intact and was removed at this visit, incision closed and healing. Positive sensation all distributions of right thigh, tib/fib, ankle and foot. Flex/extend all digits right foot. All compartments soft and easily compressible. Negative calf tenderness, negative homans sign. 1+ pitting edema ankle, 2+ DP and pedal pulse.       Right Shoulder Exam     Other   Erythema: absent  Sensation: normal  Pulse: present    Comments:  Denies that he is experiencing pain right shoulder  FF: 170 degrees  ER: 40 degrees 4/5 strength       Left Shoulder Exam     Tenderness   The patient is experiencing tenderness in the acromion.    Range of Motion   Forward flexion: 180     Muscle Strength   Internal rotation: 4/5   External rotation: 4/5   Supraspinatus: 4/5   Subscapularis: 4/5   Biceps: 4/5     Tests   Melendrez test: negative  Cross arm: negative  Impingement: negative  Drop arm: negative  Sulcus: absent    Other   Erythema: absent  Sensation: " normal  Pulse: present     Comments:  Negative empty can  negative [place]'s  negative Speed's  negative bear hug exam                 Imaging:  Right Shoulder X-Ray  Indication: Pain  AP Internal and External Rotation views    Findings:  Mildly displaced fracture clavicle new since discharge,   Incomplete fracture neck of scapula   No bony lesion  prior studies were available for comparison.    Assessment:        1. Pain    2. Subacromial bursitis of left shoulder joint    3. Status post hip hemiarthroplasty    4. Closed displaced fracture of neck of right scapula, initial encounter    5. Closed nondisplaced fracture of right clavicle, unspecified part of clavicle, initial encounter           Plan:  1. Discussed plan of care with patient and caregiver. Will plan to see him back in 3-4 weeks, repeat x-rays right shoulder and right hip at that time.   2. Continue in sling right upper extremity until follow-up. Encouraged to continue with weightbearing and strengthening right lower extremity. Avoid submerging right lower extremity in water, avoid bathtubs, hot tub, pool for total four weeks post-op. Patient verbalized understanding of all information and agrees with plan of care. Denies all other concerns present at this time.     Orders:  Orders Placed This Encounter   Procedures   • Large Joint Arthrocentesis: L subacromial bursa   • XR Shoulder 2+ View Right       I ordered and reviewed the NAYELY today.     Dictated utilizing Dragon dictation

## 2019-03-21 PROBLEM — M75.52 SUBACROMIAL BURSITIS OF LEFT SHOULDER JOINT: Status: ACTIVE | Noted: 2019-03-21

## 2019-03-21 PROBLEM — S42.151A: Status: ACTIVE | Noted: 2019-03-21

## 2019-03-21 PROBLEM — Z96.649 STATUS POST HIP HEMIARTHROPLASTY: Status: ACTIVE | Noted: 2019-03-21

## 2019-03-21 PROBLEM — S42.001A CLOSED NONDISPLACED FRACTURE OF RIGHT CLAVICLE: Status: ACTIVE | Noted: 2019-03-21

## 2019-03-21 RX ORDER — TRIAMCINOLONE ACETONIDE 40 MG/ML
80 INJECTION, SUSPENSION INTRA-ARTICULAR; INTRAMUSCULAR
Status: COMPLETED | OUTPATIENT
Start: 2019-03-20 | End: 2019-03-20

## 2019-03-21 RX ORDER — LIDOCAINE HYDROCHLORIDE 10 MG/ML
4 INJECTION, SOLUTION EPIDURAL; INFILTRATION; INTRACAUDAL; PERINEURAL
Status: COMPLETED | OUTPATIENT
Start: 2019-03-20 | End: 2019-03-20

## 2019-03-23 ENCOUNTER — OUTSIDE FACILITY SERVICE (OUTPATIENT)
Dept: HOSPITALIST | Facility: HOSPITAL | Age: 74
End: 2019-03-23

## 2019-03-23 PROCEDURE — 99307 SBSQ NF CARE SF MDM 10: CPT | Performed by: HOSPITALIST

## 2019-03-28 ENCOUNTER — OUTSIDE FACILITY SERVICE (OUTPATIENT)
Dept: HOSPITALIST | Facility: HOSPITAL | Age: 74
End: 2019-03-28

## 2019-03-28 ENCOUNTER — EPISODE CHANGES (OUTPATIENT)
Dept: CASE MANAGEMENT | Facility: OTHER | Age: 74
End: 2019-03-28

## 2019-03-28 PROCEDURE — 99307 SBSQ NF CARE SF MDM 10: CPT | Performed by: HOSPITALIST

## 2019-04-03 ENCOUNTER — EPISODE CHANGES (OUTPATIENT)
Dept: CASE MANAGEMENT | Facility: OTHER | Age: 74
End: 2019-04-03

## 2019-04-05 ENCOUNTER — LAB REQUISITION (OUTPATIENT)
Dept: LAB | Facility: HOSPITAL | Age: 74
End: 2019-04-05

## 2019-04-05 ENCOUNTER — OUTSIDE FACILITY SERVICE (OUTPATIENT)
Dept: HOSPITALIST | Facility: HOSPITAL | Age: 74
End: 2019-04-05

## 2019-04-05 DIAGNOSIS — J18.9 PNEUMONIA: ICD-10-CM

## 2019-04-05 LAB
ALBUMIN SERPL-MCNC: 3.6 G/DL (ref 3.5–5.2)
ALBUMIN/GLOB SERPL: 1.6 G/DL
ALP SERPL-CCNC: 207 U/L (ref 39–117)
ALT SERPL W P-5'-P-CCNC: 15 U/L (ref 1–41)
ANION GAP SERPL CALCULATED.3IONS-SCNC: 8.7 MMOL/L
AST SERPL-CCNC: 16 U/L (ref 1–40)
B PARAPERT DNA SPEC QL NAA+PROBE: NOT DETECTED
B PERT DNA SPEC QL NAA+PROBE: NOT DETECTED
BASOPHILS # BLD AUTO: 0.08 10*3/MM3 (ref 0–0.2)
BASOPHILS NFR BLD AUTO: 0.9 % (ref 0–1.5)
BILIRUB SERPL-MCNC: 0.3 MG/DL (ref 0.2–1.2)
BUN BLD-MCNC: 12 MG/DL (ref 8–23)
BUN/CREAT SERPL: 21.4 (ref 7–25)
C PNEUM DNA NPH QL NAA+NON-PROBE: NOT DETECTED
CALCIUM SPEC-SCNC: 8.7 MG/DL (ref 8.6–10.5)
CHLORIDE SERPL-SCNC: 91 MMOL/L (ref 98–107)
CO2 SERPL-SCNC: 28.3 MMOL/L (ref 22–29)
CREAT BLD-MCNC: 0.56 MG/DL (ref 0.76–1.27)
DEPRECATED RDW RBC AUTO: 44.8 FL (ref 37–54)
EOSINOPHIL # BLD AUTO: 0.09 10*3/MM3 (ref 0–0.4)
EOSINOPHIL NFR BLD AUTO: 1 % (ref 0.3–6.2)
ERYTHROCYTE [DISTWIDTH] IN BLOOD BY AUTOMATED COUNT: 14.3 % (ref 12.3–15.4)
FLUAV H1 2009 PAND RNA NPH QL NAA+PROBE: NOT DETECTED
FLUAV H1 HA GENE NPH QL NAA+PROBE: NOT DETECTED
FLUAV H3 RNA NPH QL NAA+PROBE: NOT DETECTED
FLUAV SUBTYP SPEC NAA+PROBE: NOT DETECTED
FLUBV RNA ISLT QL NAA+PROBE: NOT DETECTED
GFR SERPL CREATININE-BSD FRML MDRD: 143 ML/MIN/1.73
GLOBULIN UR ELPH-MCNC: 2.3 GM/DL
GLUCOSE BLD-MCNC: 128 MG/DL (ref 65–99)
HADV DNA SPEC NAA+PROBE: NOT DETECTED
HCOV 229E RNA SPEC QL NAA+PROBE: NOT DETECTED
HCOV HKU1 RNA SPEC QL NAA+PROBE: NOT DETECTED
HCOV NL63 RNA SPEC QL NAA+PROBE: NOT DETECTED
HCOV OC43 RNA SPEC QL NAA+PROBE: NOT DETECTED
HCT VFR BLD AUTO: 40 % (ref 37.5–51)
HGB BLD-MCNC: 13 G/DL (ref 13–17.7)
HMPV RNA NPH QL NAA+NON-PROBE: NOT DETECTED
HPIV1 RNA SPEC QL NAA+PROBE: NOT DETECTED
HPIV2 RNA SPEC QL NAA+PROBE: NOT DETECTED
HPIV3 RNA NPH QL NAA+PROBE: NOT DETECTED
HPIV4 P GENE NPH QL NAA+PROBE: NOT DETECTED
IMM GRANULOCYTES # BLD AUTO: 0.42 10*3/MM3 (ref 0–0.05)
IMM GRANULOCYTES NFR BLD AUTO: 4.6 % (ref 0–0.5)
LYMPHOCYTES # BLD AUTO: 2.38 10*3/MM3 (ref 0.7–3.1)
LYMPHOCYTES NFR BLD AUTO: 26 % (ref 19.6–45.3)
M PNEUMO IGG SER IA-ACNC: NOT DETECTED
MCH RBC QN AUTO: 28 PG (ref 26.6–33)
MCHC RBC AUTO-ENTMCNC: 32.5 G/DL (ref 31.5–35.7)
MCV RBC AUTO: 86.2 FL (ref 79–97)
MONOCYTES # BLD AUTO: 0.87 10*3/MM3 (ref 0.1–0.9)
MONOCYTES NFR BLD AUTO: 9.5 % (ref 5–12)
NEUTROPHILS # BLD AUTO: 5.33 10*3/MM3 (ref 1.4–7)
NEUTROPHILS NFR BLD AUTO: 58 % (ref 42.7–76)
NRBC BLD AUTO-RTO: 0 /100 WBC (ref 0–0)
PLATELET # BLD AUTO: 401 10*3/MM3 (ref 140–450)
PMV BLD AUTO: 8.7 FL (ref 6–12)
POTASSIUM BLD-SCNC: 4.5 MMOL/L (ref 3.5–5.2)
PROT SERPL-MCNC: 5.9 G/DL (ref 6–8.5)
RBC # BLD AUTO: 4.64 10*6/MM3 (ref 4.14–5.8)
RHINOVIRUS RNA SPEC NAA+PROBE: DETECTED
RSV RNA NPH QL NAA+NON-PROBE: DETECTED
SODIUM BLD-SCNC: 128 MMOL/L (ref 136–145)
WBC NRBC COR # BLD: 9.17 10*3/MM3 (ref 3.4–10.8)

## 2019-04-05 PROCEDURE — 85025 COMPLETE CBC W/AUTO DIFF WBC: CPT | Performed by: HOSPITALIST

## 2019-04-05 PROCEDURE — 99308 SBSQ NF CARE LOW MDM 20: CPT | Performed by: NURSE PRACTITIONER

## 2019-04-05 PROCEDURE — 87798 DETECT AGENT NOS DNA AMP: CPT | Performed by: HOSPITALIST

## 2019-04-05 PROCEDURE — 80053 COMPREHEN METABOLIC PANEL: CPT | Performed by: HOSPITALIST

## 2019-04-05 PROCEDURE — 87581 M.PNEUMON DNA AMP PROBE: CPT | Performed by: HOSPITALIST

## 2019-04-05 PROCEDURE — 87486 CHLMYD PNEUM DNA AMP PROBE: CPT | Performed by: HOSPITALIST

## 2019-04-05 PROCEDURE — 87633 RESP VIRUS 12-25 TARGETS: CPT | Performed by: HOSPITALIST

## 2019-04-10 ENCOUNTER — OFFICE VISIT (OUTPATIENT)
Dept: ORTHOPEDIC SURGERY | Facility: CLINIC | Age: 74
End: 2019-04-10

## 2019-04-10 DIAGNOSIS — S42.001D CLOSED DISPLACED FRACTURE OF RIGHT CLAVICLE WITH ROUTINE HEALING, UNSPECIFIED PART OF CLAVICLE, SUBSEQUENT ENCOUNTER: ICD-10-CM

## 2019-04-10 DIAGNOSIS — S42.151A: ICD-10-CM

## 2019-04-10 DIAGNOSIS — Z96.649 STATUS POST HIP HEMIARTHROPLASTY: Primary | ICD-10-CM

## 2019-04-10 PROCEDURE — 99024 POSTOP FOLLOW-UP VISIT: CPT | Performed by: NURSE PRACTITIONER

## 2019-04-10 RX ORDER — ACETAMINOPHEN 325 MG/1
650 TABLET ORAL EVERY 6 HOURS PRN
COMMUNITY
End: 2019-07-11

## 2019-04-11 ENCOUNTER — EPISODE CHANGES (OUTPATIENT)
Dept: CASE MANAGEMENT | Facility: OTHER | Age: 74
End: 2019-04-11

## 2019-04-12 ENCOUNTER — OUTSIDE FACILITY SERVICE (OUTPATIENT)
Dept: HOSPITALIST | Facility: HOSPITAL | Age: 74
End: 2019-04-12

## 2019-04-12 PROCEDURE — 99308 SBSQ NF CARE LOW MDM 20: CPT | Performed by: NURSE PRACTITIONER

## 2019-04-14 NOTE — PROGRESS NOTES
CC: follow-up status post right hip hemiarthroplasty, DOS 03/08/2019  Scapular neck fracture, follow-up clavicle fracture, follow-up    HPI: Andrew Redmond present with caretaker from rehab facility. Mr. Redmond states that he is doing fairly well at this time and denies that he is experiencing significant pain at the right hip or the right shoulder.  He is completely remove the sling to the right upper extremity and has started with strengthening activities with physical therapy of the right shoulder and right upper extremity.  He is continued with physical therapy and weightbearing for the right hip.  Positive for soreness over the incision however denies of presence of drainage, erythema, fevers, sweats, chills all other concerns in regards to the right hip.  Continues to weight-bear without any significant discomfort or concerns.  Also states that the corticosteroid injection to the left shoulder at last visit completely decrease the pain that he is experiencing of the left shoulder and he has been able to resume all normal activities with the left upper extremity.  Denies other concerns present at this time.    Exam:   Right hip examined  Incision clean dry intact, completely healed, no signs or symptoms of infection including erythema, drainage, heat nor swelling  Positive sensation all distributions of the right lower extremity  Flex extend all digits of the right foot and 5 out of 5 plantar flexion and dorsiflexion of the right ankle  All compartments soft and easily compressible, negative calf tenderness negative Homans sign bilaterally  2+ pedal pulse and 2+ posterior tibialis pulse present    Right shoulder with forward flexion to 170 degrees active and passive 180 degrees, external rotation to 40 degrees with 4 out of 5 strength  Positive sensation light touch all aspects right upper extremity,  strength 4+/5 compared to contralateral side  Positive AC joint tenderness with crossbody testing and  positive bear hug sign, positive grinding sensation at clavicle however is not painful to patient    Imaging:  Independently reviewed x-rays previously completed outside facility:  2 views right hip hardware with Good positioning anatomical alignment right hip no evidence of loosening, further fracture noted    Right shoulder x-ray, 2 view independently reviewed by this APRN shows distal clavicular fracture healing, displaced similar to previous x-ray in office, AC joint arthropathy as previously discussed.  No new concerns for fracture of the right shoulder    Assessment:  Status post right hip hemiarthroplasty  Closed mildly displaced fracture of the distal clavicle, subsequent encounter    Plan:  1.  Discussed plan of care with patient.  I do encourage him to continue range of motion of the right upper extremity and strengthening activities this time.  2.  Encouraged him to continue ambulating and strengthening with physical therapy the right lower extremity.  We will plan to see him back in approximately 6 weeks plan to release that time.  Patient verbalized understanding of all information agrees with plan of care.  Denies other concerns present this time.

## 2019-04-19 ENCOUNTER — OUTSIDE FACILITY SERVICE (OUTPATIENT)
Dept: HOSPITALIST | Facility: HOSPITAL | Age: 74
End: 2019-04-19

## 2019-04-19 PROCEDURE — 99308 SBSQ NF CARE LOW MDM 20: CPT | Performed by: NURSE PRACTITIONER

## 2019-04-23 ENCOUNTER — OUTSIDE FACILITY SERVICE (OUTPATIENT)
Dept: HOSPITALIST | Facility: HOSPITAL | Age: 74
End: 2019-04-23

## 2019-04-23 PROCEDURE — 99308 SBSQ NF CARE LOW MDM 20: CPT | Performed by: HOSPITALIST

## 2019-04-25 ENCOUNTER — EPISODE CHANGES (OUTPATIENT)
Dept: CASE MANAGEMENT | Facility: OTHER | Age: 74
End: 2019-04-25

## 2019-04-26 ENCOUNTER — TELEPHONE (OUTPATIENT)
Dept: ORTHOPEDIC SURGERY | Facility: CLINIC | Age: 74
End: 2019-04-26

## 2019-04-26 NOTE — TELEPHONE ENCOUNTER
Verbal order was given to Angela at [facility] for OT and PT today, per Chantal.

## 2019-05-01 ENCOUNTER — OFFICE VISIT (OUTPATIENT)
Dept: INTERNAL MEDICINE | Facility: CLINIC | Age: 74
End: 2019-05-01

## 2019-05-01 VITALS
RESPIRATION RATE: 16 BRPM | BODY MASS INDEX: 22.58 KG/M2 | OXYGEN SATURATION: 97 % | HEART RATE: 61 BPM | WEIGHT: 149 LBS | DIASTOLIC BLOOD PRESSURE: 48 MMHG | HEIGHT: 68 IN | SYSTOLIC BLOOD PRESSURE: 124 MMHG

## 2019-05-01 DIAGNOSIS — F02.80 ALZHEIMER'S DEMENTIA WITHOUT BEHAVIORAL DISTURBANCE, UNSPECIFIED TIMING OF DEMENTIA ONSET: ICD-10-CM

## 2019-05-01 DIAGNOSIS — G30.9 ALZHEIMER'S DEMENTIA WITHOUT BEHAVIORAL DISTURBANCE, UNSPECIFIED TIMING OF DEMENTIA ONSET: ICD-10-CM

## 2019-05-01 DIAGNOSIS — F31.9 BIPOLAR 1 DISORDER (HCC): ICD-10-CM

## 2019-05-01 DIAGNOSIS — M80.059S: Primary | ICD-10-CM

## 2019-05-01 PROCEDURE — 99214 OFFICE O/P EST MOD 30 MIN: CPT | Performed by: INTERNAL MEDICINE

## 2019-05-01 NOTE — PROGRESS NOTES
Andrew Redmodn is a 73 y.o. male, who presents with a chief complaint of   Chief Complaint   Patient presents with   • Hip Injury     f/u    • Alzheimer's Disease       HPI   Pt here for f/u. He fell at home and had a hip and clavicle fracture on 3/10/19.  He had a hip replacement and his arm was in a sling.  He was at the [facility] for rehab and just got home last Friday (4/24).  He is now back home living by himself.  He says things are going well.  Home health came to evaluate him but said he was doing fine.  He is doing ok trying to cook easy meals.  He is thin and lost more weight with this injury.  He denies pain and says he is doing well walking with the rollator. He walked from Health system to [facility] in [place] yesterday and did ok.    Alzheimer's - mild on exelon and namenda    Hx bipolar - on lamictal and stable.      The following portions of the patient's history were reviewed and updated as appropriate: allergies, current medications, past family history, past medical history, past social history, past surgical history and problem list.    Allergies: Patient has no known allergies.    Review of Systems   Constitutional: Negative.    HENT: Negative.    Eyes: Negative.    Respiratory: Negative.    Cardiovascular: Negative.    Gastrointestinal: Negative.    Endocrine: Negative.    Genitourinary: Negative.    Musculoskeletal: Negative.    Skin: Negative.    Allergic/Immunologic: Negative.    Neurological: Negative.    Hematological: Negative.    Psychiatric/Behavioral: Negative.    All other systems reviewed and are negative.            Wt Readings from Last 3 Encounters:   05/01/19 67.6 kg (149 lb)   03/20/19 69.9 kg (154 lb)   03/06/19 70.1 kg (154 lb 8 oz)     Temp Readings from Last 3 Encounters:   03/11/19 99.2 °F (37.3 °C) (Oral)   02/05/19 98.1 °F (36.7 °C) (Oral)   12/22/18 98.2 °F (36.8 °C) (Oral)     BP Readings from Last 3 Encounters:   05/01/19 124/48   03/11/19 115/64   02/05/19 116/60      Pulse Readings from Last 3 Encounters:   05/01/19 61   03/11/19 76   02/05/19 73     Body mass index is 22.66 kg/m².  @LASTSAO2(3)@    Physical Exam   Constitutional: He is oriented to person, place, and time. He appears well-developed and well-nourished. No distress.   HENT:   Head: Normocephalic and atraumatic.   Right Ear: External ear normal.   Left Ear: External ear normal.   Nose: Nose normal.   Mouth/Throat: Oropharynx is clear and moist.   Eyes: Conjunctivae and EOM are normal. Pupils are equal, round, and reactive to light.   Neck: Normal range of motion. Neck supple.   Cardiovascular: Normal rate, regular rhythm, normal heart sounds and intact distal pulses.   Pulmonary/Chest: Effort normal and breath sounds normal. No respiratory distress. He has no wheezes.   Musculoskeletal:   Walks with rollator   Neurological: He is alert and oriented to person, place, and time.   Skin: Skin is warm and dry.   Psychiatric: He has a normal mood and affect. His behavior is normal. Judgment and thought content normal.   Nursing note and vitals reviewed.      Results for orders placed or performed in visit on 04/05/19   Respiratory Panel, PCR - Swab, Nasopharynx   Result Value Ref Range    ADENOVIRUS, PCR Not Detected Not Detected    Coronavirus 229E Not Detected Not Detected    Coronavirus HKU1 Not Detected Not Detected    Coronavirus NL63 Not Detected Not Detected    Coronavirus OC43 Not Detected Not Detected    Human Metapneumovirus Not Detected Not Detected    Human Rhinovirus/Enterovirus Detected (A) Not Detected    Influenza B PCR Not Detected Not Detected    Parainfluenza Virus 1 Not Detected Not Detected    Parainfluenza Virus 2 Not Detected Not Detected    Parainfluenza Virus 3 Not Detected Not Detected    Parainfluenza Virus 4 Not Detected Not Detected    Bordetella pertussis pcr Not Detected Not Detected    Influenza A H1 2009 PCR Not Detected Not Detected    Chlamydophila pneumoniae PCR Not Detected Not  Detected    Mycoplasma pneumo by PCR Not Detected Not Detected    Influenza A PCR Not Detected Not Detected    Influenza A H3 Not Detected Not Detected    Influenza A H1 Not Detected Not Detected    RSV, PCR Detected (A) Not Detected    Bordetella parapertussis PCR Not Detected Not Detected   Comprehensive Metabolic Panel   Result Value Ref Range    Glucose 128 (H) 65 - 99 mg/dL    BUN 12 8 - 23 mg/dL    Creatinine 0.56 (L) 0.76 - 1.27 mg/dL    Sodium 128 (L) 136 - 145 mmol/L    Potassium 4.5 3.5 - 5.2 mmol/L    Chloride 91 (L) 98 - 107 mmol/L    CO2 28.3 22.0 - 29.0 mmol/L    Calcium 8.7 8.6 - 10.5 mg/dL    Total Protein 5.9 (L) 6.0 - 8.5 g/dL    Albumin 3.60 3.50 - 5.20 g/dL    ALT (SGPT) 15 1 - 41 U/L    AST (SGOT) 16 1 - 40 U/L    Alkaline Phosphatase 207 (H) 39 - 117 U/L    Total Bilirubin 0.3 0.2 - 1.2 mg/dL    eGFR Non African Amer 143 >60 mL/min/1.73    Globulin 2.3 gm/dL    A/G Ratio 1.6 g/dL    BUN/Creatinine Ratio 21.4 7.0 - 25.0    Anion Gap 8.7 mmol/L   CBC Auto Differential   Result Value Ref Range    WBC 9.17 3.40 - 10.80 10*3/mm3    RBC 4.64 4.14 - 5.80 10*6/mm3    Hemoglobin 13.0 13.0 - 17.7 g/dL    Hematocrit 40.0 37.5 - 51.0 %    MCV 86.2 79.0 - 97.0 fL    MCH 28.0 26.6 - 33.0 pg    MCHC 32.5 31.5 - 35.7 g/dL    RDW 14.3 12.3 - 15.4 %    RDW-SD 44.8 37.0 - 54.0 fl    MPV 8.7 6.0 - 12.0 fL    Platelets 401 140 - 450 10*3/mm3    Neutrophil % 58.0 42.7 - 76.0 %    Lymphocyte % 26.0 19.6 - 45.3 %    Monocyte % 9.5 5.0 - 12.0 %    Eosinophil % 1.0 0.3 - 6.2 %    Basophil % 0.9 0.0 - 1.5 %    Immature Grans % 4.6 (H) 0.0 - 0.5 %    Neutrophils, Absolute 5.33 1.40 - 7.00 10*3/mm3    Lymphocytes, Absolute 2.38 0.70 - 3.10 10*3/mm3    Monocytes, Absolute 0.87 0.10 - 0.90 10*3/mm3    Eosinophils, Absolute 0.09 0.00 - 0.40 10*3/mm3    Basophils, Absolute 0.08 0.00 - 0.20 10*3/mm3    Immature Grans, Absolute 0.42 (H) 0.00 - 0.05 10*3/mm3    nRBC 0.0 0.0 - 0.0 /100 WBC           Andrew was seen today for hip  injury and alzheimer's disease.    Diagnoses and all orders for this visit:    Hip fracture due to osteoporosis, sequela    Alzheimer's dementia without behavioral disturbance, unspecified timing of dementia onset    Bipolar 1 disorder (CMS/[facility])      Cont current meds.  Ov/labs in 3 mo.       Outpatient Medications Prior to Visit   Medication Sig Dispense Refill   • acetaminophen (TYLENOL) 325 MG tablet Take 650 mg by mouth Every 6 (Six) Hours As Needed for Mild Pain .     • aspirin 325 MG EC tablet Take 1 tablet by mouth Every 12 (Twelve) Hours.     • calcitonin, salmon, (MIACALCIN) 200 UNIT/ACT nasal spray 1 spray into each nostril Daily. 3.7 mL 3   • fexofenadine-pseudoephedrine (ALLEGRA-D)  MG per 12 hr tablet Take 1 tablet by mouth 2 (Two) Times a Day As Needed for Allergies.     • lamoTRIgine (LaMICtal) 100 MG tablet Take 100 mg by mouth Daily.     • lamoTRIgine (LAMICTAL) 200 MG tablet Take 200 mg by mouth Every Night.     • memantine (NAMENDA) 10 MG tablet Take 10 mg by mouth 2 (Two) Times a Day.     • ondansetron (ZOFRAN) 4 MG tablet Take 1 tablet by mouth Every 6 (Six) Hours As Needed for Nausea or Vomiting.     • polyethylene glycol (MIRALAX) powder Take 17 g by mouth Daily. (Patient taking differently: Take 17 g by mouth Daily As Needed.) 850 g 5   • rivastigmine (EXELON) 6 MG capsule Take 6 mg by mouth 2 (Two) Times a Day.     • tamsulosin (FLOMAX) 0.4 MG capsule 24 hr capsule Take 1 capsule by mouth Every Night. 30 capsule 2     No facility-administered medications prior to visit.      No orders of the defined types were placed in this encounter.    [unfilled]  Medications Discontinued During This Encounter   Medication Reason   • tamsulosin (FLOMAX) 0.4 MG capsule 24 hr capsule          Return in about 3 months (around 8/1/2019) for Recheck, labs.

## 2019-05-02 ENCOUNTER — EPISODE CHANGES (OUTPATIENT)
Dept: CASE MANAGEMENT | Facility: OTHER | Age: 74
End: 2019-05-02

## 2019-05-06 ENCOUNTER — PATIENT OUTREACH (OUTPATIENT)
Dept: CASE MANAGEMENT | Facility: OTHER | Age: 74
End: 2019-05-06

## 2019-05-06 NOTE — OUTREACH NOTE
Patient Outreach Note    Talked with patient. Patient recently discharged from [facility].He was evaluated by home health services but states  Not to be  receiving services at this time.  Patient lives alone; independent with ADL's; light meal preparation; and  ambulates using walker as needed. He states to be independent; walking as needed and has been walking to [facility]. Patient states to be compliant with medications; and  medical appointments. Patient is able to review upcoming physician appointments and dates. Patient declined 24/7 Nurse Line Telephone number. He states to be doing well; appreciates phone call  and states  not to need phone calls or assistance.      Lachelle Marquez RN    5/6/2019, 5:11 PM

## 2019-05-06 NOTE — OUTREACH NOTE
Care Coordination Note    Talked with [facility] [phone]. She states patient had PT evaluation and patient declined home health services.     Lachelle Marquez RN    5/6/2019, 5:22 PM

## 2019-05-07 ENCOUNTER — PATIENT OUTREACH (OUTPATIENT)
Dept: CASE MANAGEMENT | Facility: OTHER | Age: 74
End: 2019-05-07

## 2019-05-07 ENCOUNTER — EPISODE CHANGES (OUTPATIENT)
Dept: CASE MANAGEMENT | Facility: OTHER | Age: 74
End: 2019-05-07

## 2019-05-07 NOTE — OUTREACH NOTE
Talked with Sue/ PCP office regarding scheduling of Medicare Annual Wellness Visit. MWV scheduled for 8/1/19 at 1:15 PM.

## 2019-05-23 ENCOUNTER — OFFICE VISIT (OUTPATIENT)
Dept: ORTHOPEDIC SURGERY | Facility: CLINIC | Age: 74
End: 2019-05-23

## 2019-05-23 DIAGNOSIS — S42.001A CLOSED NONDISPLACED FRACTURE OF RIGHT CLAVICLE, UNSPECIFIED PART OF CLAVICLE, INITIAL ENCOUNTER: ICD-10-CM

## 2019-05-23 DIAGNOSIS — Z96.649 STATUS POST HIP HEMIARTHROPLASTY: Primary | ICD-10-CM

## 2019-05-23 PROCEDURE — 73030 X-RAY EXAM OF SHOULDER: CPT | Performed by: ORTHOPAEDIC SURGERY

## 2019-05-23 PROCEDURE — 99024 POSTOP FOLLOW-UP VISIT: CPT | Performed by: ORTHOPAEDIC SURGERY

## 2019-05-23 PROCEDURE — 73502 X-RAY EXAM HIP UNI 2-3 VIEWS: CPT | Performed by: ORTHOPAEDIC SURGERY

## 2019-05-23 NOTE — PROGRESS NOTES
DIAGNOSIS: F/u s/p right hip hemiarthroplasty, DOS 03/08/2019, closed treatment right clavicle fracture    SUBJECTIVE:Patient returns today for follow up of right hip replacement and closed treatment right clavicle fracture. Patient reports continuing to do well, but notes minimal discomfort in the right hip, which is worsened with bending over. He rates the pain as a 1/10 in severity. Appears to be progressing appropriately. Denies fevers, chills, or sweats, no redness or drainage from incision. Denies calf pain or swelling to bilateral lower extremities. He currently ambulates with a walker. He also complains of minimal right shoulder pain with good improvement compared to prior office visit. Denies any numbness or tingling in the right upper extremity.     EXAM:  Right hip- incision well healed   No erythema, fluctuance, or subcutaneous fluid collection   Hip flexion 120   ER 45   IR 50   Abduction is 45 with 4/5 strength    No calf pain, negative Homans sign bilateral lower extremities   Positive sensation right foot  Right shoulder   FF- 180   ER- 55   IR- to T12   No pain on cross arm test   No pain over distal clavicle    IMAGING  AP pelvis and frog lateral view of right hip, ordered and reviewed by me today, indication status post hip hemiarthroplasty, compared to prior postoperative x-rays.  Findings include stable hemiarthroplasty components with no evidence of loosening or osteolysis surrounding femoral stem, no evidence of joint space narrowing at the acetabulum or periprosthetic fracture.  Reactive bone formation noted medially adjacent to the lesser trochanter.     2 view x-rays right shoulder from today's visit, AP and lateral views, ordered and reviewed by me, indication right scapular neck and distal clavicle fracture.  Persistent region of radiolucency at the distal clavicle fracture site with moderate reactive periosteal callus formation noted, no significant displacement in comparison to prior  films.  Scapular neck fracture appears to be fairly unchanged in regards to position with appreciated interosseous callus.     ASSESSMENT: status post right hip hemiarthroplasty, closed treatment right clavicle fracture    PLAN:   1.  Continue work on range of motion and strengthening.  He is having no pain over his distal clavicle fracture site despite lack of definitive union, not interested in any further intervention or surgery at this time and function very well with minimal to no pain.  2.  May discontinue posterior hip precautions at this time.   3.  Follow-up in 3 months with repeat x-rays of the right hip and right shoulder  4.  All questions answered today     By signing my name here, I Jovani Meredith, attest that all documentation on 05/23/19 at 11:02 AM has been prepared under the direction and in the presence of Dr. Harrison Turk.    I, Dr. Harrison Turk, personally performed the services described in this documentation, as scribed by Jovani Meredith, in my presence, and it is both accurate and complete.

## 2019-05-24 ENCOUNTER — TELEPHONE (OUTPATIENT)
Dept: INTERNAL MEDICINE | Facility: CLINIC | Age: 74
End: 2019-05-24

## 2019-05-24 NOTE — TELEPHONE ENCOUNTER
Spoke with patient.  He states that some earwax came loose but wants us to finish cleaning ears.  Scheduled on nurse sched next Tues @ 11:00.      ----- Message from Sue Hilton sent at 5/24/2019  3:32 PM EDT -----  Regarding: APPOINTMENT TO CLEAN EARS?  Contact: [phone]  LEN PT    Patient called to say that he was using an ear wax removal kit on his ears and thinks he broke something lose in his ear. He wants to come in next week.  Does he need to see dr mansfield to have this done?    Please call patient    Thanks!  Sue

## 2019-06-04 ENCOUNTER — DOCUMENTATION (OUTPATIENT)
Dept: INTERNAL MEDICINE | Facility: CLINIC | Age: 74
End: 2019-06-04

## 2019-06-14 ENCOUNTER — TELEPHONE (OUTPATIENT)
Dept: INTERNAL MEDICINE | Facility: CLINIC | Age: 74
End: 2019-06-14

## 2019-06-14 NOTE — TELEPHONE ENCOUNTER
Patient wanting to know if he can clean ears with peroxide/water mix.  I advised that I had already done this multiple times with his ears, but that he could try this himself. (Advised not to insert QTip or other hard objects in ear.) I advised that we may need to refer him to ENT, as the cerumen in his ears is persistent.  He voiced understanding and assured me that he would call next week if he wants referral.   ----- Message from El Dow MA sent at 6/14/2019  2:11 PM EDT -----  Regarding: FW: pt requesting call back  Contact: [phone]  :)  ----- Message -----  From: Salima Griggs  Sent: 6/14/2019  12:34 PM  To: Caden Sanchez [facility] [place]  Subject: pt requesting call back                            Patient calling today saying he is having problems with his ears and there is stuff in them.  Asking for a return call from Suni ESPINOZA

## 2019-07-11 ENCOUNTER — OFFICE VISIT (OUTPATIENT)
Dept: INTERNAL MEDICINE | Facility: CLINIC | Age: 74
End: 2019-07-11

## 2019-07-11 VITALS
RESPIRATION RATE: 16 BRPM | BODY MASS INDEX: 22.88 KG/M2 | TEMPERATURE: 98.3 F | DIASTOLIC BLOOD PRESSURE: 56 MMHG | HEART RATE: 79 BPM | HEIGHT: 68 IN | OXYGEN SATURATION: 98 % | SYSTOLIC BLOOD PRESSURE: 98 MMHG | WEIGHT: 151 LBS

## 2019-07-11 DIAGNOSIS — H65.00 ACUTE SEROUS OTITIS MEDIA, RECURRENCE NOT SPECIFIED, UNSPECIFIED LATERALITY: Primary | ICD-10-CM

## 2019-07-11 PROCEDURE — 99213 OFFICE O/P EST LOW 20 MIN: CPT | Performed by: NURSE PRACTITIONER

## 2019-07-11 RX ORDER — FEXOFENADINE HCL AND PSEUDOEPHEDRINE HCI 60; 120 MG/1; MG/1
1 TABLET, EXTENDED RELEASE ORAL 2 TIMES DAILY
Qty: 60 TABLET | Refills: 0
Start: 2019-07-11 | End: 2019-12-18 | Stop reason: SDUPTHER

## 2019-07-11 RX ORDER — FLUTICASONE PROPIONATE 50 MCG
2 SPRAY, SUSPENSION (ML) NASAL DAILY
Qty: 16 G | Refills: 0 | Status: SHIPPED | OUTPATIENT
Start: 2019-07-11 | End: 2019-11-12 | Stop reason: SDUPTHER

## 2019-07-11 NOTE — PATIENT INSTRUCTIONS
Otitis Media With Effusion, Pediatric  Otitis media with effusion (OME) occurs when there is inflammation of the middle ear and fluid in the middle ear space. There are no signs and symptoms of infection. The middle ear space contains air and the bones for hearing. Air in the middle ear space helps to transmit sound to the brain.  OME is a common condition in children, and it often occurs after an ear infection. This condition may be present for several weeks or longer after an ear infection. Most cases of this condition get better on their own.  What are the causes?  OME is caused by a blockage of the eustachian tube in one or both ears. These tubes drain fluid in the ears to the back of the nose (nasopharynx). If the tissue in the tube swells up (edema), the tube closes. This prevents fluid from draining. Blockage can be caused by:  · Ear infections.  · Colds and other upper respiratory infections.  · Allergies.  · Irritants, such as tobacco smoke.  · Enlarged adenoids. The adenoids are areas of soft tissue located high in the back of the throat, behind the nose and the roof of the mouth. They are part of the body’s natural defense (immune) system.  · A mass in the nasopharynx.  · Damage to the ear caused by pressure changes (barotrauma).    What increases the risk?  Your child is more likely to develop this condition if:  · He or she has repeated ear and sinus infections.  · He or she has allergies.  · He or she is exposed to tobacco smoke.  · He or she attends .  · He or she is not .    What are the signs or symptoms?  Symptoms of this condition may not be obvious. Sometimes this condition does not have any symptoms, or symptoms may overlap with those of a cold or upper respiratory tract illness.  Symptoms of this condition include:  · Temporary hearing loss.  · A feeling of fullness in the ear without pain.  · Irritability or agitation.  · Balance (vestibular) problems.    As a result of hearing  "loss, your child may:  · Listen to the TV at a loud volume.  · Not respond to questions.  · Ask \"What?\" often when spoken to.  · Mistake or confuse one sound or word for another.  · Perform poorly at school.  · Have a poor attention span.  · Become agitated or irritated easily.    How is this diagnosed?  This condition is diagnosed with an ear exam. Your child's health care provider will look inside your child's ear with an instrument (otoscope) to check for redness, swelling, and fluid.  Other tests may be done, including:  · A test to check the movement of the eardrum (pneumatic otoscopy). This is done by squeezing a small amount of air into the ear.  · A test that changes air pressure in the middle ear to check how well the eardrum moves and to see if the eustachian tube is working (tympanogram).  · Hearing test (audiogram). This test involves playing tones at different pitches to see if your child can hear each tone.    How is this treated?  Treatment for this condition depends on the cause. In many cases, the fluid goes away on its own.  In some cases, your child may need a procedure to create a hole in the eardrum to allow fluid to drain (myringotomy) and to insert small drainage tubes (tympanostomy tubes) into the eardrums. These tubes help to drain fluid and prevent infection. This procedure may be recommended if:  · OME does not get better over several months.  · Your child has many ear infections within several months.  · Your child has noticeable hearing loss.  · Your child has problems with speech and language development.    Surgery may also be done to remove the adenoids (adenoidectomy).  Follow these instructions at home:  · Give over-the-counter and prescription medicines only as told by your child's health care provider.  · Keep children away from any tobacco smoke.  · Keep all follow-up visits as told by your child's health care provider. This is important.  How is this prevented?  · Keep your " child's vaccinations up to date. Make sure your child gets all recommended vaccinations, including a pneumonia and flu vaccine.  · Encourage hand washing. Your child should wash his or her hands often with soap and water. If there is no soap and water, he or she should use hand .  · Avoid exposing your child to tobacco smoke.  · Breastfeed your baby, if possible. Babies who are  as long as possible are less likely to develop this condition.  Contact a health care provider if:  · Your child's hearing does not get better after 3 months.  · Your child's hearing is worse.  · Your child has ear pain.  · Your child has a fever.  · Your child has drainage from the ear.  · Your child is dizzy.  · Your child has a lump on his or her neck.  Get help right away if:  · Your child has bleeding from the nose.  · Your child cannot move part of her or his face.  · Your child has trouble breathing.  · Your child cannot smell.  · Your child develops severe congestion.  · Your child develops weakness.  · Your child who is younger than 3 months has a temperature of 100°F (38°C) or higher.  Summary  · Otitis media with effusion (OME) occurs when there is inflammation of the middle ear and fluid in the middle ear space.  · This condition is caused by blockage of one or both eustachian tubes, which drain fluid in the ears to the back of the nose.  · Symptoms of this condition can include temporary hearing loss, a feeling of fullness in the ear, irritability or agitation, and balance (vestibular) problems. Sometimes, there are no symptoms.  · This condition is diagnosed with an ear exam and tests, such as pneumatic otoscopy, tympanogram, and audiogram.  · Treatment for this condition depends on the cause. In many cases, the fluid goes away on its own.  This information is not intended to replace advice given to you by your health care provider. Make sure you discuss any questions you have with your health care  provider.  Document Released: 03/09/2005 Document Revised: 11/09/2017 Document Reviewed: 11/09/2017  [employer] Interactive Patient Education © 2019 [employer]

## 2019-07-18 DIAGNOSIS — R79.9 ABNORMAL FINDING OF BLOOD CHEMISTRY: ICD-10-CM

## 2019-07-18 DIAGNOSIS — R53.83 LOW ENERGY: ICD-10-CM

## 2019-07-18 DIAGNOSIS — Z23 NEED FOR VACCINATION: Primary | ICD-10-CM

## 2019-07-18 DIAGNOSIS — R63.4 WEIGHT LOSS: ICD-10-CM

## 2019-07-18 DIAGNOSIS — E78.5 DYSLIPIDEMIA: ICD-10-CM

## 2019-07-18 DIAGNOSIS — Z00.00 HEALTHCARE MAINTENANCE: ICD-10-CM

## 2019-07-23 ENCOUNTER — RESULTS ENCOUNTER (OUTPATIENT)
Dept: INTERNAL MEDICINE | Facility: CLINIC | Age: 74
End: 2019-07-23

## 2019-07-23 DIAGNOSIS — R63.4 WEIGHT LOSS: ICD-10-CM

## 2019-07-23 DIAGNOSIS — Z00.00 HEALTHCARE MAINTENANCE: ICD-10-CM

## 2019-07-23 DIAGNOSIS — E78.5 DYSLIPIDEMIA: ICD-10-CM

## 2019-07-23 DIAGNOSIS — R79.9 ABNORMAL FINDING OF BLOOD CHEMISTRY: ICD-10-CM

## 2019-07-23 DIAGNOSIS — R53.83 LOW ENERGY: ICD-10-CM

## 2019-07-27 LAB
ALBUMIN SERPL-MCNC: 4.1 G/DL (ref 3.5–5.2)
ALBUMIN/GLOB SERPL: 1.7 G/DL
ALP SERPL-CCNC: 159 U/L (ref 39–117)
ALT SERPL-CCNC: 26 U/L (ref 1–41)
AST SERPL-CCNC: 23 U/L (ref 1–40)
BASOPHILS # BLD AUTO: 0.08 10*3/MM3 (ref 0–0.2)
BASOPHILS NFR BLD AUTO: 1.1 % (ref 0–1.5)
BILIRUB SERPL-MCNC: 0.3 MG/DL (ref 0.2–1.2)
BUN SERPL-MCNC: 12 MG/DL (ref 8–23)
BUN/CREAT SERPL: 15.8 (ref 7–25)
CALCIUM SERPL-MCNC: 9.3 MG/DL (ref 8.6–10.5)
CHLORIDE SERPL-SCNC: 100 MMOL/L (ref 98–107)
CHOLEST SERPL-MCNC: 175 MG/DL (ref 0–200)
CO2 SERPL-SCNC: 29.4 MMOL/L (ref 22–29)
CREAT SERPL-MCNC: 0.76 MG/DL (ref 0.76–1.27)
EOSINOPHIL # BLD AUTO: 0.17 10*3/MM3 (ref 0–0.4)
EOSINOPHIL NFR BLD AUTO: 2.4 % (ref 0.3–6.2)
ERYTHROCYTE [DISTWIDTH] IN BLOOD BY AUTOMATED COUNT: 14.8 % (ref 12.3–15.4)
GLOBULIN SER CALC-MCNC: 2.4 GM/DL
GLUCOSE SERPL-MCNC: 94 MG/DL (ref 65–99)
HBA1C MFR BLD: 5.6 % (ref 4.8–5.6)
HCT VFR BLD AUTO: 45.5 % (ref 37.5–51)
HCV AB S/CO SERPL IA: <0.1 S/CO RATIO (ref 0–0.9)
HDLC SERPL-MCNC: 50 MG/DL (ref 40–60)
HGB BLD-MCNC: 14.8 G/DL (ref 13–17.7)
IMM GRANULOCYTES # BLD AUTO: 0.09 10*3/MM3 (ref 0–0.05)
IMM GRANULOCYTES NFR BLD AUTO: 1.2 % (ref 0–0.5)
LDLC SERPL CALC-MCNC: 103 MG/DL (ref 0–100)
LDLC/HDLC SERPL: 2.05 {RATIO}
LYMPHOCYTES # BLD AUTO: 2.29 10*3/MM3 (ref 0.7–3.1)
LYMPHOCYTES NFR BLD AUTO: 31.8 % (ref 19.6–45.3)
MCH RBC QN AUTO: 29.1 PG (ref 26.6–33)
MCHC RBC AUTO-ENTMCNC: 32.5 G/DL (ref 31.5–35.7)
MCV RBC AUTO: 89.4 FL (ref 79–97)
MONOCYTES # BLD AUTO: 0.66 10*3/MM3 (ref 0.1–0.9)
MONOCYTES NFR BLD AUTO: 9.2 % (ref 5–12)
NEUTROPHILS # BLD AUTO: 3.92 10*3/MM3 (ref 1.7–7)
NEUTROPHILS NFR BLD AUTO: 54.3 % (ref 42.7–76)
NRBC BLD AUTO-RTO: 0 /100 WBC (ref 0–0.2)
PLATELET # BLD AUTO: 319 10*3/MM3 (ref 140–450)
POTASSIUM SERPL-SCNC: 5.1 MMOL/L (ref 3.5–5.2)
PROT SERPL-MCNC: 6.5 G/DL (ref 6–8.5)
RBC # BLD AUTO: 5.09 10*6/MM3 (ref 4.14–5.8)
SODIUM SERPL-SCNC: 139 MMOL/L (ref 136–145)
TRIGL SERPL-MCNC: 112 MG/DL (ref 0–150)
VLDLC SERPL CALC-MCNC: 22.4 MG/DL
WBC # BLD AUTO: 7.21 10*3/MM3 (ref 3.4–10.8)

## 2019-07-30 ENCOUNTER — TELEPHONE (OUTPATIENT)
Dept: INTERNAL MEDICINE | Facility: CLINIC | Age: 74
End: 2019-07-30

## 2019-07-30 NOTE — TELEPHONE ENCOUNTER
----- Message from Jessica Birch MD sent at 7/29/2019  3:45 PM EDT -----  Labs ok. Will discuss at upcoming appt.

## 2019-08-02 ENCOUNTER — OFFICE VISIT (OUTPATIENT)
Dept: INTERNAL MEDICINE | Facility: CLINIC | Age: 74
End: 2019-08-02

## 2019-08-02 VITALS
DIASTOLIC BLOOD PRESSURE: 70 MMHG | HEIGHT: 68 IN | OXYGEN SATURATION: 97 % | WEIGHT: 147 LBS | BODY MASS INDEX: 22.28 KG/M2 | SYSTOLIC BLOOD PRESSURE: 100 MMHG | RESPIRATION RATE: 16 BRPM | HEART RATE: 78 BPM

## 2019-08-02 DIAGNOSIS — F02.80 ALZHEIMER'S DEMENTIA WITHOUT BEHAVIORAL DISTURBANCE, UNSPECIFIED TIMING OF DEMENTIA ONSET: ICD-10-CM

## 2019-08-02 DIAGNOSIS — M85.9 DISORDER OF BONE DENSITY AND STRUCTURE, UNSPECIFIED: ICD-10-CM

## 2019-08-02 DIAGNOSIS — G30.9 ALZHEIMER'S DEMENTIA WITHOUT BEHAVIORAL DISTURBANCE, UNSPECIFIED TIMING OF DEMENTIA ONSET: ICD-10-CM

## 2019-08-02 DIAGNOSIS — K59.09 CHRONIC CONSTIPATION: ICD-10-CM

## 2019-08-02 DIAGNOSIS — J30.9 ALLERGIC RHINITIS, UNSPECIFIED SEASONALITY, UNSPECIFIED TRIGGER: ICD-10-CM

## 2019-08-02 DIAGNOSIS — E78.5 DYSLIPIDEMIA: ICD-10-CM

## 2019-08-02 DIAGNOSIS — Z00.00 MEDICARE ANNUAL WELLNESS VISIT, SUBSEQUENT: Primary | ICD-10-CM

## 2019-08-02 DIAGNOSIS — M80.059S: ICD-10-CM

## 2019-08-02 DIAGNOSIS — F30.9 BIPOLAR I DISORDER, SINGLE MANIC EPISODE (HCC): ICD-10-CM

## 2019-08-02 PROCEDURE — G0439 PPPS, SUBSEQ VISIT: HCPCS | Performed by: INTERNAL MEDICINE

## 2019-08-02 PROCEDURE — 99214 OFFICE O/P EST MOD 30 MIN: CPT | Performed by: INTERNAL MEDICINE

## 2019-08-02 NOTE — PROGRESS NOTES
The ABCs of the Annual Wellness Visit  Subsequent Medicare Wellness Visit    Chief Complaint   Patient presents with   • Medicare Wellness-subsequent       Subjective   History of Present Illness:  Andrew Redmond is a 73 y.o. male who presents for a Subsequent Medicare Wellness Visit.    HEALTH RISK ASSESSMENT    Recent Hospitalizations:  Recently treated at the following:  [facility] pt fractured hip and had surgery then went to the [facility] for rehab.  Now home and doing well.    Current Medical Providers:  Patient Care Team:  Jessica Birch MD as PCP - General (Internal Medicine & Pediatrics)  Huan Romano MD as PCP - Family Medicine  Jessica Birch MD as PCP - Claims Attributed    Smoking Status:  Social History     Tobacco Use   Smoking Status Former Smoker   • Packs/day: 2.00   • Years: 20.00   • Pack years: 40.00   Smokeless Tobacco Former User   • Quit date: 1987   Tobacco Comment    quit 30 yrs ago        Alcohol Consumption:  Social History     Substance and Sexual Activity   Alcohol Use No       Depression Screen:   PHQ-2/PHQ-9 Depression Screening 8/2/2019   Little interest or pleasure in doing things 0   Feeling down, depressed, or hopeless 0   Total Score 0       Fall Risk Screen:  STEADI Fall Risk Assessment was completed, and patient is at HIGH risk for falls. Assessment completed on:8/2/2019    Health Habits and Functional and Cognitive Screening:  Functional & Cognitive Status 8/2/2019   Do you have difficulty preparing food and eating? No   Do you have difficulty bathing yourself, getting dressed or grooming yourself? No   Do you have difficulty using the toilet? No   Do you have difficulty moving around from place to place? No   Do you have trouble with steps or getting out of a bed or a chair? No   Current Diet Well Balanced Diet   Dental Exam Up to date   Eye Exam Not up to date   Exercise (times per week) 7 times per week   Current Exercise Activities  Include walking   Do you need help using the phone?  No   Are you deaf or do you have serious difficulty hearing?  Yes   Do you need help with transportation? Yes   Do you need help shopping? No   Do you need help preparing meals?  No   Do you need help with housework?  No   Do you need help with laundry? No   Do you need help taking your medications? No   Do you need help managing money? No   Do you ever drive or ride in a car without wearing a seat belt? No   Have you felt unusual stress, anger or loneliness in the last month? -   Who do you live with? -   If you need help, do you have trouble finding someone available to you? -   Have you been bothered in the last four weeks by sexual problems? -   Do you have difficulty concentrating, remembering or making decisions? -         Does the patient have evidence of cognitive impairment? Yes    Asprin use counseling:Does not need ASA but is currently taking (advised patient that ASA is not indicated and patient chooses to stop it)    Age-appropriate Screening Schedule:  Refer to the list below for future screening recommendations based on patient's age, sex and/or medical conditions. Orders for these recommended tests are listed in the plan section. The patient has been provided with a written plan.    Health Maintenance   Topic Date Due   • ZOSTER VACCINE (1 of 2) 09/25/1995   • DXA SCAN  05/01/2019   • INFLUENZA VACCINE  08/01/2019   • PNEUMOCOCCAL VACCINES (65+ LOW/MEDIUM RISK) (2 of 2 - PPSV23) 10/05/2019   • COLONOSCOPY  04/01/2026   • TDAP/TD VACCINES (4 - Tdap) 12/22/2028          The following portions of the patient's history were reviewed and updated as appropriate: allergies, current medications, past family history, past medical history, past social history, past surgical history and problem list.    Outpatient Medications Prior to Visit   Medication Sig Dispense Refill   • aspirin 325 MG EC tablet Take 1 tablet by mouth Every 12 (Twelve) Hours.     •  fexofenadine-pseudoephedrine (ALLEGRA-D)  MG per 12 hr tablet Take 1 tablet by mouth 2 (Two) Times a Day. 60 tablet 0   • fluticasone (FLONASE) 50 MCG/ACT nasal spray 2 sprays into the nostril(s) as directed by provider Daily. 16 g 0   • lamoTRIgine (LaMICtal) 100 MG tablet Take 100 mg by mouth Daily.     • lamoTRIgine (LAMICTAL) 200 MG tablet Take 200 mg by mouth Every Night.     • memantine (NAMENDA) 10 MG tablet Take 10 mg by mouth 2 (Two) Times a Day.     • polyethylene glycol (MIRALAX) powder Take 17 g by mouth Daily. (Patient taking differently: Take 17 g by mouth Daily As Needed.) 850 g 5   • rivastigmine (EXELON) 6 MG capsule Take 6 mg by mouth 2 (Two) Times a Day.       No facility-administered medications prior to visit.        Patient Active Problem List   Diagnosis   • Alzheimer's dementia without behavioral disturbance   • Bipolar I disorder, single manic episode (CMS/HCC)   • Epilepsy (CMS/HCC)   • Chronic constipation   • Hemorrhoids   • Polyp of colon   • Erectile dysfunction   • High risk medication use   • Poor balance   • Subdural hematoma (CMS/HCC)   • Seizure (CMS/HCC)   • Low energy   • Closed compression fracture of L1 lumbar vertebra (CMS/HCC)   • Acute bilateral low back pain without sciatica   • Weight loss   • Displaced fracture of right femoral neck (CMS/HCC)   • Status post hip hemiarthroplasty   • Subacromial bursitis of left shoulder joint   • Closed displaced fracture of neck of right scapula   • Closed nondisplaced fracture of right clavicle   • Hip fracture due to osteoporosis, sequela   • Acute serous otitis media       Advanced Care Planning:  Patient has an advance directive - a copy has been provided and is visible in patient header    Review of Systems    Compared to one year ago, the patient feels his physical health is worse.  Compared to one year ago, the patient feels his mental health is the same.    Reviewed chart for potential of high risk medication in the  "elderly: yes  Reviewed chart for potential of harmful drug interactions in the elderly:yes    Objective         Vitals:    08/02/19 1310   BP: 100/70   Pulse: 78   Resp: 16   SpO2: 97%   Weight: 66.7 kg (147 lb)   Height: 172.7 cm (68\")       Body mass index is 22.35 kg/m².  Discussed the patient's BMI with him. The BMI is in the acceptable range.    Physical Exam    Lab Results   Component Value Date    GLU 94 07/26/2019    CHLPL 175 07/26/2019    TRIG 112 07/26/2019    HDL 50 07/26/2019     (H) 07/26/2019    VLDL 22.4 07/26/2019    HGBA1C 5.60 07/26/2019        Assessment/Plan   Medicare Risks and Personalized Health Plan  CMS Preventative Services Quick Reference  Chronic Pain   Fall Risk  Immunizations Discussed/Encouraged (specific immunizations; Shingrix )  Inadequate Social Support, Isolation, Loneliness, Lack of Transportation, Financial Difficulties, or Caregiver Stress   Osteoprorosis Risk  Polypharmacy    The above risks/problems have been discussed with the patient.  Pertinent information has been shared with the patient in the After Visit Summary.  Follow up plans and orders are seen below in the Assessment/Plan Section.    Diagnoses and all orders for this visit:    1. Medicare annual wellness visit, subsequent (Primary)    2. Hip fracture due to osteoporosis, sequela  -     DEXA Bone Density Axial; Future    3. Alzheimer's dementia without behavioral disturbance, unspecified timing of dementia onset    4. Bipolar I disorder, single manic episode (CMS/[facility])    5. Dyslipidemia    6. Allergic rhinitis, unspecified seasonality, unspecified trigger    7. Chronic constipation    8. Disorder of bone density and structure, unspecified   -     DEXA Bone Density Axial; Future      Follow Up:  Return in about 6 months (around 2/2/2020) for Recheck, labs.     An After Visit Summary and PPPS were given to the patient.             "

## 2019-08-02 NOTE — PROGRESS NOTES
Andrew Redmond is a 73 y.o. male, who presents with a chief complaint of   Chief Complaint   Patient presents with   • Medicare Wellness-subsequent   follow up    HPI   Pt here for follow up and medicare wellness    Alzheimer's - dx 2008.  Pt  on exelon and namenda and doing well.  He lives on his own.  He struggles with short term memory at times but overall feels he is doing well.       Hx bipolar - on lamictal and stable.      chronic constipation - takes miralax PRN which is usually every 3-4 days.      AR - sx flared this summer.  Doing well on flonase and allegra.    Taking asa 325 daily but no hx CAD.     Dyslipidemia - total chol normal, ldl up.     Pt with difficulty urinating and has an upcoming appt with urology    Pt had ear infection about 2 weeks ago.  Got better then about 2-3 days ago couldn't hear as well.     The following portions of the patient's history were reviewed and updated as appropriate: allergies, current medications, past family history, past medical history, past social history, past surgical history and problem list.    Allergies: Patient has no known allergies.    Review of Systems   Constitutional: Negative.    HENT:        Trouble hearing   Eyes: Negative.    Respiratory: Negative.    Cardiovascular: Negative.    Gastrointestinal: Negative.    Endocrine: Negative.    Genitourinary: Positive for difficulty urinating.   Musculoskeletal: Negative.         Chronic hip pain   Skin: Negative.    Allergic/Immunologic: Negative.    Neurological: Negative.    Hematological: Negative.    Psychiatric/Behavioral: Negative.    All other systems reviewed and are negative.            Wt Readings from Last 3 Encounters:   08/02/19 66.7 kg (147 lb)   07/11/19 68.5 kg (151 lb)   05/01/19 67.6 kg (149 lb)     Temp Readings from Last 3 Encounters:   07/11/19 98.3 °F (36.8 °C) (Oral)   03/11/19 99.2 °F (37.3 °C) (Oral)   02/05/19 98.1 °F (36.7 °C) (Oral)     BP Readings from Last 3 Encounters:    08/02/19 100/70   07/11/19 98/56   05/01/19 124/48     Pulse Readings from Last 3 Encounters:   08/02/19 78   07/11/19 79   05/01/19 61     Body mass index is 22.35 kg/m².  @LASTSAO2(3)@    Physical Exam   Constitutional: He is oriented to person, place, and time. He appears well-developed and well-nourished. No distress.   HENT:   Head: Normocephalic and atraumatic.   Right Ear: External ear normal.   Left Ear: External ear normal.   Nose: Nose normal.   Mouth/Throat: Oropharynx is clear and moist.   bilat tm with mild serous effusion   Eyes: Conjunctivae and EOM are normal. Pupils are equal, round, and reactive to light.   Neck: Normal range of motion. Neck supple.   Cardiovascular: Normal rate, regular rhythm, normal heart sounds and intact distal pulses.   Pulmonary/Chest: Effort normal and breath sounds normal. No respiratory distress. He has no wheezes.   Musculoskeletal: Normal range of motion.   Walks with walker   Neurological: He is alert and oriented to person, place, and time.   Skin: Skin is warm and dry.   Psychiatric: He has a normal mood and affect. His behavior is normal. Judgment and thought content normal.   Nursing note and vitals reviewed.      Results for orders placed or performed in visit on 07/23/19   Lipid Panel With LDL / HDL Ratio   Result Value Ref Range    Total Cholesterol 175 0 - 200 mg/dL    Triglycerides 112 0 - 150 mg/dL    HDL Cholesterol 50 40 - 60 mg/dL    VLDL Cholesterol 22.4 mg/dL    LDL Cholesterol  103 (H) 0 - 100 mg/dL    LDL/HDL Ratio 2.05    Hemoglobin A1c   Result Value Ref Range    Hemoglobin A1C 5.60 4.80 - 5.60 %   Comprehensive Metabolic Panel   Result Value Ref Range    Glucose 94 65 - 99 mg/dL    BUN 12 8 - 23 mg/dL    Creatinine 0.76 0.76 - 1.27 mg/dL    eGFR Non African Am 101 >60 mL/min/1.73    eGFR African Am 122 >60 mL/min/1.73    BUN/Creatinine Ratio 15.8 7.0 - 25.0    Sodium 139 136 - 145 mmol/L    Potassium 5.1 3.5 - 5.2 mmol/L    Chloride 100 98 - 107  mmol/L    Total CO2 29.4 (H) 22.0 - 29.0 mmol/L    Calcium 9.3 8.6 - 10.5 mg/dL    Total Protein 6.5 6.0 - 8.5 g/dL    Albumin 4.10 3.50 - 5.20 g/dL    Globulin 2.4 gm/dL    A/G Ratio 1.7 g/dL    Total Bilirubin 0.3 0.2 - 1.2 mg/dL    Alkaline Phosphatase 159 (H) 39 - 117 U/L    AST (SGOT) 23 1 - 40 U/L    ALT (SGPT) 26 1 - 41 U/L   Hepatitis C Antibody   Result Value Ref Range    Hep C Virus Ab <0.1 0.0 - 0.9 s/co ratio   CBC & Differential   Result Value Ref Range    WBC 7.21 3.40 - 10.80 10*3/mm3    RBC 5.09 4.14 - 5.80 10*6/mm3    Hemoglobin 14.8 13.0 - 17.7 g/dL    Hematocrit 45.5 37.5 - 51.0 %    MCV 89.4 79.0 - 97.0 fL    MCH 29.1 26.6 - 33.0 pg    MCHC 32.5 31.5 - 35.7 g/dL    RDW 14.8 12.3 - 15.4 %    Platelets 319 140 - 450 10*3/mm3    Neutrophil Rel % 54.3 42.7 - 76.0 %    Lymphocyte Rel % 31.8 19.6 - 45.3 %    Monocyte Rel % 9.2 5.0 - 12.0 %    Eosinophil Rel % 2.4 0.3 - 6.2 %    Basophil Rel % 1.1 0.0 - 1.5 %    Neutrophils Absolute 3.92 1.70 - 7.00 10*3/mm3    Lymphocytes Absolute 2.29 0.70 - 3.10 10*3/mm3    Monocytes Absolute 0.66 0.10 - 0.90 10*3/mm3    Eosinophils Absolute 0.17 0.00 - 0.40 10*3/mm3    Basophils Absolute 0.08 0.00 - 0.20 10*3/mm3    Immature Granulocyte Rel % 1.2 (H) 0.0 - 0.5 %    Immature Grans Absolute 0.09 (H) 0.00 - 0.05 10*3/mm3    nRBC 0.0 0.0 - 0.2 /100 WBC           Andrew was seen today for medicare wellness-subsequent.    Diagnoses and all orders for this visit:    Medicare annual wellness visit, subsequent    Hip fracture due to osteoporosis, sequela  -     DEXA Bone Density Axial; Future    Alzheimer's dementia without behavioral disturbance, unspecified timing of dementia onset    Bipolar I disorder, single manic episode (CMS/[facility])    Dyslipidemia    Allergic rhinitis, unspecified seasonality, unspecified trigger    Chronic constipation    Disorder of bone density and structure, unspecified   -     DEXA Bone Density Axial; Future          Outpatient Medications Prior to  Visit   Medication Sig Dispense Refill   • aspirin 325 MG EC tablet Take 1 tablet by mouth Every 12 (Twelve) Hours.     • fexofenadine-pseudoephedrine (ALLEGRA-D)  MG per 12 hr tablet Take 1 tablet by mouth 2 (Two) Times a Day. 60 tablet 0   • fluticasone (FLONASE) 50 MCG/ACT nasal spray 2 sprays into the nostril(s) as directed by provider Daily. 16 g 0   • lamoTRIgine (LaMICtal) 100 MG tablet Take 100 mg by mouth Daily.     • lamoTRIgine (LAMICTAL) 200 MG tablet Take 200 mg by mouth Every Night.     • memantine (NAMENDA) 10 MG tablet Take 10 mg by mouth 2 (Two) Times a Day.     • polyethylene glycol (MIRALAX) powder Take 17 g by mouth Daily. (Patient taking differently: Take 17 g by mouth Daily As Needed.) 850 g 5   • rivastigmine (EXELON) 6 MG capsule Take 6 mg by mouth 2 (Two) Times a Day.       No facility-administered medications prior to visit.      No orders of the defined types were placed in this encounter.    [unfilled]  There are no discontinued medications.      Return in about 6 months (around 2/2/2020) for Recheck, labs.

## 2019-08-02 NOTE — PATIENT INSTRUCTIONS
Fall Prevention in the Home, Adult  Falls can cause injuries. They can happen to people of all ages. There are many things you can do to make your home safe and to help prevent falls. Ask for help when making these changes, if needed.  What actions can I take to prevent falls?  General Instructions  · Use good lighting in all rooms. Replace any light bulbs that burn out.  · Turn on the lights when you go into a dark area. Use night-lights.  · Keep items that you use often in easy-to-reach places. Lower the shelves around your home if necessary.  · Set up your furniture so you have a clear path. Avoid moving your furniture around.  · Do not have throw rugs and other things on the floor that can make you trip.  · Avoid walking on wet floors.  · If any of your floors are uneven, fix them.  · Add color or contrast paint or tape to clearly negin and help you see:  ? Any grab bars or handrails.  ? First and last steps of stairways.  ? Where the edge of each step is.  · If you use a stepladder:  ? Make sure that it is fully opened. Do not climb a closed stepladder.  ? Make sure that both sides of the stepladder are locked into place.  ? Ask someone to hold the stepladder for you while you use it.  · If there are any pets around you, be aware of where they are.  What can I do in the bathroom?    · Keep the floor dry. Clean up any water that spills onto the floor as soon as it happens.  · Remove soap buildup in the tub or shower regularly.  · Use non-skid mats or decals on the floor of the tub or shower.  · Attach bath mats securely with double-sided, non-slip rug tape.  · If you need to sit down in the shower, use a plastic, non-slip stool.  · Install grab bars by the toilet and in the tub and shower. Do not use towel bars as grab bars.  What can I do in the bedroom?  · Make sure that you have a light by your bed that is easy to reach.  · Do not use any sheets or blankets that are too big for your bed. They should not hang  down onto the floor.  · Have a firm chair that has side arms. You can use this for support while you get dressed.  What can I do in the kitchen?  · Clean up any spills right away.  · If you need to reach something above you, use a strong step stool that has a grab bar.  · Keep electrical cords out of the way.  · Do not use floor polish or wax that makes floors slippery. If you must use wax, use non-skid floor wax.  What can I do with my stairs?  · Do not leave any items on the stairs.  · Make sure that you have a light switch at the top of the stairs and the bottom of the stairs. If you do not have them, ask someone to add them for you.  · Make sure that there are handrails on both sides of the stairs, and use them. Fix handrails that are broken or loose. Make sure that handrails are as long as the stairways.  · Install non-slip stair treads on all stairs in your home.  · Avoid having throw rugs at the top or bottom of the stairs. If you do have throw rugs, attach them to the floor with carpet tape.  · Choose a carpet that does not hide the edge of the steps on the stairway.  · Check any carpeting to make sure that it is firmly attached to the stairs. Fix any carpet that is loose or worn.  What can I do on the outside of my home?  · Use bright outdoor lighting.  · Regularly fix the edges of walkways and driveways and fix any cracks.  · Remove anything that might make you trip as you walk through a door, such as a raised step or threshold.  · Trim any bushes or trees on the path to your home.  · Regularly check to see if handrails are loose or broken. Make sure that both sides of any steps have handrails.  · Install guardrails along the edges of any raised decks and porches.  · Clear walking paths of anything that might make someone trip, such as tools or rocks.  · Have any leaves, snow, or ice cleared regularly.  · Use sand or salt on walking paths during winter.  · Clean up any spills in your garage right away.  This includes grease or oil spills.  What other actions can I take?  · Wear shoes that:  ? Have a low heel. Do not wear high heels.  ? Have rubber bottoms.  ? Are comfortable and fit you well.  ? Are closed at the toe. Do not wear open-toe sandals.  · Use tools that help you move around (mobility aids) if they are needed. These include:  ? Canes.  ? Walkers.  ? Scooters.  ? Crutches.  · Review your medicines with your doctor. Some medicines can make you feel dizzy. This can increase your chance of falling.  Ask your doctor what other things you can do to help prevent falls.  Where to find more information  · Centers for Disease Control and Prevention, [employer]: https://cdc.gov  · National [place] on Aging: https://pn8jgxm.brianne.nih.gov  Contact a doctor if:  · You are afraid of falling at home.  · You feel weak, drowsy, or dizzy at home.  · You fall at home.  Summary  · There are many simple things that you can do to make your home safe and to help prevent falls.  · Ways to make your home safe include removing tripping hazards and installing grab bars in the bathroom.  · Ask for help when making these changes in your home.  This information is not intended to replace advice given to you by your health care provider. Make sure you discuss any questions you have with your health care provider.  Document Released: 10/14/2010 Document Revised: 2018 Document Reviewed: 2018  [employer] Interactive Patient Education © 2019 [employer]    Medicare Wellness  Personal Prevention Plan of Service     Date of Office Visit:  2019  Encounter Provider:  Jessica Birch MD  Place of Service:  [facility]  Patient Name: Andrew Redmond  :  1945    As part of the Medicare Wellness portion of your visit today, we are providing you with this personalized preventive plan of services (PPPS). This plan is based upon recommendations of the [place] Preventive Services Task  Force (USPSTF) and the Advisory Committee on Immunization Practices (ACIP).    This lists the preventive care services that should be considered, and provides dates of when you are due. Items listed as completed are up-to-date and do not require any further intervention.    Health Maintenance   Topic Date Due   • ZOSTER VACCINE (1 of 2) 09/25/1995   • DXA SCAN  05/01/2019   • INFLUENZA VACCINE  08/01/2019   • PNEUMOCOCCAL VACCINES (65+ LOW/MEDIUM RISK) (2 of 2 - PPSV23) 10/05/2019   • MEDICARE ANNUAL WELLNESS  08/02/2020   • COLONOSCOPY  04/01/2026   • TDAP/TD VACCINES (4 - Tdap) 12/22/2028   • HEPATITIS C SCREENING  Completed   • AAA SCREEN (ONE-TIME)  Completed       Orders Placed This Encounter   Procedures   • DEXA Bone Density Axial     Standing Status:   Future     Standing Expiration Date:   8/2/2020     Order Specific Question:   Reason for Exam:     Answer:   hx hip fracture       Return in about 6 months (around 2/2/2020) for Recheck, labs.

## 2019-08-08 ENCOUNTER — APPOINTMENT (OUTPATIENT)
Dept: BONE DENSITY | Facility: HOSPITAL | Age: 74
End: 2019-08-08

## 2019-08-08 DIAGNOSIS — M80.059S: ICD-10-CM

## 2019-08-08 DIAGNOSIS — M85.9 DISORDER OF BONE DENSITY AND STRUCTURE, UNSPECIFIED: ICD-10-CM

## 2019-08-08 PROCEDURE — 77080 DXA BONE DENSITY AXIAL: CPT

## 2019-08-13 ENCOUNTER — TELEPHONE (OUTPATIENT)
Dept: INTERNAL MEDICINE | Facility: CLINIC | Age: 74
End: 2019-08-13

## 2019-08-13 RX ORDER — ALENDRONATE SODIUM 70 MG/1
70 TABLET ORAL
Qty: 48 TABLET | Refills: 0 | Status: SHIPPED | OUTPATIENT
Start: 2019-08-13 | End: 2020-02-11 | Stop reason: SDUPTHER

## 2019-08-13 NOTE — TELEPHONE ENCOUNTER
----- Message from Jessica Birch MD sent at 8/9/2019  7:52 AM EDT -----  Call pt about dexa - pt with osteoporosis.  Can offer fosamax weekly or reclast infusion yearly. Will order the one pt prefers.

## 2019-08-23 ENCOUNTER — OFFICE VISIT (OUTPATIENT)
Dept: ORTHOPEDIC SURGERY | Facility: CLINIC | Age: 74
End: 2019-08-23

## 2019-08-23 VITALS — BODY MASS INDEX: 22.28 KG/M2 | WEIGHT: 147 LBS | HEIGHT: 68 IN

## 2019-08-23 DIAGNOSIS — Z96.649 STATUS POST HIP HEMIARTHROPLASTY: ICD-10-CM

## 2019-08-23 DIAGNOSIS — S42.001A CLOSED NONDISPLACED FRACTURE OF RIGHT CLAVICLE, UNSPECIFIED PART OF CLAVICLE, INITIAL ENCOUNTER: Primary | ICD-10-CM

## 2019-08-23 DIAGNOSIS — S42.151A: ICD-10-CM

## 2019-08-23 PROCEDURE — 73030 X-RAY EXAM OF SHOULDER: CPT | Performed by: ORTHOPAEDIC SURGERY

## 2019-08-23 PROCEDURE — 73502 X-RAY EXAM HIP UNI 2-3 VIEWS: CPT | Performed by: ORTHOPAEDIC SURGERY

## 2019-08-23 PROCEDURE — 99212 OFFICE O/P EST SF 10 MIN: CPT | Performed by: ORTHOPAEDIC SURGERY

## 2019-08-23 RX ORDER — TAMSULOSIN HYDROCHLORIDE 0.4 MG/1
CAPSULE ORAL
COMMUNITY
Start: 2019-08-20 | End: 2020-09-11 | Stop reason: SDUPTHER

## 2019-08-23 RX ORDER — OLANZAPINE 20 MG/1
TABLET ORAL
COMMUNITY
Start: 2019-08-20 | End: 2020-02-03

## 2019-11-12 DIAGNOSIS — H65.00 ACUTE SEROUS OTITIS MEDIA, RECURRENCE NOT SPECIFIED, UNSPECIFIED LATERALITY: ICD-10-CM

## 2019-11-12 RX ORDER — FLUTICASONE PROPIONATE 50 MCG
SPRAY, SUSPENSION (ML) NASAL
Qty: 9.9 ML | Refills: 1 | Status: SHIPPED | OUTPATIENT
Start: 2019-11-12 | End: 2020-02-03

## 2019-12-18 ENCOUNTER — TELEPHONE (OUTPATIENT)
Dept: INTERNAL MEDICINE | Facility: CLINIC | Age: 74
End: 2019-12-18

## 2019-12-18 DIAGNOSIS — H65.00 ACUTE SEROUS OTITIS MEDIA, RECURRENCE NOT SPECIFIED, UNSPECIFIED LATERALITY: ICD-10-CM

## 2019-12-18 RX ORDER — FEXOFENADINE HCL AND PSEUDOEPHEDRINE HCI 60; 120 MG/1; MG/1
1 TABLET, EXTENDED RELEASE ORAL 2 TIMES DAILY
Qty: 60 TABLET | Refills: 0 | Status: SHIPPED | OUTPATIENT
Start: 2019-12-18 | End: 2020-08-11

## 2019-12-18 NOTE — TELEPHONE ENCOUNTER
PATIENT ASKED THAT DR. HARRINGTON CALL IN [facility] 24 HR. TO [facility] IN [place].

## 2020-02-03 ENCOUNTER — HOSPITAL ENCOUNTER (EMERGENCY)
Facility: HOSPITAL | Age: 75
Discharge: HOME | End: 2020-02-03
Attending: EMERGENCY MEDICINE | Admitting: EMERGENCY MEDICINE

## 2020-02-03 VITALS
BODY MASS INDEX: 22.73 KG/M2 | OXYGEN SATURATION: 95 % | RESPIRATION RATE: 22 BRPM | WEIGHT: 150 LBS | TEMPERATURE: 97.8 F | HEIGHT: 68 IN | HEART RATE: 79 BPM | SYSTOLIC BLOOD PRESSURE: 133 MMHG | DIASTOLIC BLOOD PRESSURE: 57 MMHG

## 2020-02-03 DIAGNOSIS — H69.82 EUSTACHIAN TUBE DYSFUNCTION, LEFT: Primary | ICD-10-CM

## 2020-02-03 PROCEDURE — 99282 EMERGENCY DEPT VISIT SF MDM: CPT | Performed by: EMERGENCY MEDICINE

## 2020-02-03 PROCEDURE — 99282 EMERGENCY DEPT VISIT SF MDM: CPT

## 2020-02-03 NOTE — ED PROVIDER NOTES
"Subjective     History provided by:  Patient    History of Present Illness    · Chief complaint: \"I have a pill stuck in my eustachian tube\"    · Location: Left eustachian tube    · Quality/Severity: Patient feels like he has a foreign body stuck in his left eustachian tube.    · Timing/Onset: Started Saturday night (36 hours ago) when he swallowed a pill and it went into his eustachian tube.    · Modifying Factors: No aggravating or relieving factors.    · Associated symptoms: He denies any difficulty swallowing.  He reports pressure sensation in his left ear.    · Narrative: The patient is a 74-year-old white male who Saturday night (36 hours ago) states he swallowed a pill and it felt like it went over into his left eustachian tube instead of going into his stomach.  He is complained of pressure in his left ear since.    Review of Systems   Constitutional: Negative for activity change, appetite change, chills, diaphoresis, fatigue and fever.   HENT: Positive for ear pain ( \"Pressure sensation in eustachian tube\"). Negative for congestion, dental problem, ear discharge, hearing loss, mouth sores, postnasal drip, rhinorrhea, sinus pressure, sore throat and voice change.    Eyes: Negative for photophobia, pain, discharge, redness and visual disturbance.   Respiratory: Negative for cough, chest tightness, shortness of breath, wheezing and stridor.    Cardiovascular: Negative for chest pain, palpitations and leg swelling.   Gastrointestinal: Negative for abdominal pain, diarrhea, nausea and vomiting.   Genitourinary: Negative for difficulty urinating, dysuria, flank pain, frequency, hematuria and urgency.   Musculoskeletal: Negative for arthralgias, back pain, gait problem, joint swelling, myalgias, neck pain and neck stiffness.   Skin: Negative for color change and rash.   Neurological: Negative for dizziness, tremors, seizures, syncope, facial asymmetry, speech difficulty, weakness, light-headedness, numbness and " "headaches.   Hematological: Negative for adenopathy.   Psychiatric/Behavioral: Negative.  Negative for confusion and decreased concentration. The patient is not nervous/anxious.      Past Medical History:   Diagnosis Date   • Alzheimer disease (CMS/HCC)    • Alzheimer disease (CMS/HCC)    • Alzheimer disease (CMS/HCC) 03/06/2019   • Anxiety and depression    • Bipolar 1 disorder (CMS/HCC)    • Colon polyp    • Constipation    • Depression    • Diarrhea    • Epilepsy (CMS/HCC)    • Epilepsy (CMS/HCC) 03/06/2019   • Erectile dysfunction    • GERD (gastroesophageal reflux disease)    • L1 vertebral fracture (CMS/HCC)    • Seizures (CMS/HCC)     absence seizures.  well controlled.     /57 (BP Location: Right arm, Patient Position: Sitting)   Pulse 79   Temp 97.8 °F (36.6 °C) (Oral)   Resp 22   Ht 172.7 cm (67.99\")   Wt 68 kg (150 lb)   SpO2 95%   BMI 22.81 kg/m²     Past Medical History:   Diagnosis Date   • Alzheimer disease (CMS/HCC)    • Alzheimer disease (CMS/HCC)    • Alzheimer disease (CMS/HCC) 03/06/2019   • Anxiety and depression    • Bipolar 1 disorder (CMS/HCC)    • Colon polyp    • Constipation    • Depression    • Diarrhea    • Epilepsy (CMS/HCC)    • Epilepsy (CMS/HCC) 03/06/2019   • Erectile dysfunction    • GERD (gastroesophageal reflux disease)    • L1 vertebral fracture (CMS/HCC)    • Seizures (CMS/HCC)     absence seizures.  well controlled.       No Known Allergies    Past Surgical History:   Procedure Laterality Date   • CHOLECYSTECTOMY     • COLONOSCOPY N/A 4/1/2016    Procedure: COLONOSCOPY with polypectomy;  Surgeon: Akiko Scales MD;  Location: [facility] OR;  Service:    • GALLBLADDER SURGERY     • HIP HEMIARTHROPLASTY Right 3/8/2019    Procedure: HIP HEMIARTHROPLASTY and all associated procedures;  Surgeon: Harrison Turk MD;  Location: [facility] OR;  Service: Orthopedics   • LIVER SURGERY     • TONSILLECTOMY         Family History   Problem Relation Age of Onset   • Lung cancer " Father    • No Known Problems Mother    • No Known Problems Maternal Grandmother    • No Known Problems Maternal Grandfather    • No Known Problems Paternal Grandmother    • No Known Problems Paternal Grandfather        Social History     Socioeconomic History   • Marital status:      Spouse name: Not on file   • Number of children: 0   • Years of education: Ph.D   • Highest education level: Not on file   Occupational History   • Occupation: professor     Comment: taught anatomy at [employer]   Tobacco Use   • Smoking status: Former Smoker     Packs/day: 2.00     Years: 20.00     Pack years: 40.00     Last attempt to quit: 2/3/1990     Years since quittin.0   • Smokeless tobacco: Former User     Quit date:    • Tobacco comment: quit 30 yrs ago    Substance and Sexual Activity   • Alcohol use: No   • Drug use: No   • Sexual activity: Defer           Objective   Physical Exam   Constitutional: He is oriented to person, place, and time. He appears well-developed and well-nourished. No distress.   The patient appears healthy in no acute distress.  Review of his vital signs: He is afebrile with a temperature 97.8, tachypnea with a respiratory rate of 22 with a normal oxygen saturation of 95% on room air, remainder of vitals within normal limits.   HENT:   Head: Normocephalic and atraumatic.   Right Ear: External ear normal.   Left Ear: External ear normal.   Nose: Nose normal.   Mouth/Throat: Oropharynx is clear and moist.   Eyes: Pupils are equal, round, and reactive to light. Conjunctivae and EOM are normal.   Neck: Normal range of motion. Neck supple.   Lymphadenopathy:     He has no cervical adenopathy.   Neurological: He is alert and oriented to person, place, and time. No cranial nerve deficit or sensory deficit. He exhibits normal muscle tone.   Skin: Skin is warm and dry. Capillary refill takes less than 2 seconds. No rash noted. He is not diaphoretic. No erythema. No pallor.    Psychiatric: He has a normal mood and affect. His behavior is normal.   Nursing note and vitals reviewed.      Procedures           ED Course  ED Course as of Feb 03 1521   Mon Feb 03, 2020   1519 There is no foreign bodies in the external ear canals.  Tympanic membranes appear normal.  His oropharynx appears normal.  I explained to the patient that it is physically impossible for a pill to go from his mouth into the eustachian tube.  He may have some discomfort due to eustachian tube dysfunction.    [TP]      ED Course User Index  [TP] Ishmael Nj MD                                               MDM  Number of Diagnoses or Management Options  Eustachian tube dysfunction, left: new and does not require workup  Risk of Complications, Morbidity, and/or Mortality  Presenting problems: moderate  Diagnostic procedures: minimal  Management options: low    Patient Progress  Patient progress: stable      Final diagnoses:   Eustachian tube dysfunction, left           Labs Reviewed - No data to display  No orders to display          Medication List      Changed    polyethylene glycol powder  Commonly known as:  MIRALAX  Take 17 g by mouth Daily.  What changed:    when to take this  reasons to take this               Ishmael Nj MD  02/03/20 1521

## 2020-02-05 DIAGNOSIS — R63.4 WEIGHT LOSS: ICD-10-CM

## 2020-02-05 DIAGNOSIS — R73.03 PREDIABETES: ICD-10-CM

## 2020-02-05 DIAGNOSIS — E78.5 DYSLIPIDEMIA: Primary | ICD-10-CM

## 2020-02-05 DIAGNOSIS — Z00.00 HEALTHCARE MAINTENANCE: ICD-10-CM

## 2020-02-05 DIAGNOSIS — F31.9 BIPOLAR 1 DISORDER (HCC): ICD-10-CM

## 2020-02-06 ENCOUNTER — EPISODE CHANGES (OUTPATIENT)
Dept: CASE MANAGEMENT | Facility: OTHER | Age: 75
End: 2020-02-06

## 2020-02-06 LAB
ALBUMIN SERPL-MCNC: 4.2 G/DL (ref 3.5–5.2)
ALBUMIN/GLOB SERPL: 1.9 G/DL
ALP SERPL-CCNC: 102 U/L (ref 39–117)
ALT SERPL-CCNC: 18 U/L (ref 1–41)
AST SERPL-CCNC: 19 U/L (ref 1–40)
BASOPHILS # BLD AUTO: 0.08 10*3/MM3 (ref 0–0.2)
BASOPHILS NFR BLD AUTO: 1.2 % (ref 0–1.5)
BILIRUB SERPL-MCNC: 0.6 MG/DL (ref 0.2–1.2)
BUN SERPL-MCNC: 13 MG/DL (ref 8–23)
BUN/CREAT SERPL: 16 (ref 7–25)
CALCIUM SERPL-MCNC: 9.2 MG/DL (ref 8.6–10.5)
CHLORIDE SERPL-SCNC: 100 MMOL/L (ref 98–107)
CHOLEST SERPL-MCNC: 186 MG/DL (ref 0–200)
CO2 SERPL-SCNC: 28.4 MMOL/L (ref 22–29)
CREAT SERPL-MCNC: 0.81 MG/DL (ref 0.76–1.27)
EOSINOPHIL # BLD AUTO: 0.16 10*3/MM3 (ref 0–0.4)
EOSINOPHIL NFR BLD AUTO: 2.4 % (ref 0.3–6.2)
ERYTHROCYTE [DISTWIDTH] IN BLOOD BY AUTOMATED COUNT: 14.4 % (ref 12.3–15.4)
GLOBULIN SER CALC-MCNC: 2.2 GM/DL
GLUCOSE SERPL-MCNC: 96 MG/DL (ref 65–99)
HBA1C MFR BLD: 5.7 % (ref 4.8–5.6)
HCT VFR BLD AUTO: 46.9 % (ref 37.5–51)
HDLC SERPL-MCNC: 53 MG/DL (ref 40–60)
HGB BLD-MCNC: 15.7 G/DL (ref 13–17.7)
IMM GRANULOCYTES # BLD AUTO: 0.06 10*3/MM3 (ref 0–0.05)
IMM GRANULOCYTES NFR BLD AUTO: 0.9 % (ref 0–0.5)
LDLC SERPL CALC-MCNC: 103 MG/DL (ref 0–100)
LDLC/HDLC SERPL: 1.95 {RATIO}
LYMPHOCYTES # BLD AUTO: 2.01 10*3/MM3 (ref 0.7–3.1)
LYMPHOCYTES NFR BLD AUTO: 30 % (ref 19.6–45.3)
MCH RBC QN AUTO: 28.4 PG (ref 26.6–33)
MCHC RBC AUTO-ENTMCNC: 33.5 G/DL (ref 31.5–35.7)
MCV RBC AUTO: 84.8 FL (ref 79–97)
MONOCYTES # BLD AUTO: 0.61 10*3/MM3 (ref 0.1–0.9)
MONOCYTES NFR BLD AUTO: 9.1 % (ref 5–12)
NEUTROPHILS # BLD AUTO: 3.78 10*3/MM3 (ref 1.7–7)
NEUTROPHILS NFR BLD AUTO: 56.4 % (ref 42.7–76)
NRBC BLD AUTO-RTO: 0 /100 WBC (ref 0–0.2)
PLATELET # BLD AUTO: 307 10*3/MM3 (ref 140–450)
POTASSIUM SERPL-SCNC: 4.7 MMOL/L (ref 3.5–5.2)
PROT SERPL-MCNC: 6.4 G/DL (ref 6–8.5)
RBC # BLD AUTO: 5.53 10*6/MM3 (ref 4.14–5.8)
SODIUM SERPL-SCNC: 139 MMOL/L (ref 136–145)
TRIGL SERPL-MCNC: 148 MG/DL (ref 0–150)
VLDLC SERPL CALC-MCNC: 29.6 MG/DL
WBC # BLD AUTO: 6.7 10*3/MM3 (ref 3.4–10.8)

## 2020-02-07 ENCOUNTER — EPISODE CHANGES (OUTPATIENT)
Dept: CASE MANAGEMENT | Facility: OTHER | Age: 75
End: 2020-02-07

## 2020-02-10 ENCOUNTER — TELEPHONE (OUTPATIENT)
Dept: INTERNAL MEDICINE | Facility: CLINIC | Age: 75
End: 2020-02-10

## 2020-02-10 NOTE — TELEPHONE ENCOUNTER
----- Message from Jessica Birch MD sent at 2/7/2020 10:44 AM EST -----  Labs ok. Will discuss at upcoming appt.

## 2020-02-11 ENCOUNTER — OFFICE VISIT (OUTPATIENT)
Dept: INTERNAL MEDICINE | Facility: CLINIC | Age: 75
End: 2020-02-11

## 2020-02-11 VITALS
DIASTOLIC BLOOD PRESSURE: 62 MMHG | HEART RATE: 78 BPM | SYSTOLIC BLOOD PRESSURE: 118 MMHG | TEMPERATURE: 97.8 F | OXYGEN SATURATION: 94 % | BODY MASS INDEX: 22.73 KG/M2 | RESPIRATION RATE: 16 BRPM | HEIGHT: 68 IN | WEIGHT: 150 LBS

## 2020-02-11 DIAGNOSIS — R73.03 PREDIABETES: ICD-10-CM

## 2020-02-11 DIAGNOSIS — G30.9 ALZHEIMER'S DEMENTIA WITHOUT BEHAVIORAL DISTURBANCE, UNSPECIFIED TIMING OF DEMENTIA ONSET: Primary | ICD-10-CM

## 2020-02-11 DIAGNOSIS — B35.1 ONYCHOMYCOSIS: ICD-10-CM

## 2020-02-11 DIAGNOSIS — F02.80 ALZHEIMER'S DEMENTIA WITHOUT BEHAVIORAL DISTURBANCE, UNSPECIFIED TIMING OF DEMENTIA ONSET: Primary | ICD-10-CM

## 2020-02-11 DIAGNOSIS — F31.9 BIPOLAR 1 DISORDER (HCC): ICD-10-CM

## 2020-02-11 DIAGNOSIS — E78.5 DYSLIPIDEMIA: ICD-10-CM

## 2020-02-11 PROCEDURE — 99214 OFFICE O/P EST MOD 30 MIN: CPT | Performed by: INTERNAL MEDICINE

## 2020-02-11 RX ORDER — TERBINAFINE HYDROCHLORIDE 250 MG/1
250 TABLET ORAL DAILY
Qty: 90 TABLET | Refills: 0 | Status: SHIPPED | OUTPATIENT
Start: 2020-02-11 | End: 2020-02-26 | Stop reason: SDUPTHER

## 2020-02-11 RX ORDER — ALENDRONATE SODIUM 70 MG/1
70 TABLET ORAL
Qty: 48 TABLET | Refills: 0 | Status: SHIPPED | OUTPATIENT
Start: 2020-02-11 | End: 2020-02-26 | Stop reason: SDUPTHER

## 2020-02-11 NOTE — PROGRESS NOTES
Andrew Redmond is a 74 y.o. male, who presents with a chief complaint of   Chief Complaint   Patient presents with   • Follow-up   • Allergies       HPI   Pt here for f/u and also has a new c/o of toe fungus.      His left great toe has a yellow thick nail. He says he has poor balance and has trouble trimming his nails.      He has more hearing loss.  He has tried to get the wax out of his ear but  He still can't hear.      Pre diabetes - a1c 5.7.      Dementia - sx were mild but pt reports one day he poured cheerios on a plate and tried to eat them with scissors.  He lives alone.  He doesn't have anyone that checks in on him regularly.  He doesn't want home health.  He says that he is fine.  He says the cheerio/scissors episode was a one time thing.         The following portions of the patient's history were reviewed and updated as appropriate: allergies, current medications, past family history, past medical history, past social history, past surgical history and problem list.    Allergies: Patient has no known allergies.    Review of Systems   Constitutional: Negative.    HENT: Negative.    Eyes: Negative.    Respiratory: Negative.    Cardiovascular: Negative.    Gastrointestinal: Negative.    Endocrine: Negative.    Genitourinary: Negative.    Musculoskeletal: Negative.    Skin: Negative.    Allergic/Immunologic: Negative.    Neurological: Negative.    Hematological: Negative.    Psychiatric/Behavioral: Negative.    All other systems reviewed and are negative.            Wt Readings from Last 3 Encounters:   02/11/20 68 kg (150 lb)   02/03/20 68 kg (150 lb)   08/23/19 66.7 kg (147 lb)     Temp Readings from Last 3 Encounters:   02/11/20 97.8 °F (36.6 °C) (Oral)   02/03/20 97.8 °F (36.6 °C) (Oral)   07/11/19 98.3 °F (36.8 °C) (Oral)     BP Readings from Last 3 Encounters:   02/11/20 118/62   02/03/20 133/57   08/02/19 100/70     Pulse Readings from Last 3 Encounters:   02/11/20 78   02/03/20 79   08/02/19 78      Body mass index is 22.81 kg/m².  @LASTSAO2(3)@    Physical Exam   Constitutional: He is oriented to person, place, and time. He appears well-developed and well-nourished. No distress.   HENT:   Head: Normocephalic and atraumatic.   Right Ear: External ear normal.   Left Ear: External ear normal.   Nose: Nose normal.   Mouth/Throat: Oropharynx is clear and moist.   Eyes: Pupils are equal, round, and reactive to light. Conjunctivae and EOM are normal.   Neck: Normal range of motion. Neck supple.   Cardiovascular: Normal rate, regular rhythm, normal heart sounds and intact distal pulses.   Pulmonary/Chest: Effort normal and breath sounds normal. No respiratory distress. He has no wheezes.   Musculoskeletal: Normal range of motion.   Normal gait   Neurological: He is alert and oriented to person, place, and time.   Skin: Skin is warm and dry.   Left great toe with yellow, thickened nail.  Nail jagged.    Toes 2,3,4 on left foot with scabs where pt has cut nails too short.    Psychiatric: He has a normal mood and affect. His behavior is normal. Judgment and thought content normal.   Nursing note and vitals reviewed.      Results for orders placed or performed in visit on 02/05/20   Hemoglobin A1c   Result Value Ref Range    Hemoglobin A1C 5.70 (H) 4.80 - 5.60 %   Comprehensive Metabolic Panel   Result Value Ref Range    Glucose 96 65 - 99 mg/dL    BUN 13 8 - 23 mg/dL    Creatinine 0.81 0.76 - 1.27 mg/dL    eGFR Non African Am 93 >60 mL/min/1.73    eGFR African Am 113 >60 mL/min/1.73    BUN/Creatinine Ratio 16.0 7.0 - 25.0    Sodium 139 136 - 145 mmol/L    Potassium 4.7 3.5 - 5.2 mmol/L    Chloride 100 98 - 107 mmol/L    Total CO2 28.4 22.0 - 29.0 mmol/L    Calcium 9.2 8.6 - 10.5 mg/dL    Total Protein 6.4 6.0 - 8.5 g/dL    Albumin 4.20 3.50 - 5.20 g/dL    Globulin 2.2 gm/dL    A/G Ratio 1.9 g/dL    Total Bilirubin 0.6 0.2 - 1.2 mg/dL    Alkaline Phosphatase 102 39 - 117 U/L    AST (SGOT) 19 1 - 40 U/L    ALT (SGPT)  18 1 - 41 U/L   Lipid Panel With LDL / HDL Ratio   Result Value Ref Range    Total Cholesterol 186 0 - 200 mg/dL    Triglycerides 148 0 - 150 mg/dL    HDL Cholesterol 53 40 - 60 mg/dL    VLDL Cholesterol 29.6 mg/dL    LDL Cholesterol  103 (H) 0 - 100 mg/dL    LDL/HDL Ratio 1.95    CBC & Differential   Result Value Ref Range    WBC 6.70 3.40 - 10.80 10*3/mm3    RBC 5.53 4.14 - 5.80 10*6/mm3    Hemoglobin 15.7 13.0 - 17.7 g/dL    Hematocrit 46.9 37.5 - 51.0 %    MCV 84.8 79.0 - 97.0 fL    MCH 28.4 26.6 - 33.0 pg    MCHC 33.5 31.5 - 35.7 g/dL    RDW 14.4 12.3 - 15.4 %    Platelets 307 140 - 450 10*3/mm3    Neutrophil Rel % 56.4 42.7 - 76.0 %    Lymphocyte Rel % 30.0 19.6 - 45.3 %    Monocyte Rel % 9.1 5.0 - 12.0 %    Eosinophil Rel % 2.4 0.3 - 6.2 %    Basophil Rel % 1.2 0.0 - 1.5 %    Neutrophils Absolute 3.78 1.70 - 7.00 10*3/mm3    Lymphocytes Absolute 2.01 0.70 - 3.10 10*3/mm3    Monocytes Absolute 0.61 0.10 - 0.90 10*3/mm3    Eosinophils Absolute 0.16 0.00 - 0.40 10*3/mm3    Basophils Absolute 0.08 0.00 - 0.20 10*3/mm3    Immature Granulocyte Rel % 0.9 (H) 0.0 - 0.5 %    Immature Grans Absolute 0.06 (H) 0.00 - 0.05 10*3/mm3    nRBC 0.0 0.0 - 0.2 /100 WBC           Andrew was seen today for follow-up and allergies.    Diagnoses and all orders for this visit:    Alzheimer's dementia without behavioral disturbance, unspecified timing of dementia onset (CMS/[facility])    Dyslipidemia    Bipolar 1 disorder (CMS/[facility])    Prediabetes     Onychomycosis  -     terbinafine (LAMISIL) 250 MG tablet; Take 1 tablet by mouth Daily for 90 days. Stop after 3 months of therapy.    Other orders  -     alendronate (FOSAMAX) 70 MG tablet; Take 1 tablet by mouth Every 7 (Seven) Days.          Outpatient Medications Prior to Visit   Medication Sig Dispense Refill   • lamoTRIgine (LaMICtal) 100 MG tablet Take 100 mg by mouth Daily.     • lamoTRIgine (LAMICTAL) 200 MG tablet Take 200 mg by mouth Every Night.     • memantine (NAMENDA) 10 MG tablet  Take 10 mg by mouth 2 (Two) Times a Day.     • polyethylene glycol (MIRALAX) powder Take 17 g by mouth Daily. (Patient taking differently: Take 17 g by mouth Daily As Needed.) 850 g 5   • rivastigmine (EXELON) 6 MG capsule Take 6 mg by mouth 2 (Two) Times a Day.     • alendronate (FOSAMAX) 70 MG tablet Take 1 tablet by mouth Every 7 (Seven) Days. 48 tablet 0   • fexofenadine-pseudoephedrine (ALLEGRA-D)  MG per 12 hr tablet Take 1 tablet by mouth 2 (Two) Times a Day. 60 tablet 0   • tamsulosin (FLOMAX) 0.4 MG capsule 24 hr capsule        No facility-administered medications prior to visit.      New Medications Ordered This Visit   Medications   • terbinafine (LAMISIL) 250 MG tablet     Sig: Take 1 tablet by mouth Daily for 90 days. Stop after 3 months of therapy.     Dispense:  90 tablet     Refill:  0   • alendronate (FOSAMAX) 70 MG tablet     Sig: Take 1 tablet by mouth Every 7 (Seven) Days.     Dispense:  48 tablet     Refill:  0     [unfilled]  Medications Discontinued During This Encounter   Medication Reason   • alendronate (FOSAMAX) 70 MG tablet Reorder         Return in about 3 months (around 5/11/2020) for Recheck.

## 2020-02-12 ENCOUNTER — EPISODE CHANGES (OUTPATIENT)
Dept: CASE MANAGEMENT | Facility: OTHER | Age: 75
End: 2020-02-12

## 2020-02-14 ENCOUNTER — EPISODE CHANGES (OUTPATIENT)
Dept: CASE MANAGEMENT | Facility: OTHER | Age: 75
End: 2020-02-14

## 2020-02-19 ENCOUNTER — EPISODE CHANGES (OUTPATIENT)
Dept: CASE MANAGEMENT | Facility: OTHER | Age: 75
End: 2020-02-19

## 2020-02-21 ENCOUNTER — TELEPHONE (OUTPATIENT)
Dept: INTERNAL MEDICINE | Facility: CLINIC | Age: 75
End: 2020-02-21

## 2020-02-21 NOTE — TELEPHONE ENCOUNTER
----- Message from Jessica Birch MD sent at 2/11/2020  4:52 PM EST -----  Regarding: check on pt  Call to check on pt please

## 2020-02-21 NOTE — TELEPHONE ENCOUNTER
Called PT as per message below. CMA left VM to see if PT needed anything or of PT was doing well. PT informed by VM to call back.

## 2020-02-25 ENCOUNTER — TELEPHONE (OUTPATIENT)
Dept: INTERNAL MEDICINE | Facility: CLINIC | Age: 75
End: 2020-02-25

## 2020-02-25 DIAGNOSIS — B35.1 ONYCHOMYCOSIS: ICD-10-CM

## 2020-02-26 RX ORDER — ALENDRONATE SODIUM 70 MG/1
70 TABLET ORAL
Qty: 48 TABLET | Refills: 0 | Status: SHIPPED | OUTPATIENT
Start: 2020-02-26 | End: 2021-06-14

## 2020-02-26 RX ORDER — TERBINAFINE HYDROCHLORIDE 250 MG/1
250 TABLET ORAL DAILY
Qty: 90 TABLET | Refills: 0 | Status: SHIPPED | OUTPATIENT
Start: 2020-02-26 | End: 2020-05-26

## 2020-02-26 NOTE — TELEPHONE ENCOUNTER
Called PT to confirm details of what meds to fill  
Ok to refill meds  
Please be advised  
Pt lost his prescription and needs approval to get it refilled.    
I have personally seen and examined this patient.  I have fully participated in the care of this patient. I have reviewed all pertinent clinical information, including history, physical exam, plan and the Resident’s note and agree except as noted.

## 2020-03-30 ENCOUNTER — TELEPHONE (OUTPATIENT)
Dept: INTERNAL MEDICINE | Facility: CLINIC | Age: 75
End: 2020-03-30

## 2020-05-11 ENCOUNTER — OFFICE VISIT (OUTPATIENT)
Dept: INTERNAL MEDICINE | Facility: CLINIC | Age: 75
End: 2020-05-11

## 2020-05-11 DIAGNOSIS — G30.9 ALZHEIMER'S DEMENTIA WITHOUT BEHAVIORAL DISTURBANCE, UNSPECIFIED TIMING OF DEMENTIA ONSET: ICD-10-CM

## 2020-05-11 DIAGNOSIS — R63.4 WEIGHT LOSS: ICD-10-CM

## 2020-05-11 DIAGNOSIS — F02.80 ALZHEIMER'S DEMENTIA WITHOUT BEHAVIORAL DISTURBANCE, UNSPECIFIED TIMING OF DEMENTIA ONSET: ICD-10-CM

## 2020-05-11 DIAGNOSIS — R73.03 PREDIABETES: ICD-10-CM

## 2020-05-11 DIAGNOSIS — F30.9 BIPOLAR I DISORDER, SINGLE MANIC EPISODE (HCC): Primary | ICD-10-CM

## 2020-05-11 PROCEDURE — 99214 OFFICE O/P EST MOD 30 MIN: CPT | Performed by: INTERNAL MEDICINE

## 2020-05-11 NOTE — PROGRESS NOTES
Andrew Redmond is a 74 y.o. male, who presents with a chief complaint of   Chief Complaint   Patient presents with   • Dementia   • Manic Behavior       HPI   This visit has been scheduled as a televisit to comply with patient safety concerns in accordance with CDC recommendations. The patient had issues with technology so the visit was changed from televisit to phone visit.      You have chosen to receive care through a telephone visit. Do you consent to use a telephone visit for your medical care today? Yes    Chronic weight loss - pt says he is doing better keeping weight on.  He has been eating hamburgers daily.    Pt goes to store on the public bus.  He is wearing a mask when he goes out.  He walks some but is not exercising regularly.     Bipolar - stable on meds.  Mood ok.  Sleeping well.     Pre-diabetes - a1c 5.7    Dementia - sx mild and at baseline per pt.        The following portions of the patient's history were reviewed and updated as appropriate: allergies, current medications, past family history, past medical history, past social history, past surgical history and problem list.    Allergies: Patient has no known allergies.    Review of Systems   Constitutional: Negative.    HENT: Negative.    Eyes: Negative.    Respiratory: Negative.    Cardiovascular: Negative.    Gastrointestinal: Negative.    Endocrine: Negative.    Genitourinary: Negative.    Musculoskeletal: Negative.    Skin: Negative.    Allergic/Immunologic: Negative.    Neurological: Negative.    Hematological: Negative.    Psychiatric/Behavioral: Negative.    All other systems reviewed and are negative.            Wt Readings from Last 3 Encounters:   02/11/20 68 kg (150 lb)   02/03/20 68 kg (150 lb)   08/23/19 66.7 kg (147 lb)     Temp Readings from Last 3 Encounters:   02/11/20 97.8 °F (36.6 °C) (Oral)   02/03/20 97.8 °F (36.6 °C) (Oral)   07/11/19 98.3 °F (36.8 °C) (Oral)     BP Readings from Last 3 Encounters:   02/11/20 118/62    02/03/20 133/57   08/02/19 100/70     Pulse Readings from Last 3 Encounters:   02/11/20 78   02/03/20 79   08/02/19 78     There is no height or weight on file to calculate BMI.  @LASTSAO2(3)@    Physical Exam   Constitutional: He is oriented to person, place, and time. No distress.   Pulmonary/Chest: No respiratory distress.   Neurological: He is alert and oriented to person, place, and time.   Psychiatric: He has a normal mood and affect. His behavior is normal. Judgment and thought content normal.   Vitals reviewed.      Results for orders placed or performed in visit on 02/05/20   Hemoglobin A1c   Result Value Ref Range    Hemoglobin A1C 5.70 (H) 4.80 - 5.60 %   Comprehensive Metabolic Panel   Result Value Ref Range    Glucose 96 65 - 99 mg/dL    BUN 13 8 - 23 mg/dL    Creatinine 0.81 0.76 - 1.27 mg/dL    eGFR Non African Am 93 >60 mL/min/1.73    eGFR African Am 113 >60 mL/min/1.73    BUN/Creatinine Ratio 16.0 7.0 - 25.0    Sodium 139 136 - 145 mmol/L    Potassium 4.7 3.5 - 5.2 mmol/L    Chloride 100 98 - 107 mmol/L    Total CO2 28.4 22.0 - 29.0 mmol/L    Calcium 9.2 8.6 - 10.5 mg/dL    Total Protein 6.4 6.0 - 8.5 g/dL    Albumin 4.20 3.50 - 5.20 g/dL    Globulin 2.2 gm/dL    A/G Ratio 1.9 g/dL    Total Bilirubin 0.6 0.2 - 1.2 mg/dL    Alkaline Phosphatase 102 39 - 117 U/L    AST (SGOT) 19 1 - 40 U/L    ALT (SGPT) 18 1 - 41 U/L   Lipid Panel With LDL / HDL Ratio   Result Value Ref Range    Total Cholesterol 186 0 - 200 mg/dL    Triglycerides 148 0 - 150 mg/dL    HDL Cholesterol 53 40 - 60 mg/dL    VLDL Cholesterol 29.6 mg/dL    LDL Cholesterol  103 (H) 0 - 100 mg/dL    LDL/HDL Ratio 1.95    CBC & Differential   Result Value Ref Range    WBC 6.70 3.40 - 10.80 10*3/mm3    RBC 5.53 4.14 - 5.80 10*6/mm3    Hemoglobin 15.7 13.0 - 17.7 g/dL    Hematocrit 46.9 37.5 - 51.0 %    MCV 84.8 79.0 - 97.0 fL    MCH 28.4 26.6 - 33.0 pg    MCHC 33.5 31.5 - 35.7 g/dL    RDW 14.4 12.3 - 15.4 %    Platelets 307 140 - 450 10*3/mm3     Neutrophil Rel % 56.4 42.7 - 76.0 %    Lymphocyte Rel % 30.0 19.6 - 45.3 %    Monocyte Rel % 9.1 5.0 - 12.0 %    Eosinophil Rel % 2.4 0.3 - 6.2 %    Basophil Rel % 1.2 0.0 - 1.5 %    Neutrophils Absolute 3.78 1.70 - 7.00 10*3/mm3    Lymphocytes Absolute 2.01 0.70 - 3.10 10*3/mm3    Monocytes Absolute 0.61 0.10 - 0.90 10*3/mm3    Eosinophils Absolute 0.16 0.00 - 0.40 10*3/mm3    Basophils Absolute 0.08 0.00 - 0.20 10*3/mm3    Immature Granulocyte Rel % 0.9 (H) 0.0 - 0.5 %    Immature Grans Absolute 0.06 (H) 0.00 - 0.05 10*3/mm3    nRBC 0.0 0.0 - 0.2 /100 WBC           Andrew was seen today for dementia and manic behavior.    Diagnoses and all orders for this visit:    Bipolar I disorder, single manic episode (CMS/HCC)    Weight loss    Prediabetes     Alzheimer's dementia without behavioral disturbance, unspecified timing of dementia onset (CMS/HCC)      Continue current medications.  Encouraged healthy diet/exercise.  OV labs in  4 months.  Pt to call sooner if any other issues arise.        Outpatient Medications Prior to Visit   Medication Sig Dispense Refill   • alendronate (FOSAMAX) 70 MG tablet Take 1 tablet by mouth Every 7 (Seven) Days. 48 tablet 0   • fexofenadine-pseudoephedrine (ALLEGRA-D)  MG per 12 hr tablet Take 1 tablet by mouth 2 (Two) Times a Day. 60 tablet 0   • lamoTRIgine (LaMICtal) 100 MG tablet Take 100 mg by mouth Daily.     • lamoTRIgine (LAMICTAL) 200 MG tablet Take 200 mg by mouth Every Night.     • memantine (NAMENDA) 10 MG tablet Take 10 mg by mouth 2 (Two) Times a Day.     • polyethylene glycol (MIRALAX) powder Take 17 g by mouth Daily. (Patient taking differently: Take 17 g by mouth Daily As Needed.) 850 g 5   • rivastigmine (EXELON) 6 MG capsule Take 6 mg by mouth 2 (Two) Times a Day.     • tamsulosin (FLOMAX) 0.4 MG capsule 24 hr capsule      • terbinafine (LAMISIL) 250 MG tablet Take 1 tablet by mouth Daily for 90 days. Stop after 3 months of therapy. 90 tablet 0     No  facility-administered medications prior to visit.      No orders of the defined types were placed in this encounter.    [unfilled]  There are no discontinued medications.      Return in about 4 months (around 9/11/2020) for Recheck, labs.

## 2020-07-07 ENCOUNTER — TELEPHONE (OUTPATIENT)
Dept: INTERNAL MEDICINE | Facility: CLINIC | Age: 75
End: 2020-07-07

## 2020-07-07 NOTE — TELEPHONE ENCOUNTER
Called patient back, states he has no symptoms, he was just asking were to have test done, I explained to patient that in order for patient to get tested, he has to have symptoms or he can do a self test ordered thru [facility], patient states he will talk with Dr Birch at his appointment.

## 2020-07-07 NOTE — TELEPHONE ENCOUNTER
PATIENT CALLED TO ASK DR HARRINGTON WHERE CAN HE GET TESTED FOR COVID19. PATIENT HAS AN APPT TOMORROW WITH DR HARRINGTON.  PATIENT IS CONCERNED ABOUT GETTING COVID TEST, HE DOES NOT HAVE ANY SYMPTOMS, BUT SINCE HE'S MAIN MEANS OF TRANSPORTATION IS THE BUS, AND PEOPLE DON'T WEAR MASKS OR WEAR THEM UNDER THEIR CHIN LIKE IF THEY WERE NECKLACES.  PLEASE ADVISE  [phone]

## 2020-07-08 ENCOUNTER — LAB (OUTPATIENT)
Dept: LAB | Facility: HOSPITAL | Age: 75
End: 2020-07-08

## 2020-07-08 ENCOUNTER — OFFICE VISIT (OUTPATIENT)
Dept: INTERNAL MEDICINE | Facility: CLINIC | Age: 75
End: 2020-07-08

## 2020-07-08 VITALS
RESPIRATION RATE: 16 BRPM | HEIGHT: 68 IN | HEART RATE: 67 BPM | DIASTOLIC BLOOD PRESSURE: 78 MMHG | TEMPERATURE: 98.6 F | BODY MASS INDEX: 23.01 KG/M2 | OXYGEN SATURATION: 96 % | WEIGHT: 151.8 LBS | SYSTOLIC BLOOD PRESSURE: 124 MMHG

## 2020-07-08 DIAGNOSIS — E78.5 DYSLIPIDEMIA: ICD-10-CM

## 2020-07-08 DIAGNOSIS — R05.9 COUGH: ICD-10-CM

## 2020-07-08 DIAGNOSIS — Z11.9 SCREENING EXAMINATION FOR INFECTIOUS DISEASE: ICD-10-CM

## 2020-07-08 DIAGNOSIS — L98.9 SKIN LESION OF FACE: Primary | ICD-10-CM

## 2020-07-08 DIAGNOSIS — R73.03 PREDIABETES: ICD-10-CM

## 2020-07-08 DIAGNOSIS — F02.80 ALZHEIMER'S DEMENTIA WITHOUT BEHAVIORAL DISTURBANCE, UNSPECIFIED TIMING OF DEMENTIA ONSET: ICD-10-CM

## 2020-07-08 DIAGNOSIS — G30.9 ALZHEIMER'S DEMENTIA WITHOUT BEHAVIORAL DISTURBANCE, UNSPECIFIED TIMING OF DEMENTIA ONSET: ICD-10-CM

## 2020-07-08 DIAGNOSIS — F31.9 BIPOLAR 1 DISORDER (HCC): ICD-10-CM

## 2020-07-08 LAB
ALBUMIN SERPL-MCNC: 4.2 G/DL (ref 3.5–5.2)
ALBUMIN/GLOB SERPL: 1.6 G/DL
ALP SERPL-CCNC: 99 U/L (ref 39–117)
ALT SERPL W P-5'-P-CCNC: 13 U/L (ref 1–41)
ANION GAP SERPL CALCULATED.3IONS-SCNC: 10.3 MMOL/L (ref 5–15)
AST SERPL-CCNC: 17 U/L (ref 1–40)
BASOPHILS # BLD AUTO: 0.1 10*3/MM3 (ref 0–0.2)
BASOPHILS NFR BLD AUTO: 1.1 % (ref 0–1.5)
BILIRUB SERPL-MCNC: 0.4 MG/DL (ref 0–1.2)
BUN SERPL-MCNC: 16 MG/DL (ref 8–23)
BUN/CREAT SERPL: 17.8 (ref 7–25)
CALCIUM SPEC-SCNC: 9.5 MG/DL (ref 8.6–10.5)
CHLORIDE SERPL-SCNC: 103 MMOL/L (ref 98–107)
CHOLEST SERPL-MCNC: 192 MG/DL (ref 0–200)
CO2 SERPL-SCNC: 24.7 MMOL/L (ref 22–29)
CREAT SERPL-MCNC: 0.9 MG/DL (ref 0.76–1.27)
DEPRECATED RDW RBC AUTO: 44.4 FL (ref 37–54)
EOSINOPHIL # BLD AUTO: 0.21 10*3/MM3 (ref 0–0.4)
EOSINOPHIL NFR BLD AUTO: 2.4 % (ref 0.3–6.2)
ERYTHROCYTE [DISTWIDTH] IN BLOOD BY AUTOMATED COUNT: 15 % (ref 12.3–15.4)
GFR SERPL CREATININE-BSD FRML MDRD: 82 ML/MIN/1.73
GLOBULIN UR ELPH-MCNC: 2.7 GM/DL
GLUCOSE SERPL-MCNC: 92 MG/DL (ref 65–99)
HBA1C MFR BLD: 5.59 % (ref 4.8–5.6)
HCT VFR BLD AUTO: 44.2 % (ref 37.5–51)
HDLC SERPL-MCNC: 47 MG/DL (ref 40–60)
HGB BLD-MCNC: 15.2 G/DL (ref 13–17.7)
IMM GRANULOCYTES # BLD AUTO: 0.05 10*3/MM3 (ref 0–0.05)
IMM GRANULOCYTES NFR BLD AUTO: 0.6 % (ref 0–0.5)
LDLC SERPL CALC-MCNC: 115 MG/DL (ref 0–100)
LDLC/HDLC SERPL: 2.46 {RATIO}
LYMPHOCYTES # BLD AUTO: 2.09 10*3/MM3 (ref 0.7–3.1)
LYMPHOCYTES NFR BLD AUTO: 23.9 % (ref 19.6–45.3)
MCH RBC QN AUTO: 28.5 PG (ref 26.6–33)
MCHC RBC AUTO-ENTMCNC: 34.4 G/DL (ref 31.5–35.7)
MCV RBC AUTO: 82.8 FL (ref 79–97)
MONOCYTES # BLD AUTO: 0.85 10*3/MM3 (ref 0.1–0.9)
MONOCYTES NFR BLD AUTO: 9.7 % (ref 5–12)
NEUTROPHILS NFR BLD AUTO: 5.46 10*3/MM3 (ref 1.7–7)
NEUTROPHILS NFR BLD AUTO: 62.3 % (ref 42.7–76)
NRBC BLD AUTO-RTO: 0 /100 WBC (ref 0–0.2)
PLATELET # BLD AUTO: 294 10*3/MM3 (ref 140–450)
PMV BLD AUTO: 9.7 FL (ref 6–12)
POTASSIUM SERPL-SCNC: 4.1 MMOL/L (ref 3.5–5.2)
PROT SERPL-MCNC: 6.9 G/DL (ref 6–8.5)
RBC # BLD AUTO: 5.34 10*6/MM3 (ref 4.14–5.8)
SODIUM SERPL-SCNC: 138 MMOL/L (ref 136–145)
T4 FREE SERPL-MCNC: 1.22 NG/DL (ref 0.93–1.7)
TRIGL SERPL-MCNC: 148 MG/DL (ref 0–150)
TSH SERPL DL<=0.05 MIU/L-ACNC: 2.13 UIU/ML (ref 0.27–4.2)
VLDLC SERPL-MCNC: 29.6 MG/DL (ref 5–40)
WBC # BLD AUTO: 8.76 10*3/MM3 (ref 3.4–10.8)

## 2020-07-08 PROCEDURE — 85025 COMPLETE CBC W/AUTO DIFF WBC: CPT | Performed by: INTERNAL MEDICINE

## 2020-07-08 PROCEDURE — U0003 INFECTIOUS AGENT DETECTION BY NUCLEIC ACID (DNA OR RNA); SEVERE ACUTE RESPIRATORY SYNDROME CORONAVIRUS 2 (SARS-COV-2) (CORONAVIRUS DISEASE [COVID-19]), AMPLIFIED PROBE TECHNIQUE, MAKING USE OF HIGH THROUGHPUT TECHNOLOGIES AS DESCRIBED BY CMS-2020-01-R: HCPCS

## 2020-07-08 PROCEDURE — 84443 ASSAY THYROID STIM HORMONE: CPT | Performed by: INTERNAL MEDICINE

## 2020-07-08 PROCEDURE — C9803 HOPD COVID-19 SPEC COLLECT: HCPCS

## 2020-07-08 PROCEDURE — 83036 HEMOGLOBIN GLYCOSYLATED A1C: CPT | Performed by: INTERNAL MEDICINE

## 2020-07-08 PROCEDURE — 80053 COMPREHEN METABOLIC PANEL: CPT | Performed by: INTERNAL MEDICINE

## 2020-07-08 PROCEDURE — 99214 OFFICE O/P EST MOD 30 MIN: CPT | Performed by: INTERNAL MEDICINE

## 2020-07-08 PROCEDURE — 84439 ASSAY OF FREE THYROXINE: CPT | Performed by: INTERNAL MEDICINE

## 2020-07-08 PROCEDURE — 36415 COLL VENOUS BLD VENIPUNCTURE: CPT | Performed by: INTERNAL MEDICINE

## 2020-07-08 PROCEDURE — 80061 LIPID PANEL: CPT | Performed by: INTERNAL MEDICINE

## 2020-07-08 RX ORDER — OLANZAPINE 20 MG/1
20 TABLET ORAL
COMMUNITY
Start: 2020-06-09

## 2020-07-08 NOTE — PROGRESS NOTES
Andrew Redmond is a 74 y.o. male, who presents with a chief complaint of   Chief Complaint   Patient presents with   • Suspicious Skin Lesion       HPI   Pt here bc of skin lesions on his forehead.  He has one spot that bleeds easily.  No personal hx skin cancer.    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  He says most of the people on the but arent wearing masks all the way over their mouth and nose.  He had a couple episodes of coughing but nothing that lasted more than a day or two.  No fever.     The following portions of the patient's history were reviewed and updated as appropriate: allergies, current medications, past family history, past medical history, past social history, past surgical history and problem list.    Allergies: Patient has no known allergies.    Review of Systems   Constitutional: Negative.  Negative for fatigue and fever.   HENT: Negative.    Eyes: Negative.    Respiratory: Positive for cough.    Cardiovascular: Negative.    Gastrointestinal: Negative.    Endocrine: Negative.    Genitourinary: Negative.    Musculoskeletal: Negative.    Skin: Positive for color change.   Allergic/Immunologic: Negative.    Neurological: Negative.    Hematological: Negative.    Psychiatric/Behavioral: Negative.    All other systems reviewed and are negative.            Wt Readings from Last 3 Encounters:   07/08/20 68.9 kg (151 lb 12.8 oz)   02/11/20 68 kg (150 lb)   02/03/20 68 kg (150 lb)     Temp Readings from Last 3 Encounters:   07/08/20 98.6 °F (37 °C) (Oral)   02/11/20 97.8 °F (36.6 °C) (Oral)   02/03/20 97.8 °F (36.6 °C) (Oral)     BP Readings from Last 3 Encounters:   07/08/20 124/78   02/11/20 118/62   02/03/20 133/57     Pulse Readings from Last 3 Encounters:   07/08/20 67   02/11/20 78   02/03/20 79     Body mass index is 23.08 kg/m².  @LASTSAO2(3)@    Physical Exam   Constitutional: He is oriented to person, place, and time. He appears well-developed and well-nourished.  No distress.   HENT:   Head: Normocephalic and atraumatic.   Right Ear: External ear normal.   Left Ear: External ear normal.   Nose: Nose normal.   Mouth/Throat: Oropharynx is clear and moist.   Eyes: Pupils are equal, round, and reactive to light. Conjunctivae and EOM are normal.   Neck: Normal range of motion. Neck supple.   Cardiovascular: Normal rate, regular rhythm, normal heart sounds and intact distal pulses.   Pulmonary/Chest: Effort normal and breath sounds normal. No respiratory distress. He has no wheezes.   Musculoskeletal: Normal range of motion.   Normal gait   Neurological: He is alert and oriented to person, place, and time.   Skin: Skin is warm and dry.   Right forehead with irregular lesion with scabbed center and surrounding irregular salmon colored borders   Psychiatric: He has a normal mood and affect. His behavior is normal. Judgment and thought content normal.   Nursing note and vitals reviewed.      Results for orders placed or performed in visit on 02/05/20   Hemoglobin A1c   Result Value Ref Range    Hemoglobin A1C 5.70 (H) 4.80 - 5.60 %   Comprehensive Metabolic Panel   Result Value Ref Range    Glucose 96 65 - 99 mg/dL    BUN 13 8 - 23 mg/dL    Creatinine 0.81 0.76 - 1.27 mg/dL    eGFR Non African Am 93 >60 mL/min/1.73    eGFR African Am 113 >60 mL/min/1.73    BUN/Creatinine Ratio 16.0 7.0 - 25.0    Sodium 139 136 - 145 mmol/L    Potassium 4.7 3.5 - 5.2 mmol/L    Chloride 100 98 - 107 mmol/L    Total CO2 28.4 22.0 - 29.0 mmol/L    Calcium 9.2 8.6 - 10.5 mg/dL    Total Protein 6.4 6.0 - 8.5 g/dL    Albumin 4.20 3.50 - 5.20 g/dL    Globulin 2.2 gm/dL    A/G Ratio 1.9 g/dL    Total Bilirubin 0.6 0.2 - 1.2 mg/dL    Alkaline Phosphatase 102 39 - 117 U/L    AST (SGOT) 19 1 - 40 U/L    ALT (SGPT) 18 1 - 41 U/L   Lipid Panel With LDL / HDL Ratio   Result Value Ref Range    Total Cholesterol 186 0 - 200 mg/dL    Triglycerides 148 0 - 150 mg/dL    HDL Cholesterol 53 40 - 60 mg/dL    VLDL  Cholesterol 29.6 mg/dL    LDL Cholesterol  103 (H) 0 - 100 mg/dL    LDL/HDL Ratio 1.95    CBC & Differential   Result Value Ref Range    WBC 6.70 3.40 - 10.80 10*3/mm3    RBC 5.53 4.14 - 5.80 10*6/mm3    Hemoglobin 15.7 13.0 - 17.7 g/dL    Hematocrit 46.9 37.5 - 51.0 %    MCV 84.8 79.0 - 97.0 fL    MCH 28.4 26.6 - 33.0 pg    MCHC 33.5 31.5 - 35.7 g/dL    RDW 14.4 12.3 - 15.4 %    Platelets 307 140 - 450 10*3/mm3    Neutrophil Rel % 56.4 42.7 - 76.0 %    Lymphocyte Rel % 30.0 19.6 - 45.3 %    Monocyte Rel % 9.1 5.0 - 12.0 %    Eosinophil Rel % 2.4 0.3 - 6.2 %    Basophil Rel % 1.2 0.0 - 1.5 %    Neutrophils Absolute 3.78 1.70 - 7.00 10*3/mm3    Lymphocytes Absolute 2.01 0.70 - 3.10 10*3/mm3    Monocytes Absolute 0.61 0.10 - 0.90 10*3/mm3    Eosinophils Absolute 0.16 0.00 - 0.40 10*3/mm3    Basophils Absolute 0.08 0.00 - 0.20 10*3/mm3    Immature Granulocyte Rel % 0.9 (H) 0.0 - 0.5 %    Immature Grans Absolute 0.06 (H) 0.00 - 0.05 10*3/mm3    nRBC 0.0 0.0 - 0.2 /100 WBC           Andrew was seen today for suspicious skin lesion.    Diagnoses and all orders for this visit:    Skin lesion of face  -     Ambulatory Referral to Dermatology    Cough  -     COVID-19,[facility] ROUTINE, NP/OP SWAB IN TRANSPORT MEDIA OR ESWAB 72 HR TAT - Swab, Nasopharynx; Future    Screening examination for infectious disease  -     COVID-19,[facility] ROUTINE, NP/OP SWAB IN TRANSPORT MEDIA OR ESWAB 72 HR TAT - Swab, Nasopharynx; Future    Prediabetes   -     Comprehensive Metabolic Panel  -     CBC & Differential  -     T4, Free  -     TSH  -     Lipid Panel With LDL / HDL Ratio  -     Hemoglobin A1c    Alzheimer's dementia without behavioral disturbance, unspecified timing of dementia onset (CMS/HCC)  -     Comprehensive Metabolic Panel  -     CBC & Differential    Dyslipidemia  -     Comprehensive Metabolic Panel  -     CBC & Differential  -     Lipid Panel With LDL / HDL Ratio    Bipolar 1 disorder (CMS/[facility])  -     Comprehensive Metabolic  Panel  -     CBC & Differential          Outpatient Medications Prior to Visit   Medication Sig Dispense Refill   • alendronate (FOSAMAX) 70 MG tablet Take 1 tablet by mouth Every 7 (Seven) Days. 48 tablet 0   • fexofenadine-pseudoephedrine (ALLEGRA-D)  MG per 12 hr tablet Take 1 tablet by mouth 2 (Two) Times a Day. 60 tablet 0   • lamoTRIgine (LaMICtal) 100 MG tablet Take 100 mg by mouth Daily.     • lamoTRIgine (LAMICTAL) 200 MG tablet Take 200 mg by mouth Every Night.     • memantine (NAMENDA) 10 MG tablet Take 10 mg by mouth 2 (Two) Times a Day.     • OLANZapine (zyPREXA) 20 MG tablet Take 20 mg by mouth every night at bedtime.     • polyethylene glycol (MIRALAX) powder Take 17 g by mouth Daily. (Patient taking differently: Take 17 g by mouth Daily As Needed.) 850 g 5   • rivastigmine (EXELON) 6 MG capsule Take 6 mg by mouth 2 (Two) Times a Day.     • tamsulosin (FLOMAX) 0.4 MG capsule 24 hr capsule        No facility-administered medications prior to visit.      No orders of the defined types were placed in this encounter.    [unfilled]  There are no discontinued medications.      Return for Recheck, Next scheduled follow up on 8/5.

## 2020-07-09 ENCOUNTER — TELEPHONE (OUTPATIENT)
Dept: INTERNAL MEDICINE | Facility: CLINIC | Age: 75
End: 2020-07-09

## 2020-07-09 NOTE — TELEPHONE ENCOUNTER
PATIENT DANIEL TAVERA IS CALLING TO GET COVID-19 TEST RESULTS. PATIENT HAD TEST DONE YESTERDAY 7-8-20      PLEASE CONTACT PATIENT @[phone]

## 2020-07-10 LAB — SARS-COV-2 RNA RESP QL NAA+PROBE: NOT DETECTED

## 2020-08-10 DIAGNOSIS — H65.00 ACUTE SEROUS OTITIS MEDIA, RECURRENCE NOT SPECIFIED, UNSPECIFIED LATERALITY: ICD-10-CM

## 2020-08-11 RX ORDER — FEXOFENADINE HYDROCHLORIDE AND PSEUDOEPHEDRINE HYDROCHLORIDE 60; 120 MG/1; MG/1
TABLET, FILM COATED, EXTENDED RELEASE ORAL
Qty: 60 TABLET | Refills: 0 | Status: SHIPPED | OUTPATIENT
Start: 2020-08-11 | End: 2020-10-19

## 2020-08-12 ENCOUNTER — OFFICE VISIT (OUTPATIENT)
Dept: INTERNAL MEDICINE | Facility: CLINIC | Age: 75
End: 2020-08-12

## 2020-08-12 VITALS
WEIGHT: 154 LBS | HEIGHT: 68 IN | OXYGEN SATURATION: 99 % | SYSTOLIC BLOOD PRESSURE: 100 MMHG | HEART RATE: 73 BPM | BODY MASS INDEX: 23.34 KG/M2 | TEMPERATURE: 96 F | RESPIRATION RATE: 16 BRPM | DIASTOLIC BLOOD PRESSURE: 60 MMHG

## 2020-08-12 DIAGNOSIS — G30.9 ALZHEIMER'S DEMENTIA WITHOUT BEHAVIORAL DISTURBANCE, UNSPECIFIED TIMING OF DEMENTIA ONSET: ICD-10-CM

## 2020-08-12 DIAGNOSIS — R63.4 WEIGHT LOSS: ICD-10-CM

## 2020-08-12 DIAGNOSIS — R73.03 PREDIABETES: ICD-10-CM

## 2020-08-12 DIAGNOSIS — L98.9 SKIN LESION OF FACE: ICD-10-CM

## 2020-08-12 DIAGNOSIS — E78.5 DYSLIPIDEMIA: ICD-10-CM

## 2020-08-12 DIAGNOSIS — Z23 NEED FOR VACCINATION: ICD-10-CM

## 2020-08-12 DIAGNOSIS — F02.80 ALZHEIMER'S DEMENTIA WITHOUT BEHAVIORAL DISTURBANCE, UNSPECIFIED TIMING OF DEMENTIA ONSET: ICD-10-CM

## 2020-08-12 DIAGNOSIS — Z00.00 MEDICARE ANNUAL WELLNESS VISIT, SUBSEQUENT: Primary | ICD-10-CM

## 2020-08-12 PROCEDURE — G0439 PPPS, SUBSEQ VISIT: HCPCS | Performed by: INTERNAL MEDICINE

## 2020-08-12 PROCEDURE — 99214 OFFICE O/P EST MOD 30 MIN: CPT | Performed by: INTERNAL MEDICINE

## 2020-08-12 NOTE — PATIENT INSTRUCTIONS
Medicare Wellness  Personal Prevention Plan of Service     Date of Office Visit:  2020  Encounter Provider:  Jessica Birch MD  Place of Service:  [facility]  Patient Name: Andrew Redmond  :  1945    As part of the Medicare Wellness portion of your visit today, we are providing you with this personalized preventive plan of services (PPPS). This plan is based upon recommendations of the [place] Preventive Services Task Force (USPSTF) and the Advisory Committee on Immunization Practices (ACIP).    This lists the preventive care services that should be considered, and provides dates of when you are due. Items listed as completed are up-to-date and do not require any further intervention.    Health Maintenance   Topic Date Due   • ZOSTER VACCINE (1 of 2) 1995   • Pneumococcal Vaccine Once at 65 Years Old  2010   • MEDICARE ANNUAL WELLNESS  2020   • INFLUENZA VACCINE  2020   • DXA SCAN  2021   • COLONOSCOPY  2026   • TDAP/TD VACCINES (4 - Tdap) 2028   • HEPATITIS C SCREENING  Completed   • AAA SCREEN (ONE-TIME)  Completed       Orders Placed This Encounter   Procedures   • Pneumococcal Polysaccharide Vaccine 23-Valent (PPSV23) Greater Than or Equal To 3yo Subcutaneous / IM       Return in about 4 months (around 2020) for Recheck, labs.

## 2020-08-12 NOTE — PROGRESS NOTES
Andrew Redmond is a 74 y.o. male, who presents with a chief complaint of   Chief Complaint   Patient presents with   • Follow-up   • Alzheimer's Disease       HPI   Pt here for follow up    Chronic weight loss - His weight is up a few pounds.  He has been cooking hamburgers at home a lot.     Pt goes to store on the public bus.  He worries that when he is on the bus people dont wear their masks correctly.  He is wearing a mask when he goes out.  He walks some but is not exercising regularly.      Bipolar - stable on meds.  Mood ok.  Sleeping well.      Pre-diabetes - a1c 5.7->5.59     Dementia - sx mild and at baseline per pt.      The following portions of the patient's history were reviewed and updated as appropriate: allergies, current medications, past family history, past medical history, past social history, past surgical history and problem list.    Allergies: Patient has no known allergies.    Review of Systems   Constitutional: Negative.    HENT: Negative.    Eyes: Negative.    Respiratory: Negative.    Cardiovascular: Negative.    Gastrointestinal: Negative.    Endocrine: Negative.    Genitourinary: Negative.    Musculoskeletal: Negative.    Skin: Negative.    Allergic/Immunologic: Negative.    Neurological: Negative.    Hematological: Negative.    Psychiatric/Behavioral: Negative.    All other systems reviewed and are negative.            Wt Readings from Last 3 Encounters:   08/12/20 69.9 kg (154 lb)   07/08/20 68.9 kg (151 lb 12.8 oz)   02/11/20 68 kg (150 lb)     Temp Readings from Last 3 Encounters:   08/12/20 96 °F (35.6 °C) (Tympanic)   07/08/20 98.6 °F (37 °C) (Oral)   02/11/20 97.8 °F (36.6 °C) (Oral)     BP Readings from Last 3 Encounters:   08/12/20 100/60   07/08/20 124/78   02/11/20 118/62     Pulse Readings from Last 3 Encounters:   08/12/20 73   07/08/20 67   02/11/20 78     Body mass index is 23.42 kg/m².  @LASTSAO2(3)@    Physical Exam   Constitutional: He is oriented to person, place,  and time. He appears well-developed and well-nourished. No distress.   HENT:   Head: Normocephalic and atraumatic.   Right Ear: External ear normal.   Left Ear: External ear normal.   Nose: Nose normal.   Mouth/Throat: Oropharynx is clear and moist.   Eyes: Pupils are equal, round, and reactive to light. Conjunctivae and EOM are normal.   Neck: Normal range of motion. Neck supple.   Cardiovascular: Normal rate, regular rhythm, normal heart sounds and intact distal pulses.   Pulmonary/Chest: Effort normal and breath sounds normal. No respiratory distress. He has no wheezes.   Musculoskeletal: Normal range of motion.   Walks with rollator   Neurological: He is alert and oriented to person, place, and time.   Skin: Skin is warm and dry.   Psychiatric: He has a normal mood and affect. His behavior is normal. Judgment and thought content normal.   Nursing note and vitals reviewed.      Results for orders placed or performed in visit on 07/08/20   COVID-19,[facility] ROUTINE, NP/OP SWAB IN TRANSPORT MEDIA OR ESWAB 72 HR TAT - Swab, Nasopharynx   Result Value Ref Range    SARS-CoV-2, GRETA Not Detected Not Detected           Andrew was seen today for follow-up and alzheimer's disease.    Diagnoses and all orders for this visit:    Medicare annual wellness visit, subsequent    Alzheimer's dementia without behavioral disturbance, unspecified timing of dementia onset (CMS/[facility])    Dyslipidemia    Skin lesion of face    Prediabetes     Weight loss    Need for vaccination  -     Cancel: Pneumococcal Polysaccharide Vaccine 23-Valent (PPSV23) Greater Than or Equal To 3yo Subcutaneous / IM      Continue current medications.  Encouraged healthy diet/exercise.  OV labs in  4  months.  Pt to call sooner if any other issues arise.        Outpatient Medications Prior to Visit   Medication Sig Dispense Refill   • alendronate (FOSAMAX) 70 MG tablet Take 1 tablet by mouth Every 7 (Seven) Days. 48 tablet 0   • ALLEGRA-D ALLERGY & CONGESTION   MG per 12 hr tablet Take 1 tablet by mouth twice daily 60 tablet 0   • lamoTRIgine (LAMICTAL) 200 MG tablet Take 200 mg by mouth Every Night.     • memantine (NAMENDA) 10 MG tablet Take 10 mg by mouth 2 (Two) Times a Day.     • OLANZapine (zyPREXA) 20 MG tablet Take 20 mg by mouth every night at bedtime.     • polyethylene glycol (MIRALAX) powder Take 17 g by mouth Daily. (Patient taking differently: Take 17 g by mouth Daily As Needed.) 850 g 5   • rivastigmine (EXELON) 6 MG capsule Take 6 mg by mouth 2 (Two) Times a Day.     • tamsulosin (FLOMAX) 0.4 MG capsule 24 hr capsule      • lamoTRIgine (LaMICtal) 100 MG tablet Take 100 mg by mouth Daily.       No facility-administered medications prior to visit.      No orders of the defined types were placed in this encounter.    [unfilled]  Medications Discontinued During This Encounter   Medication Reason   • lamoTRIgine (LaMICtal) 100 MG tablet *Therapy completed         Return in about 4 months (around 12/12/2020) for Recheck, labs.

## 2020-08-12 NOTE — PROGRESS NOTES
The ABCs of the Annual Wellness Visit  Subsequent Medicare Wellness Visit    Chief Complaint   Patient presents with   • Follow-up   • Alzheimer's Disease       Subjective   History of Present Illness:  Andrew Redmond is a 74 y.o. male who presents for a Subsequent Medicare Wellness Visit.    HEALTH RISK ASSESSMENT    Recent Hospitalizations:  No hospitalization(s) within the last year.    Current Medical Providers:  Patient Care Team:  Jessica Birch MD as PCP - General (Internal Medicine & Pediatrics)  Huan Romano MD as PCP - Family Medicine  Jessica Birch MD as PCP - Claims Attributed    Smoking Status:  Social History     Tobacco Use   Smoking Status Former Smoker   • Packs/day: 2.00   • Years: 20.00   • Pack years: 40.00   • Last attempt to quit: 2/3/1990   • Years since quittin.5   Smokeless Tobacco Former User   • Quit date:    Tobacco Comment    quit 30 yrs ago        Alcohol Consumption:  Social History     Substance and Sexual Activity   Alcohol Use No       Depression Screen:   PHQ-2/PHQ-9 Depression Screening 2020   Little interest or pleasure in doing things 0   Feeling down, depressed, or hopeless 0   Trouble falling or staying asleep, or sleeping too much 0   Feeling tired or having little energy 0   Poor appetite or overeating 0   Feeling bad about yourself - or that you are a failure or have let yourself or your family down 0   Trouble concentrating on things, such as reading the newspaper or watching television 0   Moving or speaking so slowly that other people could have noticed. Or the opposite - being so fidgety or restless that you have been moving around a lot more than usual 0   Thoughts that you would be better off dead, or of hurting yourself in some way 0   Total Score 0   If you checked off any problems, how difficult have these problems made it for you to do your work, take care of things at home, or get along with other people? Not difficult at all        Fall Risk Screen:  STEADI Fall Risk Assessment was completed, and patient is at MODERATE risk for falls. Assessment completed on:8/12/2020    Health Habits and Functional and Cognitive Screening:  Functional & Cognitive Status 8/12/2020   Do you have difficulty preparing food and eating? No   Do you have difficulty bathing yourself, getting dressed or grooming yourself? No   Do you have difficulty using the toilet? No   Do you have difficulty moving around from place to place? No   Do you have trouble with steps or getting out of a bed or a chair? No   Current Diet Well Balanced Diet   Dental Exam Up to date   Eye Exam Up to date   Exercise (times per week) 5 times per week   Current Exercise Activities Include Walking   Do you need help using the phone?  No   Are you deaf or do you have serious difficulty hearing?  Yes   Do you need help with transportation? No   Do you need help shopping? No   Do you need help preparing meals?  No   Do you need help with housework?  No   Do you need help with laundry? No   Do you need help taking your medications? No   Do you need help managing money? No   Do you ever drive or ride in a car without wearing a seat belt? No   Have you felt unusual stress, anger or loneliness in the last month? No   Who do you live with? Alone   If you need help, do you have trouble finding someone available to you? No   Have you been bothered in the last four weeks by sexual problems? No   Do you have difficulty concentrating, remembering or making decisions? No         Does the patient have evidence of cognitive impairment? No    Asprin use counseling:Does not need ASA (and currently is not on it)    Age-appropriate Screening Schedule:  Refer to the list below for future screening recommendations based on patient's age, sex and/or medical conditions. Orders for these recommended tests are listed in the plan section. The patient has been provided with a written plan.    Health Maintenance    Topic Date Due   • ZOSTER VACCINE (1 of 2) 09/25/1995   • INFLUENZA VACCINE  08/01/2020   • DXA SCAN  08/08/2021   • COLONOSCOPY  04/01/2026   • TDAP/TD VACCINES (4 - Tdap) 12/22/2028          The following portions of the patient's history were reviewed and updated as appropriate: allergies, current medications, past family history, past medical history, past social history, past surgical history and problem list.    Outpatient Medications Prior to Visit   Medication Sig Dispense Refill   • alendronate (FOSAMAX) 70 MG tablet Take 1 tablet by mouth Every 7 (Seven) Days. 48 tablet 0   • ALLEGRA-D ALLERGY & CONGESTION  MG per 12 hr tablet Take 1 tablet by mouth twice daily 60 tablet 0   • lamoTRIgine (LAMICTAL) 200 MG tablet Take 200 mg by mouth Every Night.     • memantine (NAMENDA) 10 MG tablet Take 10 mg by mouth 2 (Two) Times a Day.     • OLANZapine (zyPREXA) 20 MG tablet Take 20 mg by mouth every night at bedtime.     • polyethylene glycol (MIRALAX) powder Take 17 g by mouth Daily. (Patient taking differently: Take 17 g by mouth Daily As Needed.) 850 g 5   • rivastigmine (EXELON) 6 MG capsule Take 6 mg by mouth 2 (Two) Times a Day.     • tamsulosin (FLOMAX) 0.4 MG capsule 24 hr capsule      • lamoTRIgine (LaMICtal) 100 MG tablet Take 100 mg by mouth Daily.       No facility-administered medications prior to visit.        Patient Active Problem List   Diagnosis   • Alzheimer's dementia without behavioral disturbance (CMS/HCC)   • Bipolar I disorder, single manic episode (CMS/HCC)   • Epilepsy (CMS/HCC)   • Chronic constipation   • Hemorrhoids   • Polyp of colon   • Erectile dysfunction   • High risk medication use   • Poor balance   • Subdural hematoma (CMS/HCC)   • Seizure (CMS/HCC)   • Low energy   • Closed compression fracture of L1 lumbar vertebra   • Acute bilateral low back pain without sciatica   • Weight loss   • Displaced fracture of right femoral neck (CMS/HCC)   • Status post hip  "hemiarthroplasty   • Subacromial bursitis of left shoulder joint   • Closed displaced fracture of neck of right scapula   • Closed nondisplaced fracture of right clavicle   • Hip fracture due to osteoporosis, sequela   • Acute serous otitis media       Advanced Care Planning:  ACP discussion was held with the patient during this visit. Patient has an advance directive in EMR which is still valid.     Review of Systems    Compared to one year ago, the patient feels his physical health is the same.  Compared to one year ago, the patient feels his mental health is the same.    Reviewed chart for potential of high risk medication in the elderly: yes  Reviewed chart for potential of harmful drug interactions in the elderly:yes    Objective         Vitals:    08/12/20 1038   BP: 100/60   BP Location: Right arm   Patient Position: Sitting   Cuff Size: Adult   Pulse: 73   Resp: 16   Temp: 96 °F (35.6 °C)   TempSrc: Tympanic   SpO2: 99%   Weight: 69.9 kg (154 lb)   Height: 172.7 cm (67.99\")       Body mass index is 23.42 kg/m².  Discussed the patient's BMI with him. The BMI is in the acceptable range.    Physical Exam    Lab Results   Component Value Date    TRIG 148 07/08/2020    HDL 47 07/08/2020     (H) 07/08/2020    VLDL 29.6 07/08/2020    HGBA1C 5.59 07/08/2020        Assessment/Plan   Medicare Risks and Personalized Health Plan  CMS Preventative Services Quick Reference  Dementia/Memory   Fall Risk  Immunizations Discussed/Encouraged (specific immunizations; Pneumococcal 23 )  Polypharmacy    The above risks/problems have been discussed with the patient.  Pertinent information has been shared with the patient in the After Visit Summary.  Follow up plans and orders are seen below in the Assessment/Plan Section.    Diagnoses and all orders for this visit:    1. Medicare annual wellness visit, subsequent (Primary)    2. Alzheimer's dementia without behavioral disturbance, unspecified timing of dementia onset " (CMS/HCC)    3. Dyslipidemia    4. Skin lesion of face    5. Prediabetes     6. Weight loss    7. Need for vaccination  -     Pneumococcal Polysaccharide Vaccine 23-Valent (PPSV23) Greater Than or Equal To 1yo Subcutaneous / IM      Follow Up:  Return in about 4 months (around 12/12/2020) for Recheck, labs.     An After Visit Summary and PPPS were given to the patient.

## 2020-08-25 ENCOUNTER — OFFICE VISIT (OUTPATIENT)
Dept: ORTHOPEDIC SURGERY | Facility: CLINIC | Age: 75
End: 2020-08-25

## 2020-08-25 VITALS — BODY MASS INDEX: 24.17 KG/M2 | HEIGHT: 67 IN | WEIGHT: 154 LBS

## 2020-08-25 DIAGNOSIS — M25.551 RIGHT HIP PAIN: Primary | ICD-10-CM

## 2020-08-25 DIAGNOSIS — Z96.649 STATUS POST HIP HEMIARTHROPLASTY: ICD-10-CM

## 2020-08-25 PROCEDURE — 99212 OFFICE O/P EST SF 10 MIN: CPT | Performed by: ORTHOPAEDIC SURGERY

## 2020-08-25 PROCEDURE — 73501 X-RAY EXAM HIP UNI 1 VIEW: CPT | Performed by: ORTHOPAEDIC SURGERY

## 2020-08-28 ENCOUNTER — TELEPHONE (OUTPATIENT)
Dept: INTERNAL MEDICINE | Facility: CLINIC | Age: 75
End: 2020-08-28

## 2020-08-28 NOTE — TELEPHONE ENCOUNTER
PATIENT CALLED TO CHECK ON THE BIOPSY RESULTS MADE AT DERMATOLOGIST'S OFFICE.    PLEASE ADVISE    [phone]

## 2020-09-11 RX ORDER — TAMSULOSIN HYDROCHLORIDE 0.4 MG/1
1 CAPSULE ORAL DAILY
Qty: 30 CAPSULE | Refills: 2 | Status: SHIPPED | OUTPATIENT
Start: 2020-09-11 | End: 2020-12-23

## 2020-09-18 ENCOUNTER — HOSPITAL ENCOUNTER (OUTPATIENT)
Dept: GENERAL RADIOLOGY | Facility: HOSPITAL | Age: 75
Discharge: HOME | End: 2020-09-18
Admitting: INTERNAL MEDICINE

## 2020-09-18 ENCOUNTER — OFFICE VISIT (OUTPATIENT)
Dept: INTERNAL MEDICINE | Facility: CLINIC | Age: 75
End: 2020-09-18

## 2020-09-18 VITALS
TEMPERATURE: 98.8 F | HEART RATE: 107 BPM | OXYGEN SATURATION: 99 % | RESPIRATION RATE: 16 BRPM | HEIGHT: 67 IN | SYSTOLIC BLOOD PRESSURE: 132 MMHG | DIASTOLIC BLOOD PRESSURE: 82 MMHG | BODY MASS INDEX: 25.21 KG/M2 | WEIGHT: 160.6 LBS

## 2020-09-18 DIAGNOSIS — R10.9 RIGHT FLANK PAIN: Primary | ICD-10-CM

## 2020-09-18 DIAGNOSIS — R13.12 OROPHARYNGEAL DYSPHAGIA: ICD-10-CM

## 2020-09-18 DIAGNOSIS — N23 KIDNEY PAIN: ICD-10-CM

## 2020-09-18 DIAGNOSIS — R10.9 RIGHT FLANK PAIN: ICD-10-CM

## 2020-09-18 LAB
BILIRUB BLD-MCNC: NEGATIVE MG/DL
CLARITY, POC: CLEAR
COLOR UR: YELLOW
GLUCOSE UR STRIP-MCNC: NEGATIVE MG/DL
KETONES UR QL: NEGATIVE
LEUKOCYTE EST, POC: NEGATIVE
NITRITE UR-MCNC: NEGATIVE MG/ML
PH UR: 6.5 [PH] (ref 5–8)
PROT UR STRIP-MCNC: NEGATIVE MG/DL
RBC # UR STRIP: NEGATIVE /UL
SP GR UR: 1.01 (ref 1–1.03)
UROBILINOGEN UR QL: NORMAL

## 2020-09-18 PROCEDURE — 81003 URINALYSIS AUTO W/O SCOPE: CPT | Performed by: INTERNAL MEDICINE

## 2020-09-18 PROCEDURE — 99214 OFFICE O/P EST MOD 30 MIN: CPT | Performed by: INTERNAL MEDICINE

## 2020-09-18 PROCEDURE — 74018 RADEX ABDOMEN 1 VIEW: CPT

## 2020-09-18 RX ORDER — OMEPRAZOLE 40 MG/1
40 CAPSULE, DELAYED RELEASE ORAL DAILY
Qty: 30 CAPSULE | Refills: 0 | Status: SHIPPED | OUTPATIENT
Start: 2020-09-18 | End: 2021-01-27 | Stop reason: SDUPTHER

## 2020-09-18 NOTE — PROGRESS NOTES
Andrew Redmond is a 74 y.o. male, who presents with a chief complaint of   Chief Complaint   Patient presents with   • Flank Pain     Right       HPI   Pt here bc of right flank pain and dysphagia.  These are both new problems    Right flank pain -   He has had kidney stone in past but not sure it it was that or something else.  No fever.  No n/v/d.  Last bm yesterday.  He has had constipation in the past.  He denies dysuria or hematuria.     Dysphagia - he c/o pills and food sticking in his throat.  No issues with liquids.  No cough.  No hoarse voice.  He is not on any meds for GERD and denies reflux    The following portions of the patient's history were reviewed and updated as appropriate: allergies, current medications, past family history, past medical history, past social history, past surgical history and problem list.    Allergies: Patient has no known allergies.    Review of Systems   Constitutional: Negative.    HENT: Positive for rhinorrhea. Negative for congestion.    Eyes: Positive for redness. Negative for discharge.   Respiratory: Positive for cough. Negative for wheezing.    Cardiovascular: Negative for chest pain and palpitations.   Gastrointestinal: Positive for constipation. Negative for nausea and vomiting.   Genitourinary: Negative for dysuria and hematuria.   Musculoskeletal: Negative for arthralgias and myalgias.   Skin: Negative for color change and rash.   Neurological: Negative for syncope and headaches.   Psychiatric/Behavioral: Negative for agitation and dysphoric mood.             Wt Readings from Last 3 Encounters:   09/18/20 72.8 kg (160 lb 9.6 oz)   08/25/20 69.9 kg (154 lb)   08/12/20 69.9 kg (154 lb)     Temp Readings from Last 3 Encounters:   09/18/20 98.8 °F (37.1 °C) (Temporal)   08/12/20 96 °F (35.6 °C) (Tympanic)   07/08/20 98.6 °F (37 °C) (Oral)     BP Readings from Last 3 Encounters:   09/18/20 132/82   08/12/20 100/60   07/08/20 124/78     Pulse Readings from Last 3  Encounters:   09/18/20 107   08/12/20 73   07/08/20 67     Body mass index is 25.15 kg/m².  @LASTSAO2(3)@    Physical Exam  Vitals signs and nursing note reviewed.   Constitutional:       General: He is not in acute distress.     Appearance: Normal appearance. He is well-developed.   HENT:      Head: Normocephalic and atraumatic.      Right Ear: External ear normal.      Left Ear: External ear normal.      Nose: Nose normal.      Mouth/Throat:      Mouth: Mucous membranes are moist.      Pharynx: Oropharynx is clear.   Eyes:      General:         Right eye: No discharge.         Left eye: No discharge.      Conjunctiva/sclera: Conjunctivae normal.      Pupils: Pupils are equal, round, and reactive to light.   Neck:      Musculoskeletal: Normal range of motion and neck supple.   Cardiovascular:      Rate and Rhythm: Normal rate and regular rhythm.      Pulses: Normal pulses.      Heart sounds: Normal heart sounds.   Pulmonary:      Effort: Pulmonary effort is normal. No respiratory distress.      Breath sounds: Normal breath sounds. No wheezing.   Abdominal:      General: Abdomen is flat.      Palpations: Abdomen is soft.      Tenderness: There is no abdominal tenderness.   Musculoskeletal: Normal range of motion.      Comments: Normal gait   Skin:     General: Skin is warm and dry.   Neurological:      Mental Status: He is alert and oriented to person, place, and time.   Psychiatric:         Behavior: Behavior normal.         Thought Content: Thought content normal.         Judgment: Judgment normal.         Results for orders placed or performed in visit on 09/18/20   POC Urinalysis Dipstick, Automated    Specimen: Urine   Result Value Ref Range    Color Yellow Yellow, Straw, Dark Yellow, Antonina    Clarity, UA Clear Clear    Specific Gravity  1.010 1.005 - 1.030    pH, Urine 6.5 5.0 - 8.0    Leukocytes Negative Negative    Nitrite, UA Negative Negative    Protein, POC Negative Negative mg/dL    Glucose, UA Negative  Negative, 1000 mg/dL (3+) mg/dL    Ketones, UA Negative Negative    Urobilinogen, UA Normal Normal    Bilirubin Negative Negative    Blood, UA Negative Negative           Andrew was seen today for flank pain.    Diagnoses and all orders for this visit:    Right flank pain  -     XR Abdomen KUB; Future    Kidney pain  -     POC Urinalysis Dipstick, Automated    Oropharyngeal dysphagia  -     omeprazole (priLOSEC) 40 MG capsule; Take 1 capsule by mouth Daily.  -     FL video swallow w speech; Future        Outpatient Medications Prior to Visit   Medication Sig Dispense Refill   • alendronate (FOSAMAX) 70 MG tablet Take 1 tablet by mouth Every 7 (Seven) Days. 48 tablet 0   • ALLEGRA-D ALLERGY & CONGESTION  MG per 12 hr tablet Take 1 tablet by mouth twice daily 60 tablet 0   • lamoTRIgine (LAMICTAL) 200 MG tablet Take 200 mg by mouth Every Night. 1/2 pill at noon     • memantine (NAMENDA) 10 MG tablet Take 10 mg by mouth 2 (Two) Times a Day.     • OLANZapine (zyPREXA) 20 MG tablet Take 20 mg by mouth every night at bedtime.     • polyethylene glycol (MIRALAX) powder Take 17 g by mouth Daily. (Patient taking differently: Take 17 g by mouth Daily As Needed.) 850 g 5   • rivastigmine (EXELON) 6 MG capsule Take 6 mg by mouth 2 (Two) Times a Day.     • tamsulosin (FLOMAX) 0.4 MG capsule 24 hr capsule Take 1 capsule by mouth Daily. 30 capsule 2     No facility-administered medications prior to visit.      New Medications Ordered This Visit   Medications   • omeprazole (priLOSEC) 40 MG capsule     Sig: Take 1 capsule by mouth Daily.     Dispense:  30 capsule     Refill:  0     [unfilled]  There are no discontinued medications.      Return in about 4 weeks (around 10/16/2020) for Recheck.

## 2020-09-23 ENCOUNTER — TELEPHONE (OUTPATIENT)
Dept: INTERNAL MEDICINE | Facility: CLINIC | Age: 75
End: 2020-09-23

## 2020-09-23 NOTE — TELEPHONE ENCOUNTER
Patient called in and stated he received a call about a barium swollow and doesn't know what its about please call patient at [phone]

## 2020-09-24 ENCOUNTER — HOSPITAL ENCOUNTER (EMERGENCY)
Facility: HOSPITAL | Age: 75
Discharge: HOME | End: 2020-09-24
Attending: EMERGENCY MEDICINE | Admitting: EMERGENCY MEDICINE

## 2020-09-24 ENCOUNTER — APPOINTMENT (OUTPATIENT)
Dept: GENERAL RADIOLOGY | Facility: HOSPITAL | Age: 75
End: 2020-09-24

## 2020-09-24 VITALS
BODY MASS INDEX: 24.25 KG/M2 | SYSTOLIC BLOOD PRESSURE: 110 MMHG | HEART RATE: 70 BPM | WEIGHT: 160 LBS | DIASTOLIC BLOOD PRESSURE: 66 MMHG | RESPIRATION RATE: 16 BRPM | TEMPERATURE: 98.7 F | OXYGEN SATURATION: 97 % | HEIGHT: 68 IN

## 2020-09-24 DIAGNOSIS — R07.89 CHEST DISCOMFORT: Primary | ICD-10-CM

## 2020-09-24 DIAGNOSIS — E87.1 HYPONATREMIA: ICD-10-CM

## 2020-09-24 LAB
ALBUMIN SERPL-MCNC: 3.8 G/DL (ref 3.5–5.2)
ALBUMIN/GLOB SERPL: 1.6 G/DL
ALP SERPL-CCNC: 116 U/L (ref 39–117)
ALT SERPL W P-5'-P-CCNC: 18 U/L (ref 1–41)
ANION GAP SERPL CALCULATED.3IONS-SCNC: 7.7 MMOL/L (ref 5–15)
APTT PPP: 29.9 SECONDS (ref 24.3–38.1)
AST SERPL-CCNC: 21 U/L (ref 1–40)
BASOPHILS # BLD AUTO: 0.07 10*3/MM3 (ref 0–0.2)
BASOPHILS NFR BLD AUTO: 0.8 % (ref 0–1.5)
BILIRUB SERPL-MCNC: 0.4 MG/DL (ref 0–1.2)
BUN SERPL-MCNC: 8 MG/DL (ref 8–23)
BUN/CREAT SERPL: 11 (ref 7–25)
CALCIUM SPEC-SCNC: 8.6 MG/DL (ref 8.6–10.5)
CHLORIDE SERPL-SCNC: 95 MMOL/L (ref 98–107)
CO2 SERPL-SCNC: 27.3 MMOL/L (ref 22–29)
CREAT SERPL-MCNC: 0.73 MG/DL (ref 0.76–1.27)
D DIMER PPP FEU-MCNC: <0.27 MCGFEU/ML (ref 0–0.46)
DEPRECATED RDW RBC AUTO: 47.7 FL (ref 37–54)
EOSINOPHIL # BLD AUTO: 0.28 10*3/MM3 (ref 0–0.4)
EOSINOPHIL NFR BLD AUTO: 3.1 % (ref 0.3–6.2)
ERYTHROCYTE [DISTWIDTH] IN BLOOD BY AUTOMATED COUNT: 15.4 % (ref 12.3–15.4)
GFR SERPL CREATININE-BSD FRML MDRD: 105 ML/MIN/1.73
GLOBULIN UR ELPH-MCNC: 2.4 GM/DL
GLUCOSE SERPL-MCNC: 112 MG/DL (ref 65–99)
HCT VFR BLD AUTO: 41.6 % (ref 37.5–51)
HGB BLD-MCNC: 13.6 G/DL (ref 13–17.7)
IMM GRANULOCYTES # BLD AUTO: 0.1 10*3/MM3 (ref 0–0.05)
IMM GRANULOCYTES NFR BLD AUTO: 1.1 % (ref 0–0.5)
INR PPP: 1.11 (ref 0.9–1.1)
LYMPHOCYTES # BLD AUTO: 1.99 10*3/MM3 (ref 0.7–3.1)
LYMPHOCYTES NFR BLD AUTO: 21.9 % (ref 19.6–45.3)
MCH RBC QN AUTO: 28 PG (ref 26.6–33)
MCHC RBC AUTO-ENTMCNC: 32.7 G/DL (ref 31.5–35.7)
MCV RBC AUTO: 85.6 FL (ref 79–97)
MONOCYTES # BLD AUTO: 0.97 10*3/MM3 (ref 0.1–0.9)
MONOCYTES NFR BLD AUTO: 10.7 % (ref 5–12)
NEUTROPHILS NFR BLD AUTO: 5.66 10*3/MM3 (ref 1.7–7)
NEUTROPHILS NFR BLD AUTO: 62.4 % (ref 42.7–76)
NRBC BLD AUTO-RTO: 0 /100 WBC (ref 0–0.2)
NT-PROBNP SERPL-MCNC: 27.9 PG/ML (ref 0–900)
PLATELET # BLD AUTO: 296 10*3/MM3 (ref 140–450)
PMV BLD AUTO: 8.8 FL (ref 6–12)
POTASSIUM SERPL-SCNC: 3.7 MMOL/L (ref 3.5–5.2)
PROT SERPL-MCNC: 6.2 G/DL (ref 6–8.5)
PROTHROMBIN TIME: 14 SECONDS (ref 12.1–15)
RBC # BLD AUTO: 4.86 10*6/MM3 (ref 4.14–5.8)
SODIUM SERPL-SCNC: 130 MMOL/L (ref 136–145)
TROPONIN T SERPL-MCNC: <0.01 NG/ML (ref 0–0.03)
TROPONIN T SERPL-MCNC: <0.01 NG/ML (ref 0–0.03)
WBC # BLD AUTO: 9.07 10*3/MM3 (ref 3.4–10.8)

## 2020-09-24 PROCEDURE — 85379 FIBRIN DEGRADATION QUANT: CPT | Performed by: EMERGENCY MEDICINE

## 2020-09-24 PROCEDURE — 71046 X-RAY EXAM CHEST 2 VIEWS: CPT

## 2020-09-24 PROCEDURE — 80053 COMPREHEN METABOLIC PANEL: CPT | Performed by: EMERGENCY MEDICINE

## 2020-09-24 PROCEDURE — 85730 THROMBOPLASTIN TIME PARTIAL: CPT | Performed by: EMERGENCY MEDICINE

## 2020-09-24 PROCEDURE — 85610 PROTHROMBIN TIME: CPT | Performed by: EMERGENCY MEDICINE

## 2020-09-24 PROCEDURE — 84484 ASSAY OF TROPONIN QUANT: CPT | Performed by: EMERGENCY MEDICINE

## 2020-09-24 PROCEDURE — 93010 ELECTROCARDIOGRAM REPORT: CPT | Performed by: INTERNAL MEDICINE

## 2020-09-24 PROCEDURE — 93005 ELECTROCARDIOGRAM TRACING: CPT | Performed by: EMERGENCY MEDICINE

## 2020-09-24 PROCEDURE — 99285 EMERGENCY DEPT VISIT HI MDM: CPT

## 2020-09-24 PROCEDURE — 99284 EMERGENCY DEPT VISIT MOD MDM: CPT | Performed by: EMERGENCY MEDICINE

## 2020-09-24 PROCEDURE — 83880 ASSAY OF NATRIURETIC PEPTIDE: CPT | Performed by: EMERGENCY MEDICINE

## 2020-09-24 PROCEDURE — 85025 COMPLETE CBC W/AUTO DIFF WBC: CPT | Performed by: EMERGENCY MEDICINE

## 2020-09-24 PROCEDURE — 99284 EMERGENCY DEPT VISIT MOD MDM: CPT

## 2020-09-24 RX ORDER — SODIUM CHLORIDE 0.9 % (FLUSH) 0.9 %
10 SYRINGE (ML) INJECTION AS NEEDED
Status: DISCONTINUED | OUTPATIENT
Start: 2020-09-24 | End: 2020-09-24 | Stop reason: HOSPADM

## 2020-09-24 NOTE — DISCHARGE INSTRUCTIONS
Your sodium level is a bit low today.  Recommend you increase your salt intake for the next 2 to 3 days.  Is a ways to increase salt intake include saltine crackers, salted pretzels, adding a little extra salt to your food.  Recommend you follow-up with Dr. Birch next week for repeat lab work, sooner if needed.  Return to ED for medical emergencies.

## 2020-09-24 NOTE — TELEPHONE ENCOUNTER
LM on patient VM that she ordered this to investigate why he was having difficulty swallowing pills and food per notes on last visit.

## 2020-09-24 NOTE — ED PROVIDER NOTES
"Subjective   Andrew Redmond is a 74-year-old white male who presents secondary to chest discomfort.  Patient was seen by his PCP Dr. Delmis Birch earlier today.  Patient states he needs work-up for upper GI issues.  Patient normally walks from [facility] to [facility] without any difficulty.  Patient walked from the hospital to [facility] when he began feeling a sensation in his chest.  Patient states that this is not pain, not pressure, not tightness not discomfort.  He simply states something \"does not feel right\".  The sensation is still present.  No shortness of breath.  No diaphoresis.  No syncope.  No nausea or vomiting.  Patient presents for evaluation.      History provided by:  Patient      Review of Systems   Constitutional: Negative for fever.   HENT: Negative for rhinorrhea.    Eyes: Negative for redness.   Respiratory: Negative for cough.    Cardiovascular: Negative for chest pain.   Gastrointestinal: Negative for abdominal pain.   Genitourinary: Negative for dysuria.   Musculoskeletal: Negative for back pain.   Skin: Negative for rash.   Neurological: Negative for syncope.   All other systems reviewed and are negative.      Past Medical History:   Diagnosis Date   • Alzheimer disease (CMS/HCC)    • Alzheimer disease (CMS/HCC)    • Alzheimer disease (CMS/HCC) 03/06/2019   • Anxiety and depression    • Bipolar 1 disorder (CMS/HCC)    • Colon polyp    • Constipation    • Depression    • Diarrhea    • Epilepsy (CMS/HCC)    • Epilepsy (CMS/HCC) 03/06/2019   • Erectile dysfunction    • GERD (gastroesophageal reflux disease)    • L1 vertebral fracture (CMS/HCC)    • Seizures (CMS/HCC)     absence seizures.  well controlled.       No Known Allergies    Past Surgical History:   Procedure Laterality Date   • CHOLECYSTECTOMY     • COLONOSCOPY N/A 4/1/2016    Procedure: COLONOSCOPY with polypectomy;  Surgeon: Akiko Scales MD;  Location: [facility];  Service:    • GALLBLADDER SURGERY     • HIP HEMIARTHROPLASTY Right 3/8/2019    " Procedure: HIP HEMIARTHROPLASTY and all associated procedures;  Surgeon: Harrison Turk MD;  Location: [facility];  Service: Orthopedics   • LIVER SURGERY     • TONSILLECTOMY         Family History   Problem Relation Age of Onset   • Lung cancer Father    • No Known Problems Mother    • No Known Problems Maternal Grandmother    • No Known Problems Maternal Grandfather    • No Known Problems Paternal Grandmother    • No Known Problems Paternal Grandfather        Social History     Socioeconomic History   • Marital status:      Spouse name: Not on file   • Number of children: 0   • Years of education: Ph.D   • Highest education level: Not on file   Occupational History   • Occupation: professor     Comment: taught anatomy at [employer]   Tobacco Use   • Smoking status: Former Smoker     Packs/day: 2.00     Years: 20.00     Pack years: 40.00     Quit date: 2/3/1990     Years since quittin.6   • Smokeless tobacco: Former User     Quit date:    • Tobacco comment: quit 30 yrs ago    Substance and Sexual Activity   • Alcohol use: No   • Drug use: No   • Sexual activity: Defer           Objective   Physical Exam  Vitals signs and nursing note reviewed.   Constitutional:       General: He is not in acute distress.     Appearance: He is well-developed. He is not diaphoretic.      Comments: 74-year-old white male laying in bed.  Patient appears in good health for age.  Vital signs are unremarkable.   HENT:      Head: Normocephalic and atraumatic.      Right Ear: External ear normal.      Left Ear: External ear normal.      Nose: Nose normal.   Eyes:      Conjunctiva/sclera: Conjunctivae normal.      Pupils: Pupils are equal, round, and reactive to light.   Neck:      Musculoskeletal: Normal range of motion and neck supple.   Cardiovascular:      Rate and Rhythm: Normal rate and regular rhythm.      Heart sounds: Normal heart sounds. No murmur. No friction rub. No gallop.    Pulmonary:      Effort:  "Pulmonary effort is normal. No respiratory distress.      Breath sounds: Normal breath sounds. No stridor. No wheezing or rales.   Abdominal:      General: There is no distension.      Palpations: Abdomen is soft.      Tenderness: There is no abdominal tenderness.   Musculoskeletal: Normal range of motion.      Right lower leg: Edema present.      Left lower leg: Edema present.      Comments: Trace bilateral edema   Skin:     General: Skin is warm and dry.      Findings: No erythema or rash.   Neurological:      Mental Status: He is alert and oriented to person, place, and time.      Cranial Nerves: No cranial nerve deficit.      Deep Tendon Reflexes: Reflexes are normal and symmetric.   Psychiatric:         Behavior: Behavior normal.         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      ED Course  ED Course as of Sep 24 1637   Thu Sep 24, 2020   1403 Patient complaint is vague.  He states that his chest \"does not feel right\".  However he cannot further describe his symptoms.  While patient has diagnoses of Alzheimer's he was alert, awake, oriented to person, place, time and situation with the single exception of patient thought that today was the 22nd instead of the 24th.   EKG is unchanged from prior.  Will obtain full set of lab work and chest x-ray.    [SS]   1509 CBC unremarkable.  CMP shows hyponatremia with sodium of 130.  Mild hypochloremia with chloride 95.  " Otherwise unremarkable.  Troponin, proBNP and d-dimer all unremarkable.  Awaiting chest x-ray reading.    [SS]   1533 I do not find any any significant abnormality on chest x-ray.  Will repeat EKG and troponin II hours after initial's.    [SS]   1624 Repeat EKG is unchanged.  Repeat troponin is negative.  I do not find any source for patient's symptoms.I have discussed at length with patient all results, diagnoses, treatment and follow-up.    Will DC home.    [SS]      ED Course User Index  [SS] Gerardo Martinez MD      Labs Reviewed   COMPREHENSIVE METABOLIC PANEL - Abnormal; Notable for the following components:       Result Value    Glucose 112 (*)     Creatinine 0.73 (*)     Sodium 130 (*)     Chloride 95 (*)     All other components within normal limits    Narrative:     GFR Normal >60  Chronic Kidney Disease <60  Kidney Failure <15     PROTIME-INR - Abnormal; Notable for the following components:    INR 1.11 (*)     All other components within normal limits    Narrative:     Therapeutic Ranges for INR: 2.0-3.0 (PT 20-30)                              2.5-3.5 (PT 25-34)   CBC WITH AUTO DIFFERENTIAL - Abnormal; Notable for the following components:    Immature Grans % 1.1 (*)     Monocytes, Absolute 0.97 (*)     Immature Grans, Absolute 0.10 (*)     All other components within normal limits   TROPONIN (IN-HOUSE) - Normal    Narrative:     Troponin T Reference Range:  <= 0.03 ng/mL-   Negative for AMI  >0.03 ng/mL-     Abnormal for myocardial necrosis.  Clinicians would have to utilize clinical acumen, EKG, Troponin and serial changes to determine if it is an Acute Myocardial Infarction or myocardial injury due to an underlying chronic condition.       Results may be falsely decreased if patient taking Biotin.     BNP (IN-HOUSE) - Normal    Narrative:     Among patients with dyspnea, NT-proBNP is highly sensitive for the detection of acute congestive heart failure. In addition NT-proBNP of <300 pg/ml effectively  "rules out acute congestive heart failure with 99% negative predictive value.    Results may be falsely decreased if patient taking Biotin.     D-DIMER, QUANTITATIVE - Normal    Narrative:     Can be elevated in, but is not diagnostic for deep vein thrombosis (DVT) or pulmonary embolis (PE).  It is also elevated in other medical conditions.  Clinical correlation is required.  The negative cut-off value for the D-Dimer is 0.50 mcg FEU/mL for DVT and PE.     APTT - Normal    Narrative:     PTT = The equivalent PTT values for the therapeutic range of heparin levels at 0.1 to 0.7 U/ml are 53 to 110 seconds.     TROPONIN (IN-HOUSE) - Normal    Narrative:     Troponin T Reference Range:  <= 0.03 ng/mL-   Negative for AMI  >0.03 ng/mL-     Abnormal for myocardial necrosis.  Clinicians would have to utilize clinical acumen, EKG, Troponin and serial changes to determine if it is an Acute Myocardial Infarction or myocardial injury due to an underlying chronic condition.       Results may be falsely decreased if patient taking Biotin.     CBC AND DIFFERENTIAL    Narrative:     The following orders were created for panel order CBC & Differential.  Procedure                               Abnormality         Status                     ---------                               -----------         ------                     CBC Auto Differential[[id number]]        Abnormal            Final result                 Please view results for these tests on the individual orders.     Xr Chest 2 View    Result Date: 9/24/2020  Narrative: PA AND LATERAL CHEST, 9/24/2020 2:45 PM  HISTORY: Patient stated \"something does not feel right\" in his chest   symptoms for 2 days  COMPARISON: 03/07/2019  TECHNIQUE: PA and lateral upright chest series.  FINDINGS: Heart size and pulmonary vascularity are normal. The lungs are expanded and clear. No visible pulmonary infiltrate or pleural effusion.       Impression: Negative chest.  This report was finalized " on 9/24/2020 3:38 PM by Dr. Frantz Pressley MD.      Xr Abdomen Kub    Result Date: 9/18/2020  Narrative: XR ABDOMEN KUB-: 9/18/2020 12:44 PM  INDICATION: Flank pain on the right one week. Emergency liver surgery in 2008. No known injury.  COMPARISON: None available.  FINDINGS: AP radiographs of the abdomen. Status post total right hip replacement and there are postoperative changes in the right midabdomen and right upper quadrant. There is also a metallic clip in the left lower quadrant. Included lung bases are clear. There is gaseous distention of large bowel and to lesser extent small bowel without transition point or evidence to otherwise suggest obstruction. No mass effect or concerning calcifications. Moderate to moderately large stool burden. Degenerative change in the lumbar spine and left hip. Renal shadows obscured by air and fecal material..      Impression:  1. Nonspecific but nonobstructive bowel gas pattern. 2. Postop changes as described and degenerative changes.  This report was finalized on 9/18/2020 12:50 PM by Dr. Manolo March MD.      My differential diagnosis for chest pain includes but is not limited to:  Muscle strain, costochondritis, myositis, pleurisy, rib fracture, intercostal neuritis, herpes zoster, tumor, myocardial infarction, coronary syndrome, unstable angina, angina, aortic dissection, mitral valve prolapse, pericarditis, palpitations, pulmonary embolus, pneumonia, pneumothorax, lung cancer, GERD, esophagitis, esophageal spasm                                       MDM    Final diagnoses:   Chest discomfort   Hyponatremia            Gerardo Martinez MD  09/24/20 1588

## 2020-09-24 NOTE — ED NOTES
Repeat Troponin drawn from left AC, BLOOD WASTED FROM LINE PER PROTOCOL PRIOR TO LABS.     Vicente Villarreal RN  09/24/20 2550

## 2020-09-25 ENCOUNTER — EPISODE CHANGES (OUTPATIENT)
Dept: CASE MANAGEMENT | Facility: OTHER | Age: 75
End: 2020-09-25

## 2020-09-28 ENCOUNTER — EPISODE CHANGES (OUTPATIENT)
Dept: CASE MANAGEMENT | Facility: OTHER | Age: 75
End: 2020-09-28

## 2020-09-28 ENCOUNTER — TRANSCRIBE ORDERS (OUTPATIENT)
Dept: ADMINISTRATIVE | Facility: HOSPITAL | Age: 75
End: 2020-09-28

## 2020-09-28 ENCOUNTER — LAB (OUTPATIENT)
Dept: LAB | Facility: HOSPITAL | Age: 75
End: 2020-09-28

## 2020-09-28 ENCOUNTER — OFFICE VISIT (OUTPATIENT)
Dept: INTERNAL MEDICINE | Facility: CLINIC | Age: 75
End: 2020-09-28

## 2020-09-28 VITALS
DIASTOLIC BLOOD PRESSURE: 76 MMHG | HEIGHT: 68 IN | OXYGEN SATURATION: 98 % | BODY MASS INDEX: 23.43 KG/M2 | WEIGHT: 154.6 LBS | RESPIRATION RATE: 16 BRPM | TEMPERATURE: 97.3 F | HEART RATE: 87 BPM | SYSTOLIC BLOOD PRESSURE: 130 MMHG

## 2020-09-28 DIAGNOSIS — Z01.818 OTHER SPECIFIED PRE-OPERATIVE EXAMINATION: ICD-10-CM

## 2020-09-28 DIAGNOSIS — Z01.818 OTHER SPECIFIED PRE-OPERATIVE EXAMINATION: Primary | ICD-10-CM

## 2020-09-28 DIAGNOSIS — R13.12 OROPHARYNGEAL DYSPHAGIA: Primary | ICD-10-CM

## 2020-09-28 DIAGNOSIS — R63.4 WEIGHT LOSS: ICD-10-CM

## 2020-09-28 DIAGNOSIS — R07.89 NON-CARDIAC CHEST PAIN: ICD-10-CM

## 2020-09-28 LAB — SARS-COV-2 RNA PNL SPEC NAA+PROBE: NOT DETECTED

## 2020-09-28 PROCEDURE — 87635 SARS-COV-2 COVID-19 AMP PRB: CPT

## 2020-09-28 PROCEDURE — C9803 HOPD COVID-19 SPEC COLLECT: HCPCS

## 2020-09-28 PROCEDURE — 99214 OFFICE O/P EST MOD 30 MIN: CPT | Performed by: INTERNAL MEDICINE

## 2020-09-28 NOTE — PROGRESS NOTES
Andrew Redmond is a 75 y.o. male, who presents with a chief complaint of   Chief Complaint   Patient presents with   • throat issues     food getting lodged.        HPI   Pt here for f/u.  He was in the ED over the weekend bc of recurrent dysphagia/chest pain.  He was at [facility] and had chest pain on the left side.  Pain was non-exertional and persisted so he called an ambulance.  ED records reviewed.  Pain thought to be non cardiac.  He cont to c/o dysphagia with meats and pills.  He has a chronic sensation that things keep getting stuck.  He has had some intermittent diarrhea but otherwise no changes in bowels.  occ nausea but no vomiting.  No LE edema, orthopnea, pnd. BECK.      The following portions of the patient's history were reviewed and updated as appropriate: allergies, current medications, past family history, past medical history, past social history, past surgical history and problem list.    Allergies: Patient has no known allergies.    Review of Systems   Constitutional: Negative.    HENT: Positive for congestion. Negative for rhinorrhea.    Eyes: Negative.  Negative for discharge and redness.   Respiratory: Negative for cough and shortness of breath.    Cardiovascular: Positive for chest pain. Negative for palpitations.   Gastrointestinal: Positive for diarrhea. Negative for vomiting.   Genitourinary: Negative for dysuria and hematuria.   Musculoskeletal: Negative for arthralgias and myalgias.   Skin: Negative for color change and rash.   Neurological: Negative for seizures and headaches.   Psychiatric/Behavioral: Negative for agitation and dysphoric mood.             Wt Readings from Last 3 Encounters:   09/28/20 70.1 kg (154 lb 9.6 oz)   09/24/20 72.6 kg (160 lb)   09/18/20 72.8 kg (160 lb 9.6 oz)     Temp Readings from Last 3 Encounters:   09/28/20 97.3 °F (36.3 °C) (Temporal)   09/24/20 98.7 °F (37.1 °C) (Oral)   09/18/20 98.8 °F (37.1 °C) (Temporal)     BP Readings from Last 3 Encounters:    09/28/20 130/76   09/24/20 110/66   09/18/20 132/82     Pulse Readings from Last 3 Encounters:   09/28/20 87   09/24/20 70   09/18/20 107     Body mass index is 23.51 kg/m².  @LASTSAO2(3)@    Physical Exam  Vitals signs and nursing note reviewed.   Constitutional:       General: He is not in acute distress.     Appearance: He is well-developed.   HENT:      Head: Normocephalic and atraumatic.      Right Ear: External ear normal.      Left Ear: External ear normal.      Nose: Nose normal.   Eyes:      Conjunctiva/sclera: Conjunctivae normal.      Pupils: Pupils are equal, round, and reactive to light.   Neck:      Musculoskeletal: Normal range of motion and neck supple.   Cardiovascular:      Rate and Rhythm: Normal rate and regular rhythm.      Heart sounds: Normal heart sounds.   Pulmonary:      Effort: Pulmonary effort is normal. No respiratory distress.      Breath sounds: Normal breath sounds. No wheezing.   Musculoskeletal: Normal range of motion.      Comments: Normal gait   Skin:     General: Skin is warm and dry.   Neurological:      Mental Status: He is alert and oriented to person, place, and time.   Psychiatric:         Behavior: Behavior normal.         Thought Content: Thought content normal.         Judgment: Judgment normal.         Results for orders placed or performed during the hospital encounter of 09/24/20   Comprehensive Metabolic Panel    Specimen: Blood   Result Value Ref Range    Glucose 112 (H) 65 - 99 mg/dL    BUN 8 8 - 23 mg/dL    Creatinine 0.73 (L) 0.76 - 1.27 mg/dL    Sodium 130 (L) 136 - 145 mmol/L    Potassium 3.7 3.5 - 5.2 mmol/L    Chloride 95 (L) 98 - 107 mmol/L    CO2 27.3 22.0 - 29.0 mmol/L    Calcium 8.6 8.6 - 10.5 mg/dL    Total Protein 6.2 6.0 - 8.5 g/dL    Albumin 3.80 3.50 - 5.20 g/dL    ALT (SGPT) 18 1 - 41 U/L    AST (SGOT) 21 1 - 40 U/L    Alkaline Phosphatase 116 39 - 117 U/L    Total Bilirubin 0.4 0.0 - 1.2 mg/dL    eGFR Non African Amer 105 >60 mL/min/1.73     Globulin 2.4 gm/dL    A/G Ratio 1.6 g/dL    BUN/Creatinine Ratio 11.0 7.0 - 25.0    Anion Gap 7.7 5.0 - 15.0 mmol/L   Troponin    Specimen: Blood   Result Value Ref Range    Troponin T <0.010 0.000 - 0.030 ng/mL   BNP    Specimen: Blood   Result Value Ref Range    proBNP 27.9 0.0 - 900.0 pg/mL   D-dimer, Quantitative    Specimen: Blood   Result Value Ref Range    D-Dimer, Quantitative <0.27 0.00 - 0.46 MCGFEU/mL   Protime-INR    Specimen: Blood   Result Value Ref Range    Protime 14.0 12.1 - 15.0 Seconds    INR 1.11 (H) 0.90 - 1.10   aPTT    Specimen: Blood   Result Value Ref Range    PTT 29.9 24.3 - 38.1 seconds   CBC Auto Differential    Specimen: Blood   Result Value Ref Range    WBC 9.07 3.40 - 10.80 10*3/mm3    RBC 4.86 4.14 - 5.80 10*6/mm3    Hemoglobin 13.6 13.0 - 17.7 g/dL    Hematocrit 41.6 37.5 - 51.0 %    MCV 85.6 79.0 - 97.0 fL    MCH 28.0 26.6 - 33.0 pg    MCHC 32.7 31.5 - 35.7 g/dL    RDW 15.4 12.3 - 15.4 %    RDW-SD 47.7 37.0 - 54.0 fl    MPV 8.8 6.0 - 12.0 fL    Platelets 296 140 - 450 10*3/mm3    Neutrophil % 62.4 42.7 - 76.0 %    Lymphocyte % 21.9 19.6 - 45.3 %    Monocyte % 10.7 5.0 - 12.0 %    Eosinophil % 3.1 0.3 - 6.2 %    Basophil % 0.8 0.0 - 1.5 %    Immature Grans % 1.1 (H) 0.0 - 0.5 %    Neutrophils, Absolute 5.66 1.70 - 7.00 10*3/mm3    Lymphocytes, Absolute 1.99 0.70 - 3.10 10*3/mm3    Monocytes, Absolute 0.97 (H) 0.10 - 0.90 10*3/mm3    Eosinophils, Absolute 0.28 0.00 - 0.40 10*3/mm3    Basophils, Absolute 0.07 0.00 - 0.20 10*3/mm3    Immature Grans, Absolute 0.10 (H) 0.00 - 0.05 10*3/mm3    nRBC 0.0 0.0 - 0.2 /100 WBC   Troponin    Specimen: Blood   Result Value Ref Range    Troponin T <0.010 0.000 - 0.030 ng/mL           Andrew was seen today for throat issues.    Diagnoses and all orders for this visit:    Oropharyngeal dysphagia  -     FL video swallow w speech; Future  -     FL upper gi water soluble; Future    Non-cardiac chest pain  -     FL video swallow w speech; Future  -     FL  upper gi water soluble; Future    Weight loss  -     FL video swallow w speech; Future  -     FL upper gi water soluble; Future      Differential for pt's non-cardiac chest pain and weight loss concerning for GI etiology including esophageal spasm, esophageal stricture, aspiration or esophagitis.  No respiratory sx and cardiac w/u to date neg. Pt has been placed on PPI.  Will try to get swallow study and upper gi done this week.    25 minutes was spent in patient care with >50% in counseling about the above issues including medications, testing and disease management plan.         Outpatient Medications Prior to Visit   Medication Sig Dispense Refill   • ALLEGRA-D ALLERGY & CONGESTION  MG per 12 hr tablet Take 1 tablet by mouth twice daily 60 tablet 0   • lamoTRIgine (LAMICTAL) 200 MG tablet Take 200 mg by mouth Every Night. 1/2 pill at noon     • memantine (NAMENDA) 10 MG tablet Take 10 mg by mouth 2 (Two) Times a Day.     • OLANZapine (zyPREXA) 20 MG tablet Take 20 mg by mouth every night at bedtime.     • polyethylene glycol (MIRALAX) powder Take 17 g by mouth Daily. (Patient taking differently: Take 17 g by mouth Daily As Needed.) 850 g 5   • rivastigmine (EXELON) 6 MG capsule Take 6 mg by mouth 2 (Two) Times a Day.     • tamsulosin (FLOMAX) 0.4 MG capsule 24 hr capsule Take 1 capsule by mouth Daily. 30 capsule 2   • alendronate (FOSAMAX) 70 MG tablet Take 1 tablet by mouth Every 7 (Seven) Days. 48 tablet 0   • omeprazole (priLOSEC) 40 MG capsule Take 1 capsule by mouth Daily. 30 capsule 0     No facility-administered medications prior to visit.      No orders of the defined types were placed in this encounter.    [unfilled]  There are no discontinued medications.      Return in about 1 week (around 10/5/2020) for Recheck, will recheck after testing.

## 2020-09-29 ENCOUNTER — PATIENT OUTREACH (OUTPATIENT)
Dept: CASE MANAGEMENT | Facility: OTHER | Age: 75
End: 2020-09-29

## 2020-09-29 ENCOUNTER — HOSPITAL ENCOUNTER (OUTPATIENT)
Dept: GENERAL RADIOLOGY | Facility: HOSPITAL | Age: 75
Discharge: HOME | End: 2020-09-29

## 2020-09-29 DIAGNOSIS — R07.89 NON-CARDIAC CHEST PAIN: ICD-10-CM

## 2020-09-29 DIAGNOSIS — R13.12 OROPHARYNGEAL DYSPHAGIA: ICD-10-CM

## 2020-09-29 DIAGNOSIS — R63.4 WEIGHT LOSS: ICD-10-CM

## 2020-09-29 PROCEDURE — 63710000001 SOD BICARB-CITRIC ACID-SIMETHICONE 2.21-1.53-0.04 G PACK: Performed by: INTERNAL MEDICINE

## 2020-09-29 PROCEDURE — A9270 NON-COVERED ITEM OR SERVICE: HCPCS | Performed by: INTERNAL MEDICINE

## 2020-09-29 PROCEDURE — 63710000001 BARIUM SULFATE 98 % RECONSTITUTED SUSPENSION: Performed by: INTERNAL MEDICINE

## 2020-09-29 PROCEDURE — 74230 X-RAY XM SWLNG FUNCJ C+: CPT

## 2020-09-29 PROCEDURE — 74246 X-RAY XM UPR GI TRC 2CNTRST: CPT

## 2020-09-29 PROCEDURE — 63710000001 BARIUM SULFATE 96 % RECONSTITUTED SUSPENSION: Performed by: INTERNAL MEDICINE

## 2020-09-29 PROCEDURE — 92611 MOTION FLUOROSCOPY/SWALLOW: CPT

## 2020-09-29 RX ADMIN — BARIUM SULFATE 340 ML: 980 POWDER, FOR SUSPENSION ORAL at 14:54

## 2020-09-29 RX ADMIN — ANTACID/ANTIFLATULENT 1 TABLET: 380; 550; 10; 10 GRANULE, EFFERVESCENT ORAL at 14:53

## 2020-09-29 RX ADMIN — BARIUM SULFATE 183 ML: 960 POWDER, FOR SUSPENSION ORAL at 14:54

## 2020-09-29 NOTE — OUTREACH NOTE
Care Plan Note      Responses   Annual Wellness Visit:   Patient Has Completed   Care Gaps Addressed  Flu Shot, Colon Cancer Screening   Colon Cancer Screening Type  Colonoscopy   Colonoscopy Status  Up to Date (< 10 yrs)   Flu Shot Status  -- [Will discuss with PCP]   Other Patient Education/Resources   24/7 [facility] Nurse Call Line, Advanced Care Planning, [place]   24/7 Nurse Call Line Education Method  Verbal   ACP Education Method  Verbal [Completed]   MyChart Education Method  Verbal [Active]   Does patient have depression diagnosis?  No   Advanced Directives:  Patient Has   Ed Visits past 12 months:  2 or 3   Hospitalizations past 12 months  None   Medication Adherence  Medications understood        The main concerns and/or symptoms the patient would like to address are: Talked with patient. Discussed 924/20 ED visit regarding chest p . Patient states to be compliant with ED recommendations; states syptoms have improved and  had 9/28/20 PCP appointment. Patient reports improvement regarding chest pain but is having difficulty with swallowing. He had upper GI today; states to have received results and speech therapy will be scheduled. Patient states to be able to tolerate Healthy Choice vegetables and swallow without difficulty.  Patient lives alone; independent with ADL's; light meal preparation; (does not drive due to history of seizures) uses public bus service for transportation and ambulates with rollator. Patient compliant with medications and  medical appointments. Patient reports no difficulty with seizures; chest pain; SOB; appetite or sleeping.     Education/instruction provided by Care Coordinator: Reviewed with patient ED recommendations; food intake regarding dysphagia;  COVID 19 precautions; 24/7 Nurse Line Telephone number; ACM contact information; Advance Directives; My Chart; gaps in care; MWV and Case Management services.  Patient verbalized understanding and states to appreciate  phone call.  He declines further outreach at this time. No further questions or concerns voiced at this time.     Follow Up Outreach Due: Follow up as needed.     Lachelle Marquez RN  Ambulatory     9/29/2020, 17:27 EDT

## 2020-09-29 NOTE — MBS/VFSS/FEES
Outpatient Speech Language Pathology   Adult Swallow Initial Evaluation and Modified Barium Swallow Study   Angi Feliciano     Patient Name: Andrew Redmond  : 1945  MRN: [id number]  Today's Date: 2020         Visit Date: 2020   Patient Active Problem List   Diagnosis   • Alzheimer's dementia without behavioral disturbance (CMS/HCC)   • Bipolar I disorder, single manic episode (CMS/HCC)   • Epilepsy (CMS/HCC)   • Chronic constipation   • Hemorrhoids   • Polyp of colon   • Erectile dysfunction   • High risk medication use   • Poor balance   • Subdural hematoma (CMS/HCC)   • Seizure (CMS/HCC)   • Low energy   • Closed compression fracture of L1 lumbar vertebra   • Acute bilateral low back pain without sciatica   • Weight loss   • Displaced fracture of right femoral neck (CMS/HCC)   • Status post hip hemiarthroplasty   • Subacromial bursitis of left shoulder joint   • Closed displaced fracture of neck of right scapula   • Closed nondisplaced fracture of right clavicle   • Hip fracture due to osteoporosis, sequela   • Acute serous otitis media        Past Medical History:   Diagnosis Date   • Alzheimer disease (CMS/HCC)    • Alzheimer disease (CMS/HCC)    • Alzheimer disease (CMS/HCC) 2019   • Anxiety and depression    • Bipolar 1 disorder (CMS/HCC)    • Colon polyp    • Constipation    • Depression    • Diarrhea    • Epilepsy (CMS/HCC)    • Epilepsy (CMS/HCC) 2019   • Erectile dysfunction    • GERD (gastroesophageal reflux disease)    • L1 vertebral fracture (CMS/HCC)    • Seizures (CMS/HCC)     absence seizures.  well controlled.        Past Surgical History:   Procedure Laterality Date   • CHOLECYSTECTOMY     • COLONOSCOPY N/A 2016    Procedure: COLONOSCOPY with polypectomy;  Surgeon: Akiko Scales MD;  Location: [facility];  Service:    • GALLBLADDER SURGERY     • HIP HEMIARTHROPLASTY Right 3/8/2019    Procedure: HIP HEMIARTHROPLASTY and all associated procedures;  Surgeon: Burke  Harrison MICHELLE MD;  Location: [facility];  Service: Orthopedics   • LIVER SURGERY     • TONSILLECTOMY           Visit Dx:     ICD-10-CM ICD-9-CM   1. Oropharyngeal dysphagia  R13.12 787.22   2. Non-cardiac chest pain  R07.89 786.59   3. Weight loss  R63.4 783.21           SLP Adult Swallow Evaluation     Row Name 09/29/20 7262       Rehab Evaluation    Document Type  evaluation  -AD    Subjective Information  complains of  -AD    Patient Observations  alert;cooperative;agree to therapy  -AD    Patient/Family/Caregiver Comments/Observations  Pt seen standing upright in the left later position following UGI study.   -AD    Care Plan Review  evaluation/treatment results reviewed;care plan/treatment goals reviewed;risks/benefits reviewed;current/potential barriers reviewed;patient/other agree to care plan  -AD    Patient Effort  good  -AD    Symptoms Noted During/After Treatment  none  -AD       General Information    Patient Profile Reviewed  yes  -AD    Pertinent History Of Current Problem  Pt is a 76 y/o male referred for an UGI and MBS to further assess his swallowing due to recent complaints of food feeling stuck in the throat. Recent ER visit due to chest pain. It was diagnosed as noncardiac in nature and felt to be related to his recent swallowing complaints. Follow up MD appointment indicates concerns for possible esophageal spasm, stricture, aspiration or esophagitis. Pt reports complaints of difficulty swallowing pills and meats and feels as if they hang in his throat. Hx is significant for GERD, Epilepsy, anxiety/depression and early Alzheimer's dementia. CXR in the ER was negative for acute findings. Findings during the UGI indicated silent aspiration of barium contrast with material reaching the right main bronchus per radiologist. See report and images.   -AD    Current Method of Nutrition  soft textures;thin liquids  -AD    Precautions/Limitations, Vision  WFL;for purposes of eval  -AD     Precautions/Limitations, Hearing  hearing impairment, bilaterally  -AD    Prior Level of Function-Communication  other (see comments);cognitive-linguistic impairment  -AD    Prior Level of Function-Swallowing  no diet consistency restrictions;safe, efficient swallowing in all situations;other (see comments)  -AD    Plans/Goals Discussed with  patient;agreed upon  -AD    Barriers to Rehab  cognitive status;hearing deficit  -AD    Patient's Goals for Discharge  --  -AD       Pain    Additional Documentation  --  -AD       Oral Motor Structure and Function    Oral Lesions or Structural Abnormalities and/or variants  none  -AD    Dentition Assessment  natural, present and adequate  -AD    Secretion Management  WNL/WFL  -AD    Mucosal Quality  moist, healthy  -AD    Volitional Swallow  delayed  -AD    Volitional Cough  weak;reduced respiratory support  -AD       Oral Musculature and Cranial Nerve Assessment    Oral Motor General Assessment  velar impairment;vocal impairment  -AD    Velar Impairment, Detail, Cranial Nerves X, XI (Vagus and Accessory)  reduced velar strength  -AD    Vocal Impairment, Detail. Cranial Nerve X (Vagus)  vocal quality abnormality (see comments)  -AD       General Eating/Swallowing Observations    Respiratory Support Currently in Use  room air  -AD    Eating/Swallowing Skills  fed by SLP  -AD    Positioning During Eating  other (see comments)  -AD       MBS/VFSS    Utensils Used  spoon;cup;straw  -AD    Consistencies Trialed  regular textures;pureed;thin liquids;nectar/syrup-thick liquids;honey-thick liquids  -AD       MBS/VFSS Interpretation    Oral Prep Phase  WFL  -AD    Oral Transit Phase  impaired  -AD    Oral Residue  WFL  -AD    VFSS Summary  Pt presents with a mild oral and moderate-severe pharyngeal dysphagia with silent aspiration noted during the UGI in large volume. Residual material noted in the vestibule and on the vocal cords. Pt unable to clear the cords despite cued cough. Weak  cough noted. See radiologist's report for further details. MBS indicates pooling of material in the valleculae on nectar thick liquids, to the tip of the epiglottis on honey thick liquids, valleculae on puree and to the pyriforms on thin liquids. This results in a 1/2 to 1 second delay overall on all trials except soilds. Decreased tongue base retraction noted on all trials and consistencies with a minimal barium column noted. Pt with decreased hyolaryngeal anterior/superior movment and decreased laryngeal elevation with noted sluggish and incomplete epiglottic deflection. Pt with cosure of the airway at times from the vocal cords up to the epiglottic petiole. Decreased movement of all results in mild to severe vallecular residue and mild to moderate pyriform sinus residue. Residue increases with thicker consistency with the greatest being on puree. Pt with noted retrograde flow of material within the pharyngoesophageal segment due to cricopharyngeal bar and trapping abov the bar. This material flows into the pyriforms and was greatest on cued subsequent trials of puree. Penetration occurred on one trial of nectar thick liquids by cup that was not expelled. Some difficulty with viewing due to residue in vestibule from UGI. Penetration noted on trials of thins fomr spoon/cup that was both transient and nontransient. Chin tuck maneuver appeared to eliminate penetration. Subsequent swallows on trials of puree were not significantly effective in clearing vallecular and pharyngeal residue. Noted decreased pressure during a cued subsequent swallow could not maintain UES opening for all material to pass and this was refluxed back into the pyriform sinuses actually increasing the amount of residue. Other cues subsequent swallows on thickened liquids and thins partially cleared residue from the pyriforms and valleculae.   -AD    Oral Phase, Comment  Mild deficits related to delay of stage transition.   -AD       Oral Transit  Phase    Impaired Oral Transit Phase  other (see comments)  -AD       Initiation of Pharyngeal Swallow    Initiation of Pharyngeal Swallow  bolus in valleculae;bolus in pyriform sinuses  -AD    Pharyngeal Phase  impaired pharyngeal phase of swallowing  -AD    Penetration During the Swallow  thin liquids;nectar-thick liquids;secondary to reduced laryngeal elevation;secondary to reduced vestibular closure  -AD    Aspiration During the Swallow  other (see comments)  -AD    Response to Penetration  deep;shallow;transient;no response;response with cue only;weak cough/throat clear  -AD    Response to Aspiration  response with cue only;could not clear subglottic material;weak cough/throat clear;other (see comments)  -AD    Rosenbek's Scale  thin:;8--->level 8;nectar:;3--->level 3;honey:;pudding/puree:;regular textures:;1--->level 1  -AD    Pharyngeal Residue  thin liquids;nectar-thick liquids;honey-thick liquids;pudding/puree;regular textures;valleculae;pyriform sinuses;secondary to reduced base of tongue retraction;secondary to reduced laryngeal elevation;secondary to reduced hyolaryngeal excursion;laryngeal vestibule  -AD    Response to Residue  cleared residue with spontaneous subsequent swallow;cleared residue with cued swallow;other (see comments)  -AD    Attempted Compensatory Maneuvers  bolus size;chin tuck;additional subsequent swallow;throat clear after swallow  -AD    Response to Attempted Compensatory Maneuvers  prevented penetration;prevented aspiration;reduced residue;did not reduce residue  -AD    Pharyngeal Phase, Comment  Pt presents with a moderate to severe pharyngeal dysphagia with decreased base of tongue retraction, delay of pharyngeal swallow resulting in pooling of material in the vallecula and pyriforms before the swallow. Decreased vestibular closure, epiglottic deflection, hyolaryngeal anterior/superior movement and laryngeal elevation result in vallecular and pyriform sinus residue, penetration  and silent aspiration (during UGI only). Pt able to clear material from the vestibule with a cued cough/throat clear but weak. Unable to clear subglottic material. Subsequent swallows cleared thickended liquid residue, but not puree significantly. Some trapping of material above the crioopharyngeus muscle noted due to CP bar and refluxed back into the pyriforms, greatest on puree. Chin tukc was effective in eliminating penetration on thins and improving airway closure during the swallow. Bolus size also appears to eliminate aspiration as not viewed during MBS.   -AD       Esophageal Phase    Esophageal Phase, Comment  See radiologist's UGI report.   -AD       Clinical Impression    SLP Swallowing Diagnosis  mild;oral dysphagia;mod-severe;pharyngeal dysphagia  -AD    Functional Impact  risk of aspiration/pneumonia;risk of malnutrition  -AD    Rehab Potential/Prognosis, Swallowing  good, to achieve stated therapy goals  -AD    Swallow Criteria for Skilled Therapeutic Interventions Met  demonstrates skilled criteria  -AD       Recommendations    Therapy Frequency (Swallow)  1 day per week  -AD    Predicted Duration Therapy Intervention (Days)  4 weeks  -AD    SLP Diet Recommendation  soft textures;ground;thin liquids;other (see comments)  -AD    Recommended Diagnostics  reassess via VFSS (MBS)  -AD    Recommended Precautions and Strategies  upright posture during/after eating;small bites of food and sips of liquid;multiple swallows per bite of food;multiple swallows per sip of liquid;chin tuck;general aspiration precautions;fatigue precautions  -AD    Oral Care Recommendations  Oral Care before breakfast, after meals and PRN  -AD    SLP Rec. for Method of Medication Administration  meds whole;meds crushed;with thick liquids;with pudding or applesauce;as tolerated  -AD    Monitor for Signs of Aspiration  no;none - silent aspiration present  -AD    Anticipated Discharge Disposition (SLP)  home with OP services  -AD       User Key  (r) = Recorded By, (t) = Taken By, (c) = Cosigned By    Initials Name Provider Type    Christy Albright MS CCC-SLP Speech and Language Pathologist           OP SLP Education     Row Name 09/29/20 1430       Education    Barriers to Learning  Other (comment0 Decreased recall due to Alzheimer's dementia  -AD    Action Taken to Address Barriers  Use of repetition and written information  -AD    Education Provided  Described results of evaluation;Patient expressed understanding of evaluation;Patient participated in establishing goals and treatment plan;Patient demonstrated recommended strategies;Patient requires further education on strategies, risks  -AD    Assessed  Learning needs;Learning motivation;Learning preferences;Learning readiness  -AD    Learning Motivation  Strong  -AD    Learning Method  Explanation;Demonstration;Teach back;Written materials  -AD    Teaching Response  Verbalized understanding;Demonstrated understanding;Reinforcement needed  -AD    Education Comments  Reviewed video images with patient and use of chin tuck manuever. Written information and contact name number provided to patient regarding chin tuck.   -AD      User Key  (r) = Recorded By, (t) = Taken By, (c) = Cosigned By    Initials Name Effective Dates    Christy Albright MS CCC-SLP 08/09/20 -           SLP OP Goals     Row Name 09/29/20 1430          Goal Type Needed    Goal Type Needed  Dysphagia  -AD        Subjective Comments    Subjective Comments  Pt was seen for a modified barium swallow study following UGI. He was alert and cooperative throughout the study.   -AD        Subjective Pain    Able to rate subjective pain?  yes  -AD     Pre-Treatment Pain Level  0  -AD     Post-Treatment Pain Level  0  -AD     Subjective Pain Comment  No pain reported  -AD        Dysphagia Goals    Dysphagia LTG's  Patient will safely consume the recommended diet without complications such as aspiration pneumonia  -AD      Patient will safely consume the recommended diet without complications such as aspiration pneumonia  Soft, moist diet with thin liquids and use of compensatory strategies.   -AD     Status: Patient will safely consume the recommended diet without complications such as aspiration pneumonia  New  -AD     Dysphagia STG's  Patient will increase laryngeal elevation to reduce residue that might fall into airway by completing;Patient will increase superior/anterior movement of hyolaryngeal complex to reduce residue which may fall into airway by using the Expiratory Muscle Strength Trainer;Patient will compensate for oral/pharyngeal deficits and reduce risks while eating by utilizing  compensatory strategies;Patient will increase strength of tongue base and posterior pharyngeal walls to reduce residue that might fall into airway by completing  -AD     Patient will increase laryngeal elevation to reduce residue that might fall into airway by completing  falsetto/pitch glide;without cues  -AD     Status: Patient will increase laryngeal elevation to reduce residue that might fall into airway by completing  New  -AD     Patient will increase strength of tongue base and posterior pharyngeal walls to reduce residue that might fall into airway by completing  effotful swallow;Lauren (tongue hold);tongue base retraction;without cues  -AD     Status: Patient will increase strength of tongue base and posterior pharyngeal walls to reduce residue that might fall into airway by completing  New  -AD     Patient will increase superior/anterior movement of hyolaryngeal complex to reduce residue which may fall into airway by using the Expiratory Muscle Strength   at MEP for recommended frequency and duration of device  -AD     Status: Patient will increase superior/anterior movement of hyolaryngeal complex to reduce residue which may fall into airway by using the Expiratory Muscle Strength Trainer  New  -AD     Patient will compensate  for oral/pharyngeal deficits and reduce risks while eating by utilizing  compensatory strategies  chin down;controlled bolus size;residue clearing;without cues  -AD     Status: Patient will compensate for oral/pharyngeal deficits and reduce risks while eating by utilizing  compensatory strategies  New  -AD        SLP Time Calculation    SLP Goal Re-Cert Due Date  10/29/20  -AD       User Key  (r) = Recorded By, (t) = Taken By, (c) = Cosigned By    Initials Name Provider Type    AD Christy Ortega MS CCC-SLP Speech and Language Pathologist          OP SLP Assessment/Plan - 09/29/20 1430        SLP Assessment    Functional Problems  Swallowing   -AD    Impact on Function: Swallowing  Risk of aspiration;Risk of pneumonia;Risk of malnourishment   -AD    Clinical Impression: Swallowing  Mild:;oral phase dysphagia;Moderate-Severe:;pharyngeal phase dysphagia   -AD    Functional Problems Comment  Reports of food/pills feeling stuck in his throat. Risk of aspiration.    -AD    Clinical Impression Comments  Pt with significant risk of aspiration due to silent nature witnessed during UGI.    -AD    SLP Diagnosis  Mild oral and moderate to severe pharyngeal dysphagia   -AD    Prognosis  Good (comment)    with repetition and use of handouts  -AD    Patient/caregiver participated in establishment of treatment plan and goals  Yes   -AD    Patient would benefit from skilled therapy intervention  Yes   -AD       SLP Plan    Frequency  once weekly   -AD    Duration  1 month   -AD    Planned CPT's?  SLP SWALLOW THERAPY: 70663   -AD    Plan Comments  Will obtain orders from MD and contact pt to set up therapy.    -AD      User Key  (r) = Recorded By, (t) = Taken By, (c) = Cosigned By    Initials Name Provider Type    Christy Albright MS CCC-SLP Speech and Language Pathologist             Time Calculation:   SLP Start Time: 1430    Therapy Charges for Today     Code Description Service Date Service Provider Modifiers Qty     [id number]  ST MOTION FLUORO EVAL SWALLOW 6 9/29/2020 Christy Ortega, MS CCC-SLP GN 1            Christy Ortega, MS RAMA-SLP  9/29/2020

## 2020-09-30 DIAGNOSIS — T17.998A ASPIRATION OF LIQUID, INITIAL ENCOUNTER: Primary | ICD-10-CM

## 2020-09-30 DIAGNOSIS — K31.84 GASTROPARESIS: ICD-10-CM

## 2020-10-05 ENCOUNTER — HOSPITAL ENCOUNTER (OUTPATIENT)
Dept: SPEECH THERAPY | Facility: HOSPITAL | Age: 75
Setting detail: THERAPIES SERIES
Discharge: HOME | End: 2020-10-05

## 2020-10-05 DIAGNOSIS — R13.12 OROPHARYNGEAL DYSPHAGIA: Primary | ICD-10-CM

## 2020-10-05 PROCEDURE — 92526 ORAL FUNCTION THERAPY: CPT

## 2020-10-05 NOTE — THERAPY TREATMENT NOTE
Outpatient Speech Language Pathology   Adult Swallow Treatment Note  RITA Diaz     Patient Name: Andrew Redmond  : 1945  MRN: [id number]  Today's Date: 10/5/2020         Visit Date: 10/05/2020   Patient Active Problem List   Diagnosis   • Alzheimer's dementia without behavioral disturbance (CMS/HCC)   • Bipolar I disorder, single manic episode (CMS/HCC)   • Epilepsy (CMS/HCC)   • Chronic constipation   • Hemorrhoids   • Polyp of colon   • Erectile dysfunction   • High risk medication use   • Poor balance   • Subdural hematoma (CMS/HCC)   • Seizure (CMS/HCC)   • Low energy   • Closed compression fracture of L1 lumbar vertebra   • Acute bilateral low back pain without sciatica   • Weight loss   • Displaced fracture of right femoral neck (CMS/HCC)   • Status post hip hemiarthroplasty   • Subacromial bursitis of left shoulder joint   • Closed displaced fracture of neck of right scapula   • Closed nondisplaced fracture of right clavicle   • Hip fracture due to osteoporosis, sequela   • Acute serous otitis media        Visit Dx:    ICD-10-CM ICD-9-CM   1. Oropharyngeal dysphagia  R13.12 787.22         SLP OP Goals     Row Name 10/05/20 1430          Goal Type Needed    Goal Type Needed  Dysphagia  -AD        Subjective Comments    Subjective Comments  Pt was seen in therapy for 30 minutes and was alert and cooperative. Concerned about missing the bus and the session was ended early.   -AD        Subjective Pain    Able to rate subjective pain?  yes  -AD     Pre-Treatment Pain Level  0  -AD     Post-Treatment Pain Level  0  -AD     Subjective Pain Comment  No pain reported or indicated.   -AD        Dysphagia Goals    Dysphagia LTG's  Patient will safely consume the recommended diet without complications such as aspiration pneumonia  -AD     Patient will safely consume the recommended diet without complications such as aspiration pneumonia  Soft, moist diet with thin liquids and use of compensatory strategies.    -AD     Status: Patient will safely consume the recommended diet without complications such as aspiration pneumonia  Progressing as expected  -AD     Comments: Patient will safely consume the recommended diet without complications such as aspiration pneumonia  Pt reports improvement in swallowing since MBS with use of strategies. He did require some clarification regarding abiltiy to eat soft solid foods that were moist such as hamburgers. Encouraged to utilize strategies to assist with pharyngeal transit such as small bites, multiple swallows and alternating solids and liquids with use of chin tuck maneuver with liquids.   -AD     Patient will increase laryngeal elevation to reduce residue that might fall into airway by completing  falsetto/pitch glide;without cues  -AD     Status: Patient will increase laryngeal elevation to reduce residue that might fall into airway by completing  New  -AD     Comments: Patient will increase laryngeal elevation to reduce residue that might fall into airway by completing  Did not target due to time constraints and focus on other goals.   -AD     Patient will increase strength of tongue base and posterior pharyngeal walls to reduce residue that might fall into airway by completing  effotful swallow;Lauren (tongue hold);tongue base retraction;without cues  -AD     Status: Patient will increase strength of tongue base and posterior pharyngeal walls to reduce residue that might fall into airway by completing  Progressing as expected  -AD     Comments: Patient will increase strength of tongue base and posterior pharyngeal walls to reduce residue that might fall into airway by completing  Pt was able to return demonstration for effortful swallow, tongue hold, tongue base retraction exercises after SLP demonstration. Pt able to attempt tongue hold, but could not actually perform fully. Encouraged pt to continue performance at home and know that he is still getting some active movement of  the posterior pharyngeal wall. Able to perform tongue base exercises 15 times each, effortful swallow and tongue hold 5x each. Handout provided with exercise descriptions and frequency and duration for each. Pt able to verbalize understanding of exercises, duration and frequency.   -AD     Patient will increase superior/anterior movement of hyolaryngeal complex to reduce residue which may fall into airway by using the Expiratory Muscle Strength [place]  at [facility] for recommended frequency and duration of device  -AD     Status: Patient will increase superior/anterior movement of hyolaryngeal complex to reduce residue which may fall into airway by using the Expiratory Muscle Strength Trainer  New  -AD     Comments: Patient will increase superior/anterior movement of hyolaryngeal complex to reduce residue which may fall into airway by using the Expiratory Muscle Strength Trainer  Not targeted as device not obtained as of this date. Will contact pt and schedule when device obtained.   -AD     Patient will compensate for oral/pharyngeal deficits and reduce risks while eating by utilizing  compensatory strategies  chin down;controlled bolus size;residue clearing;without cues  -AD     Status: Patient will compensate for oral/pharyngeal deficits and reduce risks while eating by utilizing  compensatory strategies  Progressing as expected  -AD     Comments: Patient will compensate for oral/pharyngeal deficits and reduce risks while eating by utilizing  compensatory strategies  Pt able to recall and demonstrate use of chin tuck with liquids. Some reinforcement needed to perform with liquids only. He was able to demonstrea use of residue clearing with subsequent swallows. He was able to verbalize understanding of controlled bolus size and effortful swallow after verbal cues and visual models. Handouts provided with technique description. Pt able to read and verbalize understanding.   -AD        SLP Time Calculation    SLP Goal  Re-Cert Due Date  10/29/20  -AD       User Key  (r) = Recorded By, (t) = Taken By, (c) = Cosigned By    Initials Name Provider Type    Christy Albright MS CCC-SLP Speech and Language Pathologist          OP SLP Education     Row Name 10/05/20 1430       Education    Education Provided  Patient demonstrated recommended strategies;Patient requires further education on strategies, risks  -AD    Learning Method  Explanation;Demonstration;Teach back;Written materials  -AD    Teaching Response  Verbalized understanding;Demonstrated understanding  -AD    Education Comments  Pt able to return demonstration on all tongue base exercises. Verbalizes and demonstrates some swallowing techniques/strategies. Reports consistent use of chin tuck with occasional forgetting when distracted. Reinforcement needed for further exercise program with EMST.   -AD      User Key  (r) = Recorded By, (t) = Taken By, (c) = Cosigned By    Initials Name Effective Dates    Christy Albright MS CCC-SLP 08/09/20 -           OP SLP Assessment/Plan - 10/05/20 1430        SLP Assessment    Clinical Impression Comments  Pt able to return demonstrate on tongue base exercises and use of swallowing strategies. Handouts provided and pt able to verbalize duration and frequency of exercises.    -AD    SLP Diagnosis  Mild oral and moderate to severe pharyngeal dysphagia   -AD       SLP Plan    Plan Comments  Pt did not want to return for another session at this time. Will continue with exercises and strategies at home. He will contact SLP with any questions or concerns. SLP to contact pt when EMST obtained and schedule for follow up visit.    -AD      User Key  (r) = Recorded By, (t) = Taken By, (c) = Cosigned By    Initials Name Provider Type    Christy Albright MS CCC-SLP Speech and Language Pathologist                Time Calculation:   SLP Start Time: 1400  SLP Stop Time: 1430  SLP Time Calculation (min): 30 min  SLP Non-Billable Time (min):  0 min  Total Timed Code Minutes- SLP: 0 minute(s)    Therapy Charges for Today     Code Description Service Date Service Provider Modifiers Qty    80308488003 HC ST TREATMENT SWALLOW 2 10/5/2020 Christy Ortega, MS CCC-SLP GN 1            Christy Ortega, MS RAMA-SLP  10/5/2020

## 2020-10-15 ENCOUNTER — OFFICE VISIT (OUTPATIENT)
Dept: GASTROENTEROLOGY | Facility: CLINIC | Age: 75
End: 2020-10-15

## 2020-10-15 VITALS — TEMPERATURE: 97.8 F | BODY MASS INDEX: 23.93 KG/M2 | WEIGHT: 157.9 LBS | HEIGHT: 68 IN

## 2020-10-15 DIAGNOSIS — K63.5 POLYP OF COLON, UNSPECIFIED PART OF COLON, UNSPECIFIED TYPE: Primary | ICD-10-CM

## 2020-10-15 DIAGNOSIS — K22.89 PRESBYESOPHAGUS: ICD-10-CM

## 2020-10-15 DIAGNOSIS — R13.12 OROPHARYNGEAL DYSPHAGIA: ICD-10-CM

## 2020-10-15 DIAGNOSIS — K21.9 GASTROESOPHAGEAL REFLUX DISEASE, UNSPECIFIED WHETHER ESOPHAGITIS PRESENT: ICD-10-CM

## 2020-10-15 PROCEDURE — 99213 OFFICE O/P EST LOW 20 MIN: CPT | Performed by: INTERNAL MEDICINE

## 2020-10-15 RX ORDER — OMEPRAZOLE 40 MG/1
40 CAPSULE, DELAYED RELEASE ORAL DAILY
Qty: 90 CAPSULE | Refills: 3 | Status: SHIPPED | OUTPATIENT
Start: 2020-10-15 | End: 2021-09-27 | Stop reason: SDUPTHER

## 2020-10-15 NOTE — PROGRESS NOTES
"    PATIENT INFORMATION  Andrew Redmond       - 1945    CHIEF COMPLAINT  Chief Complaint   Patient presents with   • Difficulty Swallowing       HISTORY OF PRESENT ILLNESS  Has presbyesophagus and mild OP dysphagia but has seen Chang paulino overall feel this is possibly his dementia w/o history of CVA    Reviewed his UGI and MBSW and is to see speech tyherrapy and Does not want a PEG under anyucircumstances      REVIEWED PERTINENT RESULTS/ LABS  Lab Results   Component Value Date    CASEREPORT  2016     Surgical Pathology Report                         Case: [id number]                                  Authorizing Provider:  Akiko Scales MD          Collected:           2016 02:00 PM          Ordering Location:     [facility]   Received:            2016 03:17 PM                                 OR                                                                           Pathologist:           Stefani Conrad MD                                                     Specimen:    Large Intestine, Right/Ascending Colon, hepatic flexure polyp                               Lab Results   Component Value Date    HGB 13.6 2020    MCV 85.6 2020     2020    ALT 18 2020    AST 21 2020    HGBA1C 5.59 2020    INR 1.11 (H) 2020    TRIG 148 2020    FERRITIN 452.00 (H) 2019    IRON 23 (L) 2019    TIBC 110 (L) 2019      Xr Chest 2 View    Result Date: 2020  Narrative: PA AND LATERAL CHEST, 2020 2:45 PM  HISTORY: Patient stated \"something does not feel right\" in his chest   symptoms for 2 days  COMPARISON: 2019  TECHNIQUE: PA and lateral upright chest series.  FINDINGS: Heart size and pulmonary vascularity are normal. The lungs are expanded and clear. No visible pulmonary infiltrate or pleural effusion.       Impression: Negative chest.  This report was finalized on 2020 3:38 PM by Dr. rFantz Pressley MD.  "     Fl Video Swallow With Speech Single Contrast    Result Date: 9/29/2020  Narrative: VIDEO SWALLOWING STUDY  HISTORY: Dysphagia. History of reflux disease.  TECHNIQUE: Video swallowing study was conducted by the speech pathologist in my presence and recorded on digital video disc.  Fluoroscopy time 1.9 minutes. A single spot image was recorded for documentation purposes.  FINDINGS: There is some residue in the trachea and on the vocal cords from the upper GI series performed immediately prior to this examination. There is deep laryngeal penetration of nectar thickened liquid. There is also some penetration of thin liquids that improved with a chin tuck. For a full report, please refer to speech pathology.  This report was finalized on 9/29/2020 2:55 PM by Dr. Anastacio Ellis MD.      Fl Upper Gi Double Contrast    Result Date: 9/29/2020  Narrative: UPPER GI EXAMINATION  INDICATION: Dysphagia. History of reflux disease.  TECHNIQUE: Upper GI examination was performed using double contrast technique. *  Fluoroscopy time, 1.4 minutes *  25 fluoroscopic images were recorded.  FINDINGS: There is only a mild degree of esophageal dysmotility for patient age. No rings or strictures are seen. There are no mucosal lesions identified to suggest esophagitis. No hiatal hernia is identified. Note is made of silent aspiration of thick barium contrast. On some of the images, the gastric folds in the body are irregular. This may simply reflect some gastritis, but follow-up with upper endoscopy is suggested. Despite moving the patient through multiple positions and waiting, the stomach did not empty during the exam which may indicate gastroparesis. Therefore, the duodenal bulb and duodenal C-sweep are not characterized.      Impression:  1. Silent aspiration of thick barium contrast. 2. Fairly mild esophageal dysmotility, otherwise normal esophagus. 3. The stomach did not empty during the exam which may indicate gastroparesis. 4.  Several of the images show some irregular gastric folds in the body. Correlation with upper endoscopy suggested.  This report was finalized on 9/29/2020 3:01 PM by Dr. Anastacio Ellis MD.      Xr Abdomen Kub    Result Date: 9/18/2020  Narrative: XR ABDOMEN KUB-: 9/18/2020 12:44 PM  INDICATION: Flank pain on the right one week. Emergency liver surgery in 2008. No known injury.  COMPARISON: None available.  FINDINGS: AP radiographs of the abdomen. Status post total right hip replacement and there are postoperative changes in the right midabdomen and right upper quadrant. There is also a metallic clip in the left lower quadrant. Included lung bases are clear. There is gaseous distention of large bowel and to lesser extent small bowel without transition point or evidence to otherwise suggest obstruction. No mass effect or concerning calcifications. Moderate to moderately large stool burden. Degenerative change in the lumbar spine and left hip. Renal shadows obscured by air and fecal material..      Impression:  1. Nonspecific but nonobstructive bowel gas pattern. 2. Postop changes as described and degenerative changes.  This report was finalized on 9/18/2020 12:50 PM by Dr. Manolo March MD.        REVIEW OF SYSTEMS  Review of Systems   HENT: Positive for trouble swallowing.    All other systems reviewed and are negative.        ACTIVE PROBLEMS  Patient Active Problem List    Diagnosis   • Acute serous otitis media [H65.00]   • Hip fracture due to osteoporosis, sequela [M80.059S]   • Status post hip hemiarthroplasty [Z96.649]   • Subacromial bursitis of left shoulder joint [M75.52]   • Closed displaced fracture of neck of right scapula [S42.151A]   • Closed nondisplaced fracture of right clavicle [S42.001A]   • Displaced fracture of right femoral neck (CMS/HCC) [S72.001A]   • Weight loss [R63.4]   • Closed compression fracture of L1 lumbar vertebra [S32.010A]   • Acute bilateral low back pain without sciatica [M54.5]    • Low energy [R53.83]   • High risk medication use [Z79.899]   • Poor balance [R26.89]   • Erectile dysfunction [N52.9]   • Chronic constipation [K59.09]   • Hemorrhoids [K64.9]   • Polyp of colon [K63.5]   • Alzheimer's dementia without behavioral disturbance (CMS/HCC) [G30.9, F02.80]   • Bipolar I disorder, single manic episode (CMS/HCC) [F30.9]   • Epilepsy (CMS/HCC) [G40.909]   • Seizure (CMS/HCC) [R56.9]   • Subdural hematoma (CMS/HCC) [S06.5X9A]         PAST MEDICAL HISTORY  Past Medical History:   Diagnosis Date   • Alzheimer disease (CMS/HCC)    • Alzheimer disease (CMS/HCC)    • Alzheimer disease (CMS/HCC) 03/06/2019   • Anxiety and depression    • Bipolar 1 disorder (CMS/HCC)    • Colon polyp    • Constipation    • Depression    • Diarrhea    • Epilepsy (CMS/HCC)    • Epilepsy (CMS/HCC) 03/06/2019   • Erectile dysfunction    • GERD (gastroesophageal reflux disease)    • L1 vertebral fracture (CMS/HCC)    • Seizures (CMS/HCC)     absence seizures.  well controlled.         SURGICAL HISTORY  Past Surgical History:   Procedure Laterality Date   • CHOLECYSTECTOMY     • COLONOSCOPY N/A 4/1/2016    Procedure: COLONOSCOPY with polypectomy;  Surgeon: Akiko Scales MD;  Location: [facility];  Service:    • GALLBLADDER SURGERY     • HIP HEMIARTHROPLASTY Right 3/8/2019    Procedure: HIP HEMIARTHROPLASTY and all associated procedures;  Surgeon: Harrison Turk MD;  Location: [facility];  Service: Orthopedics   • LIVER SURGERY     • TONSILLECTOMY           FAMILY HISTORY  Family History   Problem Relation Age of Onset   • Lung cancer Father    • No Known Problems Mother    • No Known Problems Maternal Grandmother    • No Known Problems Maternal Grandfather    • No Known Problems Paternal Grandmother    • No Known Problems Paternal Grandfather    • Colon polyps Neg Hx    • Colon cancer Neg Hx          SOCIAL HISTORY  Social History     Occupational History   • Occupation: professor     Comment: taught anatomy at  "[employer]   Tobacco Use   • Smoking status: Former Smoker     Packs/day: 2.00     Years: 20.00     Pack years: 40.00     Quit date: 2/3/1990     Years since quittin.7   • Smokeless tobacco: Former User     Quit date:    • Tobacco comment: quit 30 yrs ago    Substance and Sexual Activity   • Alcohol use: No   • Drug use: No   • Sexual activity: Defer         CURRENT MEDICATIONS    Current Outpatient Medications:   •  alendronate (FOSAMAX) 70 MG tablet, Take 1 tablet by mouth Every 7 (Seven) Days., Disp: 48 tablet, Rfl: 0  •  ALLEGRA-D ALLERGY & CONGESTION  MG per 12 hr tablet, Take 1 tablet by mouth twice daily, Disp: 60 tablet, Rfl: 0  •  lamoTRIgine (LAMICTAL) 200 MG tablet, Take 200 mg by mouth Every Night. 1/2 pill at noon, Disp: , Rfl:   •  memantine (NAMENDA) 10 MG tablet, Take 10 mg by mouth 2 (Two) Times a Day., Disp: , Rfl:   •  OLANZapine (zyPREXA) 20 MG tablet, Take 20 mg by mouth every night at bedtime., Disp: , Rfl:   •  omeprazole (priLOSEC) 40 MG capsule, Take 1 capsule by mouth Daily., Disp: 30 capsule, Rfl: 0  •  polyethylene glycol (MIRALAX) powder, Take 17 g by mouth Daily. (Patient taking differently: Take 17 g by mouth Daily As Needed.), Disp: 850 g, Rfl: 5  •  rivastigmine (EXELON) 6 MG capsule, Take 6 mg by mouth 2 (Two) Times a Day., Disp: , Rfl:   •  tamsulosin (FLOMAX) 0.4 MG capsule 24 hr capsule, Take 1 capsule by mouth Daily., Disp: 30 capsule, Rfl: 2    ALLERGIES  Patient has no known allergies.    VITALS  Vitals:    10/15/20 1147   Temp: 97.8 °F (36.6 °C)   TempSrc: Temporal   Weight: 71.6 kg (157 lb 14.4 oz)   Height: 172.7 cm (67.99\")       PHYSICAL EXAM  Debilities/Disabilities Identified: None  Emotional Behavior: Appropriate  Wt Readings from Last 3 Encounters:   10/15/20 71.6 kg (157 lb 14.4 oz)   20 70.1 kg (154 lb 9.6 oz)   20 72.6 kg (160 lb)     Ht Readings from Last 1 Encounters:   10/15/20 172.7 cm (67.99\")     Body mass index is 24.01 " kg/m².  Physical Exam  Constitutional:       Appearance: He is well-developed. He is not diaphoretic.   HENT:      Head: Normocephalic and atraumatic.   Eyes:      General: No scleral icterus.     Conjunctiva/sclera: Conjunctivae normal.      Pupils: Pupils are equal, round, and reactive to light.   Neck:      Musculoskeletal: Normal range of motion and neck supple.      Thyroid: No thyromegaly.   Cardiovascular:      Rate and Rhythm: Normal rate and regular rhythm.      Heart sounds: Normal heart sounds. No murmur. No gallop.    Pulmonary:      Effort: Pulmonary effort is normal.      Breath sounds: Normal breath sounds. No wheezing or rales.   Abdominal:      General: Bowel sounds are normal. There is no distension or abdominal bruit.      Palpations: Abdomen is soft. There is no shifting dullness, fluid wave or mass.      Tenderness: There is no abdominal tenderness. There is no guarding. Negative signs include Alegria's sign.      Hernia: There is no hernia in the ventral area.   Musculoskeletal: Normal range of motion.   Lymphadenopathy:      Cervical: No cervical adenopathy.   Skin:     General: Skin is warm and dry.      Findings: No erythema or rash.   Neurological:      Mental Status: He is alert and oriented to person, place, and time.   Psychiatric:         Mood and Affect: Mood normal.         Behavior: Behavior normal.         CLINICAL DATA REVIEWED   reviewed previous lab results and integrated with today's visit, reviewed notes from other physicians and/or last GI encounter, reviewed previous endoscopy results and available photos, reviewed surgical pathology results from previous biopsies    ASSESSMENT  There are no diagnoses linked to this encounter.      PLAN  No follow-ups on file.    I have discussed the above plan with the patient.  They verbalize understanding and are in agreement with the plan.  They have been advised to contact the office for any questions, concerns, or changes related to  their health.

## 2020-10-19 ENCOUNTER — APPOINTMENT (OUTPATIENT)
Dept: GENERAL RADIOLOGY | Facility: HOSPITAL | Age: 75
End: 2020-10-19

## 2020-10-19 DIAGNOSIS — H65.00 ACUTE SEROUS OTITIS MEDIA, RECURRENCE NOT SPECIFIED, UNSPECIFIED LATERALITY: ICD-10-CM

## 2020-10-19 RX ORDER — FEXOFENADINE HYDROCHLORIDE AND PSEUDOEPHEDRINE HYDROCHLORIDE 60; 120 MG/1; MG/1
TABLET, FILM COATED, EXTENDED RELEASE ORAL
Qty: 60 TABLET | Refills: 0 | Status: SHIPPED | OUTPATIENT
Start: 2020-10-19

## 2020-11-05 ENCOUNTER — HOSPITAL ENCOUNTER (OUTPATIENT)
Dept: SPEECH THERAPY | Facility: HOSPITAL | Age: 75
Setting detail: THERAPIES SERIES
Discharge: HOME | End: 2020-11-05

## 2020-11-05 DIAGNOSIS — R13.12 OROPHARYNGEAL DYSPHAGIA: Primary | ICD-10-CM

## 2020-11-05 PROCEDURE — 92526 ORAL FUNCTION THERAPY: CPT

## 2020-11-05 NOTE — THERAPY PROGRESS REPORT/RE-CERT
Outpatient Speech Language Pathology   Adult Swallow Progress Note/Treatment Note  RITA Diaz     Patient Name: Andrew Redmond  : 1945  MRN: [id number]  Today's Date: 2020         Visit Date: 2020   Patient Active Problem List   Diagnosis   • Alzheimer's dementia without behavioral disturbance (CMS/HCC)   • Bipolar I disorder, single manic episode (CMS/HCC)   • Epilepsy (CMS/HCC)   • Chronic constipation   • Hemorrhoids   • Polyp of colon   • Erectile dysfunction   • High risk medication use   • Poor balance   • Subdural hematoma (CMS/HCC)   • Seizure (CMS/HCC)   • Low energy   • Closed compression fracture of L1 lumbar vertebra   • Acute bilateral low back pain without sciatica   • Weight loss   • Displaced fracture of right femoral neck (CMS/HCC)   • Status post hip hemiarthroplasty   • Subacromial bursitis of left shoulder joint   • Closed displaced fracture of neck of right scapula   • Closed nondisplaced fracture of right clavicle   • Hip fracture due to osteoporosis, sequela   • Acute serous otitis media        Visit Dx:    ICD-10-CM ICD-9-CM   1. Oropharyngeal dysphagia  R13.12 787.22         SLP OP Goals     Row Name 20 1105          Goal Type Needed    Goal Type Needed  Dysphagia  -AD        Subjective Comments    Subjective Comments  Pt was seen in therapy for 30 minutes and was alert and cooperative.   -AD        Subjective Pain    Able to rate subjective pain?  yes  -AD     Pre-Treatment Pain Level  0  -AD     Post-Treatment Pain Level  0  -AD     Subjective Pain Comment  No pain reported.   -AD        Dysphagia Goals    Dysphagia LTG's  Patient will safely consume the recommended diet without complications such as aspiration pneumonia  -AD     Patient will safely consume the recommended diet without complications such as aspiration pneumonia  Soft, moist diet with thin liquids and use of compensatory strategies.   -AD     Status: Patient will safely consume the recommended diet  without complications such as aspiration pneumonia  Progressing as expected  -AD     Comments: Patient will safely consume the recommended diet without complications such as aspiration pneumonia  Pt reports that he is tolerating his diet without any issues at this time. He reports that the omeprazole he was prescribed seems to be helping. Goal progressing and will continue for follow up in one month.   -AD     Patient will increase laryngeal elevation to reduce residue that might fall into airway by completing  falsetto/pitch glide;without cues  -AD     Status: Patient will increase laryngeal elevation to reduce residue that might fall into airway by completing  New  -AD     Comments: Patient will increase laryngeal elevation to reduce residue that might fall into airway by completing  Did not target during this session due to time constraints and focus on EMST.   -AD     Patient will increase strength of tongue base and posterior pharyngeal walls to reduce residue that might fall into airway by completing  effotful swallow;Lauren (tongue hold);tongue base retraction;without cues  -AD     Status: Patient will increase strength of tongue base and posterior pharyngeal walls to reduce residue that might fall into airway by completing  Achieved  -AD     Comments: Patient will increase strength of tongue base and posterior pharyngeal walls to reduce residue that might fall into airway by completing  Pt able to demonstrate and reports daily compliance with exercises. SLP encouraging to perform twice daily and he verbalizes understanding. Goal met.   -AD     Patient will increase superior/anterior movement of hyolaryngeal complex to reduce residue which may fall into airway by using the Expiratory Muscle Strength Manning  at [facility] for recommended frequency and duration of device  -AD     Status: Patient will increase superior/anterior movement of hyolaryngeal complex to reduce residue which may fall into airway by using the  Expiratory Muscle Strength Trainer  Progressing as expected  -AD     Comments: Patient will increase superior/anterior movement of hyolaryngeal complex to reduce residue which may fall into airway by using the Expiratory Muscle Strength Trainer  Pt able to utilize the EMST at Level 90 and able to perform 5 sets of 5 breath trials each after demonstration and verbal cues to perform rest breaks in between trials and sets. Pt able to demonstrate by the end of the trials.   -AD     Patient will compensate for oral/pharyngeal deficits and reduce risks while eating by utilizing  compensatory strategies  chin down;controlled bolus size;residue clearing;without cues  -AD     Status: Patient will compensate for oral/pharyngeal deficits and reduce risks while eating by utilizing  compensatory strategies  Achieved  -AD     Comments: Patient will compensate for oral/pharyngeal deficits and reduce risks while eating by utilizing  compensatory strategies  Pt reports consistent use of compensatory strategies with po intake at home. No further need to target at this time. Goal met.   -AD        SLP Time Calculation    SLP Goal Re-Cert Due Date  12/05/20  -AD       User Key  (r) = Recorded By, (t) = Taken By, (c) = Cosigned By    Initials Name Provider Type    AD Christy Ortega MS CCC-SLP Speech and Language Pathologist          OP SLP Education     Row Name 11/05/20 1107       Education    Barriers to Learning  Other (comment0 decreased recall due to Alzheimer's dementia  -AD    Action Taken to Address Barriers  use of repetition and written information; available for questions via phone  -AD    Education Provided  Patient participated in establishing goals and treatment plan;Patient demonstrated recommended strategies;Patient requires further education on strategies, risks further education as needed  -AD    Assessed  Learning needs;Learning motivation;Learning preferences;Learning readiness  -AD    Learning Motivation   Strong  -AD    Learning Method  Explanation;Demonstration;Teach back;Written materials  -AD    Teaching Response  Verbalized understanding;Demonstrated understanding  -AD    Education Comments  EMST handout with instructions and record sheet provided and reviewed. Pt able to verbalize and demonstrate use/understanding.   -AD      User Key  (r) = Recorded By, (t) = Taken By, (c) = Cosigned By    Initials Name Effective Dates    AD Christy Ortega MS CCC-SLP 08/09/20 -           OP SLP Assessment/Plan - 11/05/20 1105        SLP Assessment    Functional Problems  Swallowing   -AD    Impact on Function: Swallowing  Risk of aspiration;Risk of pneumonia;Risk of malnourishment   -AD    Clinical Impression: Swallowing  Mild:;oral phase dysphagia;Moderate:;pharyngeal phase dysphagia;esophageal dysphagia   -AD    Functional Problems Comment  Pt reports that he still has to take soft, moist foods, but is not longer having any issues with heartburn or chest pain related to eating. Adequate po intake reported.    -AD    Clinical Impression Comments  Pt able to verbalize and demonstrate use of home exercise program and has increased the frequency to twice daily on most days. Compensatory strategies and recommended diet is being followed and used per verbal report. He is able to return demonstrate use of EMST program.    -AD    SLP Diagnosis  Mild oral and moderate pharyngeal dysphagia   -AD    Prognosis  Good (comment)    with repetition and handouts provided  -AD    Patient/caregiver participated in establishment of treatment plan and goals  Yes   -AD    Patient would benefit from skilled therapy intervention  Yes   -AD       SLP Plan    Frequency  once weekly   -AD    Duration  1 month   -AD    Planned CPT's?  SLP SWALLOW THERAPY: 15003   -AD    Expected Duration of Therapy Session (SLP Eval)  30   -AD    Plan Comments  Pt to continue with EMST and HEP at home. To call to set up next appointment or with any concerns or  questions.    -AD      User Key  (r) = Recorded By, (t) = Taken By, (c) = Cosigned By    Initials Name Provider Type    AD Christy Ortega, MS CCC-SLP Speech and Language Pathologist            Time Calculation:   SLP Start Time: 1035  SLP Stop Time: 1105  SLP Time Calculation (min): 30 min  SLP Non-Billable Time (min): 0 min  Total Timed Code Minutes- SLP: 0 minute(s)    Therapy Charges for Today     Code Description Service Date Service Provider Modifiers Qty    [id number]  ST TREATMENT SWALLOW 2 11/5/2020 Christy Ortega MS CCC-SLP GN 1            Christy Ortega MS CCC-SLP  11/5/2020

## 2020-12-14 ENCOUNTER — OFFICE VISIT (OUTPATIENT)
Dept: INTERNAL MEDICINE | Facility: CLINIC | Age: 75
End: 2020-12-14

## 2020-12-14 VITALS
BODY MASS INDEX: 24.67 KG/M2 | OXYGEN SATURATION: 96 % | TEMPERATURE: 97.8 F | WEIGHT: 162.8 LBS | DIASTOLIC BLOOD PRESSURE: 72 MMHG | SYSTOLIC BLOOD PRESSURE: 124 MMHG | RESPIRATION RATE: 16 BRPM | HEART RATE: 81 BPM | HEIGHT: 68 IN

## 2020-12-14 DIAGNOSIS — G30.9 ALZHEIMER'S DEMENTIA WITHOUT BEHAVIORAL DISTURBANCE, UNSPECIFIED TIMING OF DEMENTIA ONSET: ICD-10-CM

## 2020-12-14 DIAGNOSIS — T17.998A ASPIRATION OF LIQUID, INITIAL ENCOUNTER: ICD-10-CM

## 2020-12-14 DIAGNOSIS — R73.03 PREDIABETES: ICD-10-CM

## 2020-12-14 DIAGNOSIS — F02.80 ALZHEIMER'S DEMENTIA WITHOUT BEHAVIORAL DISTURBANCE, UNSPECIFIED TIMING OF DEMENTIA ONSET: ICD-10-CM

## 2020-12-14 DIAGNOSIS — F30.9 BIPOLAR I DISORDER, SINGLE MANIC EPISODE (HCC): ICD-10-CM

## 2020-12-14 DIAGNOSIS — K21.9 GASTROESOPHAGEAL REFLUX DISEASE, UNSPECIFIED WHETHER ESOPHAGITIS PRESENT: Primary | ICD-10-CM

## 2020-12-14 DIAGNOSIS — Z23 NEED FOR INFLUENZA VACCINATION: ICD-10-CM

## 2020-12-14 DIAGNOSIS — Z23 NEED FOR VACCINATION: ICD-10-CM

## 2020-12-14 PROCEDURE — G0008 ADMIN INFLUENZA VIRUS VAC: HCPCS | Performed by: INTERNAL MEDICINE

## 2020-12-14 PROCEDURE — 90694 VACC AIIV4 NO PRSRV 0.5ML IM: CPT | Performed by: INTERNAL MEDICINE

## 2020-12-14 PROCEDURE — 99214 OFFICE O/P EST MOD 30 MIN: CPT | Performed by: INTERNAL MEDICINE

## 2020-12-14 NOTE — PROGRESS NOTES
Andrew Redmond is a 75 y.o. male, who presents with a chief complaint of   Chief Complaint   Patient presents with   • Follow-up     4 month       HPI   Pt here for f/u.  He is still on his PPI and no further chest pain.  He had aspiration on his swallow study and is doing exercises to help with this.  He says he tilts to the right and this helps too.  He was getting pills caught in his throat and changing his mechanics while swallowing has helped.  Weight going back up.    Constipation -soft  bm q 2-3 days.  He says he only needs the miralax PRN.    Nocturia - doing well with flomax.  No dysuria.     Bipolar - stable on current meds    He just had a cataract out and has the other one out tomorrow.    Prediabetes - no regular exercise.  He does walk some.      No recent illness.  No fever.          The following portions of the patient's history were reviewed and updated as appropriate: allergies, current medications, past family history, past medical history, past social history, past surgical history and problem list.    Allergies: Patient has no known allergies.    Review of Systems   Constitutional: Negative.    HENT: Negative.    Eyes: Negative.    Respiratory: Negative.    Cardiovascular: Negative.    Gastrointestinal: Negative.    Endocrine: Negative.    Genitourinary: Negative.    Musculoskeletal: Negative.    Skin: Negative.    Allergic/Immunologic: Negative.    Neurological: Negative.    Hematological: Negative.    Psychiatric/Behavioral: Negative.    All other systems reviewed and are negative.            Wt Readings from Last 3 Encounters:   12/14/20 73.8 kg (162 lb 12.8 oz)   10/15/20 71.6 kg (157 lb 14.4 oz)   09/28/20 70.1 kg (154 lb 9.6 oz)     Temp Readings from Last 3 Encounters:   12/14/20 97.8 °F (36.6 °C) (Temporal)   10/15/20 97.8 °F (36.6 °C) (Temporal)   09/28/20 97.3 °F (36.3 °C) (Temporal)     BP Readings from Last 3 Encounters:   12/14/20 124/72   09/28/20 130/76   09/24/20 110/66      Pulse Readings from Last 3 Encounters:   12/14/20 81   09/28/20 87   09/24/20 70     Body mass index is 24.76 kg/m².  @LASTSAO2(3)@    Physical Exam  Vitals signs and nursing note reviewed.   Constitutional:       General: He is not in acute distress.     Appearance: He is well-developed.   HENT:      Head: Normocephalic and atraumatic.      Right Ear: External ear normal.      Left Ear: External ear normal.      Nose: Nose normal.   Eyes:      Conjunctiva/sclera: Conjunctivae normal.      Pupils: Pupils are equal, round, and reactive to light.   Neck:      Musculoskeletal: Normal range of motion and neck supple.   Cardiovascular:      Rate and Rhythm: Normal rate and regular rhythm.      Heart sounds: Normal heart sounds.   Pulmonary:      Effort: Pulmonary effort is normal. No respiratory distress.      Breath sounds: Normal breath sounds. No wheezing.   Musculoskeletal: Normal range of motion.      Comments: Uses rollator   Skin:     General: Skin is warm and dry.   Neurological:      Mental Status: He is alert and oriented to person, place, and time.   Psychiatric:         Behavior: Behavior normal.         Thought Content: Thought content normal.         Judgment: Judgment normal.         Results for orders placed or performed in visit on 09/28/20   COVID-19,[facility] IN-HOUSE,Nasal Swab No Transport Media 3-4 HR TAT - Swab, Nasal Cavity    Specimen: Nasal Cavity; Swab   Result Value Ref Range    COVID19 Not Detected Not Detected - Ref. Range           Diagnoses and all orders for this visit:    1. Gastroesophageal reflux disease, unspecified whether esophagitis present (Primary)    2. Aspiration of liquid, initial encounter    3. Alzheimer's dementia without behavioral disturbance, unspecified timing of dementia onset (CMS/[facility])    4. Bipolar I disorder, single manic episode (CMS/[facility])    5. Prediabetes   -     Comprehensive Metabolic Panel  -     CBC & Differential  -     Hemoglobin A1c        Continue  current medications.  Encouraged healthy diet/exercise.  OV labs in  4  months.  Pt to call sooner if any other issues arise.      Outpatient Medications Prior to Visit   Medication Sig Dispense Refill   • alendronate (FOSAMAX) 70 MG tablet Take 1 tablet by mouth Every 7 (Seven) Days. 48 tablet 0   • Allegra-D Allergy & Congestion  MG per 12 hr tablet Take 1 tablet by mouth twice daily 60 tablet 0   • lamoTRIgine (LAMICTAL) 200 MG tablet Take 200 mg by mouth Every Night. 1/2 pill at noon     • memantine (NAMENDA) 10 MG tablet Take 10 mg by mouth 2 (Two) Times a Day.     • OLANZapine (zyPREXA) 20 MG tablet Take 20 mg by mouth every night at bedtime.     • omeprazole (priLOSEC) 40 MG capsule Take 1 capsule by mouth Daily. 30 capsule 0   • omeprazole (priLOSEC) 40 MG capsule Take 1 capsule by mouth Daily. 90 capsule 3   • polyethylene glycol (MIRALAX) powder Take 17 g by mouth Daily. (Patient taking differently: Take 17 g by mouth Daily As Needed.) 850 g 5   • rivastigmine (EXELON) 6 MG capsule Take 6 mg by mouth 2 (Two) Times a Day.     • tamsulosin (FLOMAX) 0.4 MG capsule 24 hr capsule Take 1 capsule by mouth Daily. 30 capsule 2     No facility-administered medications prior to visit.      No orders of the defined types were placed in this encounter.    [unfilled]  There are no discontinued medications.      Return in about 4 months (around 4/14/2021) for Recheck.

## 2020-12-15 LAB
ALBUMIN SERPL-MCNC: 4.4 G/DL (ref 3.7–4.7)
ALBUMIN/GLOB SERPL: 1.9 {RATIO} (ref 1.2–2.2)
ALP SERPL-CCNC: 185 IU/L (ref 39–117)
ALT SERPL-CCNC: 40 IU/L (ref 0–44)
AST SERPL-CCNC: 42 IU/L (ref 0–40)
BASOPHILS # BLD AUTO: 0.1 X10E3/UL (ref 0–0.2)
BASOPHILS NFR BLD AUTO: 1 %
BILIRUB SERPL-MCNC: 0.3 MG/DL (ref 0–1.2)
BUN SERPL-MCNC: 8 MG/DL (ref 8–27)
BUN/CREAT SERPL: 11 (ref 10–24)
CALCIUM SERPL-MCNC: 9.2 MG/DL (ref 8.6–10.2)
CHLORIDE SERPL-SCNC: 94 MMOL/L (ref 96–106)
CO2 SERPL-SCNC: 25 MMOL/L (ref 20–29)
CREAT SERPL-MCNC: 0.7 MG/DL (ref 0.76–1.27)
EOSINOPHIL # BLD AUTO: 0.2 X10E3/UL (ref 0–0.4)
EOSINOPHIL NFR BLD AUTO: 2 %
ERYTHROCYTE [DISTWIDTH] IN BLOOD BY AUTOMATED COUNT: 15.8 % (ref 11.6–15.4)
GLOBULIN SER CALC-MCNC: 2.3 G/DL (ref 1.5–4.5)
GLUCOSE SERPL-MCNC: 86 MG/DL (ref 65–99)
HBA1C MFR BLD: 5.5 % (ref 4.8–5.6)
HCT VFR BLD AUTO: 44.5 % (ref 37.5–51)
HGB BLD-MCNC: 15.1 G/DL (ref 13–17.7)
IMM GRANULOCYTES # BLD AUTO: 0.1 X10E3/UL (ref 0–0.1)
IMM GRANULOCYTES NFR BLD AUTO: 1 %
LYMPHOCYTES # BLD AUTO: 2 X10E3/UL (ref 0.7–3.1)
LYMPHOCYTES NFR BLD AUTO: 26 %
MCH RBC QN AUTO: 28.1 PG (ref 26.6–33)
MCHC RBC AUTO-ENTMCNC: 33.9 G/DL (ref 31.5–35.7)
MCV RBC AUTO: 83 FL (ref 79–97)
MONOCYTES # BLD AUTO: 0.8 X10E3/UL (ref 0.1–0.9)
MONOCYTES NFR BLD AUTO: 10 %
NEUTROPHILS # BLD AUTO: 4.8 X10E3/UL (ref 1.4–7)
NEUTROPHILS NFR BLD AUTO: 60 %
PLATELET # BLD AUTO: 367 X10E3/UL (ref 150–450)
POTASSIUM SERPL-SCNC: 5 MMOL/L (ref 3.5–5.2)
PROT SERPL-MCNC: 6.7 G/DL (ref 6–8.5)
RBC # BLD AUTO: 5.38 X10E6/UL (ref 4.14–5.8)
SODIUM SERPL-SCNC: 133 MMOL/L (ref 134–144)
WBC # BLD AUTO: 8 X10E3/UL (ref 3.4–10.8)

## 2020-12-17 ENCOUNTER — TELEPHONE (OUTPATIENT)
Dept: INTERNAL MEDICINE | Facility: CLINIC | Age: 75
End: 2020-12-17

## 2020-12-17 NOTE — TELEPHONE ENCOUNTER
Caller: Andrew Redmond    Relationship: Self    Best call back number:[phone]     What orders are you requesting (i.e. lab or imaging): XRAY    In what timeframe would the patient need to come in:NEXT AVAILABLE      Where will you receive your lab/imaging services:[facility]     Additional notes: MR. REDMOND SAYS THAT HIS BLADDER WAS HALF FULL TODAY, PAIN FULL WHILE URINATING HE IS WANTING AN XRAY.

## 2020-12-22 ENCOUNTER — HOSPITAL ENCOUNTER (OUTPATIENT)
Dept: GENERAL RADIOLOGY | Facility: HOSPITAL | Age: 75
Discharge: HOME | End: 2020-12-22
Admitting: INTERNAL MEDICINE

## 2020-12-22 DIAGNOSIS — R10.2 PELVIC PAIN: ICD-10-CM

## 2020-12-22 PROCEDURE — 72170 X-RAY EXAM OF PELVIS: CPT

## 2020-12-23 RX ORDER — TAMSULOSIN HYDROCHLORIDE 0.4 MG/1
CAPSULE ORAL
Qty: 90 CAPSULE | Refills: 1 | Status: SHIPPED | OUTPATIENT
Start: 2020-12-23 | End: 2021-06-22

## 2020-12-23 NOTE — TELEPHONE ENCOUNTER
PATIENT CALLED TO CHECK THE STATUS OF THE PRESCRIPTION REQUEST FOR  tamsulosin (FLOMAX) 0.4 MG capsule 24 hr capsule  1 capsule, Daily 2 ordered  Edit       Summary: Take 1 capsule by mouth Daily., Starting Fri 9/11/2020, Normal   Dose, Route, Frequency: 1 capsule, Oral, Daily        PATIENT CONTACT: [phone]    [facility] [address] [place] [address] [place], [address] [phone] [facility] [phone] FX    THANKS

## 2021-01-22 ENCOUNTER — OFFICE VISIT (OUTPATIENT)
Dept: INTERNAL MEDICINE | Facility: CLINIC | Age: 76
End: 2021-01-22

## 2021-01-22 VITALS
OXYGEN SATURATION: 98 % | RESPIRATION RATE: 16 BRPM | DIASTOLIC BLOOD PRESSURE: 80 MMHG | SYSTOLIC BLOOD PRESSURE: 138 MMHG | BODY MASS INDEX: 23.95 KG/M2 | HEART RATE: 74 BPM | WEIGHT: 158 LBS | HEIGHT: 68 IN | TEMPERATURE: 97.3 F

## 2021-01-22 DIAGNOSIS — M54.50 ACUTE MIDLINE LOW BACK PAIN WITHOUT SCIATICA: Primary | ICD-10-CM

## 2021-01-22 PROCEDURE — 99213 OFFICE O/P EST LOW 20 MIN: CPT | Performed by: INTERNAL MEDICINE

## 2021-01-22 NOTE — PROGRESS NOTES
"Chief Complaint  Pelvic Pain and Arthritis (left hip)    Subjective          Andrew Redmond presents to [facility] for   History of Present Illness  Pt here bc of 3 days of back pain. He says he was just sitting in his recliner and it started hurting.  No fall or trauma.  He is wearing a wrap around low back brace and he says this helps.  Standing up hurts.   He has had a vertebral compression in the past.  He had osteoporosis on last dexa.     Objective   Vital Signs:   /80 (BP Location: Left arm, Patient Position: Sitting, Cuff Size: Large Adult)   Pulse 74   Temp 97.3 °F (36.3 °C) (Temporal)   Resp 16   Ht 172.7 cm (68\")   Wt 71.7 kg (158 lb)   SpO2 98%   BMI 24.02 kg/m²     Physical Exam  Vitals signs and nursing note reviewed.   Constitutional:       General: He is not in acute distress.     Appearance: He is well-developed.   HENT:      Head: Normocephalic and atraumatic.      Right Ear: External ear normal.      Left Ear: External ear normal.      Nose: Nose normal.   Eyes:      Conjunctiva/sclera: Conjunctivae normal.      Pupils: Pupils are equal, round, and reactive to light.   Neck:      Musculoskeletal: Normal range of motion and neck supple.   Cardiovascular:      Rate and Rhythm: Normal rate and regular rhythm.      Heart sounds: Normal heart sounds.   Pulmonary:      Effort: Pulmonary effort is normal. No respiratory distress.      Breath sounds: Normal breath sounds. No wheezing.   Musculoskeletal:      Comments: Normal gait  Wearing back brace.  Mild lumbar spine ttp   Skin:     General: Skin is warm and dry.   Neurological:      Mental Status: He is alert and oriented to person, place, and time.   Psychiatric:         Behavior: Behavior normal.         Thought Content: Thought content normal.         Judgment: Judgment normal.        Result Review :   The following data was reviewed by: Jessica Birch MD on 01/22/2021:  Common labs    Common " Labsle 7/8/20 7/8/20 7/8/20 7/8/20 9/24/20 9/24/20 12/14/20 12/14/20 12/14/20    1306 1306 1306 1306 1407 1407 1203 1203 1203   Glucose       86     BUN   16   8 8     Creatinine   0.90   0.73 (A) 0.70 (A)     eGFR Non  Am   82   105 92     eGFR  Am       107     Sodium   138   130 (A) 133 (A)     Potassium   4.1   3.7 5.0     Chloride   103   95 (A) 94 (A)     Calcium   9.5   8.6 9.2     Total Protein       6.7     Albumin   4.20   3.80 4.4     Total Bilirubin   0.4   0.4 0.3     Alkaline Phosphatase   99   116 185 (A)     AST (SGOT)   17   21 42 (A)     ALT (SGPT)   13   18 40     WBC 8.76    9.07   8.0    Hemoglobin 15.2    13.6   15.1    Hematocrit 44.2    41.6   44.5    Platelets 294    296   367    Triglycerides  148          HDL Cholesterol  47          LDL Cholesterol   115 (A)          Hemoglobin A1C    5.59     5.5   (A) Abnormal value       Comments are available for some flowsheets but are not being displayed.           Data reviewed: Radiologic studies spine xray, dexa          Assessment and Plan    Problem List Items Addressed This Visit     None      Visit Diagnoses     Acute midline low back pain without sciatica    -  Primary    Relevant Orders    XR Spine Lumbar 2 or 3 View      tylenol prn.  Check x-ray.  If xray neg ok to do low back exercises.  Handout given.      Follow Up   Return if symptoms worsen or fail to improve.  Patient was given instructions and counseling regarding his condition or for health maintenance advice. Please see specific information pulled into the AVS if appropriate.

## 2021-01-25 ENCOUNTER — HOSPITAL ENCOUNTER (OUTPATIENT)
Dept: GENERAL RADIOLOGY | Facility: HOSPITAL | Age: 76
Discharge: HOME | End: 2021-01-25
Admitting: INTERNAL MEDICINE

## 2021-01-25 ENCOUNTER — HOSPITAL ENCOUNTER (EMERGENCY)
Facility: HOSPITAL | Age: 76
Discharge: HOME | End: 2021-01-25
Attending: EMERGENCY MEDICINE | Admitting: EMERGENCY MEDICINE

## 2021-01-25 ENCOUNTER — TELEPHONE (OUTPATIENT)
Dept: INTERNAL MEDICINE | Facility: CLINIC | Age: 76
End: 2021-01-25

## 2021-01-25 VITALS
SYSTOLIC BLOOD PRESSURE: 150 MMHG | DIASTOLIC BLOOD PRESSURE: 73 MMHG | BODY MASS INDEX: 23.79 KG/M2 | OXYGEN SATURATION: 95 % | HEIGHT: 68 IN | HEART RATE: 86 BPM | TEMPERATURE: 98.1 F | WEIGHT: 157 LBS | RESPIRATION RATE: 18 BRPM

## 2021-01-25 DIAGNOSIS — M54.12 CERVICAL RADICULOPATHY AT C8: Primary | ICD-10-CM

## 2021-01-25 DIAGNOSIS — M54.50 ACUTE MIDLINE LOW BACK PAIN WITHOUT SCIATICA: ICD-10-CM

## 2021-01-25 PROCEDURE — 99282 EMERGENCY DEPT VISIT SF MDM: CPT

## 2021-01-25 PROCEDURE — 99284 EMERGENCY DEPT VISIT MOD MDM: CPT | Performed by: EMERGENCY MEDICINE

## 2021-01-25 PROCEDURE — 72100 X-RAY EXAM L-S SPINE 2/3 VWS: CPT

## 2021-01-26 NOTE — TELEPHONE ENCOUNTER
PATIENT CALLED REQUESTING STATUS OF CERVICAL MRI    PLEASE CALL BACK AND ADVICE  CB#: [phone]

## 2021-01-27 ENCOUNTER — OFFICE VISIT (OUTPATIENT)
Dept: INTERNAL MEDICINE | Facility: CLINIC | Age: 76
End: 2021-01-27

## 2021-01-27 VITALS
HEIGHT: 68 IN | HEART RATE: 83 BPM | TEMPERATURE: 98.6 F | DIASTOLIC BLOOD PRESSURE: 68 MMHG | BODY MASS INDEX: 24.02 KG/M2 | SYSTOLIC BLOOD PRESSURE: 118 MMHG | OXYGEN SATURATION: 98 % | RESPIRATION RATE: 16 BRPM | WEIGHT: 158.5 LBS

## 2021-01-27 DIAGNOSIS — R20.2 RIGHT HAND PARESTHESIA: Primary | ICD-10-CM

## 2021-01-27 DIAGNOSIS — M54.50 ACUTE MIDLINE LOW BACK PAIN WITHOUT SCIATICA: ICD-10-CM

## 2021-01-27 PROCEDURE — 99214 OFFICE O/P EST MOD 30 MIN: CPT | Performed by: INTERNAL MEDICINE

## 2021-01-27 NOTE — PROGRESS NOTES
"Chief Complaint  Hospital Follow Up Visit, Pinched Nerve in Neck, and Neck Pain    Subjective          Andrew Redmond presents to [facility] for   History of Present Illness  Pt here for ED follow up.  He was diagnosed with a cervical radiculopathy.  He had some numbness in his right 4th and 5th digits.  Sx now resolved.  No other neuro findings at that time.  He denies arm/shoulder pain.  He says it is fine now and doesn't want any further work up.      His low back is still hurting but sx are better than they were at last OV.  He is wearing a back brace.  xrays were done 1/25 that showed chronic degenerative disc disease and chronic vertebral compression fx at L1.  No acute findings.  No numbness, weakness, tingling.   Objective   Vital Signs:   /68   Pulse 83   Temp 98.6 °F (37 °C) (Temporal)   Resp 16   Ht 172.7 cm (68\")   Wt 71.9 kg (158 lb 8 oz)   SpO2 98%   BMI 24.10 kg/m²     Physical Exam  Vitals signs and nursing note reviewed.   Constitutional:       General: He is not in acute distress.     Appearance: He is well-developed.   HENT:      Head: Normocephalic and atraumatic.      Right Ear: External ear normal.      Left Ear: External ear normal.      Nose: Nose normal.   Eyes:      Conjunctiva/sclera: Conjunctivae normal.      Pupils: Pupils are equal, round, and reactive to light.   Neck:      Musculoskeletal: Normal range of motion and neck supple.   Cardiovascular:      Rate and Rhythm: Normal rate and regular rhythm.      Heart sounds: Normal heart sounds.   Pulmonary:      Effort: Pulmonary effort is normal. No respiratory distress.      Breath sounds: Normal breath sounds. No wheezing.   Musculoskeletal:      Right lower leg: No edema.      Left lower leg: No edema.      Comments: Walks with rolator  Wearing back brace   Skin:     General: Skin is warm and dry.   Neurological:      Mental Status: He is alert and oriented to person, place, and time. "   Psychiatric:         Behavior: Behavior normal.         Thought Content: Thought content normal.         Judgment: Judgment normal.        Result Review :   The following data was reviewed by: Jessica Birch MD on 01/27/2021:  Common labs    Common Labsle 7/8/20 7/8/20 7/8/20 7/8/20 9/24/20 9/24/20 12/14/20 12/14/20 12/14/20    1306 1306 1306 1306 1407 1407 1203 1203 1203   Glucose       86     BUN   16   8 8     Creatinine   0.90   0.73 (A) 0.70 (A)     eGFR Non  Am   82   105 92     eGFR  Am       107     Sodium   138   130 (A) 133 (A)     Potassium   4.1   3.7 5.0     Chloride   103   95 (A) 94 (A)     Calcium   9.5   8.6 9.2     Total Protein       6.7     Albumin   4.20   3.80 4.4     Total Bilirubin   0.4   0.4 0.3     Alkaline Phosphatase   99   116 185 (A)     AST (SGOT)   17   21 42 (A)     ALT (SGPT)   13   18 40     WBC 8.76    9.07   8.0    Hemoglobin 15.2    13.6   15.1    Hematocrit 44.2    41.6   44.5    Platelets 294    296   367    Triglycerides  148          HDL Cholesterol  47          LDL Cholesterol   115 (A)          Hemoglobin A1C    5.59     5.5   (A) Abnormal value       Comments are available for some flowsheets but are not being displayed.           Data reviewed: Recent hospitalization notes recent ED visit       XR Spine Lumbar 2 or 3 View (01/25/2021 10:40)  IMPRESSION:  1.  No acute osseous abnormality.  2.  Stable chronic vertebral compression fracture deformity at L1.  3.  Mild to moderate multilevel degenerative disc disease and severe  lower lumbar degenerative facet arthropathy.     Assessment and Plan    Problem List Items Addressed This Visit     None      Visit Diagnoses     Right hand paresthesia    -  Primary    New issue.  discussed pathophysiology of hand numbness including possible nerve issues at the cubital tunnel, brachial plexus, and cervical spine.  Pt says sx resolved and doesn't want more work up at this time.  I offered x-rays, medication, and  physical therapy but pt says he doesn't need it now bc he feels fine.  If these things don't help or severe worsening in sx then MRI c-spine would be needed.     Acute midline low back pain without sciatica     Old issue that is slowly improving.  Cont back brace.  Ok for tylenol PRN.  Offered physical therapy but pt declined.             I spent 25 minutes caring for Andrew on this date of service. This time includes time spent by me in the following activities:preparing for the visit, reviewing tests, performing a medically appropriate examination and/or evaluation  and documenting information in the medical record  Follow Up   Return if symptoms worsen or fail to improve.  Patient was given instructions and counseling regarding his condition or for health maintenance advice. Please see specific information pulled into the AVS if appropriate.

## 2021-01-29 ENCOUNTER — TELEPHONE (OUTPATIENT)
Dept: INTERNAL MEDICINE | Facility: CLINIC | Age: 76
End: 2021-01-29

## 2021-01-29 NOTE — TELEPHONE ENCOUNTER
PATIENT CALLED STATING THAT HE HAS BEEN CALLED TWICE BY Bahai BUT THE VOICEMAILS THAT HAVE BEEN LEFT HAVE BEEN UNCLEAR AND HARD TO UNDERSTAND. PATIENT IS NOT AWARE OF WHY ANYBODY WOULD BE CONTACTING HIM, HUB UNABLE TO SHARE ANY INFORMATION.    PLEASE ADVISE  [phone]

## 2021-02-05 ENCOUNTER — PATIENT OUTREACH (OUTPATIENT)
Dept: CASE MANAGEMENT | Facility: OTHER | Age: 76
End: 2021-02-05

## 2021-02-05 NOTE — OUTREACH NOTE
Care Coordination Note  RN-ACM unable to reach patient x 4 regarding 1/25/21  ED visit for cervical radiculopathy. Patient treated; discharged home to follow up with PCP.Note per [facility] 1/27/21 PCP appointment.This note routed per Clinical Pool.         Lachelle Marquez RN  Ambulatory     2/5/2021, 13:53 EST

## 2021-03-18 ENCOUNTER — IMMUNIZATION (OUTPATIENT)
Dept: VACCINE CLINIC | Facility: HOSPITAL | Age: 76
End: 2021-03-18

## 2021-03-18 PROCEDURE — 91300 HC SARSCOV02 VAC 30MCG/0.3ML IM: CPT | Performed by: OBSTETRICS & GYNECOLOGY

## 2021-03-18 PROCEDURE — 0001A: CPT | Performed by: OBSTETRICS & GYNECOLOGY

## 2021-04-01 ENCOUNTER — TELEPHONE (OUTPATIENT)
Dept: INTERNAL MEDICINE | Facility: CLINIC | Age: 76
End: 2021-04-01

## 2021-04-01 DIAGNOSIS — R73.03 PREDIABETES: ICD-10-CM

## 2021-04-01 DIAGNOSIS — G30.9 ALZHEIMER'S DEMENTIA WITHOUT BEHAVIORAL DISTURBANCE, UNSPECIFIED TIMING OF DEMENTIA ONSET: ICD-10-CM

## 2021-04-01 DIAGNOSIS — F30.9 BIPOLAR I DISORDER, SINGLE MANIC EPISODE (HCC): Primary | ICD-10-CM

## 2021-04-01 DIAGNOSIS — E78.5 DYSLIPIDEMIA: ICD-10-CM

## 2021-04-01 DIAGNOSIS — F02.80 ALZHEIMER'S DEMENTIA WITHOUT BEHAVIORAL DISTURBANCE, UNSPECIFIED TIMING OF DEMENTIA ONSET: ICD-10-CM

## 2021-04-01 NOTE — TELEPHONE ENCOUNTER
Patient is coming in the office on 4/7 for labs. Please review your lab orders and update or enter orders if necessary.

## 2021-04-07 ENCOUNTER — LAB (OUTPATIENT)
Dept: INTERNAL MEDICINE | Facility: CLINIC | Age: 76
End: 2021-04-07

## 2021-04-07 DIAGNOSIS — R73.03 PREDIABETES: ICD-10-CM

## 2021-04-07 DIAGNOSIS — E78.5 DYSLIPIDEMIA: ICD-10-CM

## 2021-04-07 DIAGNOSIS — F02.80 ALZHEIMER'S DEMENTIA WITHOUT BEHAVIORAL DISTURBANCE, UNSPECIFIED TIMING OF DEMENTIA ONSET: ICD-10-CM

## 2021-04-07 DIAGNOSIS — F30.9 BIPOLAR I DISORDER, SINGLE MANIC EPISODE (HCC): ICD-10-CM

## 2021-04-07 DIAGNOSIS — G30.9 ALZHEIMER'S DEMENTIA WITHOUT BEHAVIORAL DISTURBANCE, UNSPECIFIED TIMING OF DEMENTIA ONSET: ICD-10-CM

## 2021-04-08 ENCOUNTER — IMMUNIZATION (OUTPATIENT)
Dept: VACCINE CLINIC | Facility: HOSPITAL | Age: 76
End: 2021-04-08

## 2021-04-08 LAB
ALBUMIN SERPL-MCNC: 4.3 G/DL (ref 3.5–5.2)
ALBUMIN/GLOB SERPL: 2.3 G/DL
ALP SERPL-CCNC: 131 U/L (ref 39–117)
ALT SERPL-CCNC: 19 U/L (ref 1–41)
AST SERPL-CCNC: 20 U/L (ref 1–40)
BASOPHILS # BLD AUTO: 0.05 10*3/MM3 (ref 0–0.2)
BASOPHILS NFR BLD AUTO: 0.7 % (ref 0–1.5)
BILIRUB SERPL-MCNC: 0.4 MG/DL (ref 0–1.2)
BUN SERPL-MCNC: 15 MG/DL (ref 8–23)
BUN/CREAT SERPL: 20.5 (ref 7–25)
CALCIUM SERPL-MCNC: 8.9 MG/DL (ref 8.6–10.5)
CHLORIDE SERPL-SCNC: 98 MMOL/L (ref 98–107)
CHOLEST SERPL-MCNC: 130 MG/DL (ref 0–200)
CO2 SERPL-SCNC: 26.9 MMOL/L (ref 22–29)
CREAT SERPL-MCNC: 0.73 MG/DL (ref 0.76–1.27)
EOSINOPHIL # BLD AUTO: 0.07 10*3/MM3 (ref 0–0.4)
EOSINOPHIL NFR BLD AUTO: 1 % (ref 0.3–6.2)
ERYTHROCYTE [DISTWIDTH] IN BLOOD BY AUTOMATED COUNT: 15.3 % (ref 12.3–15.4)
GLOBULIN SER CALC-MCNC: 1.9 GM/DL
GLUCOSE SERPL-MCNC: 86 MG/DL (ref 65–99)
HBA1C MFR BLD: 5.5 % (ref 4.8–5.6)
HCT VFR BLD AUTO: 44.5 % (ref 37.5–51)
HDLC SERPL-MCNC: 58 MG/DL (ref 40–60)
HGB BLD-MCNC: 14.9 G/DL (ref 13–17.7)
IMM GRANULOCYTES # BLD AUTO: 0.05 10*3/MM3 (ref 0–0.05)
IMM GRANULOCYTES NFR BLD AUTO: 0.7 % (ref 0–0.5)
LDLC SERPL CALC-MCNC: 55 MG/DL (ref 0–100)
LDLC/HDLC SERPL: 0.92 {RATIO}
LYMPHOCYTES # BLD AUTO: 1.77 10*3/MM3 (ref 0.7–3.1)
LYMPHOCYTES NFR BLD AUTO: 25.8 % (ref 19.6–45.3)
MCH RBC QN AUTO: 27.9 PG (ref 26.6–33)
MCHC RBC AUTO-ENTMCNC: 33.5 G/DL (ref 31.5–35.7)
MCV RBC AUTO: 83.2 FL (ref 79–97)
MONOCYTES # BLD AUTO: 0.7 10*3/MM3 (ref 0.1–0.9)
MONOCYTES NFR BLD AUTO: 10.2 % (ref 5–12)
NEUTROPHILS # BLD AUTO: 4.22 10*3/MM3 (ref 1.7–7)
NEUTROPHILS NFR BLD AUTO: 61.6 % (ref 42.7–76)
NRBC BLD AUTO-RTO: 0 /100 WBC (ref 0–0.2)
PLATELET # BLD AUTO: 317 10*3/MM3 (ref 140–450)
POTASSIUM SERPL-SCNC: 4.8 MMOL/L (ref 3.5–5.2)
PROT SERPL-MCNC: 6.2 G/DL (ref 6–8.5)
RBC # BLD AUTO: 5.35 10*6/MM3 (ref 4.14–5.8)
SODIUM SERPL-SCNC: 135 MMOL/L (ref 136–145)
T4 FREE SERPL-MCNC: 1.35 NG/DL (ref 0.93–1.7)
TRIGL SERPL-MCNC: 92 MG/DL (ref 0–150)
TSH SERPL DL<=0.005 MIU/L-ACNC: 1.87 UIU/ML (ref 0.27–4.2)
VLDLC SERPL CALC-MCNC: 17 MG/DL (ref 5–40)
WBC # BLD AUTO: 6.86 10*3/MM3 (ref 3.4–10.8)

## 2021-04-08 PROCEDURE — 0002A: CPT | Performed by: OBSTETRICS & GYNECOLOGY

## 2021-04-08 PROCEDURE — 91300 HC SARSCOV02 VAC 30MCG/0.3ML IM: CPT | Performed by: OBSTETRICS & GYNECOLOGY

## 2021-04-14 ENCOUNTER — OFFICE VISIT (OUTPATIENT)
Dept: INTERNAL MEDICINE | Facility: CLINIC | Age: 76
End: 2021-04-14

## 2021-04-14 VITALS
SYSTOLIC BLOOD PRESSURE: 100 MMHG | WEIGHT: 151.6 LBS | HEIGHT: 68 IN | HEART RATE: 67 BPM | RESPIRATION RATE: 18 BRPM | TEMPERATURE: 96.9 F | DIASTOLIC BLOOD PRESSURE: 60 MMHG | BODY MASS INDEX: 22.97 KG/M2 | OXYGEN SATURATION: 98 %

## 2021-04-14 DIAGNOSIS — T17.908S ASPIRATION INTO AIRWAY, SEQUELA: ICD-10-CM

## 2021-04-14 DIAGNOSIS — F30.9 BIPOLAR I DISORDER, SINGLE MANIC EPISODE (HCC): ICD-10-CM

## 2021-04-14 DIAGNOSIS — K59.09 CHRONIC CONSTIPATION: Primary | ICD-10-CM

## 2021-04-14 DIAGNOSIS — Z96.649 STATUS POST HIP HEMIARTHROPLASTY: ICD-10-CM

## 2021-04-14 DIAGNOSIS — R35.1 NOCTURIA: ICD-10-CM

## 2021-04-14 DIAGNOSIS — K21.9 GASTROESOPHAGEAL REFLUX DISEASE, UNSPECIFIED WHETHER ESOPHAGITIS PRESENT: ICD-10-CM

## 2021-04-14 DIAGNOSIS — R73.03 PREDIABETES: ICD-10-CM

## 2021-04-14 PROCEDURE — 99214 OFFICE O/P EST MOD 30 MIN: CPT | Performed by: INTERNAL MEDICINE

## 2021-04-14 NOTE — PROGRESS NOTES
Andrew Redmond is a 75 y.o. male, who presents with a chief complaint of   Chief Complaint   Patient presents with   • 4 mo follow up           HPI     Pt here for f/u.  he has had both covid vaccines.     gerd - He is still on his PPI and no further chest pain.  He had aspiration on his swallow study and is doing exercises to help with this.  He says he tilts to the right and this helps too.  He was getting pills caught in his throat and changing his mechanics while swallowing has helped.       Constipation -soft  bm q 2-3 days.  He says he only needs the miralax PRN.     Nocturia - doing well with flomax.  No dysuria.      Bipolar - stable on current meds     Prediabetes - glucoses back to normal.  no regular exercise.          The following portions of the patient's history were reviewed and updated as appropriate: allergies, current medications, past family history, past medical history, past social history, past surgical history and problem list.    Allergies: Patient has no known allergies.    Review of Systems   Constitutional: Negative.    HENT: Negative.    Eyes: Negative.    Respiratory: Negative.    Cardiovascular: Negative.    Gastrointestinal: Negative.    Endocrine: Negative.    Genitourinary: Negative.    Musculoskeletal: Negative.    Skin: Negative.    Allergic/Immunologic: Negative.    Neurological: Negative.    Hematological: Negative.    Psychiatric/Behavioral: Negative.    All other systems reviewed and are negative.            Wt Readings from Last 3 Encounters:   04/14/21 68.8 kg (151 lb 9.6 oz)   01/27/21 71.9 kg (158 lb 8 oz)   01/25/21 71.2 kg (157 lb)     Temp Readings from Last 3 Encounters:   04/14/21 96.9 °F (36.1 °C) (Temporal)   01/27/21 98.6 °F (37 °C) (Temporal)   01/25/21 98.1 °F (36.7 °C) (Oral)     BP Readings from Last 3 Encounters:   04/14/21 100/60   01/27/21 118/68   01/25/21 150/73     Pulse Readings from Last 3 Encounters:   04/14/21 67   01/27/21 83   01/25/21 86      Body mass index is 23.06 kg/m².  SpO2 Readings from Last 3 Encounters:   04/14/21 98%   01/27/21 98%   01/25/21 95%          Physical Exam  Vitals and nursing note reviewed.   Constitutional:       General: He is not in acute distress.     Appearance: He is well-developed.   HENT:      Head: Normocephalic and atraumatic.      Right Ear: External ear normal.      Left Ear: External ear normal.      Nose: Nose normal.   Eyes:      Conjunctiva/sclera: Conjunctivae normal.      Pupils: Pupils are equal, round, and reactive to light.   Cardiovascular:      Rate and Rhythm: Normal rate and regular rhythm.      Heart sounds: Normal heart sounds.   Pulmonary:      Effort: Pulmonary effort is normal. No respiratory distress.      Breath sounds: Normal breath sounds. No wheezing.   Musculoskeletal:         General: Normal range of motion.      Cervical back: Normal range of motion and neck supple.      Right lower leg: No edema.      Left lower leg: No edema.   Skin:     General: Skin is warm and dry.   Neurological:      Mental Status: He is alert and oriented to person, place, and time.   Psychiatric:         Behavior: Behavior normal.         Thought Content: Thought content normal.         Judgment: Judgment normal.         Results for orders placed or performed in visit on 04/07/21   TSH    Specimen: Blood   Result Value Ref Range    TSH 1.870 0.270 - 4.200 uIU/mL   T4, Free    Specimen: Blood   Result Value Ref Range    Free T4 1.35 0.93 - 1.70 ng/dL   Hemoglobin A1c    Specimen: Blood   Result Value Ref Range    Hemoglobin A1C 5.50 4.80 - 5.60 %   Lipid Panel With LDL / HDL Ratio    Specimen: Blood   Result Value Ref Range    Total Cholesterol 130 0 - 200 mg/dL    Triglycerides 92 0 - 150 mg/dL    HDL Cholesterol 58 40 - 60 mg/dL    VLDL Cholesterol Anurag 17 5 - 40 mg/dL    LDL Chol Calc (NIH) 55 0 - 100 mg/dL    LDL/HDL RATIO 0.92    Comprehensive Metabolic Panel    Specimen: Blood   Result Value Ref Range     Glucose 86 65 - 99 mg/dL    BUN 15 8 - 23 mg/dL    Creatinine 0.73 (L) 0.76 - 1.27 mg/dL    eGFR Non African Am 105 >60 mL/min/1.73    eGFR African Am 127 >60 mL/min/1.73    BUN/Creatinine Ratio 20.5 7.0 - 25.0    Sodium 135 (L) 136 - 145 mmol/L    Potassium 4.8 3.5 - 5.2 mmol/L    Chloride 98 98 - 107 mmol/L    Total CO2 26.9 22.0 - 29.0 mmol/L    Calcium 8.9 8.6 - 10.5 mg/dL    Total Protein 6.2 6.0 - 8.5 g/dL    Albumin 4.30 3.50 - 5.20 g/dL    Globulin 1.9 gm/dL    A/G Ratio 2.3 g/dL    Total Bilirubin 0.4 0.0 - 1.2 mg/dL    Alkaline Phosphatase 131 (H) 39 - 117 U/L    AST (SGOT) 20 1 - 40 U/L    ALT (SGPT) 19 1 - 41 U/L   CBC & Differential    Specimen: Blood   Result Value Ref Range    WBC 6.86 3.40 - 10.80 10*3/mm3    RBC 5.35 4.14 - 5.80 10*6/mm3    Hemoglobin 14.9 13.0 - 17.7 g/dL    Hematocrit 44.5 37.5 - 51.0 %    MCV 83.2 79.0 - 97.0 fL    MCH 27.9 26.6 - 33.0 pg    MCHC 33.5 31.5 - 35.7 g/dL    RDW 15.3 12.3 - 15.4 %    Platelets 317 140 - 450 10*3/mm3    Neutrophil Rel % 61.6 42.7 - 76.0 %    Lymphocyte Rel % 25.8 19.6 - 45.3 %    Monocyte Rel % 10.2 5.0 - 12.0 %    Eosinophil Rel % 1.0 0.3 - 6.2 %    Basophil Rel % 0.7 0.0 - 1.5 %    Neutrophils Absolute 4.22 1.70 - 7.00 10*3/mm3    Lymphocytes Absolute 1.77 0.70 - 3.10 10*3/mm3    Monocytes Absolute 0.70 0.10 - 0.90 10*3/mm3    Eosinophils Absolute 0.07 0.00 - 0.40 10*3/mm3    Basophils Absolute 0.05 0.00 - 0.20 10*3/mm3    Immature Granulocyte Rel % 0.7 (H) 0.0 - 0.5 %    Immature Grans Absolute 0.05 0.00 - 0.05 10*3/mm3    nRBC 0.0 0.0 - 0.2 /100 WBC     Result Review :                  Assessment and Plan    Diagnoses and all orders for this visit:    1. Chronic constipation (Primary)    2. Bipolar I disorder, single manic episode (CMS/HCC)    3. Status post hip hemiarthroplasty    4. Prediabetes    5. Nocturia    6. Aspiration into airway, sequela    7. Gastroesophageal reflux disease, unspecified whether esophagitis present       Continue  current medications.  Encouraged healthy diet/exercise.  OV labs in  6  months.  Pt to call sooner if any other issues arise.        Patient's Body mass index is 23.06 kg/m². BMI is within normal parameters. No follow-up required..             Outpatient Medications Prior to Visit   Medication Sig Dispense Refill   • alendronate (FOSAMAX) 70 MG tablet Take 1 tablet by mouth Every 7 (Seven) Days. 48 tablet 0   • Allegra-D Allergy & Congestion  MG per 12 hr tablet Take 1 tablet by mouth twice daily 60 tablet 0   • lamoTRIgine (LAMICTAL) 200 MG tablet Take 200 mg by mouth Every Night. 1/2 pill at noon     • memantine (NAMENDA) 10 MG tablet Take 10 mg by mouth 2 (Two) Times a Day.     • OLANZapine (zyPREXA) 20 MG tablet Take 20 mg by mouth every night at bedtime.     • omeprazole (priLOSEC) 40 MG capsule Take 1 capsule by mouth Daily. 90 capsule 3   • polyethylene glycol (MIRALAX) powder Take 17 g by mouth Daily. (Patient taking differently: Take 17 g by mouth Daily As Needed.) 850 g 5   • rivastigmine (EXELON) 6 MG capsule Take 6 mg by mouth 2 (Two) Times a Day.     • tamsulosin (FLOMAX) 0.4 MG capsule 24 hr capsule Take 1 capsule by mouth once daily 90 capsule 1     No facility-administered medications prior to visit.     No orders of the defined types were placed in this encounter.    [unfilled]  There are no discontinued medications.      Return in about 6 months (around 10/14/2021) for labs, Recheck.    Patient was given instructions and counseling regarding her condition or for health maintenance advice. Please see specific information pulled into the AVS if appropriate.

## 2021-04-15 ENCOUNTER — TELEPHONE (OUTPATIENT)
Dept: INTERNAL MEDICINE | Facility: CLINIC | Age: 76
End: 2021-04-15

## 2021-04-15 NOTE — TELEPHONE ENCOUNTER
PATIENT WOULD LIKE APPOINTMENT CARDS FILLED OUT FOR HIS VISITS IN OCTOBER. HE WOULD LIKE THESE MAILED TO THE ADDRESS ON FILE.    VISITS:  10/6/2021 AT 10:40AM FOR LABS  10/13/2021 AT 11:00AM FOR 6 MONTH OV    ADDRESS:   [facility] [address]  [place] [address]    PLEASE ADVISE: [phone]

## 2021-04-22 ENCOUNTER — OFFICE VISIT (OUTPATIENT)
Dept: GASTROENTEROLOGY | Facility: CLINIC | Age: 76
End: 2021-04-22

## 2021-04-22 VITALS — BODY MASS INDEX: 22.85 KG/M2 | HEIGHT: 68 IN | WEIGHT: 150.8 LBS

## 2021-04-22 DIAGNOSIS — K22.89 PRESBYESOPHAGUS: Primary | ICD-10-CM

## 2021-04-22 PROCEDURE — 99212 OFFICE O/P EST SF 10 MIN: CPT | Performed by: INTERNAL MEDICINE

## 2021-04-22 NOTE — PROGRESS NOTES
PATIENT INFORMATION  Andrew Redmond       - 1945    CHIEF COMPLAINT  Chief Complaint   Patient presents with   • Heartburn   • Difficulty Swallowing     foods only       HISTORY OF PRESENT ILLNESS  Follow up from Presbyesophagus and occasionally bran flakes and pills so reviewed those rcommendstion    Discussed screening rcommendationa and with his present vscular and memory issues will NOT recommend any further colon screening eiother non invasive nor invasive and he understands      REVIEWED PERTINENT RESULTS/ LABS  Lab Results   Component Value Date    CASEREPORT  2016     Surgical Pathology Report                         Case: [id number]                                  Authorizing Provider:  Akiko Scales MD          Collected:           2016 02:00 PM          Ordering Location:     [facility]   Received:            2016 03:17 PM                                 OR                                                                           Pathologist:           Stefani Conrad MD                                                     Specimen:    Large Intestine, Right/Ascending Colon, hepatic flexure polyp                               Lab Results   Component Value Date    HGB 14.9 2021    MCV 83.2 2021     2021    ALT 19 2021    AST 20 2021    HGBA1C 5.50 2021    INR 1.11 (H) 2020    TRIG 92 2021    FERRITIN 452.00 (H) 2019    IRON 23 (L) 2019    TIBC 110 (L) 2019      No results found.    REVIEW OF SYSTEMS  Review of Systems   Constitutional: Negative for activity change, chills, fever and unexpected weight change.   HENT: Positive for trouble swallowing. Negative for congestion.    Eyes: Negative for visual disturbance.   Respiratory: Negative for shortness of breath.    Cardiovascular: Negative for chest pain and palpitations.   Gastrointestinal: Negative for abdominal pain and blood in  stool.   Endocrine: Negative for cold intolerance and heat intolerance.   Genitourinary: Negative for hematuria.   Musculoskeletal: Negative for gait problem.   Skin: Negative for color change.   Allergic/Immunologic: Negative for immunocompromised state.   Neurological: Negative for weakness and light-headedness.   Hematological: Negative for adenopathy.   Psychiatric/Behavioral: Negative for sleep disturbance. The patient is not nervous/anxious.          ACTIVE PROBLEMS  Patient Active Problem List    Diagnosis    • Nocturia [R35.1]    • Acute serous otitis media [H65.00]    • Hip fracture due to osteoporosis, sequela [M80.059S]    • Status post hip hemiarthroplasty [Z96.649]    • Subacromial bursitis of left shoulder joint [M75.52]    • Closed displaced fracture of neck of right scapula [S42.151A]    • Closed nondisplaced fracture of right clavicle [S42.001A]    • Displaced fracture of right femoral neck (CMS/[facility]) [S72.001A]    • Weight loss [R63.4]    • Closed compression fracture of L1 lumbar vertebra [S32.010A]    • Acute bilateral low back pain without sciatica [M54.5]    • Low energy [R53.83]    • High risk medication use [Z79.899]    • Poor balance [R26.89]    • Erectile dysfunction [N52.9]    • Chronic constipation [K59.09]    • Hemorrhoids [K64.9]    • Polyp of colon [K63.5]    • Alzheimer's dementia without behavioral disturbance (CMS/HCC) [G30.9, F02.80]    • Bipolar I disorder, single manic episode (CMS/[facility]) [F30.9]    • Epilepsy (CMS/[facility]) [G40.909]    • Seizure (CMS/[facility]) [R56.9]    • Subdural hematoma (CMS/[facility]) [S06.5X9A]          PAST MEDICAL HISTORY  Past Medical History:   Diagnosis Date   • Alzheimer disease (CMS/[facility])    • Alzheimer disease (CMS/[facility])    • Alzheimer disease (CMS/[facility]) 03/06/2019   • Anxiety and depression    • Bipolar 1 disorder (CMS/[facility])    • Colon polyp    • Constipation    • Depression    • Diarrhea    • Epilepsy (CMS/[facility])    • Epilepsy (CMS/[facility]) 03/06/2019   • Erectile  dysfunction    • GERD (gastroesophageal reflux disease)    • L1 vertebral fracture (CMS/HCC)    • Seizures (CMS/HCC)     absence seizures.  well controlled.         SURGICAL HISTORY  Past Surgical History:   Procedure Laterality Date   • CHOLECYSTECTOMY     • COLONOSCOPY N/A 2016    Procedure: COLONOSCOPY with polypectomy;  Surgeon: Akiko Scales MD;  Location: [facility] OR;  Service:    • GALLBLADDER SURGERY     • HIP HEMIARTHROPLASTY Right 3/8/2019    Procedure: HIP HEMIARTHROPLASTY and all associated procedures;  Surgeon: Harrison Turk MD;  Location: [facility] OR;  Service: Orthopedics   • LIVER SURGERY     • TONSILLECTOMY           FAMILY HISTORY  Family History   Problem Relation Age of Onset   • Lung cancer Father    • No Known Problems Mother    • No Known Problems Maternal Grandmother    • No Known Problems Maternal Grandfather    • No Known Problems Paternal Grandmother    • No Known Problems Paternal Grandfather    • Colon polyps Neg Hx    • Colon cancer Neg Hx          SOCIAL HISTORY  Social History     Occupational History   • Occupation: professor     Comment: taught anatomy at [employer]   Tobacco Use   • Smoking status: Former Smoker     Packs/day: 2.00     Years: 20.00     Pack years: 40.00     Quit date: 2/3/1990     Years since quittin.2   • Smokeless tobacco: Former User     Quit date:    • Tobacco comment: quit 30 yrs ago    Substance and Sexual Activity   • Alcohol use: No   • Drug use: No   • Sexual activity: Defer         CURRENT MEDICATIONS    Current Outpatient Medications:   •  alendronate (FOSAMAX) 70 MG tablet, Take 1 tablet by mouth Every 7 (Seven) Days., Disp: 48 tablet, Rfl: 0  •  Allegra-D Allergy & Congestion  MG per 12 hr tablet, Take 1 tablet by mouth twice daily, Disp: 60 tablet, Rfl: 0  •  lamoTRIgine (LAMICTAL) 200 MG tablet, Take 200 mg by mouth Every Night. 1/2 pill at noon, Disp: , Rfl:   •  memantine (NAMENDA) 10 MG tablet, Take 10 mg by mouth 2  "(Two) Times a Day., Disp: , Rfl:   •  OLANZapine (zyPREXA) 20 MG tablet, Take 20 mg by mouth every night at bedtime., Disp: , Rfl:   •  omeprazole (priLOSEC) 40 MG capsule, Take 1 capsule by mouth Daily., Disp: 90 capsule, Rfl: 3  •  polyethylene glycol (MIRALAX) powder, Take 17 g by mouth Daily. (Patient taking differently: Take 17 g by mouth Daily As Needed.), Disp: 850 g, Rfl: 5  •  rivastigmine (EXELON) 6 MG capsule, Take 6 mg by mouth 2 (Two) Times a Day., Disp: , Rfl:   •  tamsulosin (FLOMAX) 0.4 MG capsule 24 hr capsule, Take 1 capsule by mouth once daily, Disp: 90 capsule, Rfl: 1    ALLERGIES  Patient has no known allergies.    VITALS  Vitals:    04/22/21 1118   Weight: 68.4 kg (150 lb 12.8 oz)   Height: 172.7 cm (68\")       PHYSICAL EXAM  Debilities/Disabilities Identified: None  Emotional Behavior: Appropriate  Wt Readings from Last 3 Encounters:   04/22/21 68.4 kg (150 lb 12.8 oz)   04/14/21 68.8 kg (151 lb 9.6 oz)   01/27/21 71.9 kg (158 lb 8 oz)     Ht Readings from Last 1 Encounters:   04/22/21 172.7 cm (68\")     Body mass index is 22.93 kg/m².  Physical Exam  Constitutional:       Appearance: He is well-developed.   HENT:      Head: Normocephalic and atraumatic.   Eyes:      General: No scleral icterus.     Pupils: Pupils are equal, round, and reactive to light.   Neck:      Thyroid: No thyromegaly.   Cardiovascular:      Rate and Rhythm: Normal rate and regular rhythm.      Heart sounds: Normal heart sounds. No murmur heard.   No gallop.    Pulmonary:      Effort: Pulmonary effort is normal.      Breath sounds: Normal breath sounds. No wheezing or rales.   Abdominal:      General: Bowel sounds are normal. There is no distension or abdominal bruit.      Palpations: Abdomen is soft. Abdomen is not rigid. There is no shifting dullness, fluid wave, hepatomegaly, splenomegaly, mass or pulsatile mass.      Tenderness: There is no abdominal tenderness. There is no guarding or rebound. Negative signs include " Alegria's sign.      Hernia: No hernia is present. There is no hernia in the ventral area.   Musculoskeletal:         General: Normal range of motion.      Cervical back: Normal range of motion and neck supple.   Lymphadenopathy:      Cervical: No cervical adenopathy.   Skin:     General: Skin is warm and dry.      Findings: No erythema or rash.      Comments: Has a bruise / excoriation e\healiung under where his wrist watch was   Neurological:      Mental Status: He is alert and oriented to person, place, and time.         CLINICAL DATA REVIEWED   reviewed previous lab results and integrated with today's visit, reviewed notes from other physicians and/or last GI encounter, reviewed previous endoscopy results and available photos, reviewed surgical pathology results from previous biopsies    ASSESSMENT  Diagnoses and all orders for this visit:    Presbyesophagus          PLAN  No further screening colonoscopy and he is adamant against PEGs    Return if symptoms worsen or fail to improve.    I have discussed the above plan with the patient.  They verbalize understanding and are in agreement with the plan.  They have been advised to contact the office for any questions, concerns, or changes related to their health.

## 2021-06-14 RX ORDER — ALENDRONATE SODIUM 70 MG/1
TABLET ORAL
Qty: 12 TABLET | Refills: 0 | Status: SHIPPED | OUTPATIENT
Start: 2021-06-14 | End: 2022-01-20 | Stop reason: SDUPTHER

## 2021-06-22 RX ORDER — TAMSULOSIN HYDROCHLORIDE 0.4 MG/1
CAPSULE ORAL
Qty: 90 CAPSULE | Refills: 3 | Status: SHIPPED | OUTPATIENT
Start: 2021-06-22 | End: 2022-01-20 | Stop reason: SDUPTHER

## 2021-07-23 ENCOUNTER — TELEPHONE (OUTPATIENT)
Dept: INTERNAL MEDICINE | Facility: CLINIC | Age: 76
End: 2021-07-23

## 2021-07-23 NOTE — TELEPHONE ENCOUNTER
PATIENT IS CALLING TO LET DR HARRINGTON KNOW THAT HE SAW A SMALL AMOUNT OF BLOOD ON HIS PILLOW TODAY FROM THE SITE OF THE FOREHEAD BIOPSY AND WANTED TO SEE IF HE SHOULD BE CONCERNED. PLEASE ADVISE.      CALL: [phone]

## 2021-07-26 NOTE — TELEPHONE ENCOUNTER
I didn't do his forehead biopsy and know nothing about issues related to it.  If it is a tiny amount of blood I would just do supportive care but if any worsening of sx he needs to call whoever did the biopsy.

## 2021-07-27 NOTE — TELEPHONE ENCOUNTER
Lvm with pt  and informed that he would need to contact his provider who did his biopsy if he is having worsening sx.

## 2021-09-14 ENCOUNTER — OFFICE VISIT (OUTPATIENT)
Dept: INTERNAL MEDICINE | Facility: CLINIC | Age: 76
End: 2021-09-14

## 2021-09-14 ENCOUNTER — HOSPITAL ENCOUNTER (OUTPATIENT)
Dept: GENERAL RADIOLOGY | Facility: HOSPITAL | Age: 76
Discharge: HOME | End: 2021-09-14
Admitting: INTERNAL MEDICINE

## 2021-09-14 VITALS
DIASTOLIC BLOOD PRESSURE: 70 MMHG | WEIGHT: 131.6 LBS | OXYGEN SATURATION: 97 % | HEART RATE: 83 BPM | RESPIRATION RATE: 19 BRPM | BODY MASS INDEX: 19.94 KG/M2 | HEIGHT: 68 IN | TEMPERATURE: 97.7 F | SYSTOLIC BLOOD PRESSURE: 110 MMHG

## 2021-09-14 DIAGNOSIS — F30.9 BIPOLAR I DISORDER, SINGLE MANIC EPISODE (HCC): ICD-10-CM

## 2021-09-14 DIAGNOSIS — K21.9 GASTROESOPHAGEAL REFLUX DISEASE, UNSPECIFIED WHETHER ESOPHAGITIS PRESENT: ICD-10-CM

## 2021-09-14 DIAGNOSIS — R63.4 WEIGHT LOSS, UNINTENTIONAL: ICD-10-CM

## 2021-09-14 DIAGNOSIS — Z12.5 PROSTATE CANCER SCREENING: ICD-10-CM

## 2021-09-14 DIAGNOSIS — Z00.00 MEDICARE ANNUAL WELLNESS VISIT, SUBSEQUENT: Primary | ICD-10-CM

## 2021-09-14 DIAGNOSIS — Z23 NEED FOR VACCINATION: ICD-10-CM

## 2021-09-14 DIAGNOSIS — Z87.891 FORMER SMOKER: ICD-10-CM

## 2021-09-14 DIAGNOSIS — M81.0 AGE-RELATED OSTEOPOROSIS WITHOUT CURRENT PATHOLOGICAL FRACTURE: ICD-10-CM

## 2021-09-14 DIAGNOSIS — R73.03 PREDIABETES: ICD-10-CM

## 2021-09-14 DIAGNOSIS — R93.3 ABNORMAL FINDINGS ON DIAGNOSTIC IMAGING OF OTHER PARTS OF DIGESTIVE TRACT: ICD-10-CM

## 2021-09-14 PROCEDURE — G0439 PPPS, SUBSEQ VISIT: HCPCS | Performed by: INTERNAL MEDICINE

## 2021-09-14 PROCEDURE — 90732 PPSV23 VACC 2 YRS+ SUBQ/IM: CPT | Performed by: INTERNAL MEDICINE

## 2021-09-14 PROCEDURE — 99214 OFFICE O/P EST MOD 30 MIN: CPT | Performed by: INTERNAL MEDICINE

## 2021-09-14 PROCEDURE — 71046 X-RAY EXAM CHEST 2 VIEWS: CPT

## 2021-09-14 PROCEDURE — G0009 ADMIN PNEUMOCOCCAL VACCINE: HCPCS | Performed by: INTERNAL MEDICINE

## 2021-09-14 NOTE — PROGRESS NOTES
Andrew Redmond is a 75 y.o. male, who presents with a chief complaint of   Chief Complaint   Patient presents with   • Diarrhea     last week, feels better now.    • Medicare Wellness-subsequent   • Weight Loss           HPI   Pt is here bc of diarrhea x 1 week.  He says after having and episode and felt like something didn't come all the way out.  + hx hemorrhoids.  He doesn't feel this sensation any more.  Last episode of diarrhea a day or two ago.  Last c-scope 16 showed Colon polyps, Extensive diverticulosis, Internal hemorrhoids.  Outside of this episode he doesn't have chronic diarrhea issues.  He denies blood in stool or urine.    He is worried his eyes are always dry.     He is making lots of chewing movements with his mouth even when he isnt thinking about it.  He can stop the movement but it annoys him. He is on psych meds per dr. susanne yarbrough.  He says he has been on the same meds for years.     He has lost 20 pounds in the past 5 months.  He says he doesn't know what is going on.  He is trying to eat regularly.  He usually wears a 36 size pant but is now in a 34.  He says he eats cereal for breakfast, hamburger for lunch and healthy choice meal for dinner.  No change in diet or activity.   He had a 40 pack year history of smoking and quit about 30 years ago.  His dad  of lung cancer.  His mom had alzheimer's.  No abd pain.  No n/v.      The following portions of the patient's history were reviewed and updated as appropriate: allergies, current medications, past family history, past medical history, past social history, past surgical history and problem list.    Allergies: Patient has no known allergies.    Review of Systems   Constitutional: Positive for unexpected weight change. Negative for fever.   HENT: Negative.    Eyes: Negative.    Respiratory: Negative.    Cardiovascular: Negative.    Gastrointestinal: Positive for diarrhea.   Endocrine: Negative.    Genitourinary: Negative.     Musculoskeletal: Negative.    Skin: Negative.    Allergic/Immunologic: Negative.    Neurological: Negative.    Hematological: Negative.    Psychiatric/Behavioral: Negative.    All other systems reviewed and are negative.            Wt Readings from Last 3 Encounters:   09/14/21 59.7 kg (131 lb 9.6 oz)   04/22/21 68.4 kg (150 lb 12.8 oz)   04/14/21 68.8 kg (151 lb 9.6 oz)     Temp Readings from Last 3 Encounters:   09/14/21 97.7 °F (36.5 °C) (Temporal)   04/14/21 96.9 °F (36.1 °C) (Temporal)   01/27/21 98.6 °F (37 °C) (Temporal)     BP Readings from Last 3 Encounters:   09/14/21 110/70   04/14/21 100/60   01/27/21 118/68     Pulse Readings from Last 3 Encounters:   09/14/21 83   04/14/21 67   01/27/21 83     Body mass index is 19.99 kg/m².  SpO2 Readings from Last 3 Encounters:   09/14/21 97%   04/14/21 98%   01/27/21 98%          Physical Exam  Vitals and nursing note reviewed.   Constitutional:       General: He is not in acute distress.     Appearance: He is well-developed.   HENT:      Head: Normocephalic and atraumatic.      Right Ear: External ear normal.      Left Ear: External ear normal.      Nose: Nose normal.   Eyes:      Conjunctiva/sclera: Conjunctivae normal.      Pupils: Pupils are equal, round, and reactive to light.   Cardiovascular:      Rate and Rhythm: Normal rate and regular rhythm.      Heart sounds: Normal heart sounds.   Pulmonary:      Effort: Pulmonary effort is normal. No respiratory distress.      Breath sounds: Normal breath sounds. No wheezing.   Musculoskeletal:         General: Normal range of motion.      Cervical back: Normal range of motion and neck supple.      Comments: Walks with rollator   Skin:     General: Skin is warm and dry.   Neurological:      Mental Status: He is alert and oriented to person, place, and time.   Psychiatric:         Behavior: Behavior normal.         Thought Content: Thought content normal.         Judgment: Judgment normal.         Results for orders  placed or performed in visit on 04/07/21   TSH    Specimen: Blood   Result Value Ref Range    TSH 1.870 0.270 - 4.200 uIU/mL   T4, Free    Specimen: Blood   Result Value Ref Range    Free T4 1.35 0.93 - 1.70 ng/dL   Hemoglobin A1c    Specimen: Blood   Result Value Ref Range    Hemoglobin A1C 5.50 4.80 - 5.60 %   Lipid Panel With LDL / HDL Ratio    Specimen: Blood   Result Value Ref Range    Total Cholesterol 130 0 - 200 mg/dL    Triglycerides 92 0 - 150 mg/dL    HDL Cholesterol 58 40 - 60 mg/dL    VLDL Cholesterol Anurag 17 5 - 40 mg/dL    LDL Chol Calc (NIH) 55 0 - 100 mg/dL    LDL/HDL RATIO 0.92    Comprehensive Metabolic Panel    Specimen: Blood   Result Value Ref Range    Glucose 86 65 - 99 mg/dL    BUN 15 8 - 23 mg/dL    Creatinine 0.73 (L) 0.76 - 1.27 mg/dL    eGFR Non African Am 105 >60 mL/min/1.73    eGFR African Am 127 >60 mL/min/1.73    BUN/Creatinine Ratio 20.5 7.0 - 25.0    Sodium 135 (L) 136 - 145 mmol/L    Potassium 4.8 3.5 - 5.2 mmol/L    Chloride 98 98 - 107 mmol/L    Total CO2 26.9 22.0 - 29.0 mmol/L    Calcium 8.9 8.6 - 10.5 mg/dL    Total Protein 6.2 6.0 - 8.5 g/dL    Albumin 4.30 3.50 - 5.20 g/dL    Globulin 1.9 gm/dL    A/G Ratio 2.3 g/dL    Total Bilirubin 0.4 0.0 - 1.2 mg/dL    Alkaline Phosphatase 131 (H) 39 - 117 U/L    AST (SGOT) 20 1 - 40 U/L    ALT (SGPT) 19 1 - 41 U/L   CBC & Differential    Specimen: Blood   Result Value Ref Range    WBC 6.86 3.40 - 10.80 10*3/mm3    RBC 5.35 4.14 - 5.80 10*6/mm3    Hemoglobin 14.9 13.0 - 17.7 g/dL    Hematocrit 44.5 37.5 - 51.0 %    MCV 83.2 79.0 - 97.0 fL    MCH 27.9 26.6 - 33.0 pg    MCHC 33.5 31.5 - 35.7 g/dL    RDW 15.3 12.3 - 15.4 %    Platelets 317 140 - 450 10*3/mm3    Neutrophil Rel % 61.6 42.7 - 76.0 %    Lymphocyte Rel % 25.8 19.6 - 45.3 %    Monocyte Rel % 10.2 5.0 - 12.0 %    Eosinophil Rel % 1.0 0.3 - 6.2 %    Basophil Rel % 0.7 0.0 - 1.5 %    Neutrophils Absolute 4.22 1.70 - 7.00 10*3/mm3    Lymphocytes Absolute 1.77 0.70 - 3.10 10*3/mm3     Monocytes Absolute 0.70 0.10 - 0.90 10*3/mm3    Eosinophils Absolute 0.07 0.00 - 0.40 10*3/mm3    Basophils Absolute 0.05 0.00 - 0.20 10*3/mm3    Immature Granulocyte Rel % 0.7 (H) 0.0 - 0.5 %    Immature Grans Absolute 0.05 0.00 - 0.05 10*3/mm3    nRBC 0.0 0.0 - 0.2 /100 WBC     Result Review :                  Assessment and Plan    Diagnoses and all orders for this visit:    1. Medicare annual wellness visit, subsequent (Primary)    2. Bipolar I disorder, single manic episode (CMS/[facility]) - pt making chewing movements.  Encouraged him to d/w psych as this can be a side effect of some psych meds.      3. Prediabetes  -     Comprehensive Metabolic Panel  -     CBC & Differential  -     T4, Free  -     TSH  -     Lipid Panel With LDL / HDL Ratio  -     Hemoglobin A1c  -     POCT urinalysis dipstick, automated    4. Gastroesophageal reflux disease, unspecified whether esophagitis present  -     Comprehensive Metabolic Panel  -     CBC & Differential  -     T4, Free  -     TSH  -     Lipid Panel With LDL / HDL Ratio  -     Hemoglobin A1c  -     POCT urinalysis dipstick, automated    5. Weight loss, unintentional - new problem.  cause unclear.  no pain or localizing sx.  He is a former smoker. Will initiate work up with labs, ua, and cxr.  -     Comprehensive Metabolic Panel  -     CBC & Differential  -     T4, Free  -     TSH  -     Lipid Panel With LDL / HDL Ratio  -     Hemoglobin A1c  -     POCT urinalysis dipstick, automated  -     XR Chest PA & Lateral    6. Former smoker  -     XR Chest PA & Lateral    7. Prostate cancer screening  -     PSA SCREENING    8. Abnormal findings on diagnostic imaging of other parts of digestive tract   -     Lipid Panel With LDL / HDL Ratio                 Outpatient Medications Prior to Visit   Medication Sig Dispense Refill   • alendronate (FOSAMAX) 70 MG tablet Take 1 tablet by mouth once a week 12 tablet 0   • Allegra-D Allergy & Congestion  MG per 12 hr tablet Take 1 tablet  by mouth twice daily 60 tablet 0   • lamoTRIgine (LAMICTAL) 200 MG tablet Take 200 mg by mouth Every Night. 1/2 pill at noon     • memantine (NAMENDA) 10 MG tablet Take 10 mg by mouth 2 (Two) Times a Day.     • OLANZapine (zyPREXA) 20 MG tablet Take 20 mg by mouth every night at bedtime.     • omeprazole (priLOSEC) 40 MG capsule Take 1 capsule by mouth Daily. 90 capsule 3   • polyethylene glycol (MIRALAX) powder Take 17 g by mouth Daily. (Patient taking differently: Take 17 g by mouth Daily As Needed.) 850 g 5   • rivastigmine (EXELON) 6 MG capsule Take 6 mg by mouth 2 (Two) Times a Day.     • tamsulosin (FLOMAX) 0.4 MG capsule 24 hr capsule Take 1 capsule by mouth once daily 90 capsule 3     No facility-administered medications prior to visit.     No orders of the defined types were placed in this encounter.    [unfilled]  There are no discontinued medications.      Return in about 4 weeks (around 10/12/2021) for Recheck.    Patient was given instructions and counseling regarding her condition or for health maintenance advice. Please see specific information pulled into the AVS if appropriate.

## 2021-09-14 NOTE — PROGRESS NOTES
The ABCs of the Annual Wellness Visit  Subsequent Medicare Wellness Visit    Chief Complaint   Patient presents with   • Diarrhea     last week, feels better now.    • Medicare Wellness-subsequent   • Weight Loss      Subjective    History of Present Illness:  Andrew Redmond is a 75 y.o. male who presents for a Subsequent Medicare Wellness Visit.    The following portions of the patient's history were reviewed and   updated as appropriate: allergies, current medications, past family history, past medical history, past social history, past surgical history and problem list.    Compared to one year ago, the patient feels his physical   health is the same.    Compared to one year ago, the patient feels his mental   health is the same.    Recent Hospitalizations:  This patient has had a [facility] admission record on file within the last 365 days.    Current Medical Providers:  Patient Care Team:  Jessica Birch MD as PCP - General (Internal Medicine & Pediatrics)  Huan Romano MD as PCP - Family Medicine    Outpatient Medications Prior to Visit   Medication Sig Dispense Refill   • alendronate (FOSAMAX) 70 MG tablet Take 1 tablet by mouth once a week 12 tablet 0   • Allegra-D Allergy & Congestion  MG per 12 hr tablet Take 1 tablet by mouth twice daily 60 tablet 0   • lamoTRIgine (LAMICTAL) 200 MG tablet Take 200 mg by mouth Every Night. 1/2 pill at noon     • memantine (NAMENDA) 10 MG tablet Take 10 mg by mouth 2 (Two) Times a Day.     • OLANZapine (zyPREXA) 20 MG tablet Take 20 mg by mouth every night at bedtime.     • omeprazole (priLOSEC) 40 MG capsule Take 1 capsule by mouth Daily. 90 capsule 3   • polyethylene glycol (MIRALAX) powder Take 17 g by mouth Daily. (Patient taking differently: Take 17 g by mouth Daily As Needed.) 850 g 5   • rivastigmine (EXELON) 6 MG capsule Take 6 mg by mouth 2 (Two) Times a Day.     • tamsulosin (FLOMAX) 0.4 MG capsule 24 hr capsule Take 1 capsule by mouth once  "daily 90 capsule 3     No facility-administered medications prior to visit.       No opioid medication identified on active medication list. I have reviewed chart for other potential  high risk medication/s and harmful drug interactions in the elderly.          Aspirin is not on active medication list.  Aspirin use is not indicated based on review of current medical condition/s. Risk of harm outweighs potential benefits.  .    Patient Active Problem List   Diagnosis   • Alzheimer's dementia without behavioral disturbance (CMS/HCC)   • Bipolar I disorder, single manic episode (CMS/HCC)   • Epilepsy (CMS/HCC)   • Chronic constipation   • Hemorrhoids   • Polyp of colon   • Erectile dysfunction   • High risk medication use   • Poor balance   • Subdural hematoma (CMS/HCC)   • Seizure (CMS/[facility])   • Low energy   • Closed compression fracture of L1 lumbar vertebra   • Acute bilateral low back pain without sciatica   • Weight loss   • Displaced fracture of right femoral neck (CMS/HCC)   • Status post hip hemiarthroplasty   • Subacromial bursitis of left shoulder joint   • Closed displaced fracture of neck of right scapula   • Closed nondisplaced fracture of right clavicle   • Hip fracture due to osteoporosis, sequela   • Acute serous otitis media   • Nocturia     Advance Care Planning  Advance Directive is on file.  ACP discussion was held with the patient during this visit. Patient has an advance directive in EMR which is still valid.           Objective    Vitals:    09/14/21 1101   BP: 110/70   Pulse: 83   Resp: 19   Temp: 97.7 °F (36.5 °C)   TempSrc: Temporal   SpO2: 97%   Weight: 59.7 kg (131 lb 9.6 oz)   Height: 172.8 cm (68.03\")     BMI Readings from Last 1 Encounters:   09/14/21 19.99 kg/m²   BMI is within normal parameters. No follow-up required.    Does the patient have evidence of cognitive impairment? No    Physical Exam            HEALTH RISK ASSESSMENT    Smoking Status:  Social History     Tobacco Use "   Smoking Status Former Smoker   • Packs/day: 2.00   • Years: 20.00   • Pack years: 40.00   • Quit date: 2/3/1990   • Years since quittin.6   Smokeless Tobacco Former User   • Quit date:    Tobacco Comment    quit 30 yrs ago      Alcohol Consumption:  Social History     Substance and Sexual Activity   Alcohol Use No     Fall Risk Screen:    LILLIE Fall Risk Assessment was completed, and patient is at MODERATE risk for falls. Assessment completed on:2021    Depression Screening:  PHQ-2/PHQ-9 Depression Screening 2021   Little interest or pleasure in doing things 0   Feeling down, depressed, or hopeless 0   Trouble falling or staying asleep, or sleeping too much -   Feeling tired or having little energy -   Poor appetite or overeating -   Feeling bad about yourself - or that you are a failure or have let yourself or your family down -   Trouble concentrating on things, such as reading the newspaper or watching television -   Moving or speaking so slowly that other people could have noticed. Or the opposite - being so fidgety or restless that you have been moving around a lot more than usual -   Thoughts that you would be better off dead, or of hurting yourself in some way -   Total Score 0   If you checked off any problems, how difficult have these problems made it for you to do your work, take care of things at home, or get along with other people? -       Health Habits and Functional and Cognitive Screening:  Functional & Cognitive Status 2020   Do you have difficulty preparing food and eating? No   Do you have difficulty bathing yourself, getting dressed or grooming yourself? No   Do you have difficulty using the toilet? No   Do you have difficulty moving around from place to place? No   Do you have trouble with steps or getting out of a bed or a chair? No   Current Diet Well Balanced Diet   Dental Exam Up to date   Eye Exam Up to date   Exercise (times per week) 5 times per week         Exercise Frequency Comment -   Current Exercise Activities Include Walking   Do you need help using the phone?  No   Are you deaf or do you have serious difficulty hearing?  Yes   Do you need help with transportation? No   Do you need help shopping? No   Do you need help preparing meals?  No   Do you need help with housework?  No   Do you need help with laundry? No   Do you need help taking your medications? No   Do you need help managing money? No   Do you ever drive or ride in a car without wearing a seat belt? No   Have you felt unusual stress, anger or loneliness in the last month? No   Who do you live with? Alone   If you need help, do you have trouble finding someone available to you? No   Have you been bothered in the last four weeks by sexual problems? No   Do you have difficulty concentrating, remembering or making decisions? No       Age-appropriate Screening Schedule:  Refer to the list below for future screening recommendations based on patient's age, sex and/or medical conditions. Orders for these recommended tests are listed in the plan section. The patient has been provided with a written plan.    Health Maintenance   Topic Date Due   • DXA SCAN  08/08/2021   • ZOSTER VACCINE (1 of 2) 09/23/2022 (Originally 9/25/1995)   • INFLUENZA VACCINE  10/01/2021   • TDAP/TD VACCINES (4 - Tdap) 12/22/2028              Assessment/Plan   CMS Preventative Services Quick Reference  Risk Factors Identified During Encounter  Dementia/Memory   Fall Risk-High or Moderate  Immunizations Discussed/Encouraged (specific Immunizations; Influenza and Pneumococcal 23  Inactivity/Sedentary  Polypharmacy  The above risks/problems have been discussed with the patient.  Follow up actions/plans if indicated are seen below in the Assessment/Plan Section.  Pertinent information has been shared with the patient in the After Visit Summary.    Diagnoses and all orders for this visit:    1. Medicare annual wellness visit, subsequent  (Primary)    2. Bipolar I disorder, single manic episode (CMS/[facility])    3. Prediabetes  -     Comprehensive Metabolic Panel  -     CBC & Differential  -     T4, Free  -     TSH  -     Lipid Panel With LDL / HDL Ratio  -     Hemoglobin A1c  -     POCT urinalysis dipstick, automated    4. Gastroesophageal reflux disease, unspecified whether esophagitis present  -     Comprehensive Metabolic Panel  -     CBC & Differential  -     T4, Free  -     TSH  -     Lipid Panel With LDL / HDL Ratio  -     Hemoglobin A1c  -     POCT urinalysis dipstick, automated    5. Weight loss, unintentional  -     Comprehensive Metabolic Panel  -     CBC & Differential  -     T4, Free  -     TSH  -     Lipid Panel With LDL / HDL Ratio  -     Hemoglobin A1c  -     POCT urinalysis dipstick, automated  -     XR Chest PA & Lateral    6. Former smoker  -     XR Chest PA & Lateral    7. Prostate cancer screening  -     PSA SCREENING    8. Abnormal findings on diagnostic imaging of other parts of digestive tract   -     Lipid Panel With LDL / HDL Ratio    9. Age-related osteoporosis without current pathological fracture  -     DEXA Bone Density Axial; Future    10. Need for vaccination  -     Pneumococcal Polysaccharide Vaccine 23-Valent (PPSV23) Greater Than or Equal To 3yo Subcutaneous / IM        Follow Up:   Return in about 4 weeks (around 10/12/2021) for Recheck.     An After Visit Summary and PPPS were made available to the patient.

## 2021-09-15 LAB
ALBUMIN SERPL-MCNC: 4.1 G/DL (ref 3.5–5.2)
ALBUMIN/GLOB SERPL: 2.3 G/DL
ALP SERPL-CCNC: 74 U/L (ref 39–117)
ALT SERPL-CCNC: 7 U/L (ref 1–41)
AST SERPL-CCNC: 12 U/L (ref 1–40)
BASOPHILS # BLD AUTO: 0.03 10*3/MM3 (ref 0–0.2)
BASOPHILS NFR BLD AUTO: 0.4 % (ref 0–1.5)
BILIRUB SERPL-MCNC: 0.5 MG/DL (ref 0–1.2)
BUN SERPL-MCNC: 11 MG/DL (ref 8–23)
BUN/CREAT SERPL: 13.9 (ref 7–25)
CALCIUM SERPL-MCNC: 9 MG/DL (ref 8.6–10.5)
CHLORIDE SERPL-SCNC: 96 MMOL/L (ref 98–107)
CHOLEST SERPL-MCNC: 168 MG/DL (ref 0–200)
CO2 SERPL-SCNC: 27 MMOL/L (ref 22–29)
CREAT SERPL-MCNC: 0.79 MG/DL (ref 0.76–1.27)
EOSINOPHIL # BLD AUTO: 0.14 10*3/MM3 (ref 0–0.4)
EOSINOPHIL NFR BLD AUTO: 2 % (ref 0.3–6.2)
ERYTHROCYTE [DISTWIDTH] IN BLOOD BY AUTOMATED COUNT: 15.8 % (ref 12.3–15.4)
GLOBULIN SER CALC-MCNC: 1.8 GM/DL
GLUCOSE SERPL-MCNC: 70 MG/DL (ref 65–99)
HBA1C MFR BLD: 5.3 % (ref 4.8–5.6)
HCT VFR BLD AUTO: 43.3 % (ref 37.5–51)
HDLC SERPL-MCNC: 66 MG/DL (ref 40–60)
HGB BLD-MCNC: 14.2 G/DL (ref 13–17.7)
IMM GRANULOCYTES # BLD AUTO: 0.06 10*3/MM3 (ref 0–0.05)
IMM GRANULOCYTES NFR BLD AUTO: 0.9 % (ref 0–0.5)
LDLC SERPL CALC-MCNC: 87 MG/DL (ref 0–100)
LDLC/HDLC SERPL: 1.3 {RATIO}
LYMPHOCYTES # BLD AUTO: 2.42 10*3/MM3 (ref 0.7–3.1)
LYMPHOCYTES NFR BLD AUTO: 34.9 % (ref 19.6–45.3)
MCH RBC QN AUTO: 28.1 PG (ref 26.6–33)
MCHC RBC AUTO-ENTMCNC: 32.8 G/DL (ref 31.5–35.7)
MCV RBC AUTO: 85.7 FL (ref 79–97)
MONOCYTES # BLD AUTO: 0.97 10*3/MM3 (ref 0.1–0.9)
MONOCYTES NFR BLD AUTO: 14 % (ref 5–12)
NEUTROPHILS # BLD AUTO: 3.32 10*3/MM3 (ref 1.7–7)
NEUTROPHILS NFR BLD AUTO: 47.8 % (ref 42.7–76)
NRBC BLD AUTO-RTO: 0 /100 WBC (ref 0–0.2)
PLATELET # BLD AUTO: 368 10*3/MM3 (ref 140–450)
POTASSIUM SERPL-SCNC: 4.5 MMOL/L (ref 3.5–5.2)
PROT SERPL-MCNC: 5.9 G/DL (ref 6–8.5)
PSA SERPL-MCNC: 0.95 NG/ML (ref 0–4)
RBC # BLD AUTO: 5.05 10*6/MM3 (ref 4.14–5.8)
SODIUM SERPL-SCNC: 137 MMOL/L (ref 136–145)
T4 FREE SERPL-MCNC: 1.25 NG/DL (ref 0.93–1.7)
TRIGL SERPL-MCNC: 81 MG/DL (ref 0–150)
TSH SERPL DL<=0.005 MIU/L-ACNC: 1.63 UIU/ML (ref 0.27–4.2)
VLDLC SERPL CALC-MCNC: 15 MG/DL (ref 5–40)
WBC # BLD AUTO: 6.94 10*3/MM3 (ref 3.4–10.8)

## 2021-09-21 ENCOUNTER — TELEPHONE (OUTPATIENT)
Dept: INTERNAL MEDICINE | Facility: CLINIC | Age: 76
End: 2021-09-21

## 2021-09-21 NOTE — TELEPHONE ENCOUNTER
Have him try some calmoseptine.  It is in the adult diapering section at [facility].  [facility] has it too.  White tube, green writing.  Cream is pink.  dont get the generic - its not the same

## 2021-09-21 NOTE — TELEPHONE ENCOUNTER
Caller: Andrew Redmond    Relationship: Self    Best call back number: [phone]    What medication are you requesting:     What are your current symptoms: RASH ON BUTTOCK    How long have you been experiencing symptoms:     Have you had these symptoms before:    [] Yes  [x] No    Have you been treated for these symptoms before:   [] Yes  [] No    If a prescription is needed, what is your preferred pharmacy and phone number:  [facility]  [address] [place] [address]  [phone]      Additional notes:  PATIENT IS CALLING IN STATING THAT HE HAD BEEN WEARING DEPENDS DUE TO HIS DIARRHEA AND NOW HAS A RASH ON HIS BUTTOCK.  HE WANTS SOMETHING TO HELP WITH THE PAIN AND IRRITATION THERE.

## 2021-09-21 NOTE — TELEPHONE ENCOUNTER
I called pt and offered to schedule him an appt, but he advised me that he takes the bus so the timing had to be perfect. He is saying that his depends are chafing him causing the skin to become red and irritated. Is there anything that he can try to give him some relief? Please advise.

## 2021-09-22 ENCOUNTER — APPOINTMENT (OUTPATIENT)
Dept: BONE DENSITY | Facility: HOSPITAL | Age: 76
End: 2021-09-22

## 2021-09-22 DIAGNOSIS — M81.0 AGE-RELATED OSTEOPOROSIS WITHOUT CURRENT PATHOLOGICAL FRACTURE: ICD-10-CM

## 2021-09-22 PROCEDURE — 77080 DXA BONE DENSITY AXIAL: CPT

## 2021-09-27 DIAGNOSIS — K22.89 PRESBYESOPHAGUS: Primary | ICD-10-CM

## 2021-09-27 RX ORDER — OMEPRAZOLE 40 MG/1
40 CAPSULE, DELAYED RELEASE ORAL DAILY
Qty: 90 CAPSULE | Refills: 3 | Status: SHIPPED | OUTPATIENT
Start: 2021-09-27 | End: 2021-10-04 | Stop reason: SDUPTHER

## 2021-10-04 DIAGNOSIS — K22.89 PRESBYESOPHAGUS: ICD-10-CM

## 2021-10-04 RX ORDER — OMEPRAZOLE 40 MG/1
40 CAPSULE, DELAYED RELEASE ORAL DAILY
Qty: 90 CAPSULE | Refills: 3 | Status: SHIPPED | OUTPATIENT
Start: 2021-10-04 | End: 2022-09-26

## 2021-10-12 ENCOUNTER — OFFICE VISIT (OUTPATIENT)
Dept: INTERNAL MEDICINE | Facility: CLINIC | Age: 76
End: 2021-10-12

## 2021-10-12 VITALS
DIASTOLIC BLOOD PRESSURE: 42 MMHG | HEIGHT: 68 IN | WEIGHT: 133.8 LBS | BODY MASS INDEX: 20.28 KG/M2 | TEMPERATURE: 97.7 F | OXYGEN SATURATION: 98 % | HEART RATE: 59 BPM | RESPIRATION RATE: 19 BRPM | SYSTOLIC BLOOD PRESSURE: 98 MMHG

## 2021-10-12 DIAGNOSIS — R63.4 WEIGHT LOSS, UNINTENTIONAL: Primary | ICD-10-CM

## 2021-10-12 DIAGNOSIS — E88.09 PROTEINS SERUM PLASMA LOW: ICD-10-CM

## 2021-10-12 PROCEDURE — 99213 OFFICE O/P EST LOW 20 MIN: CPT | Performed by: INTERNAL MEDICINE

## 2021-10-12 NOTE — PROGRESS NOTES
Andrew Redmond is a 76 y.o. male, who presents with a chief complaint of   Chief Complaint   Patient presents with   • Follow-up     4 wk           HPI   Pt here for follow up.  His diarrhea is resoled.  He had lost about 20 popunds in the past few months.  He has changed his eating habits and weight up 2 pounds.  He stopped eating healthy choice meals and is eating regular foods and more of it.  c-scope last done 4/1/16.  He denies blood in stood and is not anemic.  cxr neg.  cmp and cbc fairly unremarkable.  He denies any pain, upset stomach or other discomfort.      The following portions of the patient's history were reviewed and updated as appropriate: allergies, current medications, past family history, past medical history, past social history, past surgical history and problem list.    Allergies: Patient has no known allergies.    Review of Systems   Constitutional: Negative.    HENT: Negative.    Eyes: Negative.    Respiratory: Negative.    Cardiovascular: Negative.    Gastrointestinal: Negative.    Endocrine: Negative.    Genitourinary: Negative.    Musculoskeletal: Negative.    Skin: Negative.    Allergic/Immunologic: Negative.    Neurological: Negative.    Hematological: Negative.    Psychiatric/Behavioral: Negative.    All other systems reviewed and are negative.            Wt Readings from Last 3 Encounters:   10/12/21 60.7 kg (133 lb 12.8 oz)   09/14/21 59.7 kg (131 lb 9.6 oz)   04/22/21 68.4 kg (150 lb 12.8 oz)     Temp Readings from Last 3 Encounters:   10/12/21 97.7 °F (36.5 °C) (Temporal)   09/14/21 97.7 °F (36.5 °C) (Temporal)   04/14/21 96.9 °F (36.1 °C) (Temporal)     BP Readings from Last 3 Encounters:   10/12/21 98/42   09/14/21 110/70   04/14/21 100/60     Pulse Readings from Last 3 Encounters:   10/12/21 59   09/14/21 83   04/14/21 67     Body mass index is 20.33 kg/m².  SpO2 Readings from Last 3 Encounters:   10/12/21 98%   09/14/21 97%   04/14/21 98%          Physical Exam  Vitals and  nursing note reviewed.   Constitutional:       General: He is not in acute distress.     Appearance: He is well-developed.   HENT:      Head: Normocephalic and atraumatic.      Right Ear: External ear normal.      Left Ear: External ear normal.      Nose: Nose normal.   Eyes:      Conjunctiva/sclera: Conjunctivae normal.      Pupils: Pupils are equal, round, and reactive to light.   Cardiovascular:      Rate and Rhythm: Normal rate and regular rhythm.      Heart sounds: Normal heart sounds.   Pulmonary:      Effort: Pulmonary effort is normal. No respiratory distress.      Breath sounds: Normal breath sounds. No wheezing.   Musculoskeletal:         General: Normal range of motion.      Cervical back: Normal range of motion and neck supple.      Comments: Normal gait   Skin:     General: Skin is warm and dry.   Neurological:      Mental Status: He is alert and oriented to person, place, and time.   Psychiatric:         Behavior: Behavior normal.         Thought Content: Thought content normal.         Judgment: Judgment normal.         Results for orders placed or performed in visit on 09/14/21   Comprehensive Metabolic Panel    Specimen: Blood   Result Value Ref Range    Glucose 70 65 - 99 mg/dL    BUN 11 8 - 23 mg/dL    Creatinine 0.79 0.76 - 1.27 mg/dL    eGFR Non African Am 96 >60 mL/min/1.73    eGFR African Am 116 >60 mL/min/1.73    BUN/Creatinine Ratio 13.9 7.0 - 25.0    Sodium 137 136 - 145 mmol/L    Potassium 4.5 3.5 - 5.2 mmol/L    Chloride 96 (L) 98 - 107 mmol/L    Total CO2 27.0 22.0 - 29.0 mmol/L    Calcium 9.0 8.6 - 10.5 mg/dL    Total Protein 5.9 (L) 6.0 - 8.5 g/dL    Albumin 4.10 3.50 - 5.20 g/dL    Globulin 1.8 gm/dL    A/G Ratio 2.3 g/dL    Total Bilirubin 0.5 0.0 - 1.2 mg/dL    Alkaline Phosphatase 74 39 - 117 U/L    AST (SGOT) 12 1 - 40 U/L    ALT (SGPT) 7 1 - 41 U/L   T4, Free    Specimen: Blood   Result Value Ref Range    Free T4 1.25 0.93 - 1.70 ng/dL   TSH    Specimen: Blood   Result Value Ref  Range    TSH 1.630 0.270 - 4.200 uIU/mL   Lipid Panel With LDL / HDL Ratio    Specimen: Blood   Result Value Ref Range    Total Cholesterol 168 0 - 200 mg/dL    Triglycerides 81 0 - 150 mg/dL    HDL Cholesterol 66 (H) 40 - 60 mg/dL    VLDL Cholesterol Anurag 15 5 - 40 mg/dL    LDL Chol Calc (NIH) 87 0 - 100 mg/dL    LDL/HDL RATIO 1.30    Hemoglobin A1c    Specimen: Blood   Result Value Ref Range    Hemoglobin A1C 5.30 4.80 - 5.60 %   PSA SCREENING    Specimen: Blood   Result Value Ref Range    PSA 0.949 0.000 - 4.000 ng/mL   CBC & Differential    Specimen: Blood   Result Value Ref Range    WBC 6.94 3.40 - 10.80 10*3/mm3    RBC 5.05 4.14 - 5.80 10*6/mm3    Hemoglobin 14.2 13.0 - 17.7 g/dL    Hematocrit 43.3 37.5 - 51.0 %    MCV 85.7 79.0 - 97.0 fL    MCH 28.1 26.6 - 33.0 pg    MCHC 32.8 31.5 - 35.7 g/dL    RDW 15.8 (H) 12.3 - 15.4 %    Platelets 368 140 - 450 10*3/mm3    Neutrophil Rel % 47.8 42.7 - 76.0 %    Lymphocyte Rel % 34.9 19.6 - 45.3 %    Monocyte Rel % 14.0 (H) 5.0 - 12.0 %    Eosinophil Rel % 2.0 0.3 - 6.2 %    Basophil Rel % 0.4 0.0 - 1.5 %    Neutrophils Absolute 3.32 1.70 - 7.00 10*3/mm3    Lymphocytes Absolute 2.42 0.70 - 3.10 10*3/mm3    Monocytes Absolute 0.97 (H) 0.10 - 0.90 10*3/mm3    Eosinophils Absolute 0.14 0.00 - 0.40 10*3/mm3    Basophils Absolute 0.03 0.00 - 0.20 10*3/mm3    Immature Granulocyte Rel % 0.9 (H) 0.0 - 0.5 %    Immature Grans Absolute 0.06 (H) 0.00 - 0.05 10*3/mm3    nRBC 0.0 0.0 - 0.2 /100 WBC     Result Review :                  Assessment and Plan    Diagnoses and all orders for this visit:    1. Weight loss, unintentional (Primary)  -     Comprehensive Metabolic Panel; Future  -     CBC & Differential; Future    2. Proteins serum plasma low  -     Comprehensive Metabolic Panel; Future  -     CBC & Differential; Future       Weight stabilized.  Discussed diet and increasing calorie intake.  Cancer screening UTD with normal colonoscopy in 2016.  cxr normal.  psa normal.  No  localizing sx on history or exam.  Pt does have some dementia issues and unsure if this is coming into play with pt's diet and eating habits.  Will cont close monitoring.  Recheck in 2 mo.   Will add ensure nutritional supplement to pt's diet daily.          Outpatient Medications Prior to Visit   Medication Sig Dispense Refill   • alendronate (FOSAMAX) 70 MG tablet Take 1 tablet by mouth once a week 12 tablet 0   • Allegra-D Allergy & Congestion  MG per 12 hr tablet Take 1 tablet by mouth twice daily 60 tablet 0   • lamoTRIgine (LAMICTAL) 200 MG tablet Take 200 mg by mouth Every Night. 1/2 pill at noon     • memantine (NAMENDA) 10 MG tablet Take 10 mg by mouth 2 (Two) Times a Day.     • omeprazole (priLOSEC) 40 MG capsule Take 1 capsule by mouth Daily. 90 capsule 3   • polyethylene glycol (MIRALAX) powder Take 17 g by mouth Daily. (Patient taking differently: Take 17 g by mouth Daily As Needed.) 850 g 5   • rivastigmine (EXELON) 6 MG capsule Take 6 mg by mouth 2 (Two) Times a Day.     • tamsulosin (FLOMAX) 0.4 MG capsule 24 hr capsule Take 1 capsule by mouth once daily 90 capsule 3   • OLANZapine (zyPREXA) 20 MG tablet Take 20 mg by mouth every night at bedtime.       No facility-administered medications prior to visit.     No orders of the defined types were placed in this encounter.    [unfilled]  There are no discontinued medications.      Return in about 2 months (around 12/12/2021).    Patient was given instructions and counseling regarding her condition or for health maintenance advice. Please see specific information pulled into the AVS if appropriate.

## 2021-10-26 ENCOUNTER — OFFICE VISIT (OUTPATIENT)
Dept: INTERNAL MEDICINE | Facility: CLINIC | Age: 76
End: 2021-10-26

## 2021-10-26 VITALS
OXYGEN SATURATION: 98 % | HEART RATE: 89 BPM | BODY MASS INDEX: 20.76 KG/M2 | WEIGHT: 137 LBS | TEMPERATURE: 97.8 F | SYSTOLIC BLOOD PRESSURE: 120 MMHG | DIASTOLIC BLOOD PRESSURE: 76 MMHG | RESPIRATION RATE: 20 BRPM | HEIGHT: 68 IN

## 2021-10-26 DIAGNOSIS — L72.3 INFECTED SEBACEOUS CYST OF SKIN: Primary | ICD-10-CM

## 2021-10-26 DIAGNOSIS — L08.9 INFECTED SEBACEOUS CYST OF SKIN: Primary | ICD-10-CM

## 2021-10-26 DIAGNOSIS — E88.09 PROTEINS SERUM PLASMA LOW: ICD-10-CM

## 2021-10-26 PROCEDURE — 99213 OFFICE O/P EST LOW 20 MIN: CPT | Performed by: NURSE PRACTITIONER

## 2021-10-26 RX ORDER — DOXYCYCLINE HYCLATE 100 MG/1
100 CAPSULE ORAL 2 TIMES DAILY
Qty: 14 CAPSULE | Refills: 0 | Status: SHIPPED | OUTPATIENT
Start: 2021-10-26 | End: 2021-11-05

## 2021-10-26 NOTE — PROGRESS NOTES
"Chief Complaint   Patient presents with   • Mass     on back, noticed 2 wks ago        Subjective     Andrew Redmond is a 76 y.o. male being seen for socorro cute visit for \"spot on back.\"  He noticed a spot on his right upper back while getting dressed. No drainage, no tenderness. He thinks it may have gotten smaller over the last week. He reports a history of acne. He is followed by Dermatology. He did not treat it with anything.       History of Present Illness     No Known Allergies      Current Outpatient Medications:   •  alendronate (FOSAMAX) 70 MG tablet, Take 1 tablet by mouth once a week, Disp: 12 tablet, Rfl: 0  •  Allegra-D Allergy & Congestion  MG per 12 hr tablet, Take 1 tablet by mouth twice daily, Disp: 60 tablet, Rfl: 0  •  lamoTRIgine (LAMICTAL) 200 MG tablet, Take 200 mg by mouth Every Night. 1/2 pill at noon, Disp: , Rfl:   •  memantine (NAMENDA) 10 MG tablet, Take 10 mg by mouth 2 (Two) Times a Day., Disp: , Rfl:   •  OLANZapine (zyPREXA) 20 MG tablet, Take 20 mg by mouth every night at bedtime., Disp: , Rfl:   •  omeprazole (priLOSEC) 40 MG capsule, Take 1 capsule by mouth Daily., Disp: 90 capsule, Rfl: 3  •  polyethylene glycol (MIRALAX) powder, Take 17 g by mouth Daily. (Patient taking differently: Take 17 g by mouth Daily As Needed.), Disp: 850 g, Rfl: 5  •  rivastigmine (EXELON) 6 MG capsule, Take 6 mg by mouth 2 (Two) Times a Day., Disp: , Rfl:   •  tamsulosin (FLOMAX) 0.4 MG capsule 24 hr capsule, Take 1 capsule by mouth once daily, Disp: 90 capsule, Rfl: 3    The following portions of the patient's history were reviewed and updated as appropriate: allergies, current medications, past family history, past medical history, past social history, past surgical history and problem list.    Review of Systems   Constitutional: Negative.    HENT: Negative.    Cardiovascular: Negative.    Musculoskeletal: Negative.    Skin: Positive for rash.   Hematological: Negative.        Assessment "     Physical Exam  Vitals reviewed.   Cardiovascular:      Pulses: Normal pulses.      Heart sounds: Normal heart sounds.   Pulmonary:      Effort: Pulmonary effort is normal.      Breath sounds: Normal breath sounds.   Skin:         Neurological:      Mental Status: He is alert.   Psychiatric:         Mood and Affect: Mood normal.         Thought Content: Thought content normal.         Plan         Diagnoses and all orders for this visit:    1. Infected sebaceous cyst of skin (Primary)  -     doxycycline (VIBRAMYCIN) 100 MG capsule; Take 1 capsule by mouth 2 (Two) Times a Day for 10 days.  Dispense: 14 capsule; Refill: 0    2. Proteins serum plasma low  -     CBC & Differential  -     Comprehensive Metabolic Panel    Follow up with Dr. Birch as scheduled

## 2022-01-18 ENCOUNTER — TELEPHONE (OUTPATIENT)
Dept: INTERNAL MEDICINE | Facility: CLINIC | Age: 77
End: 2022-01-18

## 2022-01-18 NOTE — TELEPHONE ENCOUNTER
Caller: Andrew Redmond    Relationship: Self    Best call back number: [phone]     What is the best time to reach you: ANY TIME    Who are you requesting to speak with (clinical staff, provider,  specific staff member): CLINICAL    What was the call regarding: PATIENT CALLED SAYING HE IS EXPERIENCING URGENT URINATION AND NOT MUCH COMING OUT, AS WELL AS DISCOMFORT WHILE URINATING. HUB CHECKED FOR AVAILABLE APPOINTMENTS AND SINCE THERE WEREN'T ANY SOON AND HE'S ALREADY COMING IN 1/31, HE WANTED TO WAIT ON SCHEDULING ANOTHER APPOINTMENT AND SEE WHAT DR. HARRINGTON SAYS.   PLEASE ADVISE.     Do you require a callback: YES

## 2022-01-20 ENCOUNTER — OFFICE VISIT (OUTPATIENT)
Dept: INTERNAL MEDICINE | Facility: CLINIC | Age: 77
End: 2022-01-20

## 2022-01-20 VITALS
RESPIRATION RATE: 16 BRPM | OXYGEN SATURATION: 97 % | TEMPERATURE: 97.8 F | HEART RATE: 96 BPM | HEIGHT: 68 IN | SYSTOLIC BLOOD PRESSURE: 125 MMHG | BODY MASS INDEX: 21.22 KG/M2 | DIASTOLIC BLOOD PRESSURE: 80 MMHG | WEIGHT: 140 LBS

## 2022-01-20 DIAGNOSIS — R35.0 URINE FREQUENCY: Primary | ICD-10-CM

## 2022-01-20 DIAGNOSIS — N40.1 BENIGN PROSTATIC HYPERPLASIA WITH WEAK URINARY STREAM: ICD-10-CM

## 2022-01-20 DIAGNOSIS — R39.12 BENIGN PROSTATIC HYPERPLASIA WITH WEAK URINARY STREAM: ICD-10-CM

## 2022-01-20 LAB
BILIRUB BLD-MCNC: NEGATIVE MG/DL
CLARITY, POC: CLEAR
COLOR UR: YELLOW
EXPIRATION DATE: NORMAL
GLUCOSE UR STRIP-MCNC: NEGATIVE MG/DL
KETONES UR QL: NEGATIVE
LEUKOCYTE EST, POC: NEGATIVE
Lab: NORMAL
NITRITE UR-MCNC: NEGATIVE MG/ML
PH UR: 5.5 [PH] (ref 5–8)
PROT UR STRIP-MCNC: NEGATIVE MG/DL
RBC # UR STRIP: NEGATIVE /UL
SP GR UR: 1.02 (ref 1–1.03)
UROBILINOGEN UR QL: NORMAL

## 2022-01-20 PROCEDURE — 81003 URINALYSIS AUTO W/O SCOPE: CPT | Performed by: FAMILY MEDICINE

## 2022-01-20 PROCEDURE — 99213 OFFICE O/P EST LOW 20 MIN: CPT | Performed by: FAMILY MEDICINE

## 2022-01-20 RX ORDER — TAMSULOSIN HYDROCHLORIDE 0.4 MG/1
2 CAPSULE ORAL DAILY
Qty: 180 CAPSULE | Refills: 0 | Status: SHIPPED | OUTPATIENT
Start: 2022-01-20 | End: 2022-05-16

## 2022-01-20 RX ORDER — ALENDRONATE SODIUM 70 MG/1
70 TABLET ORAL WEEKLY
Qty: 12 TABLET | Refills: 0 | Status: SHIPPED | OUTPATIENT
Start: 2022-01-20 | End: 2022-10-19 | Stop reason: SDUPTHER

## 2022-01-20 NOTE — PROGRESS NOTES
Subjective   Andrew Redmond is a 76 y.o. male presenting today for follow up of   Chief Complaint   Patient presents with   • Urinary Frequency       History of Present Illness     This is a patient of Dr. Birch who c/o several months of urinary frequency and sometimes weak stream of stream.  He denies hesitancy and straining.  Last night he had to urinate twice during the night; typically he gets up once.  This is what prompted him to make an appointment today.  He denies dysuria and gross hematuria.  Denies flank pain. He takes tamsulosin X several years 0.4 mg daily.    Patient Active Problem List   Diagnosis   • Alzheimer's dementia without behavioral disturbance ([facility])   • Bipolar I disorder, single manic episode ([facility])   • Epilepsy ([facility])   • Chronic constipation   • Hemorrhoids   • Polyp of colon   • Erectile dysfunction   • High risk medication use   • Poor balance   • Subdural hematoma ([facility])   • Seizure ([facility])   • Low energy   • Closed compression fracture of L1 lumbar vertebra   • Acute bilateral low back pain without sciatica   • Weight loss   • Displaced fracture of right femoral neck ([facility])   • Status post hip hemiarthroplasty   • Subacromial bursitis of left shoulder joint   • Closed displaced fracture of neck of right scapula   • Closed nondisplaced fracture of right clavicle   • Hip fracture due to osteoporosis, sequela   • Acute serous otitis media   • Nocturia   • Infected sebaceous cyst of skin       Current Outpatient Medications on File Prior to Visit   Medication Sig   • Allegra-D Allergy & Congestion  MG per 12 hr tablet Take 1 tablet by mouth twice daily   • lamoTRIgine (LAMICTAL) 200 MG tablet Take 200 mg by mouth Every Night. 1/2 pill at noon   • memantine (NAMENDA) 10 MG tablet Take 10 mg by mouth 2 (Two) Times a Day.   • OLANZapine (zyPREXA) 20 MG tablet Take 20 mg by mouth every night at bedtime.   • omeprazole (priLOSEC) 40 MG capsule Take 1 capsule by mouth Daily.   • polyethylene glycol  "(MIRALAX) powder Take 17 g by mouth Daily. (Patient taking differently: Take 17 g by mouth Daily As Needed.)   • rivastigmine (EXELON) 6 MG capsule Take 6 mg by mouth 2 (Two) Times a Day.   • [DISCONTINUED] alendronate (FOSAMAX) 70 MG tablet Take 1 tablet by mouth once a week   • [DISCONTINUED] tamsulosin (FLOMAX) 0.4 MG capsule 24 hr capsule Take 1 capsule by mouth once daily     No current facility-administered medications on file prior to visit.          The following portions of the patient's history were reviewed and updated as appropriate: allergies, current medications, past family history, past medical history, past social history, past surgical history and problem list.    Review of Systems    Objective   Vitals:    01/20/22 1043   BP: 125/80   Pulse: 96   Resp: 16   Temp: 97.8 °F (36.6 °C)   SpO2: 97%   Weight: 63.5 kg (140 lb)   Height: 172.8 cm (68.03\")       BP Readings from Last 3 Encounters:   01/20/22 125/80   10/26/21 120/76   10/12/21 98/42        Wt Readings from Last 3 Encounters:   01/20/22 63.5 kg (140 lb)   10/26/21 62.1 kg (137 lb)   10/12/21 60.7 kg (133 lb 12.8 oz)        Body mass index is 21.27 kg/m².  Nursing notes and vitals reviewed.    Physical Exam  Vitals and nursing note reviewed.   Constitutional:       General: He is not in acute distress.     Comments: Frail appearing.   HENT:      Head: Normocephalic and atraumatic.      Mouth/Throat:      Mouth: Mucous membranes are moist.   Eyes:      Extraocular Movements: Extraocular movements intact.      Conjunctiva/sclera: Conjunctivae normal.   Pulmonary:      Effort: Pulmonary effort is normal. No respiratory distress.   Abdominal:      Palpations: Abdomen is soft.      Tenderness: There is no abdominal tenderness. There is no right CVA tenderness or left CVA tenderness.   Musculoskeletal:      Cervical back: Neck supple. No rigidity.   Skin:     General: Skin is warm and dry.   Neurological:      Mental Status: He is alert. "   Psychiatric:         Mood and Affect: Mood normal.         Behavior: Behavior normal.         Recent Results (from the past 672 hour(s))   POCT urinalysis dipstick, automated    Collection Time: 01/20/22 10:55 AM    Specimen: Urine   Result Value Ref Range    Color Yellow Yellow, Straw, Dark Yellow, Antonina    Clarity, UA Clear Clear    Specific Gravity  1.025 1.005 - 1.030    pH, Urine 5.5 5.0 - 8.0    Leukocytes Negative Negative    Nitrite, UA Negative Negative    Protein, POC Negative Negative mg/dL    Glucose, UA Negative Negative, 1000 mg/dL (3+) mg/dL    Ketones, UA Negative Negative    Urobilinogen, UA Normal Normal    Bilirubin Negative Negative    Blood, UA Negative Negative    Lot Number [id number]     Expiration Date 10-          Assessment/Plan   Diagnoses and all orders for this visit:    1. Urine frequency (Primary)  -     POCT urinalysis dipstick, automated    2. Benign prostatic hyperplasia with weak urinary stream    Other orders  -     alendronate (FOSAMAX) 70 MG tablet; Take 1 tablet by mouth 1 (One) Time Per Week.  Dispense: 12 tablet; Refill: 0  -     tamsulosin (FLOMAX) 0.4 MG capsule 24 hr capsule; Take 2 capsules by mouth Daily.  Dispense: 180 capsule; Refill: 0      Urinalysis is normal.  Pt with sxs worsening BPH.  Increase tamsulosin from 0.4 to 0.8 mg daily.  Monitor blood pressure.  He has f/u with Dr. Birch later this month.        Medications, including side effects, were discussed with the patient. Patient verbalized understanding.  The plan of care was discussed. All questions were answered. Patient verbalized understanding.      Return for Next scheduled follow up.

## 2022-01-21 ENCOUNTER — TELEPHONE (OUTPATIENT)
Dept: INTERNAL MEDICINE | Facility: CLINIC | Age: 77
End: 2022-01-21

## 2022-01-21 DIAGNOSIS — Z96.649 STATUS POST HIP HEMIARTHROPLASTY: ICD-10-CM

## 2022-01-21 DIAGNOSIS — G30.9 ALZHEIMER'S DEMENTIA WITHOUT BEHAVIORAL DISTURBANCE, UNSPECIFIED TIMING OF DEMENTIA ONSET: ICD-10-CM

## 2022-01-21 DIAGNOSIS — M81.0 AGE-RELATED OSTEOPOROSIS WITHOUT CURRENT PATHOLOGICAL FRACTURE: Primary | ICD-10-CM

## 2022-01-21 DIAGNOSIS — F02.80 ALZHEIMER'S DEMENTIA WITHOUT BEHAVIORAL DISTURBANCE, UNSPECIFIED TIMING OF DEMENTIA ONSET: ICD-10-CM

## 2022-01-21 NOTE — TELEPHONE ENCOUNTER
PATIENT CALLED STATING THAT HE IS NEEDING A NEW ROLATER SCRIPT TO GO TO [facility]     HE WOULD LIKE ASAP -- REAR WHEELS COMING OFF  AND BRAKES NOT WORKING      PLEASE ADVISE -REQUESTING CALL BACK      Andrew Redmond (Self) [phone] (H)

## 2022-01-21 NOTE — TELEPHONE ENCOUNTER
Caller: Andrew Redmond    Relationship: Self    Best call back number: [phone] (H)    What is the best time to reach you: ANY TIME     Who are you requesting to speak with (clinical staff, provider,  specific staff member): CLINICAL STAFF     What was the call regarding:PATIENT HAS A ROLATOR WALKER RIGHT NOW, THE WHEELS ARE ABOUT TO FALL OFF AND HE IS IN DESPERATE NEED OF A NEW ONE.    PLEASE ADVISE     Do you require a callback: YES

## 2022-01-25 LAB
ALBUMIN SERPL-MCNC: 4.2 G/DL (ref 3.7–4.7)
ALBUMIN/GLOB SERPL: 2.1 {RATIO} (ref 1.2–2.2)
ALP SERPL-CCNC: 133 IU/L (ref 44–121)
ALT SERPL-CCNC: 43 IU/L (ref 0–44)
AST SERPL-CCNC: 35 IU/L (ref 0–40)
BASOPHILS # BLD AUTO: 0.1 X10E3/UL (ref 0–0.2)
BASOPHILS NFR BLD AUTO: 1 %
BILIRUB SERPL-MCNC: 0.4 MG/DL (ref 0–1.2)
BUN SERPL-MCNC: 13 MG/DL (ref 8–27)
BUN/CREAT SERPL: 19 (ref 10–24)
CALCIUM SERPL-MCNC: 9.2 MG/DL (ref 8.6–10.2)
CHLORIDE SERPL-SCNC: 98 MMOL/L (ref 96–106)
CO2 SERPL-SCNC: 28 MMOL/L (ref 20–29)
CREAT SERPL-MCNC: 0.7 MG/DL (ref 0.76–1.27)
EOSINOPHIL # BLD AUTO: 0.2 X10E3/UL (ref 0–0.4)
EOSINOPHIL NFR BLD AUTO: 2 %
ERYTHROCYTE [DISTWIDTH] IN BLOOD BY AUTOMATED COUNT: 15.9 % (ref 11.6–15.4)
GLOBULIN SER CALC-MCNC: 2 G/DL (ref 1.5–4.5)
GLUCOSE SERPL-MCNC: 85 MG/DL (ref 65–99)
HCT VFR BLD AUTO: 44.7 % (ref 37.5–51)
HGB BLD-MCNC: 14.7 G/DL (ref 13–17.7)
IMM GRANULOCYTES # BLD AUTO: 0.2 X10E3/UL (ref 0–0.1)
IMM GRANULOCYTES NFR BLD AUTO: 2 %
LYMPHOCYTES # BLD AUTO: 2.3 X10E3/UL (ref 0.7–3.1)
LYMPHOCYTES NFR BLD AUTO: 26 %
MCH RBC QN AUTO: 27.9 PG (ref 26.6–33)
MCHC RBC AUTO-ENTMCNC: 32.9 G/DL (ref 31.5–35.7)
MCV RBC AUTO: 85 FL (ref 79–97)
MONOCYTES # BLD AUTO: 0.9 X10E3/UL (ref 0.1–0.9)
MONOCYTES NFR BLD AUTO: 10 %
NEUTROPHILS # BLD AUTO: 5.1 X10E3/UL (ref 1.4–7)
NEUTROPHILS NFR BLD AUTO: 59 %
PLATELET # BLD AUTO: 295 X10E3/UL (ref 150–450)
POTASSIUM SERPL-SCNC: 4.7 MMOL/L (ref 3.5–5.2)
PROT SERPL-MCNC: 6.2 G/DL (ref 6–8.5)
RBC # BLD AUTO: 5.27 X10E6/UL (ref 4.14–5.8)
SODIUM SERPL-SCNC: 138 MMOL/L (ref 134–144)
WBC # BLD AUTO: 8.7 X10E3/UL (ref 3.4–10.8)

## 2022-01-26 NOTE — TELEPHONE ENCOUNTER
PATIENT CALLED BACK STATING [facility]ILL HAS NOT RECEIVED ORDER. HE PROVIDED A FAX NUMBER FOR [facility], HE WANTS TO KNOW IF WE CAN REFAX THIS.    [phone]    PLEASE ADVISE

## 2022-01-27 NOTE — TELEPHONE ENCOUNTER
Called and spoke with pt and let him know that it has been sent over to dr mansfield and pt states understanding

## 2022-01-28 RX ORDER — CALCIUM CARBONATE 160(400)MG
1 TABLET,CHEWABLE ORAL DAILY
Qty: 1 EACH | Refills: 0 | Status: SHIPPED | OUTPATIENT
Start: 2022-01-28 | End: 2022-01-31 | Stop reason: SDUPTHER

## 2022-01-31 ENCOUNTER — TELEPHONE (OUTPATIENT)
Dept: INTERNAL MEDICINE | Facility: CLINIC | Age: 77
End: 2022-01-31

## 2022-01-31 ENCOUNTER — OFFICE VISIT (OUTPATIENT)
Dept: INTERNAL MEDICINE | Facility: CLINIC | Age: 77
End: 2022-01-31

## 2022-01-31 VITALS
WEIGHT: 142.2 LBS | HEIGHT: 68 IN | BODY MASS INDEX: 21.55 KG/M2 | TEMPERATURE: 98.2 F | HEART RATE: 88 BPM | DIASTOLIC BLOOD PRESSURE: 62 MMHG | RESPIRATION RATE: 20 BRPM | SYSTOLIC BLOOD PRESSURE: 118 MMHG | OXYGEN SATURATION: 98 %

## 2022-01-31 DIAGNOSIS — R63.4 WEIGHT LOSS, UNINTENTIONAL: ICD-10-CM

## 2022-01-31 DIAGNOSIS — G30.9 ALZHEIMER'S DEMENTIA WITHOUT BEHAVIORAL DISTURBANCE, UNSPECIFIED TIMING OF DEMENTIA ONSET: ICD-10-CM

## 2022-01-31 DIAGNOSIS — F30.9 BIPOLAR I DISORDER, SINGLE MANIC EPISODE: ICD-10-CM

## 2022-01-31 DIAGNOSIS — M81.0 AGE-RELATED OSTEOPOROSIS WITHOUT CURRENT PATHOLOGICAL FRACTURE: ICD-10-CM

## 2022-01-31 DIAGNOSIS — Z96.649 STATUS POST HIP HEMIARTHROPLASTY: ICD-10-CM

## 2022-01-31 DIAGNOSIS — F02.80 ALZHEIMER'S DEMENTIA WITHOUT BEHAVIORAL DISTURBANCE, UNSPECIFIED TIMING OF DEMENTIA ONSET: ICD-10-CM

## 2022-01-31 DIAGNOSIS — R39.12 BENIGN PROSTATIC HYPERPLASIA WITH WEAK URINARY STREAM: Primary | ICD-10-CM

## 2022-01-31 DIAGNOSIS — N40.1 BENIGN PROSTATIC HYPERPLASIA WITH WEAK URINARY STREAM: Primary | ICD-10-CM

## 2022-01-31 PROCEDURE — 99214 OFFICE O/P EST MOD 30 MIN: CPT | Performed by: INTERNAL MEDICINE

## 2022-01-31 RX ORDER — CALCIUM CARBONATE 160(400)MG
1 TABLET,CHEWABLE ORAL DAILY
Qty: 1 EACH | Refills: 0 | Status: SHIPPED | OUTPATIENT
Start: 2022-01-31 | End: 2022-01-31 | Stop reason: SDUPTHER

## 2022-01-31 RX ORDER — CALCIUM CARBONATE 160(400)MG
1 TABLET,CHEWABLE ORAL DAILY
Qty: 1 EACH | Refills: 0 | Status: SHIPPED | OUTPATIENT
Start: 2022-01-31

## 2022-01-31 NOTE — PROGRESS NOTES
Andrew Redmond is a 76 y.o. male, who presents with a chief complaint of   Chief Complaint   Patient presents with   • Weight Loss     2 mo           HPI   Pt here for f/u    He saw dr. Cantu a couple of weeks ago with urinary frequency.  His tamsulosin was increased.  He still has some nocturia.  He occ has some discomfort in his center right abdomen that comes and goes.  He occ has constipation but hasnt paid attention to see if the constipation and discomfort are related.      He had been having issues with weight loss.  Weight up 5 pounds from when I last saw him in October 2021.      gerd - he is getting pills caught in his throat and then he has to cough them back up.  Food sticks sometimes but usually goes down. He is est with dr. Winkler.      High risk med use with lamictal, olanzapine, exelon, namenda.      The following portions of the patient's history were reviewed and updated as appropriate: allergies, current medications, past family history, past medical history, past social history, past surgical history and problem list.    Allergies: Patient has no known allergies.    Review of Systems   Constitutional: Negative.    HENT: Negative.    Eyes: Negative.    Respiratory: Negative.    Cardiovascular: Negative.    Gastrointestinal: Negative.    Endocrine: Negative.    Genitourinary: Negative.    Musculoskeletal: Negative.    Skin: Negative.    Allergic/Immunologic: Negative.    Neurological: Negative.    Hematological: Negative.    Psychiatric/Behavioral: Negative.    All other systems reviewed and are negative.            Wt Readings from Last 3 Encounters:   01/31/22 64.5 kg (142 lb 3.2 oz)   01/20/22 63.5 kg (140 lb)   10/26/21 62.1 kg (137 lb)     Temp Readings from Last 3 Encounters:   01/31/22 98.2 °F (36.8 °C) (Temporal)   01/20/22 97.8 °F (36.6 °C)   10/26/21 97.8 °F (36.6 °C) (Temporal)     BP Readings from Last 3 Encounters:   01/31/22 118/62   01/20/22 125/80   10/26/21 120/76      Pulse Readings from Last 3 Encounters:   01/31/22 88   01/20/22 96   10/26/21 89     Body mass index is 21.6 kg/m².  SpO2 Readings from Last 3 Encounters:   01/31/22 98%   01/20/22 97%   10/26/21 98%          Physical Exam  Vitals and nursing note reviewed.   Constitutional:       General: He is not in acute distress.     Appearance: He is well-developed.   HENT:      Head: Normocephalic and atraumatic.      Right Ear: External ear normal.      Left Ear: External ear normal.      Nose: Nose normal.   Eyes:      Conjunctiva/sclera: Conjunctivae normal.      Pupils: Pupils are equal, round, and reactive to light.   Cardiovascular:      Rate and Rhythm: Normal rate and regular rhythm.      Heart sounds: Normal heart sounds.   Pulmonary:      Effort: Pulmonary effort is normal. No respiratory distress.      Breath sounds: Normal breath sounds. No wheezing.   Musculoskeletal:         General: Normal range of motion.      Cervical back: Normal range of motion and neck supple.      Comments: Normal gait   Skin:     General: Skin is warm and dry.   Neurological:      Mental Status: He is alert and oriented to person, place, and time.   Psychiatric:         Behavior: Behavior normal.         Thought Content: Thought content normal.         Judgment: Judgment normal.         Results for orders placed or performed in visit on 01/20/22   POCT urinalysis dipstick, automated    Specimen: Urine   Result Value Ref Range    Color Yellow Yellow, Straw, Dark Yellow, Antonina    Clarity, UA Clear Clear    Specific Gravity  1.025 1.005 - 1.030    pH, Urine 5.5 5.0 - 8.0    Leukocytes Negative Negative    Nitrite, UA Negative Negative    Protein, POC Negative Negative mg/dL    Glucose, UA Negative Negative, 1000 mg/dL (3+) mg/dL    Ketones, UA Negative Negative    Urobilinogen, UA Normal Normal    Bilirubin Negative Negative    Blood, UA Negative Negative    Lot Number [id number]     Expiration Date 10-      Result Review :                   Assessment and Plan    Diagnoses and all orders for this visit:    1. Benign prostatic hyperplasia with weak urinary stream (Primary) - cont higher doses tamsulosin.    2. Status post hip hemiarthroplasty - needs new rollator.  Pt given rx today.    3. Bipolar I disorder, single manic episode ([facility]) - cont current meds    4. Weight loss, unintentional- weight more stable    5. Difficulty swallowing - choking on pills.  Encouraged pt to f/u with GI.        Labs look good.  Cont current meds.             Outpatient Medications Prior to Visit   Medication Sig Dispense Refill   • alendronate (FOSAMAX) 70 MG tablet Take 1 tablet by mouth 1 (One) Time Per Week. 12 tablet 0   • Allegra-D Allergy & Congestion  MG per 12 hr tablet Take 1 tablet by mouth twice daily 60 tablet 0   • lamoTRIgine (LAMICTAL) 200 MG tablet Take 200 mg by mouth Every Night. 1/2 pill at noon     • Misc. Devices (Rollator Ultra-Light) misc 1 each Daily. 1 each 0   • OLANZapine (zyPREXA) 20 MG tablet Take 20 mg by mouth every night at bedtime.     • omeprazole (priLOSEC) 40 MG capsule Take 1 capsule by mouth Daily. 90 capsule 3   • polyethylene glycol (MIRALAX) powder Take 17 g by mouth Daily. (Patient taking differently: Take 17 g by mouth Daily As Needed.) 850 g 5   • rivastigmine (EXELON) 6 MG capsule Take 6 mg by mouth 2 (Two) Times a Day.     • tamsulosin (FLOMAX) 0.4 MG capsule 24 hr capsule Take 2 capsules by mouth Daily. 180 capsule 0   • memantine (NAMENDA) 10 MG tablet Take 10 mg by mouth 2 (Two) Times a Day.       No facility-administered medications prior to visit.     No orders of the defined types were placed in this encounter.    [unfilled]  There are no discontinued medications.      Return in about 3 months (around 4/30/2022) for Recheck.    Patient was given instructions and counseling regarding her condition or for health maintenance advice. Please see specific information pulled into the AVS if appropriate.

## 2022-02-01 ENCOUNTER — TELEPHONE (OUTPATIENT)
Dept: INTERNAL MEDICINE | Facility: CLINIC | Age: 77
End: 2022-02-01

## 2022-02-01 NOTE — TELEPHONE ENCOUNTER
Caller: Andrew Redmond    Relationship: Self    Best call back number: [phone] (H)    What is the best time to reach you: ANYTIME AFTER 2:00PM EST    Who are you requesting to speak with (clinical staff, provider,  specific staff member): CLINICAL STAFF    What was the call regarding: PATIENT WAS IN TO SEE DR HARRINGTON ON Monday 01/31/22 AND IT WAS SUGGESTED TO HIM TO MAKE AN APPOINTMENT WITH DR BACON. PATIENT STATES DR BACON HAS NO APPOINTMENTS OPEN UNTIL MAY 2022.     PATIENT IS CALLING IN ASKING IF THERE WOULD BE ANOTHER DR THAT DR HARRINGTON WOULD REFER HIM TO FOR THIS ISSUE.    PLEASE CALL PATIENT TO DISCUSS THIS ISSUE WITH HIM

## 2022-02-02 ENCOUNTER — TELEPHONE (OUTPATIENT)
Dept: INTERNAL MEDICINE | Facility: CLINIC | Age: 77
End: 2022-02-02

## 2022-02-02 NOTE — TELEPHONE ENCOUNTER
He is already est with dr. Delgadillo.  Will you please call get him a sooner appt either with dr. delgadillo or one of his NP's?  Thanks!

## 2022-02-02 NOTE — TELEPHONE ENCOUNTER
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 BACON WILL NOT BE ABLE TO GET HIM IN FOR APPT UNTIL MAY.     CAN YOU REFER HIM TO ANOTHER DR FOR SWALLOWING ISSUES       PLEASE CALL AND ADVISE  Andrew Redmond (Self) [phone] (H)

## 2022-02-14 ENCOUNTER — TELEPHONE (OUTPATIENT)
Dept: INTERNAL MEDICINE | Facility: CLINIC | Age: 77
End: 2022-02-14

## 2022-02-14 NOTE — TELEPHONE ENCOUNTER
Caller: Andrew Redmond    Relationship: Self    Best call back number: [phone]    What was the call regarding: PATIENT STATES HIS APPOINTMENT WITH Suman Winkler IS NOT TILL MAY.  HE IS REQUESTING ANOTHER DOCTOR WHO CAN SEE HIM SOONER.  PLEASE ADVISE    Do you require a callback: YES

## 2022-03-15 NOTE — PROGRESS NOTES
Subjective:     Patient ID: Andrew Redmond is a 74 y.o. male.    Chief Complaint: F/u s/p right hip hemiarthroplasty, DOS 2019, closed treatment right clavicle fracture and scapular neck fracture    History of Present Illness  Andrew Redmond returns to clinic today for evaluation of his right hip. He states that he is doing well at this time, but does note some pain on certain motions such as crossing his legs. This pain is fairly mild when it does occur. Denies any numbness or tingling.      Social History     Occupational History   • Occupation: professor     Comment: taught anatomy at [employer]   Tobacco Use   • Smoking status: Former Smoker     Packs/day: 2.00     Years: 20.00     Pack years: 40.00     Last attempt to quit: 2/3/1990     Years since quittin.5   • Smokeless tobacco: Former User     Quit date:    • Tobacco comment: quit 30 yrs ago    Substance and Sexual Activity   • Alcohol use: No   • Drug use: No   • Sexual activity: Defer      Past Medical History:   Diagnosis Date   • Alzheimer disease (CMS/HCC)    • Alzheimer disease (CMS/HCC)    • Alzheimer disease (CMS/HCC) 2019   • Anxiety and depression    • Bipolar 1 disorder (CMS/HCC)    • Colon polyp    • Constipation    • Depression    • Diarrhea    • Epilepsy (CMS/HCC)    • Epilepsy (CMS/HCC) 2019   • Erectile dysfunction    • GERD (gastroesophageal reflux disease)    • L1 vertebral fracture (CMS/HCC)    • Seizures (CMS/HCC)     absence seizures.  well controlled.     Past Surgical History:   Procedure Laterality Date   • CHOLECYSTECTOMY     • COLONOSCOPY N/A 2016    Procedure: COLONOSCOPY with polypectomy;  Surgeon: Akiko Scales MD;  Location: [facility] OR;  Service:    • GALLBLADDER SURGERY     • HIP HEMIARTHROPLASTY Right 3/8/2019    Procedure: HIP HEMIARTHROPLASTY and all associated procedures;  Surgeon: Harrison Turk MD;  Location: [facility] OR;  Service: Orthopedics   • LIVER SURGERY     • TONSILLECTOMY    We received remote transmission from patient's dual chamber pacemaker monitor at home. Transmission shows normal sensing and pacing function. Known AT/AF noted, 0.6% burden, 16.7% Pace-Terminated episodes (Eliquis, sotalol). EP physician will review. See interrogation under cardiology tab in the [address] [place] [address] [address] field for more details. Will continue to monitor remotely. "      Family History   Problem Relation Age of Onset   • Lung cancer Father    • No Known Problems Mother    • No Known Problems Maternal Grandmother    • No Known Problems Maternal Grandfather    • No Known Problems Paternal Grandmother    • No Known Problems Paternal Grandfather          Review of Systems   Constitutional: Negative for chills, diaphoresis, fever and unexpected weight change.   HENT: Negative for hearing loss, nosebleeds, sore throat and tinnitus.    Eyes: Negative for pain and visual disturbance.   Respiratory: Negative for cough, shortness of breath and wheezing.    Cardiovascular: Negative for chest pain and palpitations.   Gastrointestinal: Negative for abdominal pain, diarrhea, nausea and vomiting.   Endocrine: Negative for cold intolerance, heat intolerance and polydipsia.   Genitourinary: Negative for difficulty urinating, dysuria and hematuria.   Musculoskeletal: Positive for arthralgias and myalgias. Negative for joint swelling.   Skin: Negative for rash and wound.   Allergic/Immunologic: Negative for environmental allergies.   Neurological: Negative for dizziness, syncope and numbness.   Hematological: Does not bruise/bleed easily.   Psychiatric/Behavioral: Negative for dysphoric mood and sleep disturbance. The patient is not nervous/anxious.            Objective:  Vitals:    08/25/20 1136   Weight: 69.9 kg (154 lb)   Height: 170.2 cm (67\")         08/25/20  1136   Weight: 69.9 kg (154 lb)     Body mass index is 24.12 kg/m².    General: No acute distress.  Resp: normal respiratory effort  Skin: no rashes or wounds; normal turgor  Psych: mood and affect appropriate; recent and remote memory intact        Ortho Exam         Right hip-    Flexion 120, 4/5 strength   ER  40, 4/5 strength   IR 35, 4/5 strength   Abduction 55, 4/5 strength   Adduction 25, 4/5 strength   Logroll-negative   Stinchfield-negative    Positive sensation light tough all distributions symmetric to contralateral " side  Brisk cap refill all digits  2+ dorsalis pedis pulse        Imaging:  Right Hip X-Ray  Indication: Status post hemiarthroplasty  AP pelvis and Frog Leg views    Findings:  Stable hemiarthroplasty components noted with no significant femoral acetabular joint space narrowing in comparison to prior x-rays, stable mineralization along the lesser trochanter, no evidence of loosening or ostial lysis of the implant in the femoral canal    Compared to prior office x-rays      Assessment:        1. Right hip pain    2. Status post hip hemiarthroplasty           Plan:          1. Discussed treatment options at length with patient at today's visit.  2. Follow-up with me as needed  3. Continue home exercises for hip, core, quad strengthening      Andrew Redmond was in agreement with plan and had all questions answered.     Orders:  Orders Placed This Encounter   Procedures   • XR Hip With or Without Pelvis 1 View Right       Medications:  No orders of the defined types were placed in this encounter.      Followup:  Return if symptoms worsen or fail to improve.    Andrew was seen today for follow-up.    Diagnoses and all orders for this visit:    Right hip pain  -     XR Hip With or Without Pelvis 1 View Right    Status post hip hemiarthroplasty           By signing my name here, I Samantha Croft attest that all documentation on 08/25/20 at 12:24 has been prepared under the direction and in the presence of Dr. Harrison Turk MD.    I, Dr. Harrison Turk, personally performed the services described in this documentation, as scribed by Samantha Croft, in my presence, and it is both accurate and complete.        Dictated utilizing Dragon dictation

## 2022-04-18 ENCOUNTER — TELEPHONE (OUTPATIENT)
Dept: INTERNAL MEDICINE | Facility: CLINIC | Age: 77
End: 2022-04-18

## 2022-04-18 DIAGNOSIS — R63.4 WEIGHT LOSS, UNINTENTIONAL: ICD-10-CM

## 2022-04-18 DIAGNOSIS — F30.9 BIPOLAR I DISORDER, SINGLE MANIC EPISODE: Primary | ICD-10-CM

## 2022-04-26 ENCOUNTER — TELEPHONE (OUTPATIENT)
Dept: INTERNAL MEDICINE | Facility: CLINIC | Age: 77
End: 2022-04-26

## 2022-04-26 NOTE — TELEPHONE ENCOUNTER
----- Message from Jessica Birch MD sent at 4/26/2022 12:16 PM EDT -----  Labs stable. Will discuss at upcoming appt.

## 2022-04-26 NOTE — TELEPHONE ENCOUNTER
Ok for HUB to read    Called and informed Pt of results. Left results on VM.    Labs stable. Will discuss at upcoming appt.      Told him if he has any questions to call back.

## 2022-05-02 ENCOUNTER — OFFICE VISIT (OUTPATIENT)
Dept: INTERNAL MEDICINE | Facility: CLINIC | Age: 77
End: 2022-05-02

## 2022-05-02 VITALS
TEMPERATURE: 96.9 F | HEART RATE: 71 BPM | HEIGHT: 68 IN | BODY MASS INDEX: 22.1 KG/M2 | RESPIRATION RATE: 17 BRPM | DIASTOLIC BLOOD PRESSURE: 58 MMHG | OXYGEN SATURATION: 96 % | WEIGHT: 145.8 LBS | SYSTOLIC BLOOD PRESSURE: 114 MMHG

## 2022-05-02 DIAGNOSIS — R63.4 WEIGHT LOSS, UNINTENTIONAL: ICD-10-CM

## 2022-05-02 DIAGNOSIS — N40.1 BENIGN PROSTATIC HYPERPLASIA WITH WEAK URINARY STREAM: ICD-10-CM

## 2022-05-02 DIAGNOSIS — F30.9 BIPOLAR I DISORDER, SINGLE MANIC EPISODE: ICD-10-CM

## 2022-05-02 DIAGNOSIS — F02.80 ALZHEIMER'S DEMENTIA WITHOUT BEHAVIORAL DISTURBANCE, UNSPECIFIED TIMING OF DEMENTIA ONSET: Primary | ICD-10-CM

## 2022-05-02 DIAGNOSIS — R79.9 ABNORMAL BLOOD CHEMISTRY: ICD-10-CM

## 2022-05-02 DIAGNOSIS — R73.03 PREDIABETES: ICD-10-CM

## 2022-05-02 DIAGNOSIS — G30.9 ALZHEIMER'S DEMENTIA WITHOUT BEHAVIORAL DISTURBANCE, UNSPECIFIED TIMING OF DEMENTIA ONSET: Primary | ICD-10-CM

## 2022-05-02 DIAGNOSIS — Z79.899 HIGH RISK MEDICATION USE: ICD-10-CM

## 2022-05-02 DIAGNOSIS — R39.12 BENIGN PROSTATIC HYPERPLASIA WITH WEAK URINARY STREAM: ICD-10-CM

## 2022-05-02 PROCEDURE — 99214 OFFICE O/P EST MOD 30 MIN: CPT | Performed by: INTERNAL MEDICINE

## 2022-05-02 NOTE — PROGRESS NOTES
Andrew Redmond is a 76 y.o. male, who presents with a chief complaint of   Chief Complaint   Patient presents with   • health maintenance            HPI   Pt here for f/u     Chronic contstipation  - better with miralax    Nocturia - improved with increased flomax.      He had been having issues with weight loss.  He says he is eating a lot.  Weight stable. He eats a lot of frozen dinners.  He doesn't eat a lot of vegetables. He eats rasin bran in the morning for fiber.  He recently broke off part of a tooth on his rasin bran.  He has aleksey appt with the dentist later this week.      gerd - he is getting pills caught in his throat and then he has to cough them back up.  Food sticks sometimes but usually goes down. He is est with dr. Winkler and has an appt peyman couple of weeks.  He wants to cancel this appt but I encouraged him to keep his f/u.       High risk med use with lamictal, olanzapine, exelon, namenda.    Dementia - pt feels like his memory issues are fairly stable. He struggles with short term memory and continues to take his meds.       The following portions of the patient's history were reviewed and updated as appropriate: allergies, current medications, past family history, past medical history, past social history, past surgical history and problem list.    Allergies: Patient has no known allergies.    Review of Systems   Constitutional: Negative.    HENT: Negative.    Eyes: Negative.    Respiratory: Negative.    Cardiovascular: Negative.    Gastrointestinal: Negative.    Endocrine: Negative.    Genitourinary: Negative.    Musculoskeletal: Negative.    Skin: Negative.    Allergic/Immunologic: Negative.    Neurological: Negative.    Hematological: Negative.    Psychiatric/Behavioral: Negative.    All other systems reviewed and are negative.            Wt Readings from Last 3 Encounters:   05/02/22 66.1 kg (145 lb 12.8 oz)   01/31/22 64.5 kg (142 lb 3.2 oz)   01/20/22 63.5 kg (140 lb)     Temp  Readings from Last 3 Encounters:   05/02/22 96.9 °F (36.1 °C) (Tympanic)   01/31/22 98.2 °F (36.8 °C) (Temporal)   01/20/22 97.8 °F (36.6 °C)     BP Readings from Last 3 Encounters:   05/02/22 114/58   01/31/22 118/62   01/20/22 125/80     Pulse Readings from Last 3 Encounters:   05/02/22 71   01/31/22 88   01/20/22 96     Body mass index is 22.15 kg/m².  SpO2 Readings from Last 3 Encounters:   05/02/22 96%   01/31/22 98%   01/20/22 97%          Physical Exam  Vitals and nursing note reviewed.   Constitutional:       General: He is not in acute distress.     Appearance: He is well-developed.   HENT:      Head: Normocephalic and atraumatic.      Right Ear: External ear normal.      Left Ear: External ear normal.      Nose: Nose normal.   Eyes:      Conjunctiva/sclera: Conjunctivae normal.      Pupils: Pupils are equal, round, and reactive to light.   Cardiovascular:      Rate and Rhythm: Normal rate and regular rhythm.      Heart sounds: Normal heart sounds.   Pulmonary:      Effort: Pulmonary effort is normal. No respiratory distress.      Breath sounds: Normal breath sounds. No wheezing.   Musculoskeletal:         General: Normal range of motion.      Cervical back: Normal range of motion and neck supple.      Comments: Normal gait   Skin:     General: Skin is warm and dry.   Neurological:      Mental Status: He is alert and oriented to person, place, and time.   Psychiatric:         Behavior: Behavior normal.         Thought Content: Thought content normal.         Judgment: Judgment normal.         Results for orders placed or performed in visit on 04/23/22   Comprehensive Metabolic Panel    Specimen: Blood   Result Value Ref Range    Glucose 99 65 - 99 mg/dL    BUN 14 8 - 27 mg/dL    Creatinine 0.77 0.76 - 1.27 mg/dL    EGFR Result 93 >59 mL/min/1.73    BUN/Creatinine Ratio 18 10 - 24    Sodium 134 134 - 144 mmol/L    Potassium 4.8 3.5 - 5.2 mmol/L    Chloride 97 96 - 106 mmol/L    Total CO2 24 20 - 29 mmol/L     Calcium 9.1 8.6 - 10.2 mg/dL    Total Protein 6.2 6.0 - 8.5 g/dL    Albumin 4.1 3.7 - 4.7 g/dL    Globulin 2.1 1.5 - 4.5 g/dL    A/G Ratio 2.0 1.2 - 2.2    Total Bilirubin 0.5 0.0 - 1.2 mg/dL    Alkaline Phosphatase 119 44 - 121 IU/L    AST (SGOT) 27 0 - 40 IU/L    ALT (SGPT) 23 0 - 44 IU/L   CBC & Differential    Specimen: Blood   Result Value Ref Range    WBC 6.5 3.4 - 10.8 x10E3/uL    RBC 4.87 4.14 - 5.80 x10E6/uL    Hemoglobin 13.6 13.0 - 17.7 g/dL    Hematocrit 41.3 37.5 - 51.0 %    MCV 85 79 - 97 fL    MCH 27.9 26.6 - 33.0 pg    MCHC 32.9 31.5 - 35.7 g/dL    RDW 16.2 (H) 11.6 - 15.4 %    Platelets 263 150 - 450 x10E3/uL    Neutrophil Rel % 52 Not Estab. %    Lymphocyte Rel % 31 Not Estab. %    Monocyte Rel % 13 Not Estab. %    Eosinophil Rel % 2 Not Estab. %    Basophil Rel % 1 Not Estab. %    Neutrophils Absolute 3.4 1.4 - 7.0 x10E3/uL    Lymphocytes Absolute 2.0 0.7 - 3.1 x10E3/uL    Monocytes Absolute 0.9 0.1 - 0.9 x10E3/uL    Eosinophils Absolute 0.1 0.0 - 0.4 x10E3/uL    Basophils Absolute 0.1 0.0 - 0.2 x10E3/uL    Immature Granulocyte Rel % 1 Not Estab. %    Immature Grans Absolute 0.1 0.0 - 0.1 x10E3/uL     Result Review :                  Assessment and Plan    Diagnoses and all orders for this visit:    1. Alzheimer's dementia without behavioral disturbance, unspecified timing of dementia onset (HCC) (Primary) - cont namenda and exelon    2. Bipolar I disorder, single manic episode (HCC)    3. Weight loss, unintentional - keep f/u with GI for swallowing issues. Weight stable at this time.   -     Comprehensive Metabolic Panel; Future  -     CBC & Differential; Future  -     Lipid Panel With LDL / HDL Ratio; Future  -     TSH; Future  -     T4, Free; Future  -     Hemoglobin A1c; Future    4. Benign prostatic hyperplasia with weak urinary stream - cont flomax    5. Prediabetes  -     Comprehensive Metabolic Panel; Future  -     CBC & Differential; Future  -     Lipid Panel With LDL / HDL Ratio;  Future  -     TSH; Future  -     T4, Free; Future  -     Hemoglobin A1c; Future    6. Abnormal blood chemistry  -     Comprehensive Metabolic Panel; Future  -     CBC & Differential; Future  -     Lipid Panel With LDL / HDL Ratio; Future  -     TSH; Future  -     T4, Free; Future  -     Hemoglobin A1c; Future    7. High risk medication use - on olanzapine and lamictal for bipolar.    -     Comprehensive Metabolic Panel; Future  -     CBC & Differential; Future  -     Lipid Panel With LDL / HDL Ratio; Future  -     TSH; Future  -     T4, Free; Future  -     Hemoglobin A1c; Future         BMI is within normal parameters. No follow-up required.             Outpatient Medications Prior to Visit   Medication Sig Dispense Refill   • alendronate (FOSAMAX) 70 MG tablet Take 1 tablet by mouth 1 (One) Time Per Week. 12 tablet 0   • Allegra-D Allergy & Congestion  MG per 12 hr tablet Take 1 tablet by mouth twice daily 60 tablet 0   • lamoTRIgine (LaMICtal) 200 MG tablet Take 200 mg by mouth Every Night. 1/2 pill at noon     • memantine (NAMENDA) 10 MG tablet Take 10 mg by mouth 2 (Two) Times a Day.     • Misc. Devices (Rollator Ultra-Light) misc 1 each Daily. 1 each 0   • OLANZapine (zyPREXA) 20 MG tablet Take 20 mg by mouth every night at bedtime.     • omeprazole (priLOSEC) 40 MG capsule Take 1 capsule by mouth Daily. 90 capsule 3   • polyethylene glycol (MIRALAX) powder Take 17 g by mouth Daily. (Patient taking differently: Take 17 g by mouth Daily As Needed.) 850 g 5   • rivastigmine (EXELON) 6 MG capsule Take 6 mg by mouth 2 (Two) Times a Day.     • tamsulosin (FLOMAX) 0.4 MG capsule 24 hr capsule Take 2 capsules by mouth Daily. 180 capsule 0     No facility-administered medications prior to visit.     No orders of the defined types were placed in this encounter.    [unfilled]  There are no discontinued medications.      Return in about 20 weeks (around 9/19/2022) for Recheck, labs.    Patient was given  instructions and counseling regarding her condition or for health maintenance advice. Please see specific information pulled into the AVS if appropriate.

## 2022-05-16 ENCOUNTER — OFFICE VISIT (OUTPATIENT)
Dept: GASTROENTEROLOGY | Facility: CLINIC | Age: 77
End: 2022-05-16

## 2022-05-16 VITALS
HEIGHT: 68 IN | BODY MASS INDEX: 22.07 KG/M2 | DIASTOLIC BLOOD PRESSURE: 76 MMHG | WEIGHT: 145.6 LBS | SYSTOLIC BLOOD PRESSURE: 116 MMHG

## 2022-05-16 DIAGNOSIS — K22.89 PRESBYESOPHAGUS: Primary | ICD-10-CM

## 2022-05-16 DIAGNOSIS — K21.00 GASTROESOPHAGEAL REFLUX DISEASE WITH ESOPHAGITIS WITHOUT HEMORRHAGE: ICD-10-CM

## 2022-05-16 DIAGNOSIS — K63.5 POLYP OF COLON, UNSPECIFIED PART OF COLON, UNSPECIFIED TYPE: ICD-10-CM

## 2022-05-16 DIAGNOSIS — K59.09 CHRONIC CONSTIPATION: ICD-10-CM

## 2022-05-16 PROCEDURE — 99213 OFFICE O/P EST LOW 20 MIN: CPT | Performed by: INTERNAL MEDICINE

## 2022-05-16 RX ORDER — TAMSULOSIN HYDROCHLORIDE 0.4 MG/1
CAPSULE ORAL
Qty: 180 CAPSULE | Refills: 0 | Status: SHIPPED | OUTPATIENT
Start: 2022-05-16 | End: 2022-08-15

## 2022-05-16 NOTE — PROGRESS NOTES
PATIENT INFORMATION  Andrew Redmond       - 1945    CHIEF COMPLAINT  Chief Complaint   Patient presents with   • Presbyesophagus       HISTORY OF PRESENT ILLNESS  Overall doing well but his Calcium pill gets caught and can bring it up but the food that catches does always go downa nd the remainder of his pill that sometimes catch will dissolve and he denies any seizuress.    The frequency is hard for him to estimatye ut he feels it 1-2 a month that the cacium pills come back up.    His bowels are regular and uses miralax when he skips    Reviewee his meds and he lives aolone so was warned htat that is one of the signs to look for prior to needing more assisstance      REVIEWED PERTINENT RESULTS/ LABS  Lab Results   Component Value Date    CASEREPORT  2016     Surgical Pathology Report                         Case: [id number]                                  Authorizing Provider:  Akiko Scales MD          Collected:           2016 02:00 PM          Ordering Location:     [facility]   Received:            2016 03:17 PM                                 OR                                                                           Pathologist:           Stefani Conrad MD                                                     Specimen:    Large Intestine, Right/Ascending Colon, hepatic flexure polyp                               Lab Results   Component Value Date    HGB 13.6 2022    MCV 85 2022     2022    ALT 23 2022    AST 27 2022    HGBA1C 5.30 2021    INR 1.11 (H) 2020    TRIG 81 2021    FERRITIN 452.00 (H) 2019    IRON 23 (L) 2019    TIBC 110 (L) 2019      No results found.    REVIEW OF SYSTEMS  Review of Systems   Constitutional: Negative for activity change, chills, fever and unexpected weight change.   HENT: Positive for trouble swallowing and voice change. Negative for congestion.    Eyes:  Negative for visual disturbance.   Respiratory: Positive for cough and choking. Negative for shortness of breath.    Cardiovascular: Negative for chest pain and palpitations.   Gastrointestinal: Positive for constipation. Negative for abdominal pain and blood in stool.   Endocrine: Negative for cold intolerance and heat intolerance.   Genitourinary: Negative for hematuria.   Musculoskeletal: Negative for gait problem.   Skin: Negative for color change.   Allergic/Immunologic: Negative for immunocompromised state.   Neurological: Negative for weakness and light-headedness.   Hematological: Negative for adenopathy.   Psychiatric/Behavioral: Negative for sleep disturbance. The patient is not nervous/anxious.          ACTIVE PROBLEMS  Patient Active Problem List    Diagnosis    • Infected sebaceous cyst of skin [L72.3, L08.9]    • Nocturia [R35.1]    • Acute serous otitis media [H65.00]    • Hip fracture due to osteoporosis, sequela [M80.059S]    • Status post hip hemiarthroplasty [Z96.649]    • Subacromial bursitis of left shoulder joint [M75.52]    • Closed displaced fracture of neck of right scapula [S42.151A]    • Closed nondisplaced fracture of right clavicle [S42.001A]    • Displaced fracture of right femoral neck (HCC) [S72.001A]    • Weight loss [R63.4]    • Closed compression fracture of L1 lumbar vertebra [S32.010A]    • Acute bilateral low back pain without sciatica [M54.50]    • Low energy [R53.83]    • High risk medication use [Z79.899]    • Poor balance [R26.89]    • Erectile dysfunction [N52.9]    • Chronic constipation [K59.09]    • Hemorrhoids [K64.9]    • Polyp of colon [K63.5]    • Alzheimer's dementia without behavioral disturbance (HCC) [G30.9, F02.80]    • Bipolar I disorder, single manic episode (HCC) [F30.9]    • Epilepsy (HCC) [G40.909]    • Seizure (HCC) [R56.9]    • Subdural hematoma (HCC) [S06.5X9A]          PAST MEDICAL HISTORY  Past Medical History:   Diagnosis Date   • Alzheimer disease  (HCC)    • Alzheimer disease (HCC)    • Alzheimer disease (HCC) 2019   • Anxiety and depression    • Bipolar 1 disorder (HCC)    • Colon polyp    • Constipation    • Depression    • Diarrhea    • Epilepsy (HCC)    • Epilepsy (HCC) 2019   • Erectile dysfunction    • GERD (gastroesophageal reflux disease)    • L1 vertebral fracture (HCC)    • Seizures (HCC)     absence seizures.  well controlled.         SURGICAL HISTORY  Past Surgical History:   Procedure Laterality Date   • CHOLECYSTECTOMY     • COLONOSCOPY N/A 2016    Procedure: COLONOSCOPY with polypectomy;  Surgeon: Akiko Scales MD;  Location: [facility] OR;  Service:    • GALLBLADDER SURGERY     • HIP HEMIARTHROPLASTY Right 3/8/2019    Procedure: HIP HEMIARTHROPLASTY and all associated procedures;  Surgeon: Harrison Turk MD;  Location: [facility] OR;  Service: Orthopedics   • LIVER SURGERY     • TONSILLECTOMY           FAMILY HISTORY  Family History   Problem Relation Age of Onset   • Lung cancer Father    • No Known Problems Mother    • No Known Problems Maternal Grandmother    • No Known Problems Maternal Grandfather    • No Known Problems Paternal Grandmother    • No Known Problems Paternal Grandfather    • Colon polyps Neg Hx    • Colon cancer Neg Hx          SOCIAL HISTORY  Social History     Occupational History   • Occupation: professor     Comment: taught anatomy at [employer]   Tobacco Use   • Smoking status: Former Smoker     Packs/day: 2.00     Years: 20.00     Pack years: 40.00     Quit date: 2/3/1990     Years since quittin.3   • Smokeless tobacco: Former User     Quit date:    • Tobacco comment: quit 30 yrs ago    Vaping Use   • Vaping Use: Never used   Substance and Sexual Activity   • Alcohol use: No   • Drug use: No   • Sexual activity: Defer         CURRENT MEDICATIONS    Current Outpatient Medications:   •  alendronate (FOSAMAX) 70 MG tablet, Take 1 tablet by mouth 1 (One) Time Per Week., Disp: 12 tablet, Rfl:  "0  •  Allegra-D Allergy & Congestion  MG per 12 hr tablet, Take 1 tablet by mouth twice daily, Disp: 60 tablet, Rfl: 0  •  lamoTRIgine (LaMICtal) 200 MG tablet, Take 200 mg by mouth Every Night. 1/2 pill at noon, Disp: , Rfl:   •  memantine (NAMENDA) 10 MG tablet, Take 10 mg by mouth 2 (Two) Times a Day., Disp: , Rfl:   •  Misc. Devices (Rollator Ultra-Light) misc, 1 each Daily., Disp: 1 each, Rfl: 0  •  OLANZapine (zyPREXA) 20 MG tablet, Take 20 mg by mouth every night at bedtime., Disp: , Rfl:   •  omeprazole (priLOSEC) 40 MG capsule, Take 1 capsule by mouth Daily., Disp: 90 capsule, Rfl: 3  •  polyethylene glycol (MIRALAX) powder, Take 17 g by mouth Daily. (Patient taking differently: Take 17 g by mouth Daily As Needed.), Disp: 850 g, Rfl: 5  •  rivastigmine (EXELON) 6 MG capsule, Take 6 mg by mouth 2 (Two) Times a Day., Disp: , Rfl:   •  tamsulosin (FLOMAX) 0.4 MG capsule 24 hr capsule, Take 2 capsules by mouth Daily., Disp: 180 capsule, Rfl: 0    ALLERGIES  Patient has no known allergies.    VITALS  Vitals:    05/16/22 1324   BP: 116/76   BP Location: Left arm   Patient Position: Sitting   Cuff Size: Adult   Weight: 66 kg (145 lb 9.6 oz)   Height: 172.7 cm (68\")       PHYSICAL EXAM  Debilities/Disabilities Identified: None  Emotional Behavior: Appropriate  Wt Readings from Last 3 Encounters:   05/16/22 66 kg (145 lb 9.6 oz)   05/02/22 66.1 kg (145 lb 12.8 oz)   01/31/22 64.5 kg (142 lb 3.2 oz)     Ht Readings from Last 1 Encounters:   05/16/22 172.7 cm (68\")     Body mass index is 22.14 kg/m².  Physical Exam  Constitutional:       Appearance: He is well-developed. He is not diaphoretic.   Eyes:      General: No scleral icterus.     Conjunctiva/sclera: Conjunctivae normal.      Pupils: Pupils are equal, round, and reactive to light.   Neck:      Thyroid: No thyromegaly.   Cardiovascular:      Rate and Rhythm: Normal rate and regular rhythm.      Heart sounds: Normal heart sounds. No murmur heard.    No " gallop.   Pulmonary:      Effort: Pulmonary effort is normal.      Breath sounds: Normal breath sounds. No wheezing or rales.   Abdominal:      General: Bowel sounds are normal. There is no distension or abdominal bruit.      Palpations: Abdomen is soft. There is no shifting dullness, fluid wave or mass.      Tenderness: There is no abdominal tenderness. There is no guarding. Negative signs include Alegria's sign.      Hernia: There is no hernia in the ventral area.   Musculoskeletal:         General: Normal range of motion.      Cervical back: Normal range of motion and neck supple.   Lymphadenopathy:      Cervical: No cervical adenopathy.   Skin:     General: Skin is warm and dry.      Findings: No erythema or rash.   Neurological:      Mental Status: He is alert and oriented to person, place, and time.         CLINICAL DATA REVIEWED   reviewed previous lab results and integrated with today's visit, reviewed notes from other physicians and/or last GI encounter, reviewed previous endoscopy results and available photos, reviewed surgical pathology results from previous biopsies    ASSESSMENT  Diagnoses and all orders for this visit:    Presbyesophagus    Chronic constipation    Polyp of colon, unspecified part of colon, unspecified type    Gastroesophageal reflux disease with esophagitis without hemorrhage          PLAN  With stable weight and his present memory is not getting in the way of his medication management despite living alone! Would continue same for now and he was encouraged to call with questions/Problems    Return if symptoms worsen or fail to improve.    I have discussed the above plan with the patient.  They verbalize understanding and are in agreement with the plan.  They have been advised to contact the office for any questions, concerns, or changes related to their health.

## 2022-08-15 RX ORDER — TAMSULOSIN HYDROCHLORIDE 0.4 MG/1
CAPSULE ORAL
Qty: 180 CAPSULE | Refills: 0 | Status: SHIPPED | OUTPATIENT
Start: 2022-08-15 | End: 2022-11-08

## 2022-09-09 ENCOUNTER — OFFICE VISIT (OUTPATIENT)
Dept: INTERNAL MEDICINE | Facility: CLINIC | Age: 77
End: 2022-09-09

## 2022-09-09 VITALS
HEART RATE: 65 BPM | HEIGHT: 68 IN | SYSTOLIC BLOOD PRESSURE: 102 MMHG | TEMPERATURE: 97.1 F | BODY MASS INDEX: 23.04 KG/M2 | DIASTOLIC BLOOD PRESSURE: 64 MMHG | WEIGHT: 152 LBS | OXYGEN SATURATION: 98 % | RESPIRATION RATE: 18 BRPM

## 2022-09-09 DIAGNOSIS — F30.9 BIPOLAR I DISORDER, SINGLE MANIC EPISODE: ICD-10-CM

## 2022-09-09 DIAGNOSIS — Z00.00 MEDICARE ANNUAL WELLNESS VISIT, SUBSEQUENT: Primary | ICD-10-CM

## 2022-09-09 DIAGNOSIS — F02.80 ALZHEIMER'S DEMENTIA WITHOUT BEHAVIORAL DISTURBANCE, UNSPECIFIED TIMING OF DEMENTIA ONSET: ICD-10-CM

## 2022-09-09 DIAGNOSIS — G30.9 ALZHEIMER'S DEMENTIA WITHOUT BEHAVIORAL DISTURBANCE, UNSPECIFIED TIMING OF DEMENTIA ONSET: ICD-10-CM

## 2022-09-09 PROCEDURE — G0439 PPPS, SUBSEQ VISIT: HCPCS | Performed by: INTERNAL MEDICINE

## 2022-09-09 PROCEDURE — 1170F FXNL STATUS ASSESSED: CPT | Performed by: INTERNAL MEDICINE

## 2022-09-09 PROCEDURE — 1160F RVW MEDS BY RX/DR IN RCRD: CPT | Performed by: INTERNAL MEDICINE

## 2022-09-09 NOTE — PROGRESS NOTES
The ABCs of the Annual Wellness Visit  Subsequent Medicare Wellness Visit    Chief Complaint   Patient presents with   • Alzheimer's Disease     Follow up visit    • Medicare Wellness-subsequent      Subjective    History of Present Illness:  Andrew Redmond is a 76 y.o. male who presents for a Subsequent Medicare Wellness Visit.    The following portions of the patient's history were reviewed and   updated as appropriate: allergies, current medications, past family history, past medical history, past social history, past surgical history and problem list.    Compared to one year ago, the patient feels his physical   health is the same.    Compared to one year ago, the patient feels his mental   health is better.    Recent Hospitalizations:  He was not admitted to the hospital during the last year.       Current Medical Providers:  Patient Care Team:  Jessica Birch MD as PCP - General (Internal Medicine & Pediatrics)  Huan Romano MD as PCP - Family Medicine    Outpatient Medications Prior to Visit   Medication Sig Dispense Refill   • Allegra-D Allergy & Congestion  MG per 12 hr tablet Take 1 tablet by mouth twice daily 60 tablet 0   • lamoTRIgine (LaMICtal) 200 MG tablet Take 200 mg by mouth Every Night. 1/2 pill at noon     • memantine (NAMENDA) 10 MG tablet Take 10 mg by mouth 2 (Two) Times a Day.     • Misc. Devices (Rollator Ultra-Light) misc 1 each Daily. 1 each 0   • OLANZapine (zyPREXA) 20 MG tablet Take 20 mg by mouth every night at bedtime.     • omeprazole (priLOSEC) 40 MG capsule Take 1 capsule by mouth Daily. 90 capsule 3   • polyethylene glycol (MIRALAX) powder Take 17 g by mouth Daily. (Patient taking differently: Take 17 g by mouth Daily As Needed.) 850 g 5   • rivastigmine (EXELON) 6 MG capsule Take 6 mg by mouth 2 (Two) Times a Day.     • tamsulosin (FLOMAX) 0.4 MG capsule 24 hr capsule Take 2 capsules by mouth once daily 180 capsule 0   • alendronate (FOSAMAX) 70 MG tablet Take 1  "tablet by mouth 1 (One) Time Per Week. 12 tablet 0     No facility-administered medications prior to visit.       No opioid medication identified on active medication list. I have reviewed chart for other potential  high risk medication/s and harmful drug interactions in the elderly.          Aspirin is not on active medication list.  Aspirin use is not indicated based on review of current medical condition/s. Risk of harm outweighs potential benefits.  .    Patient Active Problem List   Diagnosis   • Alzheimer's dementia without behavioral disturbance (HCC)   • Bipolar I disorder, single manic episode (HCC)   • Epilepsy (HCC)   • Chronic constipation   • Hemorrhoids   • Polyp of colon   • Erectile dysfunction   • High risk medication use   • Poor balance   • Subdural hematoma (HCC)   • Seizure (HCC)   • Low energy   • Closed compression fracture of L1 lumbar vertebra   • Acute bilateral low back pain without sciatica   • Weight loss   • Displaced fracture of right femoral neck (HCC)   • Status post hip hemiarthroplasty   • Subacromial bursitis of left shoulder joint   • Closed displaced fracture of neck of right scapula   • Closed nondisplaced fracture of right clavicle   • Hip fracture due to osteoporosis, sequela   • Acute serous otitis media   • Nocturia   • Infected sebaceous cyst of skin     Advance Care Planning  Advance Directive is on file.  ACP discussion was held with the patient during this visit. Patient has an advance directive in EMR which is still valid.           Objective    Vitals:    09/09/22 1047   BP: 102/64   BP Location: Left arm   Patient Position: Sitting   Cuff Size: Adult   Pulse: 65   Resp: 18   Temp: 97.1 °F (36.2 °C)   TempSrc: Infrared   SpO2: 98%   Weight: 68.9 kg (152 lb)   Height: 172.7 cm (68\")     Estimated body mass index is 23.11 kg/m² as calculated from the following:    Height as of this encounter: 172.7 cm (68\").    Weight as of this encounter: 68.9 kg (152 lb).    BMI is " within normal parameters. No other follow-up for BMI required.      Does the patient have evidence of cognitive impairment? Yes    Physical Exam  Lab Results   Component Value Date    CHLPL 140 2022    TRIG 103 2022    HDL 58 2022    LDL 63 2022    VLDL 19 2022    HGBA1C 5.40 2022            HEALTH RISK ASSESSMENT    Smoking Status:  Social History     Tobacco Use   Smoking Status Former Smoker   • Packs/day: 2.00   • Years: 20.00   • Pack years: 40.00   • Quit date: 2/3/1990   • Years since quittin.6   Smokeless Tobacco Former User   • Quit date:    Tobacco Comment    quit 30 yrs ago      Alcohol Consumption:  Social History     Substance and Sexual Activity   Alcohol Use No     Fall Risk Screen:    LILLIE Fall Risk Assessment was completed, and patient is at MODERATE risk for falls. Assessment completed on:2022    Depression Screening:  PHQ-2/PHQ-9 Depression Screening 2022   Retired PHQ-9 Total Score -   Retired Total Score -   Little Interest or Pleasure in Doing Things 0-->not at all   Feeling Down, Depressed or Hopeless 0-->not at all   PHQ-9: Brief Depression Severity Measure Score 0       Health Habits and Functional and Cognitive Screening:  Functional & Cognitive Status 2022   Do you have difficulty preparing food and eating? No   Do you have difficulty bathing yourself, getting dressed or grooming yourself? No   Do you have difficulty using the toilet? No   Do you have difficulty moving around from place to place? Yes   Do you have trouble with steps or getting out of a bed or a chair? No   Current Diet Well Balanced Diet   Dental Exam Up to date   Eye Exam Up to date   Exercise (times per week) 0 times per week        Exercise Frequency Comment -   Current Exercises Include No Regular Exercise   Current Exercise Activities Include -   Do you need help using the phone?  No   Are you deaf or do you have serious difficulty hearing?  Yes   Do you need  help with transportation? Yes   Do you need help shopping? No   Do you need help preparing meals?  No   Do you need help with housework?  No   Do you need help with laundry? No   Do you need help taking your medications? No   Do you need help managing money? No   Do you ever drive or ride in a car without wearing a seat belt? No   Have you felt unusual stress, anger or loneliness in the last month? No   Who do you live with? Alone   If you need help, do you have trouble finding someone available to you? Yes   Have you been bothered in the last four weeks by sexual problems? No   Do you have difficulty concentrating, remembering or making decisions? Yes       Age-appropriate Screening Schedule:  Refer to the list below for future screening recommendations based on patient's age, sex and/or medical conditions. Orders for these recommended tests are listed in the plan section. The patient has been provided with a written plan.    Health Maintenance   Topic Date Due   • ZOSTER VACCINE (1 of 2) 09/23/2022 (Originally 9/25/1995)   • INFLUENZA VACCINE  10/01/2022   • DXA SCAN  09/22/2023   • TDAP/TD VACCINES (4 - Tdap) 12/22/2028              Assessment & Plan   CMS Preventative Services Quick Reference  Risk Factors Identified During Encounter  Dementia/Memory   Immunizations Discussed/Encouraged (specific Immunizations; Influenza, Shingrix and COVID19  Inadequate Social Support, Isolation, Loneliness, Lack of Transportation, Financial Difficulties, or Caregiver Stress   Polypharmacy  The above risks/problems have been discussed with the patient.  Follow up actions/plans if indicated are seen below in the Assessment/Plan Section.  Pertinent information has been shared with the patient in the After Visit Summary.    Diagnoses and all orders for this visit:    1. Medicare annual wellness visit, subsequent (Primary)    2. Alzheimer's dementia without behavioral disturbance, unspecified timing of dementia onset (HCC)    3.  Bipolar I disorder, single manic episode (HCC)        Follow Up:   Return in about 6 months (around 3/9/2023) for Recheck.     An After Visit Summary and PPPS were made available to the patient.

## 2022-09-09 NOTE — PATIENT INSTRUCTIONS
Medicare Wellness  Personal Prevention Plan of Service     Date of Office Visit:    Encounter Provider:  Jessica Birch MD  Place of Service:  [facility]  Patient Name: Andrew Redmond  :  1945    As part of the Medicare Wellness portion of your visit today, we are providing you with this personalized preventive plan of services (PPPS). This plan is based upon recommendations of the [place] Preventive Services Task Force (USPSTF) and the Advisory Committee on Immunization Practices (ACIP).    This lists the preventive care services that should be considered, and provides dates of when you are due. Items listed as completed are up-to-date and do not require any further intervention.    Health Maintenance   Topic Date Due    ZOSTER VACCINE (1 of 2) 2022 (Originally 1995)    INFLUENZA VACCINE  10/01/2022    ANNUAL WELLNESS VISIT  2023    DXA SCAN  2023    COLORECTAL CANCER SCREENING  2026    TDAP/TD VACCINES (4 - Tdap) 2028    HEPATITIS C SCREENING  Completed    COVID-19 Vaccine  Completed    Pneumococcal Vaccine 65+  Completed       No orders of the defined types were placed in this encounter.      Return in about 6 months (around 3/9/2023) for Recheck.

## 2022-09-09 NOTE — PROGRESS NOTES
Andrew Redmond is a 76 y.o. male, who presents with a chief complaint of   Chief Complaint   Patient presents with   • Alzheimer's Disease     Follow up visit         {Problem List  Visit Diagnosis   Encounters  Notes  Medications  Labs  Result Review Imaging  Media :23}   HPI   Pt sees dr. schoenbachler at  for his memory and psych meds      The following portions of the patient's history were reviewed and updated as appropriate: allergies, current medications, past family history, past medical history, past social history, past surgical history and problem list.    Allergies: Patient has no known allergies.    Review of Systems          Wt Readings from Last 3 Encounters:   09/09/22 68.9 kg (152 lb)   05/16/22 66 kg (145 lb 9.6 oz)   05/02/22 66.1 kg (145 lb 12.8 oz)     Temp Readings from Last 3 Encounters:   09/09/22 97.1 °F (36.2 °C) (Infrared)   05/02/22 96.9 °F (36.1 °C) (Tympanic)   01/31/22 98.2 °F (36.8 °C) (Temporal)     BP Readings from Last 3 Encounters:   09/09/22 102/64   05/16/22 116/76   05/02/22 114/58     Pulse Readings from Last 3 Encounters:   09/09/22 65   05/02/22 71   01/31/22 88     Body mass index is 23.11 kg/m².  SpO2 Readings from Last 3 Encounters:   09/09/22 98%   05/02/22 96%   01/31/22 98%          Physical Exam    Results for orders placed or performed in visit on 05/07/22   Comprehensive Metabolic Panel    Specimen: Blood   Result Value Ref Range    Glucose 103 (H) 65 - 99 mg/dL    BUN 12 8 - 23 mg/dL    Creatinine 0.77 0.76 - 1.27 mg/dL    EGFR Result 92.8 >60.0 mL/min/1.73    BUN/Creatinine Ratio 15.6 7.0 - 25.0    Sodium 133 (L) 136 - 145 mmol/L    Potassium 5.0 3.5 - 5.2 mmol/L    Chloride 98 98 - 107 mmol/L    Total CO2 28.9 22.0 - 29.0 mmol/L    Calcium 9.5 8.6 - 10.5 mg/dL    Total Protein 6.1 6.0 - 8.5 g/dL    Albumin 4.20 3.50 - 5.20 g/dL    Globulin 1.9 gm/dL    A/G Ratio 2.2 g/dL    Total Bilirubin 0.5 0.0 - 1.2 mg/dL    Alkaline Phosphatase 102 39 - 117 U/L     AST (SGOT) 19 1 - 40 U/L    ALT (SGPT) 18 1 - 41 U/L   Lipid Panel With LDL / HDL Ratio    Specimen: Blood   Result Value Ref Range    Total Cholesterol 140 0 - 200 mg/dL    Triglycerides 103 0 - 150 mg/dL    HDL Cholesterol 58 40 - 60 mg/dL    VLDL Cholesterol Anurag 19 5 - 40 mg/dL    LDL Chol Calc (NIH) 63 0 - 100 mg/dL    LDL/HDL RATIO 1.06    TSH    Specimen: Blood   Result Value Ref Range    TSH 2.090 0.270 - 4.200 uIU/mL   T4, Free    Specimen: Blood   Result Value Ref Range    Free T4 1.06 0.93 - 1.70 ng/dL   Hemoglobin A1c    Specimen: Blood   Result Value Ref Range    Hemoglobin A1C 5.40 4.80 - 5.60 %   CBC & Differential    Specimen: Blood   Result Value Ref Range    WBC 5.72 3.40 - 10.80 10*3/mm3    RBC 5.16 4.14 - 5.80 10*6/mm3    Hemoglobin 14.7 13.0 - 17.7 g/dL    Hematocrit 44.9 37.5 - 51.0 %    MCV 87.0 79.0 - 97.0 fL    MCH 28.5 26.6 - 33.0 pg    MCHC 32.7 31.5 - 35.7 g/dL    RDW 15.8 (H) 12.3 - 15.4 %    Platelets 272 140 - 450 10*3/mm3    Neutrophil Rel % 48.0 42.7 - 76.0 %    Lymphocyte Rel % 35.8 19.6 - 45.3 %    Monocyte Rel % 10.5 5.0 - 12.0 %    Eosinophil Rel % 2.8 0.3 - 6.2 %    Basophil Rel % 1.2 0.0 - 1.5 %    Neutrophils Absolute 2.74 1.70 - 7.00 10*3/mm3    Lymphocytes Absolute 2.05 0.70 - 3.10 10*3/mm3    Monocytes Absolute 0.60 0.10 - 0.90 10*3/mm3    Eosinophils Absolute 0.16 0.00 - 0.40 10*3/mm3    Basophils Absolute 0.07 0.00 - 0.20 10*3/mm3    Immature Granulocyte Rel % 1.7 (H) 0.0 - 0.5 %    Immature Grans Absolute 0.10 (H) 0.00 - 0.05 10*3/mm3    nRBC 0.0 0.0 - 0.2 /100 WBC     Result Review :{Labs  Result Review  Imaging  Med Tab  Media :23}   {The following data was reviewed by (Optional):22324}  {Ambulatory Labs (Optional):80946}  {Data reviewed (Optional):56788:::1}           Assessment and Plan {CC Problem List  Visit Diagnosis  ROS  Review (Popup)  Health Maintenance  Quality  BestPractice  Medications  SmartSets  SnapShot Encounters  Media :23}   There  are no diagnoses linked to this encounter.     BMI is within normal parameters. No other follow-up for BMI required.       {Time Spent (Optional):21932}      Outpatient Medications Prior to Visit   Medication Sig Dispense Refill   • Allegra-D Allergy & Congestion  MG per 12 hr tablet Take 1 tablet by mouth twice daily 60 tablet 0   • lamoTRIgine (LaMICtal) 200 MG tablet Take 200 mg by mouth Every Night. 1/2 pill at noon     • memantine (NAMENDA) 10 MG tablet Take 10 mg by mouth 2 (Two) Times a Day.     • Misc. Devices (Rollator Ultra-Light) misc 1 each Daily. 1 each 0   • OLANZapine (zyPREXA) 20 MG tablet Take 20 mg by mouth every night at bedtime.     • omeprazole (priLOSEC) 40 MG capsule Take 1 capsule by mouth Daily. 90 capsule 3   • polyethylene glycol (MIRALAX) powder Take 17 g by mouth Daily. (Patient taking differently: Take 17 g by mouth Daily As Needed.) 850 g 5   • rivastigmine (EXELON) 6 MG capsule Take 6 mg by mouth 2 (Two) Times a Day.     • tamsulosin (FLOMAX) 0.4 MG capsule 24 hr capsule Take 2 capsules by mouth once daily 180 capsule 0   • alendronate (FOSAMAX) 70 MG tablet Take 1 tablet by mouth 1 (One) Time Per Week. 12 tablet 0     No facility-administered medications prior to visit.     No orders of the defined types were placed in this encounter.    [unfilled]  There are no discontinued medications.      No follow-ups on file.    Patient was given instructions and counseling regarding her condition or for health maintenance advice. Please see specific information pulled into the AVS if appropriate.

## 2022-09-12 ENCOUNTER — TELEPHONE (OUTPATIENT)
Dept: INTERNAL MEDICINE | Facility: CLINIC | Age: 77
End: 2022-09-12

## 2022-09-12 NOTE — TELEPHONE ENCOUNTER
Caller: Andrew Redmond    Relationship to patient: Self    Best call back number: [phone]    Patient is needing: PATIENT IS CALLING TO ASK IF THERE IS AN ENT AT [facility].  HE STATES HE DOES NOT DRIVE AND NEEDS TO BE SEEN LOCAL.  HE STATES HE WAS CLEANING WAX OUT OF HIS EAR AND GOT OVERANXIOUS.    HE IS WANTING A NUMBER AND HE WILL CALL FOR THE APPOINTMENT.    PLEASE ADVISE.

## 2022-09-13 NOTE — TELEPHONE ENCOUNTER
Tried to call patient to inform him of this but was unable to reach and left a voicemail with the details

## 2022-09-20 ENCOUNTER — TELEPHONE (OUTPATIENT)
Dept: INTERNAL MEDICINE | Facility: CLINIC | Age: 77
End: 2022-09-20

## 2022-09-20 NOTE — TELEPHONE ENCOUNTER
PATIENT WAS CALLING BACK IN TO RETURN CALL TO [facility].    HE STATES HIS PHONE SPEAKER IS NOT VERY GOOD AND WOULD LIKE TO JUST SPEAK TO HER.    CALLBACK NUMBER IS  [phone]

## 2022-09-20 NOTE — TELEPHONE ENCOUNTER
Called and spoke with patient he informed me that he was feeling much better and that he had found someone else and that he was looking into going and seeing them

## 2022-09-20 NOTE — TELEPHONE ENCOUNTER
Caller: Andrew Redmond    Relationship: Self    Best call back number:[phone]     PATIENT CALLED STATING HE IS FEELING MUCH BETTER AND SORRY THAT HE BOTHERED YOU YESTERDAY.

## 2022-09-20 NOTE — TELEPHONE ENCOUNTER
Hub staff attempted to follow warm transfer process and was unsuccessful     Caller: Andrew Redmond    Relationship to patient: Self    Best call back number: [phone]    Patient is needing: PATIENT RECEIVED [facility]'S MESSAGE BUT WAS UNABLE TO UNDERSTAND IT. PLEASE CALL HIM BACK

## 2022-09-21 ENCOUNTER — HOSPITAL ENCOUNTER (EMERGENCY)
Facility: HOSPITAL | Age: 77
Discharge: HOME | End: 2022-09-22
Attending: EMERGENCY MEDICINE | Admitting: EMERGENCY MEDICINE

## 2022-09-21 VITALS
SYSTOLIC BLOOD PRESSURE: 176 MMHG | RESPIRATION RATE: 18 BRPM | HEART RATE: 91 BPM | WEIGHT: 150 LBS | TEMPERATURE: 98.5 F | BODY MASS INDEX: 22.73 KG/M2 | HEIGHT: 68 IN | DIASTOLIC BLOOD PRESSURE: 87 MMHG | OXYGEN SATURATION: 95 %

## 2022-09-21 DIAGNOSIS — T16.1XXA FOREIGN BODY IN AURICLE OF RIGHT EAR, INITIAL ENCOUNTER: Primary | ICD-10-CM

## 2022-09-21 PROCEDURE — 99283 EMERGENCY DEPT VISIT LOW MDM: CPT

## 2022-09-22 NOTE — DISCHARGE INSTRUCTIONS
Irrigate the right ear with peroxide and water a couple times a day for 2 days.  Take Tylenol as needed as directed for pain.  Follow-up with your primary care provider within 1 week.  Return to the emergency department if there is increased pain, redness, drainage, fever, worse in any way at all

## 2022-09-22 NOTE — ED PROVIDER NOTES
Subjective   History of Present Illness  History of Present Illness    Chief complaint: Ear foreign body    Location: Right ear    Quality/Severity: Bleeding after attempt to remove    Timing/Onset/Duration: This evening    Modifying Factors: Nothing makes it better    Associated Symptoms: Patient complains of some mild ear pain    Narrative: This 76-year-old white male has a piece of his hearing aid stuck in his external auditory canal.  He noted it this evening.  He tried to remove it and the ear started bleeding.  He called EMS to come here for further treatment evaluation.    PCP:Jessica Birch MD  Review of Systems   Constitutional: Negative for chills and fever.   HENT:        Foreign body right ear with some bleeding        Medication List      CONTINUE taking these medications    Rollator Ultra-Light misc  1 each Daily.        ASK your doctor about these medications    alendronate 70 MG tablet  Commonly known as: FOSAMAX  Take 1 tablet by mouth 1 (One) Time Per Week.     Allegra-D Allergy & Congestion  MG per 12 hr tablet  Generic drug: fexofenadine-pseudoephedrine  Take 1 tablet by mouth twice daily     lamoTRIgine 200 MG tablet  Commonly known as: LaMICtal     memantine 10 MG tablet  Commonly known as: NAMENDA     OLANZapine 20 MG tablet  Commonly known as: zyPREXA     omeprazole 40 MG capsule  Commonly known as: priLOSEC  Take 1 capsule by mouth Daily.     polyethylene glycol 17 GM/SCOOP powder  Commonly known as: MIRALAX  Take 17 g by mouth Daily.     rivastigmine 6 MG capsule  Commonly known as: EXELON     tamsulosin 0.4 MG capsule 24 hr capsule  Commonly known as: FLOMAX  Take 2 capsules by mouth once daily            Past Medical History:   Diagnosis Date   • Alzheimer disease (HCC)    • Alzheimer disease (HCC)    • Alzheimer disease (HCC) 03/06/2019   • Anxiety and depression    • Bipolar 1 disorder (HCC)    • Colon polyp    • Constipation    • Depression    • Diarrhea    • Epilepsy  (HCC)    • Epilepsy (HCC) 2019   • Erectile dysfunction    • GERD (gastroesophageal reflux disease)    • L1 vertebral fracture (HCC)    • Seizures (HCC)     absence seizures.  well controlled.       No Known Allergies    Past Surgical History:   Procedure Laterality Date   • CHOLECYSTECTOMY     • COLONOSCOPY N/A 2016    Procedure: COLONOSCOPY with polypectomy;  Surgeon: Akiko Scales MD;  Location: [facility] OR;  Service:    • GALLBLADDER SURGERY     • HIP HEMIARTHROPLASTY Right 3/8/2019    Procedure: HIP HEMIARTHROPLASTY and all associated procedures;  Surgeon: Harrison Turk MD;  Location: [facility] OR;  Service: Orthopedics   • LIVER SURGERY     • TONSILLECTOMY         Family History   Problem Relation Age of Onset   • Lung cancer Father    • No Known Problems Mother    • No Known Problems Maternal Grandmother    • No Known Problems Maternal Grandfather    • No Known Problems Paternal Grandmother    • No Known Problems Paternal Grandfather    • Colon polyps Neg Hx    • Colon cancer Neg Hx        Social History     Socioeconomic History   • Marital status:    • Number of children: 0   • Years of education: Ph.D   Tobacco Use   • Smoking status: Former Smoker     Packs/day: 2.00     Years: 20.00     Pack years: 40.00     Quit date: 2/3/1990     Years since quittin.6   • Smokeless tobacco: Former User     Quit date:    • Tobacco comment: quit 30 yrs ago    Vaping Use   • Vaping Use: Never used   Substance and Sexual Activity   • Alcohol use: No   • Drug use: No   • Sexual activity: Defer           Objective   Physical Exam  Vitals (The temperature is 98.5 °F, pulse 91, respirations 18, /87, room air pulse ox 95%.) and nursing note reviewed.   Constitutional:       Appearance: Normal appearance.   HENT:      Ears:      Comments: There is evidence of a foreign body in the external auditory canal.  There is dried blood noted.  There is no active bleeding.  There is no erythema or  purulent drainage.  Neurological:      General: No focal deficit present.      Mental Status: He is alert and oriented to person, place, and time.         Procedures           ED Course      00:05 EDT, 09/22/22:  The foreign body was removed with alligator forceps.  The ear canal was irrigated with peroxide and water.    00:05 EDT, 09/22/22:  The patient's diagnosis of foreign body right ear was discussed with him.  The patient should wash the ear canal a couple of times a day for 2 days with peroxide and water.  He should follow-up with Jessica Birch within 1 week.  The patient should return to the emergency department there is increased pain, redness, drainage, fever, chills, worsening way at all.  All the patient's question were answered he will be discharged in good condition.                                MDM    Final diagnoses:   Foreign body in auricle of right ear, initial encounter       ED Disposition  ED Disposition     None          No follow-up provider specified.       Medication List      No changes were made to your prescriptions during this visit.          Pierre Mahoney MD  09/22/22 0702

## 2022-09-23 ENCOUNTER — OFFICE VISIT (OUTPATIENT)
Dept: INTERNAL MEDICINE | Facility: CLINIC | Age: 77
End: 2022-09-23

## 2022-09-23 ENCOUNTER — PATIENT OUTREACH (OUTPATIENT)
Dept: CASE MANAGEMENT | Facility: OTHER | Age: 77
End: 2022-09-23

## 2022-09-23 VITALS
DIASTOLIC BLOOD PRESSURE: 70 MMHG | HEIGHT: 68 IN | WEIGHT: 150.2 LBS | RESPIRATION RATE: 18 BRPM | SYSTOLIC BLOOD PRESSURE: 130 MMHG | BODY MASS INDEX: 22.76 KG/M2 | HEART RATE: 63 BPM | TEMPERATURE: 97.1 F | OXYGEN SATURATION: 97 %

## 2022-09-23 DIAGNOSIS — H60.391 OTHER INFECTIVE ACUTE OTITIS EXTERNA OF RIGHT EAR: Primary | ICD-10-CM

## 2022-09-23 PROCEDURE — 99213 OFFICE O/P EST LOW 20 MIN: CPT | Performed by: INTERNAL MEDICINE

## 2022-09-23 RX ORDER — COLISTIN SULFATE, NEOMYCIN SULFATE, THONZONIUM BROMIDE AND HYDROCORTISONE ACETATE 3; 3.3; .5; 1 MG/ML; MG/ML; MG/ML; MG/ML
4 SUSPENSION AURICULAR (OTIC) 4 TIMES DAILY
Qty: 10 ML | Refills: 0 | Status: SHIPPED | OUTPATIENT
Start: 2022-09-23 | End: 2022-09-23

## 2022-09-23 NOTE — PROGRESS NOTES
Andrew Redmond is a 76 y.o. male, who presents with a chief complaint of   Chief Complaint   Patient presents with   • Hospital Follow Up Visit           HPI   Pt was in the ED bc part of his hearing aid got stuck in his ear.  The foreign body was removed in the ED.  His ear still hurts especially if he touches it.  No fever.    The following portions of the patient's history were reviewed and updated as appropriate: allergies, current medications, past family history, past medical history, past social history, past surgical history and problem list.    Allergies: Patient has no known allergies.    Review of Systems   Constitutional: Negative.    HENT: Positive for ear pain. Negative for ear discharge.    Eyes: Negative.    Respiratory: Negative.    Cardiovascular: Negative.    Gastrointestinal: Negative.    Endocrine: Negative.    Genitourinary: Negative.    Musculoskeletal: Negative.    Skin: Negative.    Allergic/Immunologic: Negative.    Neurological: Negative.    Hematological: Negative.    Psychiatric/Behavioral: Negative.    All other systems reviewed and are negative.            Wt Readings from Last 3 Encounters:   09/23/22 68.1 kg (150 lb 3.2 oz)   09/21/22 68 kg (150 lb)   09/09/22 68.9 kg (152 lb)     Temp Readings from Last 3 Encounters:   09/23/22 97.1 °F (36.2 °C) (Infrared)   09/21/22 98.5 °F (36.9 °C) (Oral)   09/09/22 97.1 °F (36.2 °C) (Infrared)     BP Readings from Last 3 Encounters:   09/23/22 130/70   09/21/22 176/87   09/09/22 102/64     Pulse Readings from Last 3 Encounters:   09/23/22 63   09/21/22 91   09/09/22 65     Body mass index is 22.84 kg/m².  SpO2 Readings from Last 3 Encounters:   09/23/22 97%   09/21/22 95%   09/09/22 98%          Physical Exam  Vitals and nursing note reviewed.   Constitutional:       General: He is not in acute distress.     Appearance: He is well-developed.   HENT:      Head: Normocephalic and atraumatic.      Right Ear: Tympanic membrane and external ear  normal.      Left Ear: Tympanic membrane, ear canal and external ear normal.      Ears:      Comments: Right ear canal with erythema, excoriation and swelling.       Nose: Nose normal.   Eyes:      Conjunctiva/sclera: Conjunctivae normal.      Pupils: Pupils are equal, round, and reactive to light.   Cardiovascular:      Rate and Rhythm: Normal rate and regular rhythm.      Heart sounds: Normal heart sounds.   Pulmonary:      Effort: Pulmonary effort is normal. No respiratory distress.      Breath sounds: Normal breath sounds. No wheezing.   Musculoskeletal:         General: Normal range of motion.      Cervical back: Normal range of motion and neck supple.      Comments: Normal gait   Skin:     General: Skin is warm and dry.   Neurological:      Mental Status: He is alert and oriented to person, place, and time.   Psychiatric:         Behavior: Behavior normal.         Thought Content: Thought content normal.         Judgment: Judgment normal.         Results for orders placed or performed in visit on 05/07/22   Comprehensive Metabolic Panel    Specimen: Blood   Result Value Ref Range    Glucose 103 (H) 65 - 99 mg/dL    BUN 12 8 - 23 mg/dL    Creatinine 0.77 0.76 - 1.27 mg/dL    EGFR Result 92.8 >60.0 mL/min/1.73    BUN/Creatinine Ratio 15.6 7.0 - 25.0    Sodium 133 (L) 136 - 145 mmol/L    Potassium 5.0 3.5 - 5.2 mmol/L    Chloride 98 98 - 107 mmol/L    Total CO2 28.9 22.0 - 29.0 mmol/L    Calcium 9.5 8.6 - 10.5 mg/dL    Total Protein 6.1 6.0 - 8.5 g/dL    Albumin 4.20 3.50 - 5.20 g/dL    Globulin 1.9 gm/dL    A/G Ratio 2.2 g/dL    Total Bilirubin 0.5 0.0 - 1.2 mg/dL    Alkaline Phosphatase 102 39 - 117 U/L    AST (SGOT) 19 1 - 40 U/L    ALT (SGPT) 18 1 - 41 U/L   Lipid Panel With LDL / HDL Ratio    Specimen: Blood   Result Value Ref Range    Total Cholesterol 140 0 - 200 mg/dL    Triglycerides 103 0 - 150 mg/dL    HDL Cholesterol 58 40 - 60 mg/dL    VLDL Cholesterol Anurag 19 5 - 40 mg/dL    LDL Chol Calc ([facility]) 63 0  - 100 mg/dL    LDL/HDL RATIO 1.06    TSH    Specimen: Blood   Result Value Ref Range    TSH 2.090 0.270 - 4.200 uIU/mL   T4, Free    Specimen: Blood   Result Value Ref Range    Free T4 1.06 0.93 - 1.70 ng/dL   Hemoglobin A1c    Specimen: Blood   Result Value Ref Range    Hemoglobin A1C 5.40 4.80 - 5.60 %   CBC & Differential    Specimen: Blood   Result Value Ref Range    WBC 5.72 3.40 - 10.80 10*3/mm3    RBC 5.16 4.14 - 5.80 10*6/mm3    Hemoglobin 14.7 13.0 - 17.7 g/dL    Hematocrit 44.9 37.5 - 51.0 %    MCV 87.0 79.0 - 97.0 fL    MCH 28.5 26.6 - 33.0 pg    MCHC 32.7 31.5 - 35.7 g/dL    RDW 15.8 (H) 12.3 - 15.4 %    Platelets 272 140 - 450 10*3/mm3    Neutrophil Rel % 48.0 42.7 - 76.0 %    Lymphocyte Rel % 35.8 19.6 - 45.3 %    Monocyte Rel % 10.5 5.0 - 12.0 %    Eosinophil Rel % 2.8 0.3 - 6.2 %    Basophil Rel % 1.2 0.0 - 1.5 %    Neutrophils Absolute 2.74 1.70 - 7.00 10*3/mm3    Lymphocytes Absolute 2.05 0.70 - 3.10 10*3/mm3    Monocytes Absolute 0.60 0.10 - 0.90 10*3/mm3    Eosinophils Absolute 0.16 0.00 - 0.40 10*3/mm3    Basophils Absolute 0.07 0.00 - 0.20 10*3/mm3    Immature Granulocyte Rel % 1.7 (H) 0.0 - 0.5 %    Immature Grans Absolute 0.10 (H) 0.00 - 0.05 10*3/mm3    nRBC 0.0 0.0 - 0.2 /100 WBC     Result Review :                  Assessment and Plan    Diagnoses and all orders for this visit:    1. Other infective acute otitis externa of right ear (Primary)  -     Discontinue: neomycin-colistin-hydrocortisone-thonzonium (Cortisporin-TC) 3.3-3-10-0.5 MG/ML otic suspension; Administer 4 drops to the right ear 4 (Four) Times a Day.  Dispense: 10 mL; Refill: 0  -     neomycin-polymyxin-hydrocortisone (CORTISPORIN) 3.5-55129-3 otic solution; Administer 3 drops to the right ear 4 (Four) Times a Day.  Dispense: 10 mL; Refill: 0         BMI is within normal parameters. No other follow-up for BMI required.             Outpatient Medications Prior to Visit   Medication Sig Dispense Refill   • alendronate (FOSAMAX)  70 MG tablet Take 1 tablet by mouth 1 (One) Time Per Week. 12 tablet 0   • Allegra-D Allergy & Congestion  MG per 12 hr tablet Take 1 tablet by mouth twice daily 60 tablet 0   • lamoTRIgine (LaMICtal) 200 MG tablet Take 200 mg by mouth Every Night. 1/2 pill at noon     • memantine (NAMENDA) 10 MG tablet Take 10 mg by mouth 2 (Two) Times a Day.     • Misc. Devices (Rollator Ultra-Light) misc 1 each Daily. 1 each 0   • OLANZapine (zyPREXA) 20 MG tablet Take 20 mg by mouth every night at bedtime.     • omeprazole (priLOSEC) 40 MG capsule Take 1 capsule by mouth Daily. 90 capsule 3   • polyethylene glycol (MIRALAX) powder Take 17 g by mouth Daily. (Patient taking differently: Take 17 g by mouth Daily As Needed.) 850 g 5   • rivastigmine (EXELON) 6 MG capsule Take 6 mg by mouth 2 (Two) Times a Day.     • tamsulosin (FLOMAX) 0.4 MG capsule 24 hr capsule Take 2 capsules by mouth once daily 180 capsule 0     No facility-administered medications prior to visit.     New Medications Ordered This Visit   Medications   • neomycin-polymyxin-hydrocortisone (CORTISPORIN) 3.5-19250-5 otic solution     Sig: Administer 3 drops to the right ear 4 (Four) Times a Day.     Dispense:  10 mL     Refill:  0     [unfilled]  Medications Discontinued During This Encounter   Medication Reason   • neomycin-colistin-hydrocortisone-thonzonium (Cortisporin-TC) 3.3-3-10-0.5 MG/ML otic suspension *Therapy completed         No follow-ups on file.    Patient was given instructions and counseling regarding her condition or for health maintenance advice. Please see specific information pulled into the AVS if appropriate.

## 2022-09-23 NOTE — OUTREACH NOTE
AMBULATORY CASE MANAGEMENT NOTE    Name and Relationship of Patient/Support Person: Andrew Redmond H - Self  Patient Outreach  RN-ACM outreach with patient.  Discussed  9/21/22 ED visit regarding foreign body in right auricle. Patient treated and discharged home. Patient states piece of hearing aid removed from ear ; has had no difficulty with bleeding; pain or hearing. Patient states hearing improving; states to have not been able to irrigate right ear and to have not needed Tylenol due to no difficulty with pain. Patient states to have 9/23/22 PCP appointment. Patient states to be compliant with medications; medical appointments and no difficulty with fever; chest pain; SOB; appetite or sleeping. Reviewed with patient ED AVS recommendations; education; role of RN-ACM and HRCM case management services. Patient verbalized understanding and states to appreciate patient outreach . No further questions or concerns  voiced at this time.   Adult Patient Profile  Questions/Answers    Flowsheet Row Most Recent Value   Symptoms/Conditions Managed at Home HEENT (head, eyes, ears, nose, throat)   HEENT Symptoms/Conditions ear problem(s), other (see comments)  [Patient had foreign body in right ear auricle]   HEENT Management Strategies other (see comments), medication therapy  [ED recommendations regarding irrigation of ear and Physician follow up]   Barriers to Taking Medication as Prescribed none   Equipment Currently Used at Home rollator   People in Home alone        Social Work Assessment  Questions/Answers    Flowsheet Row Most Recent Value   People in Home alone   Functional Status Comments Patient states to be independent with ADL's,  meal preparation,  transportation and ambulates with rollator.    Equipment Currently Used at Home rollator        Send Education  Questions/Answers    Flowsheet Row Most Recent Value   Annual Wellness Visit:  Patient Has Completed   Other Patient Education/Resources  24/7 [facility]  Healthcare Nurse Call Line, Advanced Care Planning, [place]   24/7 Nurse Call Line Education Method Verbal   ACP Education Method Verbal   MyChart Education Method Verbal   Advanced Directives: Patient Has      SDOH updated and reviewed with the patient during this program:  Financial Resource Strain: Low Risk    • Difficulty of Paying Living Expenses: Not hard at all      Food Insecurity: No Food Insecurity   • Worried About Running Out of Food in the Last Year: Never true   • Ran Out of Food in the Last Year: Never true      Transportation Needs: No Transportation Needs   • Lack of Transportation (Medical): No   • Lack of Transportation (Non-Medical): No     Education Documentation  Unresolved/Worsening Symptoms, taught by Lachelle Marquez, RN at 9/23/2022 10:37 AM.  Learner: Patient  Readiness: Acceptance  Method: Explanation  Response: Verbalizes Understanding    Medication Management, taught by Lachelle Marquez, RN at 9/23/2022 10:37 AM.  Learner: Patient  Readiness: Acceptance  Method: Explanation  Response: Verbalizes Understanding    Skin Integrity Promotion, taught by Lachelle Marquez, RN at 9/23/2022 10:37 AM.  Learner: Patient  Readiness: Acceptance  Method: Explanation  Response: Verbalizes Understanding          LACHELLE PERALES  Ambulatory Case Management    9/23/2022, 10:37 EDT

## 2022-09-25 DIAGNOSIS — K22.89 PRESBYESOPHAGUS: ICD-10-CM

## 2022-09-26 RX ORDER — OMEPRAZOLE 40 MG/1
CAPSULE, DELAYED RELEASE ORAL
Qty: 90 CAPSULE | Refills: 2 | Status: SHIPPED | OUTPATIENT
Start: 2022-09-26

## 2022-10-05 ENCOUNTER — OFFICE VISIT (OUTPATIENT)
Dept: FAMILY MEDICINE CLINIC | Facility: CLINIC | Age: 77
End: 2022-10-05

## 2022-10-05 VITALS
WEIGHT: 153 LBS | BODY MASS INDEX: 23.19 KG/M2 | DIASTOLIC BLOOD PRESSURE: 74 MMHG | HEIGHT: 68 IN | HEART RATE: 82 BPM | SYSTOLIC BLOOD PRESSURE: 126 MMHG | TEMPERATURE: 98.2 F | OXYGEN SATURATION: 95 %

## 2022-10-05 DIAGNOSIS — H92.01 RIGHT EAR PAIN: Primary | ICD-10-CM

## 2022-10-05 PROCEDURE — 99213 OFFICE O/P EST LOW 20 MIN: CPT | Performed by: STUDENT IN AN ORGANIZED HEALTH CARE EDUCATION/TRAINING PROGRAM

## 2022-10-05 NOTE — PROGRESS NOTES
Jb Bello DO  [facility] PRIMARY CARE  [address] [place] [address] [address]  [place] [id number] [phone]    Subjective      Name Andrew Redmond MRN [id number]    1945 AGE/SEX 77 y.o. / male      Chief Complaint Chief Complaint   Patient presents with   • Establish Care     Pt was cleaning right ear with spray bottle and has since had trouble hearing and has been unable to put his hearing aid in.          Visit History for  10/05/2022    History of Present Illness  Andrew Redmond is a 77 y.o. male who presented today for an acute visit.  He is complaining of trouble hearing and right ear pain.    Was using a spray ear bottle with elephant nose attachment and is now having trouble hearing.  Overall though he states that his ear is feeling better.  Denies any fever, chills, discharge from the ear, and no bleeding.          Medications and Allergies   Current Outpatient Medications   Medication Instructions   • alendronate (FOSAMAX) 70 mg, Oral, Weekly   • Allegra-D Allergy & Congestion  MG per 12 hr tablet Take 1 tablet by mouth twice daily   • lamoTRIgine (LAMICTAL) 200 mg, Oral, Nightly, 1/2 pill at noon   • memantine (NAMENDA) 10 mg, Oral, 2 Times Daily   • Misc. Devices (Rollator Ultra-Light) misc 1 each, Does not apply, Daily   • neomycin-polymyxin-hydrocortisone (CORTISPORIN) 3.5-50825-4 otic solution 3 drops, Right Ear, 4 Times Daily   • OLANZapine (ZYPREXA) 20 mg, Oral, Every Night at Bedtime   • omeprazole (priLOSEC) 40 MG capsule Take 1 capsule by mouth once daily   • polyethylene glycol (MIRALAX) 17 g, Oral, Daily   • rivastigmine (EXELON) 6 mg, Oral, 2 Times Daily   • tamsulosin (FLOMAX) 0.4 MG capsule 24 hr capsule Take 2 capsules by mouth once daily     No Known Allergies   I have reviewed the above medications and allergies     Objective:      Vitals Vitals:    10/05/22 0941   BP: 126/74   Pulse: 82   Temp: 98.2 °F (36.8 °C)   TempSrc: Temporal   SpO2: 95%   Weight: 69.4 kg  "(153 lb)   Height: 172.7 cm (68\")     Body mass index is 23.26 kg/m².    Physical Exam  HENT:      Right Ear: Tympanic membrane normal. Tympanic membrane is not erythematous.      Left Ear: Tympanic membrane normal. Tympanic membrane is not erythematous.      Ears:      Comments: Some minor erythema in bilateral canal  Cardiovascular:      Rate and Rhythm: Normal rate and regular rhythm.   Psychiatric:         Mood and Affect: Mood normal.         Behavior: Behavior normal.            Assessment/Plan      Issues Addressed/ Plan  1. Right ear pain   - Mild erythema noted in bilateral ears, no other signs of infection, no other signs of trauma.  Bilateral TM appear to be normal as well.  Patient re-assured.  Should use debrox drops instead of continuous use of ear irrigation.  He should limit irrigation to once a month if needed.  Follow-up with PCP if needed.  Should be able to use hearing aids.        There are no Patient Instructions on file for this visit.      Follow up  recommended Return if symptoms worsen or fail to improve.   - Dragon voice recognition software and ECO were utilized to complete this chart.  Every reasonable attempt was made to edit and correct the text, however some incorrect words may remain.   Jb Bello, DO         "

## 2022-10-15 ENCOUNTER — HOSPITAL ENCOUNTER (EMERGENCY)
Facility: HOSPITAL | Age: 77
Discharge: HOME | End: 2022-10-15
Attending: EMERGENCY MEDICINE | Admitting: EMERGENCY MEDICINE

## 2022-10-15 VITALS
OXYGEN SATURATION: 98 % | TEMPERATURE: 97.4 F | BODY MASS INDEX: 22.73 KG/M2 | HEART RATE: 71 BPM | SYSTOLIC BLOOD PRESSURE: 132 MMHG | DIASTOLIC BLOOD PRESSURE: 74 MMHG | RESPIRATION RATE: 18 BRPM | HEIGHT: 68 IN | WEIGHT: 150 LBS

## 2022-10-15 DIAGNOSIS — S50.311A ABRASION OF RIGHT ELBOW, INITIAL ENCOUNTER: ICD-10-CM

## 2022-10-15 DIAGNOSIS — E87.1 HYPONATREMIA: Primary | ICD-10-CM

## 2022-10-15 DIAGNOSIS — G62.9 NEUROPATHY: ICD-10-CM

## 2022-10-15 LAB
ALBUMIN SERPL-MCNC: 4.1 G/DL (ref 3.5–5.2)
ALBUMIN/GLOB SERPL: 1.6 G/DL
ALP SERPL-CCNC: 128 U/L (ref 39–117)
ALT SERPL W P-5'-P-CCNC: 22 U/L (ref 1–41)
ANION GAP SERPL CALCULATED.3IONS-SCNC: 8.4 MMOL/L (ref 5–15)
AST SERPL-CCNC: 22 U/L (ref 1–40)
BASOPHILS # BLD AUTO: 0.06 10*3/MM3 (ref 0–0.2)
BASOPHILS NFR BLD AUTO: 0.9 % (ref 0–1.5)
BILIRUB SERPL-MCNC: 0.8 MG/DL (ref 0–1.2)
BILIRUB UR QL STRIP: NEGATIVE
BUN SERPL-MCNC: 10 MG/DL (ref 8–23)
BUN/CREAT SERPL: 16.1 (ref 7–25)
CALCIUM SPEC-SCNC: 9.3 MG/DL (ref 8.6–10.5)
CHLORIDE SERPL-SCNC: 96 MMOL/L (ref 98–107)
CLARITY UR: CLEAR
CO2 SERPL-SCNC: 26.6 MMOL/L (ref 22–29)
COLOR UR: YELLOW
CREAT SERPL-MCNC: 0.62 MG/DL (ref 0.76–1.27)
DEPRECATED RDW RBC AUTO: 51 FL (ref 37–54)
EGFRCR SERPLBLD CKD-EPI 2021: 98.4 ML/MIN/1.73
EOSINOPHIL # BLD AUTO: 0.14 10*3/MM3 (ref 0–0.4)
EOSINOPHIL NFR BLD AUTO: 2 % (ref 0.3–6.2)
ERYTHROCYTE [DISTWIDTH] IN BLOOD BY AUTOMATED COUNT: 16.4 % (ref 12.3–15.4)
GLOBULIN UR ELPH-MCNC: 2.5 GM/DL
GLUCOSE SERPL-MCNC: 107 MG/DL (ref 65–99)
GLUCOSE UR STRIP-MCNC: NEGATIVE MG/DL
HCT VFR BLD AUTO: 43.4 % (ref 37.5–51)
HGB BLD-MCNC: 14.4 G/DL (ref 13–17.7)
HGB UR QL STRIP.AUTO: NEGATIVE
IMM GRANULOCYTES # BLD AUTO: 0.07 10*3/MM3 (ref 0–0.05)
IMM GRANULOCYTES NFR BLD AUTO: 1 % (ref 0–0.5)
KETONES UR QL STRIP: NEGATIVE
LEUKOCYTE ESTERASE UR QL STRIP.AUTO: NEGATIVE
LYMPHOCYTES # BLD AUTO: 1.14 10*3/MM3 (ref 0.7–3.1)
LYMPHOCYTES NFR BLD AUTO: 16.2 % (ref 19.6–45.3)
MCH RBC QN AUTO: 28.9 PG (ref 26.6–33)
MCHC RBC AUTO-ENTMCNC: 33.2 G/DL (ref 31.5–35.7)
MCV RBC AUTO: 87 FL (ref 79–97)
MONOCYTES # BLD AUTO: 0.88 10*3/MM3 (ref 0.1–0.9)
MONOCYTES NFR BLD AUTO: 12.5 % (ref 5–12)
NEUTROPHILS NFR BLD AUTO: 4.73 10*3/MM3 (ref 1.7–7)
NEUTROPHILS NFR BLD AUTO: 67.4 % (ref 42.7–76)
NITRITE UR QL STRIP: NEGATIVE
NRBC BLD AUTO-RTO: 0 /100 WBC (ref 0–0.2)
PH UR STRIP.AUTO: 6.5 [PH] (ref 4.5–8)
PLATELET # BLD AUTO: 260 10*3/MM3 (ref 140–450)
PMV BLD AUTO: 9 FL (ref 6–12)
POTASSIUM SERPL-SCNC: 4.2 MMOL/L (ref 3.5–5.2)
PROT SERPL-MCNC: 6.6 G/DL (ref 6–8.5)
PROT UR QL STRIP: NEGATIVE
QT INTERVAL: 382 MS
RBC # BLD AUTO: 4.99 10*6/MM3 (ref 4.14–5.8)
SODIUM SERPL-SCNC: 131 MMOL/L (ref 136–145)
SP GR UR STRIP: 1.01 (ref 1–1.03)
UROBILINOGEN UR QL STRIP: NORMAL
WBC NRBC COR # BLD: 7.02 10*3/MM3 (ref 3.4–10.8)

## 2022-10-15 PROCEDURE — 36415 COLL VENOUS BLD VENIPUNCTURE: CPT

## 2022-10-15 PROCEDURE — 85025 COMPLETE CBC W/AUTO DIFF WBC: CPT | Performed by: EMERGENCY MEDICINE

## 2022-10-15 PROCEDURE — 81003 URINALYSIS AUTO W/O SCOPE: CPT | Performed by: EMERGENCY MEDICINE

## 2022-10-15 PROCEDURE — 99284 EMERGENCY DEPT VISIT MOD MDM: CPT

## 2022-10-15 PROCEDURE — 93005 ELECTROCARDIOGRAM TRACING: CPT | Performed by: EMERGENCY MEDICINE

## 2022-10-15 PROCEDURE — 80053 COMPREHEN METABOLIC PANEL: CPT | Performed by: EMERGENCY MEDICINE

## 2022-10-15 PROCEDURE — 93010 ELECTROCARDIOGRAM REPORT: CPT | Performed by: INTERNAL MEDICINE

## 2022-10-15 RX ORDER — SODIUM CHLORIDE 0.9 % (FLUSH) 0.9 %
10 SYRINGE (ML) INJECTION AS NEEDED
Status: DISCONTINUED | OUTPATIENT
Start: 2022-10-15 | End: 2022-10-15 | Stop reason: HOSPADM

## 2022-10-15 RX ADMIN — SODIUM CHLORIDE 500 ML: 9 INJECTION, SOLUTION INTRAVENOUS at 11:30

## 2022-10-15 NOTE — ED PROVIDER NOTES
"Subjective   History of Present Illness  Andrew Redmond is a 77-year-old white male who present secondary to fall.  Patient has neuropathy with associated ataxia.  However patient usually ambulates with the help of his Rollator without significant difficulty.  This morning Mr. Redmond states he stood up\" fell straight over\".  No head injury.  No loss of consciousness.  Patient had difficulty getting back to his feet.  When he did he still felt a bit off balance.  Thus he called 911.  Patient is stable in transport.  The only injuries sustained he has an abrasion to the right elbow.  Patient placed Band-Aids on the elbow prior to coming to the ED.  Mr. Redmond presents for evaluation.    History provided by:  Patient      Review of Systems   Constitutional: Negative for fever.   HENT: Negative for rhinorrhea.    Eyes: Negative for redness.   Respiratory: Negative for cough.    Cardiovascular: Negative for chest pain.   Gastrointestinal: Negative for abdominal pain.   Genitourinary: Negative for dysuria.   Musculoskeletal: Positive for gait problem. Negative for back pain.   Skin: Negative for rash.   Neurological: Positive for weakness and numbness (Bilateral lower extremity). Negative for syncope.   All other systems reviewed and are negative.      Past Medical History:   Diagnosis Date   • Alzheimer disease (HCC)    • Alzheimer disease (HCC)    • Alzheimer disease (HCC) 03/06/2019   • Anxiety and depression    • Bipolar 1 disorder (HCC)    • Colon polyp    • Constipation    • Depression    • Diarrhea    • Epilepsy (HCC)    • Epilepsy (HCC) 03/06/2019   • Erectile dysfunction    • GERD (gastroesophageal reflux disease)    • L1 vertebral fracture (HCC)    • Seizures (HCC)     absence seizures.  well controlled.       No Known Allergies    Past Surgical History:   Procedure Laterality Date   • CHOLECYSTECTOMY     • COLONOSCOPY N/A 4/1/2016    Procedure: COLONOSCOPY with polypectomy;  Surgeon: Akiko cSales MD;  Location: "  [facility] OR;  Service:    • GALLBLADDER SURGERY     • HIP HEMIARTHROPLASTY Right 3/8/2019    Procedure: HIP HEMIARTHROPLASTY and all associated procedures;  Surgeon: Harrison Turk MD;  Location: [facility] OR;  Service: Orthopedics   • LIVER SURGERY     • TONSILLECTOMY         Family History   Problem Relation Age of Onset   • Lung cancer Father    • No Known Problems Mother    • No Known Problems Maternal Grandmother    • No Known Problems Maternal Grandfather    • No Known Problems Paternal Grandmother    • No Known Problems Paternal Grandfather    • Colon polyps Neg Hx    • Colon cancer Neg Hx        Social History     Socioeconomic History   • Marital status:    • Number of children: 0   • Years of education: Ph.D   Tobacco Use   • Smoking status: Former     Packs/day: 2.00     Years: 20.00     Pack years: 40.00     Types: Cigarettes     Quit date: 2/3/1990     Years since quittin.7   • Smokeless tobacco: Former     Quit date:    • Tobacco comments:     quit 30 yrs ago    Vaping Use   • Vaping Use: Never used   Substance and Sexual Activity   • Alcohol use: No   • Drug use: No   • Sexual activity: Defer           Objective   Physical Exam  Vitals and nursing note reviewed.   Constitutional:       General: He is not in acute distress.     Appearance: Normal appearance. He is well-developed. He is not ill-appearing, toxic-appearing or diaphoretic.      Comments: 77-year-old white male laying in bed.  Patient appears in fair overall health.  Vital signs unremarkable.  Patient friendly and cooperative.   HENT:      Head: Normocephalic and atraumatic.      Right Ear: Tympanic membrane, ear canal and external ear normal.      Left Ear: Tympanic membrane, ear canal and external ear normal.      Nose: Nose normal.      Mouth/Throat:      Mouth: Mucous membranes are moist.      Pharynx: Oropharynx is clear.   Eyes:      Extraocular Movements: Extraocular movements intact.      Conjunctiva/sclera:  Conjunctivae normal.      Pupils: Pupils are equal, round, and reactive to light.   Cardiovascular:      Rate and Rhythm: Normal rate and regular rhythm.      Pulses: Normal pulses.      Heart sounds: Normal heart sounds. No murmur heard.    No friction rub. No gallop.   Pulmonary:      Effort: Pulmonary effort is normal. No respiratory distress.      Breath sounds: Normal breath sounds. No stridor. No wheezing or rales.   Abdominal:      General: There is no distension.      Palpations: Abdomen is soft. There is no mass.      Tenderness: There is no abdominal tenderness. There is no guarding or rebound.      Hernia: No hernia is present.   Musculoskeletal:         General: Normal range of motion.      Cervical back: Normal range of motion and neck supple.   Skin:     General: Skin is warm and dry.      Capillary Refill: Capillary refill takes less than 2 seconds.      Findings: No erythema or rash.   Neurological:      General: No focal deficit present.      Mental Status: He is alert and oriented to person, place, and time.      Cranial Nerves: No cranial nerve deficit.      Deep Tendon Reflexes: Reflexes are normal and symmetric.   Psychiatric:         Mood and Affect: Mood normal.         Behavior: Behavior normal.         Procedures       EKG 12-lead  Date 10/15/2022  Time 10: 02  Normal sinus rhythm  Normal rate  Indeterminate axis  Prolonged TN interval  Interventricular conduction delay present  Q waves in V1 and V2 indicating anteroseptal infarct.  No ST elevation or depression suggesting acute infarct.    T wave abnormalities seen in V1, V2 and aVL  Abnormal EKG    Unchanged from EKG dated 9/24/2020    ED Course  ED Course as of 10/15/22 1610   Sat Oct 15, 2022   1001 Patient has small abrasion right elbow.  No other signs of injury.  Patient declined tetanus shot.  Obtaining routine lab work, EKG and orthostatics. [SS]   1110 Sodium(!): 131 [SS]   1110 CBC is unremarkable.  CMP notable for mild  hyponatremia sodium 131.  Otherwise unremarkable.  EMR shows patient's sodium has been trending down over the past few months.  Awaiting urine sample. [SS]   1111 On orthostatics patient's blood pressure did drop notably from sitting to standing.  Heart rate did not change significantly.  We will give normal saline bolus. [SS]   1242 Patient feeling better.  He was ambulated in the room without difficulty.  Thus will DC home.  Discussed at length with patient all results, diagnosis, treatment and follow-up.  Will DC home. [SS]      ED Course User Index  [SS] Gerardo Martinez MD      Labs Reviewed   COMPREHENSIVE METABOLIC PANEL - Abnormal; Notable for the following components:       Result Value    Glucose 107 (*)     Creatinine 0.62 (*)     Sodium 131 (*)     Chloride 96 (*)     Alkaline Phosphatase 128 (*)     All other components within normal limits    Narrative:     GFR Normal >60  Chronic Kidney Disease <60  Kidney Failure <15     CBC WITH AUTO DIFFERENTIAL - Abnormal; Notable for the following components:    RDW 16.4 (*)     Lymphocyte % 16.2 (*)     Monocyte % 12.5 (*)     Immature Grans % 1.0 (*)     Immature Grans, Absolute 0.07 (*)     All other components within normal limits   URINALYSIS W/ MICROSCOPIC IF INDICATED (NO CULTURE) - Normal    Narrative:     Urine microscopic not indicated.   CBC AND DIFFERENTIAL    Narrative:     The following orders were created for panel order CBC & Differential.  Procedure                               Abnormality         Status                     ---------                               -----------         ------                     CBC Auto Differential[[id number]]        Abnormal            Final result                 Please view results for these tests on the individual orders.     No results found.    My differential diagnosis includes but is not limited to generalized weakness, electrolyte abnormality, CVA, TIA, Bell's palsy, acute MI, GI bleed, urinary tract  infection, systemic infections including sepsis, alcohol abuse, drug abuse including prescription and street drug.                                       MDM    Final diagnoses:   Hyponatremia   Abrasion of right elbow, initial encounter   Neuropathy       ED Disposition  ED Disposition     ED Disposition   Discharge    Condition   Stable    Comment   --             Jessica Birch MD  [address] [facility] [address]  [place] [address]  [phone]    Schedule an appointment as soon as possible for a visit in 1 week  for continued or worsened symptoms, Sooner if needed         Medication List      Changed    polyethylene glycol 17 GM/SCOOP powder  Commonly known as: MIRALAX  Take 17 g by mouth Daily.  What changed:   · when to take this  · reasons to take this             Gerardo Martinez MD  10/15/22 2036

## 2022-10-15 NOTE — DISCHARGE INSTRUCTIONS
Medication as directed.  Your sodium was slightly low today.  Recommend eating foods such as potato chips or pretzels daily.  Have PCP recheck your sodium levels in 1 week.  Follow-up with your PCP as above.  Return to ED for worsening symptoms, medical emergencies.

## 2022-10-19 ENCOUNTER — OFFICE VISIT (OUTPATIENT)
Dept: INTERNAL MEDICINE | Facility: CLINIC | Age: 77
End: 2022-10-19

## 2022-10-19 VITALS
WEIGHT: 150 LBS | DIASTOLIC BLOOD PRESSURE: 68 MMHG | BODY MASS INDEX: 22.73 KG/M2 | SYSTOLIC BLOOD PRESSURE: 142 MMHG | TEMPERATURE: 97.3 F | OXYGEN SATURATION: 98 % | HEIGHT: 68 IN | HEART RATE: 91 BPM

## 2022-10-19 DIAGNOSIS — Z23 NEED FOR VACCINATION: Primary | ICD-10-CM
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
## 2022-10-19 DIAGNOSIS — E87.1 HYPONATREMIA: ICD-10-CM

## 2022-10-19 DIAGNOSIS — M81.0 AGE-RELATED OSTEOPOROSIS WITHOUT CURRENT PATHOLOGICAL FRACTURE: ICD-10-CM

## 2022-10-19 PROCEDURE — 99214 OFFICE O/P EST MOD 30 MIN: CPT | Performed by: INTERNAL MEDICINE

## 2022-10-19 PROCEDURE — 90662 IIV NO PRSV INCREASED AG IM: CPT | Performed by: INTERNAL MEDICINE

## 2022-10-19 PROCEDURE — G0008 ADMIN INFLUENZA VIRUS VAC: HCPCS | Performed by: INTERNAL MEDICINE

## 2022-10-19 RX ORDER — ALENDRONATE SODIUM 70 MG/1
70 TABLET ORAL WEEKLY
Qty: 12 TABLET | Refills: 3 | Status: SHIPPED | OUTPATIENT
Start: 2022-10-19

## 2022-10-19 NOTE — PROGRESS NOTES
Andrew Redmond is a 77 y.o. male, who presents with a chief complaint of   Chief Complaint   Patient presents with   • Hospital Follow Up Visit           HPI   Pt here for f/u.  He fell and had difficulty getting up so he called ems and went to the ED on 10/15.  No head injury or LOC.  His sodium was low at 131.  His sodium has slowly been trending down over the past year.  No recent med changes.  He has chronic neuropathy and says he is usually fine as long as he uses his walker.  He says he feels fine today. He eats regularly but eats lots of prepared meals/tv dinners.    Pt is on Lamictal and olanzapine for bipolar and no recent dose changes    He is also on namenda for dementia      The following portions of the patient's history were reviewed and updated as appropriate: allergies, current medications, past family history, past medical history, past social history, past surgical history and problem list.    Allergies: Patient has no known allergies.    Review of Systems   Constitutional: Negative.    HENT: Negative.    Eyes: Negative.    Respiratory: Negative.    Cardiovascular: Negative.    Gastrointestinal: Negative.    Endocrine: Negative.    Genitourinary: Negative.    Musculoskeletal: Negative.    Skin: Negative.    Allergic/Immunologic: Negative.    Neurological: Negative.    Hematological: Negative.    Psychiatric/Behavioral: Negative.    All other systems reviewed and are negative.            Wt Readings from Last 3 Encounters:   10/19/22 68 kg (150 lb)   10/15/22 68 kg (150 lb)   10/05/22 69.4 kg (153 lb)     Temp Readings from Last 3 Encounters:   10/19/22 97.3 °F (36.3 °C)   10/15/22 97.4 °F (36.3 °C) (Oral)   10/05/22 98.2 °F (36.8 °C) (Temporal)     BP Readings from Last 3 Encounters:   10/19/22 142/68   10/15/22 132/74   10/05/22 126/74     Pulse Readings from Last 3 Encounters:   10/19/22 91   10/15/22 71   10/05/22 82     Body mass index is 22.81 kg/m².  SpO2 Readings from Last 3 Encounters:    10/19/22 98%   10/15/22 98%   10/05/22 95%          Physical Exam  Vitals and nursing note reviewed.   Constitutional:       General: He is not in acute distress.     Appearance: He is well-developed.   HENT:      Head: Normocephalic and atraumatic.      Right Ear: External ear normal.      Left Ear: External ear normal.      Nose: Nose normal.   Eyes:      Conjunctiva/sclera: Conjunctivae normal.      Pupils: Pupils are equal, round, and reactive to light.   Cardiovascular:      Rate and Rhythm: Normal rate and regular rhythm.      Heart sounds: Normal heart sounds.   Pulmonary:      Effort: Pulmonary effort is normal. No respiratory distress.      Breath sounds: Normal breath sounds. No wheezing.   Musculoskeletal:      Cervical back: Normal range of motion and neck supple.      Comments: Walks with rolator   Skin:     General: Skin is warm and dry.   Neurological:      Mental Status: He is alert and oriented to person, place, and time.   Psychiatric:         Behavior: Behavior normal.         Thought Content: Thought content normal.         Judgment: Judgment normal.         Results for orders placed or performed during the hospital encounter of 10/15/22   Comprehensive Metabolic Panel    Specimen: Blood   Result Value Ref Range    Glucose 107 (H) 65 - 99 mg/dL    BUN 10 8 - 23 mg/dL    Creatinine 0.62 (L) 0.76 - 1.27 mg/dL    Sodium 131 (L) 136 - 145 mmol/L    Potassium 4.2 3.5 - 5.2 mmol/L    Chloride 96 (L) 98 - 107 mmol/L    CO2 26.6 22.0 - 29.0 mmol/L    Calcium 9.3 8.6 - 10.5 mg/dL    Total Protein 6.6 6.0 - 8.5 g/dL    Albumin 4.10 3.50 - 5.20 g/dL    ALT (SGPT) 22 1 - 41 U/L    AST (SGOT) 22 1 - 40 U/L    Alkaline Phosphatase 128 (H) 39 - 117 U/L    Total Bilirubin 0.8 0.0 - 1.2 mg/dL    Globulin 2.5 gm/dL    A/G Ratio 1.6 g/dL    BUN/Creatinine Ratio 16.1 7.0 - 25.0    Anion Gap 8.4 5.0 - 15.0 mmol/L    eGFR 98.4 >60.0 mL/min/1.73   Urinalysis With Microscopic If Indicated (No Culture) - Urine, Clean  Catch    Specimen: Urine, Clean Catch   Result Value Ref Range    Color, UA Yellow Yellow, Straw    Appearance, UA Clear Clear    pH, UA 6.5 4.5 - 8.0    Specific Gravity, UA 1.010 1.003 - 1.030    Glucose, UA Negative Negative    Ketones, UA Negative Negative    Bilirubin, UA Negative Negative    Blood, UA Negative Negative    Protein, UA Negative Negative    Leuk Esterase, UA Negative Negative    Nitrite, UA Negative Negative    Urobilinogen, UA 0.2 E.U./dL 0.2 - 1.0 E.U./dL   CBC Auto Differential    Specimen: Blood   Result Value Ref Range    WBC 7.02 3.40 - 10.80 10*3/mm3    RBC 4.99 4.14 - 5.80 10*6/mm3    Hemoglobin 14.4 13.0 - 17.7 g/dL    Hematocrit 43.4 37.5 - 51.0 %    MCV 87.0 79.0 - 97.0 fL    MCH 28.9 26.6 - 33.0 pg    MCHC 33.2 31.5 - 35.7 g/dL    RDW 16.4 (H) 12.3 - 15.4 %    RDW-SD 51.0 37.0 - 54.0 fl    MPV 9.0 6.0 - 12.0 fL    Platelets 260 140 - 450 10*3/mm3    Neutrophil % 67.4 42.7 - 76.0 %    Lymphocyte % 16.2 (L) 19.6 - 45.3 %    Monocyte % 12.5 (H) 5.0 - 12.0 %    Eosinophil % 2.0 0.3 - 6.2 %    Basophil % 0.9 0.0 - 1.5 %    Immature Grans % 1.0 (H) 0.0 - 0.5 %    Neutrophils, Absolute 4.73 1.70 - 7.00 10*3/mm3    Lymphocytes, Absolute 1.14 0.70 - 3.10 10*3/mm3    Monocytes, Absolute 0.88 0.10 - 0.90 10*3/mm3    Eosinophils, Absolute 0.14 0.00 - 0.40 10*3/mm3    Basophils, Absolute 0.06 0.00 - 0.20 10*3/mm3    Immature Grans, Absolute 0.07 (H) 0.00 - 0.05 10*3/mm3    nRBC 0.0 0.0 - 0.2 /100 WBC   ECG 12 Lead   Result Value Ref Range    QT Interval 382 ms     Result Review :       Data reviewed: Recent hospitalization notes Ed records from 10/15           Assessment and Plan    Diagnoses and all orders for this visit:    1. Need for vaccination (Primary)  -     Fluzone High-Dose 65+yrs (8416-1254)    2. Hyponatremia  -     Basic Metabolic Panel    3. Fall in home, subsequent encounter    4. Age-related osteoporosis without current pathological fracture  -     alendronate (FOSAMAX) 70 MG  tablet; Take 1 tablet by mouth 1 (One) Time Per Week.  Dispense: 12 tablet; Refill: 3     offered home health but pt declines.  He is at high risk for fracture or injury from fall bc of diagnosis of osteoporosis as well as neuropathy.     BMI is within normal parameters. No other follow-up for BMI required.             Outpatient Medications Prior to Visit   Medication Sig Dispense Refill   • Allegra-D Allergy & Congestion  MG per 12 hr tablet Take 1 tablet by mouth twice daily 60 tablet 0   • lamoTRIgine (LaMICtal) 200 MG tablet Take 200 mg by mouth Every Night. 1/2 pill at noon     • memantine (NAMENDA) 10 MG tablet Take 10 mg by mouth 2 (Two) Times a Day.     • Misc. Devices (Rollator Ultra-Light) misc 1 each Daily. 1 each 0   • neomycin-polymyxin-hydrocortisone (CORTISPORIN) 3.5-83198-2 otic solution Administer 3 drops to the right ear 4 (Four) Times a Day. 10 mL 0   • OLANZapine (zyPREXA) 20 MG tablet Take 20 mg by mouth every night at bedtime.     • omeprazole (priLOSEC) 40 MG capsule Take 1 capsule by mouth once daily 90 capsule 2   • polyethylene glycol (MIRALAX) powder Take 17 g by mouth Daily. (Patient taking differently: Take 17 g by mouth Daily As Needed.) 850 g 5   • rivastigmine (EXELON) 6 MG capsule Take 6 mg by mouth 2 (Two) Times a Day.     • tamsulosin (FLOMAX) 0.4 MG capsule 24 hr capsule Take 2 capsules by mouth once daily 180 capsule 0   • alendronate (FOSAMAX) 70 MG tablet Take 1 tablet by mouth 1 (One) Time Per Week. 12 tablet 0     No facility-administered medications prior to visit.     New Medications Ordered This Visit   Medications   • alendronate (FOSAMAX) 70 MG tablet     Sig: Take 1 tablet by mouth 1 (One) Time Per Week.     Dispense:  12 tablet     Refill:  3     [unfilled]  Medications Discontinued During This Encounter   Medication Reason   • alendronate (FOSAMAX) 70 MG tablet Reorder         No follow-ups on file.    Patient was given instructions and counseling  regarding her condition or for health maintenance advice. Please see specific information pulled into the AVS if appropriate.

## 2022-10-20 LAB
BUN SERPL-MCNC: 15 MG/DL (ref 8–23)
BUN/CREAT SERPL: 20 (ref 7–25)
CALCIUM SERPL-MCNC: 9.5 MG/DL (ref 8.6–10.5)
CHLORIDE SERPL-SCNC: 98 MMOL/L (ref 98–107)
CO2 SERPL-SCNC: 29.9 MMOL/L (ref 22–29)
CREAT SERPL-MCNC: 0.75 MG/DL (ref 0.76–1.27)
EGFRCR SERPLBLD CKD-EPI 2021: 92.9 ML/MIN/1.73
GLUCOSE SERPL-MCNC: 92 MG/DL (ref 65–99)
POTASSIUM SERPL-SCNC: 4.5 MMOL/L (ref 3.5–5.2)
SODIUM SERPL-SCNC: 136 MMOL/L (ref 136–145)

## 2022-11-02 ENCOUNTER — TELEPHONE (OUTPATIENT)
Dept: INTERNAL MEDICINE | Facility: CLINIC | Age: 77
End: 2022-11-02

## 2022-11-08 RX ORDER — TAMSULOSIN HYDROCHLORIDE 0.4 MG/1
CAPSULE ORAL
Qty: 180 CAPSULE | Refills: 0 | Status: SHIPPED | OUTPATIENT
Start: 2022-11-08 | End: 2023-02-06

## 2022-12-22 ENCOUNTER — TELEPHONE (OUTPATIENT)
Dept: INTERNAL MEDICINE | Facility: CLINIC | Age: 77
End: 2022-12-22

## 2022-12-22 ENCOUNTER — HOSPITAL ENCOUNTER (EMERGENCY)
Facility: HOSPITAL | Age: 77
Discharge: HOME | End: 2022-12-22
Attending: EMERGENCY MEDICINE | Admitting: EMERGENCY MEDICINE

## 2022-12-22 VITALS
DIASTOLIC BLOOD PRESSURE: 72 MMHG | TEMPERATURE: 98.3 F | OXYGEN SATURATION: 95 % | WEIGHT: 150 LBS | SYSTOLIC BLOOD PRESSURE: 169 MMHG | HEIGHT: 68 IN | RESPIRATION RATE: 20 BRPM | HEART RATE: 94 BPM | BODY MASS INDEX: 22.73 KG/M2

## 2022-12-22 DIAGNOSIS — T23.202A BURN OF SECOND DEGREE OF LEFT HAND, UNSPECIFIED SITE, INITIAL ENCOUNTER: Primary | ICD-10-CM

## 2022-12-22 DIAGNOSIS — M79.642 LEFT HAND PAIN: ICD-10-CM

## 2022-12-22 PROCEDURE — 99282 EMERGENCY DEPT VISIT SF MDM: CPT

## 2022-12-22 RX ORDER — BACITRACIN ZINC 500 [USP'U]/G
1 OINTMENT TOPICAL ONCE
Status: COMPLETED | OUTPATIENT
Start: 2022-12-22 | End: 2022-12-22

## 2022-12-22 RX ORDER — IBUPROFEN 200 MG
1 TABLET ORAL 3 TIMES DAILY
Qty: 15 G | Refills: 0 | Status: SHIPPED | OUTPATIENT
Start: 2022-12-22

## 2022-12-22 RX ADMIN — BACITRACIN ZINC 1 APPLICATION: 500 OINTMENT TOPICAL at 14:59
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
## 2022-12-23 ENCOUNTER — PATIENT OUTREACH (OUTPATIENT)
Dept: CASE MANAGEMENT | Facility: OTHER | Age: 77
End: 2022-12-23
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
## 2022-12-27 ENCOUNTER — PATIENT OUTREACH (OUTPATIENT)
Dept: CASE MANAGEMENT | Facility: OTHER | Age: 77
End: 2022-12-27
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
## 2023-01-12 ENCOUNTER — OFFICE VISIT (OUTPATIENT)
Dept: INTERNAL MEDICINE | Facility: CLINIC | Age: 78
End: 2023-01-12
Payer: MEDICARE

## 2023-01-12 VITALS
HEART RATE: 73 BPM | WEIGHT: 158.2 LBS | DIASTOLIC BLOOD PRESSURE: 74 MMHG | BODY MASS INDEX: 23.98 KG/M2 | SYSTOLIC BLOOD PRESSURE: 126 MMHG | RESPIRATION RATE: 18 BRPM | TEMPERATURE: 97.3 F | HEIGHT: 68 IN | OXYGEN SATURATION: 98 %

## 2023-01-12 DIAGNOSIS — M54.6 THORACIC BACK PAIN, UNSPECIFIED BACK PAIN LATERALITY, UNSPECIFIED CHRONICITY: ICD-10-CM

## 2023-01-12 DIAGNOSIS — T23.262S PARTIAL THICKNESS BURN OF BACK OF LEFT HAND, SEQUELA: ICD-10-CM

## 2023-01-12 DIAGNOSIS — N40.1 NOCTURIA ASSOCIATED WITH BENIGN PROSTATIC HYPERPLASIA: ICD-10-CM

## 2023-01-12 DIAGNOSIS — R35.1 NOCTURIA ASSOCIATED WITH BENIGN PROSTATIC HYPERPLASIA: ICD-10-CM

## 2023-01-12 DIAGNOSIS — R03.0 ELEVATED BLOOD PRESSURE READING: Primary | ICD-10-CM

## 2023-01-12 PROCEDURE — 99214 OFFICE O/P EST MOD 30 MIN: CPT | Performed by: INTERNAL MEDICINE
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
## 2023-02-06 RX ORDER — TAMSULOSIN HYDROCHLORIDE 0.4 MG/1
CAPSULE ORAL
Qty: 180 CAPSULE | Refills: 0 | Status: SHIPPED | OUTPATIENT
Start: 2023-02-06

## 2023-03-15 ENCOUNTER — OFFICE VISIT (OUTPATIENT)
Dept: INTERNAL MEDICINE | Facility: CLINIC | Age: 78
End: 2023-03-15
Payer: MEDICARE

## 2023-03-15 VITALS
HEIGHT: 68 IN | SYSTOLIC BLOOD PRESSURE: 140 MMHG | HEART RATE: 72 BPM | OXYGEN SATURATION: 96 % | TEMPERATURE: 98 F | DIASTOLIC BLOOD PRESSURE: 78 MMHG | WEIGHT: 164.2 LBS | BODY MASS INDEX: 24.89 KG/M2

## 2023-03-15 DIAGNOSIS — F30.9 BIPOLAR I DISORDER, SINGLE MANIC EPISODE: ICD-10-CM

## 2023-03-15 DIAGNOSIS — Z12.5 PROSTATE CANCER SCREENING: ICD-10-CM

## 2023-03-15 DIAGNOSIS — N40.1 NOCTURIA ASSOCIATED WITH BENIGN PROSTATIC HYPERPLASIA: ICD-10-CM

## 2023-03-15 DIAGNOSIS — E87.1 HYPONATREMIA: ICD-10-CM

## 2023-03-15 DIAGNOSIS — M81.0 AGE-RELATED OSTEOPOROSIS WITHOUT CURRENT PATHOLOGICAL FRACTURE: ICD-10-CM

## 2023-03-15 DIAGNOSIS — R73.03 PREDIABETES: Primary | ICD-10-CM

## 2023-03-15 DIAGNOSIS — R79.9 ABNORMAL BLOOD CHEMISTRY: ICD-10-CM

## 2023-03-15 DIAGNOSIS — R35.1 NOCTURIA ASSOCIATED WITH BENIGN PROSTATIC HYPERPLASIA: ICD-10-CM

## 2023-03-15 DIAGNOSIS — Z79.899 HIGH RISK MEDICATION USE: ICD-10-CM

## 2023-03-15 PROCEDURE — 99214 OFFICE O/P EST MOD 30 MIN: CPT | Performed by: INTERNAL MEDICINE

## 2023-03-15 NOTE — PROGRESS NOTES
"      Andrew Redmond is a 77 y.o. male, who presents with a chief complaint of   Chief Complaint   Patient presents with   • Alzheimer's Disease     6 month follow up           HPI   Pt here for f/u.    He has had the \"shakes\" a couple of times.  He is on memantine 10mg bid and exelon 6mg bid.  He says his short term memory isnt good.  He works games and tries to stay sharp.  His long term memory is fine.  He is still living alone.     Bipolar - on lamictal and olanzapine.     No recent falls    Difficulty sleeping - he someitmes cant sleep and just watches tv.      Elevated bp reading - f/u bp readings have been ok.     Osteoporosis with hx of pathologic fx. He has been on alendronate since august 2019    gerd - doing well on PPI      The following portions of the patient's history were reviewed and updated as appropriate: allergies, current medications, past family history, past medical history, past social history, past surgical history and problem list.    Allergies: Patient has no known allergies.    Review of Systems   Constitutional: Negative.    HENT: Negative.    Eyes: Negative.    Respiratory: Negative.    Cardiovascular: Negative.    Gastrointestinal: Negative.    Endocrine: Negative.    Genitourinary: Negative.    Musculoskeletal: Negative.    Skin: Negative.    Allergic/Immunologic: Negative.    Neurological: Negative.    Hematological: Negative.    Psychiatric/Behavioral: Negative.    All other systems reviewed and are negative.            Wt Readings from Last 3 Encounters:   03/15/23 74.5 kg (164 lb 3.2 oz)   01/12/23 71.8 kg (158 lb 3.2 oz)   12/22/22 68 kg (150 lb)     Temp Readings from Last 3 Encounters:   03/15/23 98 °F (36.7 °C)   01/12/23 97.3 °F (36.3 °C) (Infrared)   12/22/22 98.3 °F (36.8 °C) (Oral)     BP Readings from Last 3 Encounters:   03/15/23 140/78   01/12/23 126/74   12/22/22 169/72     Pulse Readings from Last 3 Encounters:   03/15/23 72   01/12/23 73   12/22/22 94     Body mass " index is 24.97 kg/m².  SpO2 Readings from Last 3 Encounters:   03/15/23 96%   01/12/23 98%   12/22/22 95%          Physical Exam  Vitals and nursing note reviewed.   Constitutional:       General: He is not in acute distress.     Appearance: He is well-developed.   HENT:      Head: Normocephalic and atraumatic.      Right Ear: External ear normal.      Left Ear: External ear normal.      Nose: Nose normal.   Eyes:      Conjunctiva/sclera: Conjunctivae normal.      Pupils: Pupils are equal, round, and reactive to light.   Cardiovascular:      Rate and Rhythm: Normal rate and regular rhythm.      Heart sounds: Normal heart sounds.   Pulmonary:      Effort: Pulmonary effort is normal. No respiratory distress.      Breath sounds: Normal breath sounds. No wheezing.   Musculoskeletal:         General: Normal range of motion.      Cervical back: Normal range of motion and neck supple.      Comments: Normal gait   Skin:     General: Skin is warm and dry.   Neurological:      Mental Status: He is alert and oriented to person, place, and time.   Psychiatric:         Behavior: Behavior normal.         Thought Content: Thought content normal.         Judgment: Judgment normal.         Results for orders placed or performed in visit on 10/19/22   Basic Metabolic Panel    Specimen: Blood   Result Value Ref Range    Glucose 92 65 - 99 mg/dL    BUN 15 8 - 23 mg/dL    Creatinine 0.75 (L) 0.76 - 1.27 mg/dL    EGFR Result 92.9 >60.0 mL/min/1.73    BUN/Creatinine Ratio 20.0 7.0 - 25.0    Sodium 136 136 - 145 mmol/L    Potassium 4.5 3.5 - 5.2 mmol/L    Chloride 98 98 - 107 mmol/L    Total CO2 29.9 (H) 22.0 - 29.0 mmol/L    Calcium 9.5 8.6 - 10.5 mg/dL     Result Review :                  Assessment and Plan    Diagnoses and all orders for this visit:    1. Prediabetes (Primary)  -     CBC & Differential; Future  -     Comprehensive Metabolic Panel; Future  -     Hemoglobin A1c; Future  -     Lipid Panel With LDL / HDL Ratio; Future  -      TSH; Future  -     T4, Free; Future    2. Hyponatremia    3. Bipolar I disorder, single manic episode (HCC)  -     CBC & Differential; Future  -     Comprehensive Metabolic Panel; Future  -     Hemoglobin A1c; Future  -     Lipid Panel With LDL / HDL Ratio; Future  -     TSH; Future  -     T4, Free; Future    4. Abnormal blood chemistry  -     CBC & Differential; Future  -     Comprehensive Metabolic Panel; Future  -     Hemoglobin A1c; Future  -     Lipid Panel With LDL / HDL Ratio; Future  -     TSH; Future  -     T4, Free; Future    5. Prostate cancer screening  -     PSA Screen; Future    6. Age-related osteoporosis without current pathological fracture    7. Nocturia associated with benign prostatic hyperplasia    8. High risk medication use  -     CBC & Differential; Future  -     Comprehensive Metabolic Panel; Future  -     Hemoglobin A1c; Future  -     Lipid Panel With LDL / HDL Ratio; Future  -     TSH; Future  -     T4, Free; Future       Reviewed current medication plan with patient today.  Continue current medications as listed below.  Encouraged healthy diet/exercise.  OV labs in 6   months.  Pt to call sooner if any other issues arise.      Encouraged covid vaccination if not already done.  Covid vaccination can be scheduled at [email or web address]    BMI is within normal parameters. No other follow-up for BMI required.             Outpatient Medications Prior to Visit   Medication Sig Dispense Refill   • alendronate (FOSAMAX) 70 MG tablet Take 1 tablet by mouth 1 (One) Time Per Week. 12 tablet 3   • Allegra-D Allergy & Congestion  MG per 12 hr tablet Take 1 tablet by mouth twice daily 60 tablet 0   • lamoTRIgine (LaMICtal) 200 MG tablet Take 1 tablet by mouth Every Night. 1/2 pill at noon     • memantine (NAMENDA) 10 MG tablet Take 1 tablet by mouth 2 (Two) Times a Day.     • Misc. Devices (Rollator Ultra-Light) misc 1 each Daily. 1 each 0   •  neomycin-bacitracin-polymyxin (NEOSPORIN) 5-400-5000 ointment Apply 1 application topically to the appropriate area as directed 3 (Three) Times a Day. 15 g 0   • neomycin-polymyxin-hydrocortisone (CORTISPORIN) 3.5-68972-4 otic solution Administer 3 drops to the right ear 4 (Four) Times a Day. 10 mL 0   • OLANZapine (zyPREXA) 20 MG tablet Take 1 tablet by mouth every night at bedtime.     • omeprazole (priLOSEC) 40 MG capsule Take 1 capsule by mouth once daily 90 capsule 2   • polyethylene glycol (MIRALAX) powder Take 17 g by mouth Daily. (Patient taking differently: Take 17 g by mouth Daily As Needed.) 850 g 5   • rivastigmine (EXELON) 6 MG capsule Take 1 capsule by mouth 2 (Two) Times a Day.     • tamsulosin (FLOMAX) 0.4 MG capsule 24 hr capsule Take 2 capsules by mouth once daily 180 capsule 0     No facility-administered medications prior to visit.     No orders of the defined types were placed in this encounter.    [unfilled]  There are no discontinued medications.      No follow-ups on file.    Patient was given instructions and counseling regarding her condition or for health maintenance advice. Please see specific information pulled into the AVS if appropriate.

## 2023-05-15 RX ORDER — TAMSULOSIN HYDROCHLORIDE 0.4 MG/1
CAPSULE ORAL
Qty: 180 CAPSULE | Refills: 0 | Status: SHIPPED | OUTPATIENT
Start: 2023-05-15

## 2023-06-17 DIAGNOSIS — K22.89 PRESBYESOPHAGUS: ICD-10-CM

## 2023-06-19 RX ORDER — OMEPRAZOLE 40 MG/1
40 CAPSULE, DELAYED RELEASE ORAL DAILY
Qty: 90 CAPSULE | Refills: 0 | OUTPATIENT
Start: 2023-06-19

## 2023-08-14 RX ORDER — TAMSULOSIN HYDROCHLORIDE 0.4 MG/1
CAPSULE ORAL
Qty: 180 CAPSULE | Refills: 0 | Status: SHIPPED | OUTPATIENT
Start: 2023-08-14

## 2023-08-14 NOTE — TELEPHONE ENCOUNTER
Rx Refill Note  Requested Prescriptions     Pending Prescriptions Disp Refills    tamsulosin (FLOMAX) 0.4 MG capsule 24 hr capsule [Pharmacy Med Name: Tamsulosin HCl 0.4 MG Oral Capsule] 180 capsule 0     Sig: Take 2 capsules by mouth once daily      Last office visit with prescribing clinician: 1/20/2022   Last telemedicine visit with prescribing clinician: Visit date not found   Next office visit with prescribing clinician: Visit date not found                         Would you like a call back once the refill request has been completed: [] Yes [] No    If the office needs to give you a call back, can they leave a voicemail: [] Yes [] No    Velia Campbell, PCT  08/14/23, 15:51 EDT

## 2023-08-16 ENCOUNTER — OFFICE VISIT (OUTPATIENT)
Dept: GASTROENTEROLOGY | Facility: CLINIC | Age: 78
End: 2023-08-16
Payer: MEDICARE

## 2023-08-16 VITALS
HEIGHT: 68 IN | SYSTOLIC BLOOD PRESSURE: 130 MMHG | BODY MASS INDEX: 26.49 KG/M2 | WEIGHT: 174.8 LBS | DIASTOLIC BLOOD PRESSURE: 68 MMHG

## 2023-08-16 DIAGNOSIS — R09.89 GLOBUS SENSATION: Primary | ICD-10-CM

## 2023-08-16 DIAGNOSIS — K22.89 PRESBYESOPHAGUS: ICD-10-CM

## 2023-08-16 RX ORDER — FAMOTIDINE 40 MG/1
40 TABLET, FILM COATED ORAL NIGHTLY
Qty: 90 TABLET | Refills: 3 | Status: SHIPPED | OUTPATIENT
Start: 2023-08-16

## 2023-08-16 RX ORDER — OMEPRAZOLE 40 MG/1
40 CAPSULE, DELAYED RELEASE ORAL DAILY
Qty: 90 CAPSULE | Refills: 3 | Status: SHIPPED | OUTPATIENT
Start: 2023-08-16

## 2023-08-16 NOTE — PROGRESS NOTES
PATIENT INFORMATION  Andrew Redmond       - 1945    CHIEF COMPLAINT  Chief Complaint   Patient presents with    Heartburn    Presbyesophagus       HISTORY OF PRESENT ILLNESS  Here today for presbyesophagus issues    Managed on daily omeprazole. Reflux episode a week ago resolved with cepocol, phlegm in throat for awhile, but not sure how to get rid. No breakthrough episodes normally, swallowing is much better, no issues with food. Pills can be slow, but doing ok. Not taking fosamax. No NSAIDs other than occasional aspirin for headache, but rare.    Bowels are moving daily, easy, controlled, no bleeding or issues with hemorrhoids recently, uses miralax occassionally with good results, no concerns.    Last colon 2016 normal 0/1 adenomas, no further screenings due to vascular and memory issues    Heartburn  He reports no abdominal pain or no nausea.     REVIEWED PERTINENT RESULTS/ LABS  Lab Results   Component Value Date    CASEREPORT  2016     Surgical Pathology Report                         Case: [id number]                                  Authorizing Provider:  Akiko Scales MD          Collected:           2016 02:00 PM          Ordering Location:     [facility]   Received:            2016 03:17 PM                                 OR                                                                           Pathologist:           Stefani Conrad MD                                                     Specimen:    Large Intestine, Right/Ascending Colon, hepatic flexure polyp                               Lab Results   Component Value Date    HGB 14.4 10/15/2022    MCV 87.0 10/15/2022     10/15/2022    ALT 22 10/15/2022    AST 22 10/15/2022    HGBA1C 5.40 2022    INR 1.11 (H) 2020    TRIG 103 2022    FERRITIN 452.00 (H) 2019    IRON 23 (L) 2019    TIBC 110 (L) 2019      No results found.    REVIEW OF SYSTEMS  Review of  Systems   Constitutional: Negative.    HENT: Negative.     Eyes: Negative.    Respiratory: Negative.     Cardiovascular: Negative.    Gastrointestinal:  Negative for abdominal pain, constipation, diarrhea, nausea and vomiting.        GERD, Presbyesophagus    Endocrine: Negative.    Genitourinary: Negative.    Musculoskeletal: Negative.    Skin: Negative.    Allergic/Immunologic: Negative.    Neurological: Negative.    Hematological: Negative.    Psychiatric/Behavioral: Negative.         ACTIVE PROBLEMS  Patient Active Problem List    Diagnosis     Infected sebaceous cyst of skin [L72.3, L08.9]     Nocturia [R35.1]     Acute serous otitis media [H65.00]     Hip fracture due to osteoporosis, sequela [M80.059S]     Status post hip hemiarthroplasty [Z96.649]     Subacromial bursitis of left shoulder joint [M75.52]     Closed displaced fracture of neck of right scapula [S42.151A]     Closed nondisplaced fracture of right clavicle [S42.001A]     Displaced fracture of right femoral neck [S72.001A]     Weight loss [R63.4]     Closed compression fracture of L1 lumbar vertebra [S32.010A]     Acute bilateral low back pain without sciatica [M54.50]     Low energy [R53.83]     High risk medication use [Z79.899]     Poor balance [R26.89]     Erectile dysfunction [N52.9]     Chronic constipation [K59.09]     Hemorrhoids [K64.9]     Polyp of colon [K63.5]     Alzheimer's dementia without behavioral disturbance [G30.9, F02.80]     Bipolar I disorder, single manic episode [F30.9]     Epilepsy [G40.909]     Seizure [R56.9]     Subdural hematoma [S06.5XAA]          PAST MEDICAL HISTORY  Past Medical History:   Diagnosis Date    Alzheimer disease     Alzheimer disease     Alzheimer disease 03/06/2019    Anxiety and depression     Bipolar 1 disorder     Colon polyp     Constipation     Depression     Diarrhea     Epilepsy     Epilepsy 03/06/2019    Erectile dysfunction     GERD (gastroesophageal reflux disease)     L1 vertebral fracture      Seizures     absence seizures.  well controlled.         SURGICAL HISTORY  Past Surgical History:   Procedure Laterality Date    CHOLECYSTECTOMY      COLONOSCOPY N/A 2016    Procedure: COLONOSCOPY with polypectomy;  Surgeon: Akiko Scales MD;  Location[facility] OR;  Service:     GALLBLADDER SURGERY      HIP HEMIARTHROPLASTY Right 3/8/2019    Procedure: HIP HEMIARTHROPLASTY and all associated procedures;  Surgeon: Harrison Turk MD;  Location: [facility];  Service: Orthopedics    LIVER SURGERY      TONSILLECTOMY           FAMILY HISTORY  Family History   Problem Relation Age of Onset    Lung cancer Father     No Known Problems Mother     No Known Problems Maternal Grandmother     No Known Problems Maternal Grandfather     No Known Problems Paternal Grandmother     No Known Problems Paternal Grandfather     Colon polyps Neg Hx     Colon cancer Neg Hx          SOCIAL HISTORY  Social History     Occupational History    Occupation: professor     Comment: taught anatomy at [employer]   Tobacco Use    Smoking status: Former     Packs/day: 2.00     Years: 20.00     Pack years: 40.00     Types: Cigarettes     Quit date: 2/3/1990     Years since quittin.5     Passive exposure: Past    Smokeless tobacco: Former     Quit date:     Tobacco comments:     quit 30 yrs ago    Vaping Use    Vaping Use: Never used   Substance and Sexual Activity    Alcohol use: No    Drug use: No    Sexual activity: Defer         CURRENT MEDICATIONS    Current Outpatient Medications:     alendronate (FOSAMAX) 70 MG tablet, Take 1 tablet by mouth 1 (One) Time Per Week., Disp: 12 tablet, Rfl: 3    Allegra-D Allergy & Congestion  MG per 12 hr tablet, Take 1 tablet by mouth twice daily, Disp: 60 tablet, Rfl: 0    lamoTRIgine (LaMICtal) 200 MG tablet, Take 1 tablet by mouth Every Night. 1/2 pill at noon, Disp: , Rfl:     memantine (NAMENDA) 10 MG tablet, Take 1 tablet by mouth 2 (Two) Times a Day., Disp: , Rfl:     Misc.  "Devices (Rollator Ultra-Light) misc, 1 each Daily., Disp: 1 each, Rfl: 0    neomycin-bacitracin-polymyxin (NEOSPORIN) 5-400-5000 ointment, Apply 1 application topically to the appropriate area as directed 3 (Three) Times a Day., Disp: 15 g, Rfl: 0    neomycin-polymyxin-hydrocortisone (CORTISPORIN) 3.5-07537-7 otic solution, Administer 3 drops to the right ear 4 (Four) Times a Day., Disp: 10 mL, Rfl: 0    OLANZapine (zyPREXA) 20 MG tablet, Take 1 tablet by mouth every night at bedtime., Disp: , Rfl:     omeprazole (priLOSEC) 40 MG capsule, Take 1 capsule by mouth Daily., Disp: 90 capsule, Rfl: 3    polyethylene glycol (MIRALAX) powder, Take 17 g by mouth Daily. (Patient taking differently: Take 17 g by mouth Daily As Needed.), Disp: 850 g, Rfl: 5    rivastigmine (EXELON) 6 MG capsule, Take 1 capsule by mouth 2 (Two) Times a Day., Disp: , Rfl:     tamsulosin (FLOMAX) 0.4 MG capsule 24 hr capsule, Take 2 capsules by mouth once daily, Disp: 180 capsule, Rfl: 0    famotidine (PEPCID) 40 MG tablet, Take 1 tablet by mouth Every Night., Disp: 90 tablet, Rfl: 3    ALLERGIES  Patient has no known allergies.    VITALS  Vitals:    08/16/23 1039   BP: 130/68   BP Location: Left arm   Patient Position: Sitting   Cuff Size: Adult   Weight: 79.3 kg (174 lb 12.8 oz)   Height: 172.7 cm (67.99\")       PHYSICAL EXAM  Debilities/Disabilities Identified: None  Emotional Behavior: Appropriate  Wt Readings from Last 3 Encounters:   08/16/23 79.3 kg (174 lb 12.8 oz)   03/15/23 74.5 kg (164 lb 3.2 oz)   01/12/23 71.8 kg (158 lb 3.2 oz)     Ht Readings from Last 1 Encounters:   08/16/23 172.7 cm (67.99\")     Body mass index is 26.58 kg/mý.  Physical Exam  Constitutional:       General: He is not in acute distress.     Appearance: Normal appearance. He is not ill-appearing.   HENT:      Head: Normocephalic and atraumatic.      Mouth/Throat:      Mouth: Mucous membranes are moist.      Pharynx: No posterior oropharyngeal erythema.   Eyes:      " General: No scleral icterus.  Cardiovascular:      Rate and Rhythm: Normal rate and regular rhythm.      Heart sounds: Normal heart sounds.   Pulmonary:      Effort: Pulmonary effort is normal.      Breath sounds: Normal breath sounds.   Abdominal:      General: Abdomen is flat. Bowel sounds are normal. There is no distension.      Palpations: Abdomen is soft. There is no mass.      Tenderness: There is no abdominal tenderness. There is no guarding or rebound. Negative signs include Alegria's sign.      Hernia: No hernia is present.   Musculoskeletal:      Cervical back: Neck supple.   Skin:     General: Skin is warm.      Capillary Refill: Capillary refill takes less than 2 seconds.   Neurological:      General: No focal deficit present.      Mental Status: He is alert and oriented to person, place, and time.   Psychiatric:         Mood and Affect: Mood normal.         Behavior: Behavior normal.         Thought Content: Thought content normal.         Judgment: Judgment normal.       CLINICAL DATA REVIEWED   reviewed previous lab results and integrated with today's visit, reviewed notes from other physicians and/or last GI encounter, reviewed previous endoscopy results and available photos, reviewed surgical pathology results from previous biopsies    ASSESSMENT  Diagnoses and all orders for this visit:    Globus sensation  -     famotidine (PEPCID) 40 MG tablet; Take 1 tablet by mouth Every Night.  -     omeprazole (priLOSEC) 40 MG capsule; Take 1 capsule by mouth Daily.    Presbyesophagus  -     omeprazole (priLOSEC) 40 MG capsule; Take 1 capsule by mouth Daily.          PLAN    Will trial pepcid at night, patient will call with any concerns    Return in about 1 year (around 8/16/2024).    I have discussed the above plan with the patient.  They verbalize understanding and are in agreement with the plan.  They have been advised to contact the office for any questions, concerns, or changes related to their  health.

## 2023-08-23 DIAGNOSIS — Z12.5 PROSTATE CANCER SCREENING: ICD-10-CM

## 2023-08-23 DIAGNOSIS — Z79.899 HIGH RISK MEDICATION USE: ICD-10-CM

## 2023-08-23 DIAGNOSIS — R73.03 PREDIABETES: ICD-10-CM

## 2023-08-23 DIAGNOSIS — R79.9 ABNORMAL BLOOD CHEMISTRY: ICD-10-CM

## 2023-08-23 DIAGNOSIS — F30.9 BIPOLAR I DISORDER, SINGLE MANIC EPISODE: ICD-10-CM

## 2023-09-02 LAB
ALBUMIN SERPL-MCNC: 4.4 G/DL (ref 3.5–5.2)
ALBUMIN/GLOB SERPL: 2.1 G/DL
ALP SERPL-CCNC: 127 U/L (ref 39–117)
ALT SERPL-CCNC: 20 U/L (ref 1–41)
AST SERPL-CCNC: 22 U/L (ref 1–40)
BASOPHILS # BLD AUTO: 0.07 10*3/MM3 (ref 0–0.2)
BASOPHILS NFR BLD AUTO: 0.8 % (ref 0–1.5)
BILIRUB SERPL-MCNC: 0.5 MG/DL (ref 0–1.2)
BUN SERPL-MCNC: 11 MG/DL (ref 8–23)
BUN/CREAT SERPL: 14.1 (ref 7–25)
CALCIUM SERPL-MCNC: 9.4 MG/DL (ref 8.6–10.5)
CHLORIDE SERPL-SCNC: 97 MMOL/L (ref 98–107)
CHOLEST SERPL-MCNC: 149 MG/DL (ref 0–200)
CO2 SERPL-SCNC: 25.9 MMOL/L (ref 22–29)
CREAT SERPL-MCNC: 0.78 MG/DL (ref 0.76–1.27)
EGFRCR SERPLBLD CKD-EPI 2021: 91.9 ML/MIN/1.73
EOSINOPHIL # BLD AUTO: 0.26 10*3/MM3 (ref 0–0.4)
EOSINOPHIL NFR BLD AUTO: 3.1 % (ref 0.3–6.2)
ERYTHROCYTE [DISTWIDTH] IN BLOOD BY AUTOMATED COUNT: 16 % (ref 12.3–15.4)
GLOBULIN SER CALC-MCNC: 2.1 GM/DL
GLUCOSE SERPL-MCNC: 133 MG/DL (ref 65–99)
HBA1C MFR BLD: 5.6 % (ref 4.8–5.6)
HCT VFR BLD AUTO: 43.6 % (ref 37.5–51)
HDLC SERPL-MCNC: 42 MG/DL (ref 40–60)
HGB BLD-MCNC: 14.6 G/DL (ref 13–17.7)
IMM GRANULOCYTES # BLD AUTO: 0.09 10*3/MM3 (ref 0–0.05)
IMM GRANULOCYTES NFR BLD AUTO: 1.1 % (ref 0–0.5)
LDLC SERPL CALC-MCNC: 77 MG/DL (ref 0–100)
LDLC/HDLC SERPL: 1.71 {RATIO}
LYMPHOCYTES # BLD AUTO: 1.94 10*3/MM3 (ref 0.7–3.1)
LYMPHOCYTES NFR BLD AUTO: 22.8 % (ref 19.6–45.3)
MCH RBC QN AUTO: 28 PG (ref 26.6–33)
MCHC RBC AUTO-ENTMCNC: 33.5 G/DL (ref 31.5–35.7)
MCV RBC AUTO: 83.7 FL (ref 79–97)
MONOCYTES # BLD AUTO: 0.65 10*3/MM3 (ref 0.1–0.9)
MONOCYTES NFR BLD AUTO: 7.6 % (ref 5–12)
NEUTROPHILS # BLD AUTO: 5.5 10*3/MM3 (ref 1.7–7)
NEUTROPHILS NFR BLD AUTO: 64.6 % (ref 42.7–76)
NRBC BLD AUTO-RTO: 0 /100 WBC (ref 0–0.2)
PLATELET # BLD AUTO: 273 10*3/MM3 (ref 140–450)
POTASSIUM SERPL-SCNC: 4.6 MMOL/L (ref 3.5–5.2)
PROT SERPL-MCNC: 6.5 G/DL (ref 6–8.5)
PSA SERPL-MCNC: 1.84 NG/ML (ref 0–4)
RBC # BLD AUTO: 5.21 10*6/MM3 (ref 4.14–5.8)
SODIUM SERPL-SCNC: 134 MMOL/L (ref 136–145)
T4 FREE SERPL-MCNC: 1.32 NG/DL (ref 0.93–1.7)
TRIGL SERPL-MCNC: 175 MG/DL (ref 0–150)
TSH SERPL DL<=0.005 MIU/L-ACNC: 2.45 UIU/ML (ref 0.27–4.2)
VLDLC SERPL CALC-MCNC: 30 MG/DL (ref 5–40)
WBC # BLD AUTO: 8.51 10*3/MM3 (ref 3.4–10.8)

## 2023-09-06 ENCOUNTER — OFFICE VISIT (OUTPATIENT)
Dept: INTERNAL MEDICINE | Facility: CLINIC | Age: 78
End: 2023-09-06
Payer: MEDICARE

## 2023-09-06 VITALS
OXYGEN SATURATION: 95 % | HEART RATE: 72 BPM | SYSTOLIC BLOOD PRESSURE: 122 MMHG | TEMPERATURE: 97.7 F | DIASTOLIC BLOOD PRESSURE: 74 MMHG | WEIGHT: 171.4 LBS | BODY MASS INDEX: 25.98 KG/M2 | RESPIRATION RATE: 18 BRPM | HEIGHT: 68 IN

## 2023-09-06 DIAGNOSIS — R73.03 PREDIABETES: Primary | ICD-10-CM

## 2023-09-06 DIAGNOSIS — M81.0 AGE-RELATED OSTEOPOROSIS WITHOUT CURRENT PATHOLOGICAL FRACTURE: ICD-10-CM

## 2023-09-06 DIAGNOSIS — B35.1 ONYCHOMYCOSIS: ICD-10-CM

## 2023-09-06 PROCEDURE — 99214 OFFICE O/P EST MOD 30 MIN: CPT | Performed by: INTERNAL MEDICINE

## 2023-09-06 NOTE — PROGRESS NOTES
"Chief Complaint  Prediabetes (6 month follow up)    Subjective        Andrew Redmond presents to [facility]  History of Present Illness    77 year old gentleman with bipolar disorder, osteoporosis, and prediabetes who presents for follow-up. Complaining of toenail fungus and right ear swelling.    Osteoporosis - doesn't take aledronate regularly because he forgets his weekly dose. Uses walker at baseline to help with ambulation.    Bipolar disorder - on Zyprexa and Lamictal. No side effects from medication. A1c checked last week and increased to 5.6    Prediabetes - A1c up from 5.4 to 5.6. States that he has cut down on large TV dinners and drinking Ensures. Stays active by walking.        Objective   Vital Signs:  /74 (BP Location: Left arm, Patient Position: Sitting, Cuff Size: Adult)   Pulse 72   Temp 97.7 °F (36.5 °C) (Infrared)   Resp 18   Ht 172.7 cm (67.99\")   Wt 77.7 kg (171 lb 6.4 oz)   SpO2 95%   BMI 26.07 kg/m²   Estimated body mass index is 26.07 kg/m² as calculated from the following:    Height as of this encounter: 172.7 cm (67.99\").    Weight as of this encounter: 77.7 kg (171 lb 6.4 oz).       BMI is >= 25 and <30. (Overweight) The following options were offered after discussion;: exercise counseling/recommendations and nutrition counseling/recommendations      Physical Exam  Vitals reviewed.   Constitutional:       General: He is not in acute distress.     Appearance: Normal appearance. He is normal weight.   HENT:      Head: Normocephalic and atraumatic.      Right Ear: Tympanic membrane, ear canal and external ear normal.      Left Ear: Tympanic membrane, ear canal and external ear normal.      Nose: Nose normal.      Mouth/Throat:      Mouth: Mucous membranes are moist.      Pharynx: Oropharynx is clear.   Eyes:      Extraocular Movements: Extraocular movements intact.      Conjunctiva/sclera: Conjunctivae normal.      Pupils: Pupils are equal, round, and " reactive to light.   Cardiovascular:      Rate and Rhythm: Normal rate and regular rhythm.      Pulses: Normal pulses.      Heart sounds: Normal heart sounds. No murmur heard.    No friction rub. No gallop.   Pulmonary:      Effort: Pulmonary effort is normal. No respiratory distress.      Breath sounds: Normal breath sounds. No wheezing or rhonchi.   Abdominal:      General: Abdomen is flat. Bowel sounds are normal. There is no distension.      Palpations: Abdomen is soft. There is no mass.      Tenderness: There is no abdominal tenderness.   Musculoskeletal:         General: Normal range of motion.      Cervical back: Normal range of motion and neck supple.   Lymphadenopathy:      Cervical: No cervical adenopathy.   Skin:     General: Skin is warm.      Capillary Refill: Capillary refill takes less than 2 seconds.   Neurological:      General: No focal deficit present.      Mental Status: He is alert and oriented to person, place, and time.   Psychiatric:         Mood and Affect: Mood normal.         Behavior: Behavior normal.         Thought Content: Thought content normal.         Judgment: Judgment normal.      Result Review :  The following data was reviewed by: Jessica Birch MD on 09/06/2023:  Common labs          10/15/2022    10:05 10/19/2022    11:30 9/1/2023    09:28   Common Labs   Glucose 107  92  133    BUN 10  15  11    Creatinine 0.62  0.75  0.78    Sodium 131  136  134    Potassium 4.2  4.5  4.6    Chloride 96  98  97    Calcium 9.3  9.5  9.4    Total Protein   6.5    Albumin 4.10   4.4    Total Bilirubin 0.8   0.5    Alkaline Phosphatase 128   127    AST (SGOT) 22   22    ALT (SGPT) 22   20    WBC 7.02   8.51    Hemoglobin 14.4   14.6    Hematocrit 43.4   43.6    Platelets 260   273    Total Cholesterol   149    Triglycerides   175    HDL Cholesterol   42    LDL Cholesterol    77    Hemoglobin A1C   5.60    PSA   1.840                   Assessment and Plan   Diagnoses and all orders for this  visit:    1. Prediabetes (Primary)    2. Age-related osteoporosis without current pathological fracture    3. Onychomycosis    1 - Prediabetes - A1c increased to 5.6. Discussed nutrition and activity level today. On Olanzapine so will recheck in 6 months.    2 - Osteoporosis - patient with difficulty with weekly dosing. Encouraged patient to restart Alendronate.    3 - Onychomycosis - will defer treatment at this time. Patient not interested in referral to podiatry for toenail management given neuropathy and poor balance.         Follow Up   Return in about 6 months (around 3/6/2024) for Recheck, labs.  Patient was given instructions and counseling regarding his condition or for health maintenance advice. Please see specific information pulled into the AVS if appropriate.     Truong Bedoya MD  Internal Medicine/Pediatrics, PGY-3    I have seen and examined the patient independently.  I have reviewed the resident's findings as noted above and added to the note where appropriate.  Agree with above.    Jessica Birch MD  Attending Physician  Internal Medicine and Pediatrics

## 2023-11-02 NOTE — TELEPHONE ENCOUNTER
Rx Refill Note  Requested Prescriptions     Pending Prescriptions Disp Refills    tamsulosin (FLOMAX) 0.4 MG capsule 24 hr capsule [Pharmacy Med Name: Tamsulosin HCl 0.4 MG Oral Capsule] 180 capsule 0     Sig: Take 2 capsules by mouth once daily      Last office visit with prescribing clinician: 1/20/2022   Last telemedicine visit with prescribing clinician: Visit date not found   Next office visit with prescribing clinician: Visit date not found                         Would you like a call back once the refill request has been completed: [] Yes [] No    If the office needs to give you a call back, can they leave a voicemail: [] Yes [] No    Acacia Rowley MA  11/02/23, 12:30 EDT

## 2023-11-04 RX ORDER — TAMSULOSIN HYDROCHLORIDE 0.4 MG/1
CAPSULE ORAL
Qty: 180 CAPSULE | Refills: 0 | Status: SHIPPED | OUTPATIENT
Start: 2023-11-04

## 2023-12-29 ENCOUNTER — TELEPHONE (OUTPATIENT)
Dept: INTERNAL MEDICINE | Facility: CLINIC | Age: 78
End: 2023-12-29
Payer: MEDICARE

## 2024-01-24 DIAGNOSIS — M81.0 AGE-RELATED OSTEOPOROSIS WITHOUT CURRENT PATHOLOGICAL FRACTURE: ICD-10-CM

## 2024-01-25 RX ORDER — ALENDRONATE SODIUM 70 MG/1
70 TABLET ORAL WEEKLY
Qty: 12 TABLET | Refills: 0 | Status: SHIPPED | OUTPATIENT
Start: 2024-01-25

## 2024-01-25 NOTE — TELEPHONE ENCOUNTER
The original prescription was discontinued on 9/8/2023 by Jessica Birch MD for the following reason: *Therapy completed.     Rx Refill Note  Requested Prescriptions     Pending Prescriptions Disp Refills    alendronate (FOSAMAX) 70 MG tablet [Pharmacy Med Name: Alendronate Sodium 70 MG Oral Tablet] 12 tablet 0     Sig: Take 1 tablet by mouth once a week      Last office visit with prescribing clinician: 9/6/2023   Last telemedicine visit with prescribing clinician: Visit date not found   Next office visit with prescribing clinician: 3/8/2024                         Would you like a call back once the refill request has been completed: [] Yes [] No    If the office needs to give you a call back, can they leave a voicemail: [] Yes [] No    Velia Campbell, PCT  01/25/24, 09:23 EST

## 2024-01-29 NOTE — PROGRESS NOTES
Subjective:     Patient ID: Andrew Redmond is a 73 y.o. male.    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[employer]   Tobacco Use   • Smoking status: Former Smoker     Packs/day: 2.00     Years: 20.00     Pack years: 40.00   • Smokeless tobacco: Former User     Quit date: 1987   • Tobacco comment: quit 30 yrs ago    Substance and Sexual Activity   • Alcohol use: No   • Drug use: No   • Sexual activity: Defer      Past Medical History:   Diagnosis Date   • Alzheimer disease    • Alzheimer disease    • Alzheimer disease 03/06/2019   • Anxiety and depression    • Bipolar 1 disorder (CMS/HCC)    • Colon polyp    • Constipation    • Depression    • Diarrhea    • Epilepsy (CMS/HCC)    • Epilepsy (CMS/HCC) 03/06/2019   • Erectile dysfunction    • GERD (gastroesophageal reflux disease)    • L1 vertebral fracture (CMS/HCC)    • Seizures (CMS/HCC)     absence seizures.  well controlled.     Past Surgical History:   Procedure Laterality Date   • COLONOSCOPY N/A 4/1/2016    Procedure: COLONOSCOPY with polypectomy;  Surgeon: Akiko Scales MD;  Location: [facility];  Service:    • GALLBLADDER SURGERY     • HIP HEMIARTHROPLASTY Right 3/8/2019    Procedure: HIP HEMIARTHROPLASTY and all associated procedures;  Surgeon: Harrison Turk MD;  Location: [facility];  Service: Orthopedics   • LIVER SURGERY     • TONSILLECTOMY         Family History   Problem Relation Age of Onset   • Lung cancer Father    • No Known Problems Mother    • No Known Problems Maternal Grandmother    • No Known Problems Maternal Grandfather    • No Known Problems Paternal Grandmother    • No Known Problems Paternal Grandfather          Review of Systems   Constitutional: Negative for chills, diaphoresis, fever and unexpected weight change.   HENT: Negative for hearing loss, nosebleeds, sore throat and tinnitus.    Eyes: Negative  [facility] ADMISSION NOTE    Patient admitted to room 2314 at approximately 1640 via wheel chair from emergency room. Patient was accompanied by transport tech.     Verbal SBAR report received from Latha prior to patient arrival.     Patient ambulated to bed with stand-by assist. Patient alert and oriented X 3. The patient is not having any pain.  . Admission vital signs: Blood pressure (!) 158/95, pulse 111, temperature 98.9  F (37.2  C), temperature source Oral, resp. rate 18, weight 86.2 kg (190 lb), SpO2 98%. Patient was oriented to plan of care, call light, bed controls, tv, telephone, bathroom, and visiting hours.     Risk Assessment    The following safety risks were identified during admission: fall. Yellow risk band applied: YES.     Skin Initial Assessment    This writer admitted this patient and completed a full skin assessment and Ayden score in the Adult PCS flowsheet. Appropriate interventions initiated as needed.     Secondary skin check completed by RN.         Education    Patient has a [place] to Observation order: No  Observation education completed and documented: N/A      Yobani Rawls RN       for pain and visual disturbance.   Respiratory: Negative for cough, shortness of breath and wheezing.    Cardiovascular: Negative for chest pain and palpitations.   Gastrointestinal: Negative for abdominal pain, diarrhea, nausea and vomiting.   Endocrine: Negative for cold intolerance, heat intolerance and polydipsia.   Genitourinary: Negative for difficulty urinating, dysuria and hematuria.   Musculoskeletal: Positive for arthralgias. Negative for joint swelling and myalgias.   Skin: Negative for rash and wound.   Allergic/Immunologic: Negative for environmental allergies.   Neurological: Negative for dizziness, syncope and numbness.   Hematological: Does not bruise/bleed easily.   Psychiatric/Behavioral: Negative for dysphoric mood and sleep disturbance. The patient is not nervous/anxious.            Objective:  There were no vitals filed for this visit.  There were no vitals filed for this visit.  There is no height or weight on file to calculate BMI.      Ortho Exam       Imaging:    Assessment:      No diagnosis found.       Plan:    Orders:  No orders of the defined types were placed in this encounter.      I ordered and reviewed the NAYELY today.     Dictated utilizing Dragon dictation

## 2024-02-13 RX ORDER — TAMSULOSIN HYDROCHLORIDE 0.4 MG/1
CAPSULE ORAL
Qty: 180 CAPSULE | Refills: 0 | Status: SHIPPED | OUTPATIENT
Start: 2024-02-13

## 2024-03-08 ENCOUNTER — OFFICE VISIT (OUTPATIENT)
Dept: INTERNAL MEDICINE | Facility: CLINIC | Age: 79
End: 2024-03-08
Payer: MEDICARE

## 2024-03-08 VITALS
OXYGEN SATURATION: 98 % | TEMPERATURE: 98.2 F | HEART RATE: 89 BPM | DIASTOLIC BLOOD PRESSURE: 78 MMHG | WEIGHT: 162.4 LBS | SYSTOLIC BLOOD PRESSURE: 140 MMHG | BODY MASS INDEX: 24.61 KG/M2 | HEIGHT: 68 IN

## 2024-03-08 DIAGNOSIS — R73.03 PREDIABETES: ICD-10-CM

## 2024-03-08 DIAGNOSIS — F30.9 BIPOLAR I DISORDER, SINGLE MANIC EPISODE: ICD-10-CM

## 2024-03-08 DIAGNOSIS — Z00.00 MEDICARE ANNUAL WELLNESS VISIT, SUBSEQUENT: Primary | ICD-10-CM

## 2024-03-08 DIAGNOSIS — R79.9 ABNORMAL FINDING OF BLOOD CHEMISTRY, UNSPECIFIED: ICD-10-CM

## 2024-03-08 DIAGNOSIS — M81.0 AGE-RELATED OSTEOPOROSIS WITHOUT CURRENT PATHOLOGICAL FRACTURE: ICD-10-CM

## 2024-03-08 NOTE — PROGRESS NOTES
The ABCs of the Annual Wellness Visit  Subsequent Medicare Wellness Visit    Subjective    Andrew Redmond is a 78 y.o. male who presents for a Subsequent Medicare Wellness Visit.    The following portions of the patient's history were reviewed and   updated as appropriate: allergies, current medications, past family history, past medical history, past social history, past surgical history, and problem list.    Compared to one year ago, the patient feels his physical   health is the same.    Compared to one year ago, the patient feels his mental   health is the same.    Recent Hospitalizations:  He was not admitted to the hospital during the last year.       Current Medical Providers:  Patient Care Team:  Jessica Birch MD as PCP - General (Internal Medicine & Pediatrics)  Huan Romano MD as PCP - Family Medicine    Outpatient Medications Prior to Visit   Medication Sig Dispense Refill    alendronate (FOSAMAX) 70 MG tablet Take 1 tablet by mouth once a week 12 tablet 0    lamoTRIgine (LaMICtal) 200 MG tablet Take 1 tablet by mouth Every Night. 1/2 pill at noon      memantine (NAMENDA) 10 MG tablet Take 1 tablet by mouth 2 (Two) Times a Day.      Misc. Devices (Rollator Ultra-Light) misc 1 each Daily. 1 each 0    OLANZapine (zyPREXA) 20 MG tablet Take 1 tablet by mouth every night at bedtime.      omeprazole (priLOSEC) 40 MG capsule Take 1 capsule by mouth Daily. 90 capsule 3    rivastigmine (EXELON) 6 MG capsule Take 1 capsule by mouth 2 (Two) Times a Day.      tamsulosin (FLOMAX) 0.4 MG capsule 24 hr capsule Take 2 capsules by mouth once daily 180 capsule 0    Allegra-D Allergy & Congestion  MG per 12 hr tablet Take 1 tablet by mouth twice daily 60 tablet 0    neomycin-bacitracin-polymyxin (NEOSPORIN) 5-400-5000 ointment Apply 1 application topically to the appropriate area as directed 3 (Three) Times a Day. (Patient not taking: Reported on 3/8/2024) 15 g 0    polyethylene glycol (MIRALAX) powder  Take 17 g by mouth Daily. (Patient taking differently: Take 17 g by mouth Daily As Needed.) 850 g 5    azithromycin (ZITHROMAX) 250 MG tablet Take 2 tablets the first day, then 1 tablet daily for 4 days. 6 tablet 0    benzonatate (TESSALON) 200 MG capsule Take 1 capsule by mouth 3 (Three) Times a Day As Needed for Cough. 30 capsule 0    doxycycline (MONODOX) 100 MG capsule Take 1 capsule by mouth 2 (Two) Times a Day. (Patient not taking: Reported on 3/8/2024) 14 capsule 0    methylPREDNISolone (MEDROL) 4 MG dose pack Take as directed on package instructions. 21 tablet 0     No facility-administered medications prior to visit.       No opioid medication identified on active medication list. I have reviewed chart for other potential  high risk medication/s and harmful drug interactions in the elderly.        Aspirin is not on active medication list.  Aspirin use is not indicated based on review of current medical condition/s. Risk of harm outweighs potential benefits.  .    Patient Active Problem List   Diagnosis    Alzheimer's dementia without behavioral disturbance    Bipolar I disorder, single manic episode    Epilepsy    Chronic constipation    Hemorrhoids    Polyp of colon    Erectile dysfunction    High risk medication use    Poor balance    Subdural hematoma    Seizure    Low energy    Closed compression fracture of L1 lumbar vertebra    Acute bilateral low back pain without sciatica    Weight loss    Displaced fracture of right femoral neck    Status post hip hemiarthroplasty    Subacromial bursitis of left shoulder joint    Closed displaced fracture of neck of right scapula    Closed nondisplaced fracture of right clavicle    Hip fracture due to osteoporosis, sequela    Acute serous otitis media    Nocturia    Infected sebaceous cyst of skin     Advance Care Planning   Advance Care Planning     Advance Directive is on file.  ACP discussion was held with the patient during this visit. Patient has an advance  "directive in EMR which is still valid.      Objective    Vitals:    24 1254   BP: 140/78   BP Location: Left arm   Patient Position: Sitting   Cuff Size: Adult   Pulse: 89   Temp: 98.2 °F (36.8 °C)   TempSrc: Infrared   SpO2: 98%   Weight: 73.7 kg (162 lb 6.4 oz)   Height: 172.7 cm (67.99\")     Estimated body mass index is 24.7 kg/m² as calculated from the following:    Height as of this encounter: 172.7 cm (67.99\").    Weight as of this encounter: 73.7 kg (162 lb 6.4 oz).    BMI is within normal parameters. No other follow-up for BMI required.      Does the patient have evidence of cognitive impairment? yes          HEALTH RISK ASSESSMENT    Smoking Status:  Social History     Tobacco Use   Smoking Status Former    Current packs/day: 0.00    Average packs/day: 2.0 packs/day for 20.0 years (40.0 ttl pk-yrs)    Types: Cigarettes    Start date: 2/3/1970    Quit date: 2/3/1990    Years since quittin.1    Passive exposure: Past   Smokeless Tobacco Former    Quit date:    Tobacco Comments    quit 30 yrs ago      Alcohol Consumption:  Social History     Substance and Sexual Activity   Alcohol Use No     Fall Risk Screen:    TATAADI Fall Risk Assessment was completed, and patient is at LOW risk for falls.Assessment completed on:3/8/2024    Depression Screening:      3/8/2024    12:58 PM   PHQ-2/PHQ-9 Depression Screening   Little Interest or Pleasure in Doing Things 0-->not at all   Feeling Down, Depressed or Hopeless 0-->not at all   PHQ-9: Brief Depression Severity Measure Score 0       Health Habits and Functional and Cognitive Screening:      3/8/2024     1:00 PM   Functional & Cognitive Status   Do you have difficulty preparing food and eating? No   Do you have difficulty bathing yourself, getting dressed or grooming yourself? No   Do you have difficulty using the toilet? No   Do you have difficulty moving around from place to place? No   Do you have trouble with steps or getting out of a bed or a chair? " No   Current Diet Well Balanced Diet   Dental Exam Up to date   Eye Exam Up to date   Exercise (times per week) 3 times per week   Current Exercises Include Walking   Do you need help using the phone?  No   Are you deaf or do you have serious difficulty hearing?  No   Do you need help to go to places out of walking distance? No   Do you need help shopping? No   Do you need help preparing meals?  No   Do you need help with housework?  No   Do you need help with laundry? No   Do you need help taking your medications? No   Do you need help managing money? No   Do you ever drive or ride in a car without wearing a seat belt? No   Have you felt unusual stress, anger or loneliness in the last month? No   Who do you live with? Alone   If you need help, do you have trouble finding someone available to you? No   Have you been bothered in the last four weeks by sexual problems? No   Do you have difficulty concentrating, remembering or making decisions? No       Age-appropriate Screening Schedule:  Refer to the list below for future screening recommendations based on patient's age, sex and/or medical conditions. Orders for these recommended tests are listed in the plan section. The patient has been provided with a written plan.    Health Maintenance   Topic Date Due    ZOSTER VACCINE (1 of 2) Never done    RSV Vaccine - Adults (1 - 1-dose 60+ series) Never done    INFLUENZA VACCINE  08/01/2023    COVID-19 Vaccine (6 - 2023-24 season) 09/01/2023    DXA SCAN  09/22/2023    ANNUAL WELLNESS VISIT  03/08/2025    COLORECTAL CANCER SCREENING  04/01/2026    TDAP/TD VACCINES (4 - Tdap) 12/22/2028    HEPATITIS C SCREENING  Completed    Pneumococcal Vaccine 65+  Completed                  CMS Preventative Services Quick Reference  Risk Factors Identified During Encounter  Fall Risk-High or Moderate: Discussed Fall Prevention in the home  Hearing Problem:  pt has hearing aids  Immunizations Discussed/Encouraged: Influenza, Shingrix,  COVID19, and RSV (Respiratory Syncytial Virus)  Inadequate Social Support, Isolation, Loneliness, Lack of Transportation, Financial Difficulties, or Caregiver Stress: lives alone  Polypharmacy: Medication List reviewed and Medications are appropriate for patient  The above risks/problems have been discussed with the patient.  Pertinent information has been shared with the patient in the After Visit Summary.  An After Visit Summary and PPPS were made available to the patient.    Follow Up:   Next Medicare Wellness visit to be scheduled in 1 year.       Additional E&M Note during same encounter follows:  Patient has multiple medical problems which are significant and separately identifiable that require additional work above and beyond the Medicare Wellness Visit.      Chief Complaint  Prediabetes (F/u) and Mass (Located on lower left back and upper back, been present for about 20-30 years)    Subjective        HPI  Andrew MARLEE Redmond is also being seen today for     77 year old gentleman with bipolar disorder, osteoporosis, and prediabetes who presents for follow-up.      Osteoporosis - he is taking his fosamax weekly and calcium daily.  He occ has back pain but no falls. Uses walker at baseline to help with ambulation.  He is due for his dexa scan.      Dysphagia - doing well on omeprazle.     Bipolar disorder - on Zyprexa and Lamictal. No side effects from medication. A1c checked last week and increased to 5.6.  he is having trouble getting downtown [place] for an appt.  He doesn't drive and they wanted $140 for fare to the appt.  He would like to get these meds filled at our office.       Prediabetes -  fasting glucose was 133  A1c up from 5.4 to 5.6. States that he has cut down on large TV dinners. Stays active by walking.     Dementia -  On memantin and revastigmine.      BPH - he is taking flomax    Bp is up today.      He cont to lose weight.  He says he has cut out ensure.  He is drinking carnation instant  "breakfast.      Review of Systems   Constitutional: Negative.    HENT: Negative.     Eyes: Negative.    Respiratory: Negative.     Cardiovascular: Negative.    Gastrointestinal: Negative.    Endocrine: Negative.    Genitourinary: Negative.    Musculoskeletal: Negative.    Skin: Negative.    Allergic/Immunologic: Negative.    Neurological: Negative.    Hematological: Negative.    Psychiatric/Behavioral: Negative.     All other systems reviewed and are negative.      Objective   Vital Signs:  /78 (BP Location: Left arm, Patient Position: Sitting, Cuff Size: Adult)   Pulse 89   Temp 98.2 °F (36.8 °C) (Infrared)   Ht 172.7 cm (67.99\")   Wt 73.7 kg (162 lb 6.4 oz)   SpO2 98%   BMI 24.70 kg/m²     Wt Readings from Last 3 Encounters:   03/08/24 73.7 kg (162 lb 6.4 oz)   11/20/23 74.8 kg (165 lb)   09/27/23 77.6 kg (171 lb)     Temp Readings from Last 3 Encounters:   03/08/24 98.2 °F (36.8 °C) (Infrared)   11/20/23 98.2 °F (36.8 °C) (Temporal)   09/27/23 97.5 °F (36.4 °C) (Infrared)     BP Readings from Last 3 Encounters:   03/08/24 140/78   11/20/23 115/65   09/27/23 148/82     Pulse Readings from Last 3 Encounters:   03/08/24 89   11/20/23 94   09/27/23 90         Physical Exam  Vitals and nursing note reviewed.   Constitutional:       General: He is not in acute distress.     Appearance: He is well-developed.   HENT:      Head: Normocephalic and atraumatic.      Right Ear: External ear normal.      Left Ear: External ear normal.      Nose: Nose normal.   Eyes:      Conjunctiva/sclera: Conjunctivae normal.      Pupils: Pupils are equal, round, and reactive to light.   Cardiovascular:      Rate and Rhythm: Normal rate and regular rhythm.      Heart sounds: Normal heart sounds.   Pulmonary:      Effort: Pulmonary effort is normal. No respiratory distress.      Breath sounds: Normal breath sounds. No wheezing.   Musculoskeletal:         General: Normal range of motion.      Cervical back: Normal range of motion " and neck supple.      Comments: Walks with rollator   Skin:     General: Skin is warm and dry.   Neurological:      General: No focal deficit present.      Mental Status: He is alert and oriented to person, place, and time.   Psychiatric:         Mood and Affect: Mood normal.         Behavior: Behavior normal.         Thought Content: Thought content normal.         Judgment: Judgment normal.          The following data was reviewed by: Jessica Birch MD on 03/08/2024:  Common labs          9/1/2023    09:28   Common Labs   Glucose 133    BUN 11    Creatinine 0.78    Sodium 134    Potassium 4.6    Chloride 97    Calcium 9.4    Total Protein 6.5    Albumin 4.4    Total Bilirubin 0.5    Alkaline Phosphatase 127    AST (SGOT) 22    ALT (SGPT) 20    WBC 8.51    Hemoglobin 14.6    Hematocrit 43.6    Platelets 273    Total Cholesterol 149    Triglycerides 175    HDL Cholesterol 42    LDL Cholesterol  77    Hemoglobin A1C 5.60    PSA 1.840                 Assessment and Plan   Diagnoses and all orders for this visit:    1. Medicare annual wellness visit, subsequent (Primary)    2. Prediabetes - due for labs today  -     CBC & Differential  -     Comprehensive Metabolic Panel  -     Hemoglobin A1c  -     Lipid Panel With LDL / HDL Ratio    3. Age-related osteoporosis without current pathological fracture - cont fosamax. Due for dexa  -     CBC & Differential  -     Comprehensive Metabolic Panel  -     Hemoglobin A1c  -     Lipid Panel With LDL / HDL Ratio  -     DEXA Bone Density Axial; Future    4. Bipolar I disorder, single manic episode - pt following with psych but is having trouble getting to his appts.  He would like to get his meds filled here if he can. Cont lamotrigine, memantine, olanzapine, ,and drivastigmine  -     CBC & Differential  -     Comprehensive Metabolic Panel  -     Hemoglobin A1c  -     Lipid Panel With LDL / HDL Ratio    5. Abnormal finding of blood chemistry, unspecified  -     Lipid Panel With  LDL / HDL Ratio             Follow Up   Return in about 6 months (around 9/8/2024) for Recheck, labs.  Patient was given instructions and counseling regarding his condition or for health maintenance advice. Please see specific information pulled into the AVS if appropriate.

## 2024-03-09 LAB
ALBUMIN SERPL-MCNC: 4.4 G/DL (ref 3.5–5.2)
ALBUMIN/GLOB SERPL: 2.4 G/DL
ALP SERPL-CCNC: 160 U/L (ref 39–117)
ALT SERPL-CCNC: 31 U/L (ref 1–41)
AST SERPL-CCNC: 27 U/L (ref 1–40)
BASOPHILS # BLD AUTO: 0.06 10*3/MM3 (ref 0–0.2)
BASOPHILS NFR BLD AUTO: 0.8 % (ref 0–1.5)
BILIRUB SERPL-MCNC: 0.5 MG/DL (ref 0–1.2)
BUN SERPL-MCNC: 11 MG/DL (ref 8–23)
BUN/CREAT SERPL: 14.3 (ref 7–25)
CALCIUM SERPL-MCNC: 8.8 MG/DL (ref 8.6–10.5)
CHLORIDE SERPL-SCNC: 100 MMOL/L (ref 98–107)
CHOLEST SERPL-MCNC: 154 MG/DL (ref 0–200)
CO2 SERPL-SCNC: 26.2 MMOL/L (ref 22–29)
CREAT SERPL-MCNC: 0.77 MG/DL (ref 0.76–1.27)
EGFRCR SERPLBLD CKD-EPI 2021: 91.6 ML/MIN/1.73
EOSINOPHIL # BLD AUTO: 0.16 10*3/MM3 (ref 0–0.4)
EOSINOPHIL NFR BLD AUTO: 2.1 % (ref 0.3–6.2)
ERYTHROCYTE [DISTWIDTH] IN BLOOD BY AUTOMATED COUNT: 16.2 % (ref 12.3–15.4)
GLOBULIN SER CALC-MCNC: 1.8 GM/DL
GLUCOSE SERPL-MCNC: 88 MG/DL (ref 65–99)
HBA1C MFR BLD: 5.9 % (ref 4.8–5.6)
HCT VFR BLD AUTO: 45.3 % (ref 37.5–51)
HDLC SERPL-MCNC: 51 MG/DL (ref 40–60)
HGB BLD-MCNC: 14.9 G/DL (ref 13–17.7)
IMM GRANULOCYTES # BLD AUTO: 0.07 10*3/MM3 (ref 0–0.05)
IMM GRANULOCYTES NFR BLD AUTO: 0.9 % (ref 0–0.5)
LDLC SERPL CALC-MCNC: 84 MG/DL (ref 0–100)
LDLC/HDLC SERPL: 1.6 {RATIO}
LYMPHOCYTES # BLD AUTO: 2.36 10*3/MM3 (ref 0.7–3.1)
LYMPHOCYTES NFR BLD AUTO: 31.5 % (ref 19.6–45.3)
MCH RBC QN AUTO: 27.9 PG (ref 26.6–33)
MCHC RBC AUTO-ENTMCNC: 32.9 G/DL (ref 31.5–35.7)
MCV RBC AUTO: 84.8 FL (ref 79–97)
MONOCYTES # BLD AUTO: 0.77 10*3/MM3 (ref 0.1–0.9)
MONOCYTES NFR BLD AUTO: 10.3 % (ref 5–12)
NEUTROPHILS # BLD AUTO: 4.08 10*3/MM3 (ref 1.7–7)
NEUTROPHILS NFR BLD AUTO: 54.4 % (ref 42.7–76)
NRBC BLD AUTO-RTO: 0 /100 WBC (ref 0–0.2)
PLATELET # BLD AUTO: 315 10*3/MM3 (ref 140–450)
POTASSIUM SERPL-SCNC: 4.4 MMOL/L (ref 3.5–5.2)
PROT SERPL-MCNC: 6.2 G/DL (ref 6–8.5)
RBC # BLD AUTO: 5.34 10*6/MM3 (ref 4.14–5.8)
SODIUM SERPL-SCNC: 138 MMOL/L (ref 136–145)
TRIGL SERPL-MCNC: 106 MG/DL (ref 0–150)
VLDLC SERPL CALC-MCNC: 19 MG/DL (ref 5–40)
WBC # BLD AUTO: 7.5 10*3/MM3 (ref 3.4–10.8)

## 2024-03-12 ENCOUNTER — TELEPHONE (OUTPATIENT)
Dept: INTERNAL MEDICINE | Facility: CLINIC | Age: 79
End: 2024-03-12
Payer: MEDICARE

## 2024-03-12 NOTE — TELEPHONE ENCOUNTER
Name: Andrew Redmond      Relationship: Self      Best Callback Number: [phone]      HUB PROVIDED THE RELAY MESSAGE FROM THE OFFICE      PATIENT: VOICED UNDERSTANDING AND HAS NO FURTHER QUESTIONS AT THIS TIME

## 2024-03-12 NOTE — TELEPHONE ENCOUNTER
Lvm to call back regarding results    Relay:  Call pt about labs.  Sugars up a little.  A1c 5.9 which is prediabetes.   Cont exercise and healthy diet.  Would just monitor and recheck in 6 mo at this time.

## 2024-03-20 ENCOUNTER — APPOINTMENT (OUTPATIENT)
Dept: BONE DENSITY | Facility: HOSPITAL | Age: 79
End: 2024-03-20
Payer: MEDICARE

## 2024-03-20 DIAGNOSIS — M81.0 AGE-RELATED OSTEOPOROSIS WITHOUT CURRENT PATHOLOGICAL FRACTURE: ICD-10-CM

## 2024-03-20 PROCEDURE — 77080 DXA BONE DENSITY AXIAL: CPT

## 2024-03-27 ENCOUNTER — TELEPHONE (OUTPATIENT)
Dept: INTERNAL MEDICINE | Facility: CLINIC | Age: 79
End: 2024-03-27
Payer: MEDICARE

## 2024-03-27 NOTE — TELEPHONE ENCOUNTER
Lvm to call back regarding results    Relay:  Call pt about dexa scan - pt still with osteoporosis.  Cont fosamax

## 2024-03-28 ENCOUNTER — TELEPHONE (OUTPATIENT)
Dept: INTERNAL MEDICINE | Facility: CLINIC | Age: 79
End: 2024-03-28
Payer: MEDICARE

## 2024-03-28 NOTE — TELEPHONE ENCOUNTER
Name: Andrew Redmond      Relationship: Self      Best Callback Number: [phone]      HUB PROVIDED THE RELAY MESSAGE FROM THE OFFICE      PATIENT: VOICED UNDERSTANDING AND HAS NO FURTHER QUESTIONS AT THIS TIME

## 2024-03-28 NOTE — TELEPHONE ENCOUNTER
"Relay     \"pt still with osteoporosis.  Cont fosamax \"                ----- Message from Jessica Birch MD sent at 3/26/2024  5:17 PM EDT -----  Call pt about dexa scan - pt still with osteoporosis.  Cont fosamax  "

## 2024-04-12 ENCOUNTER — TELEPHONE (OUTPATIENT)
Dept: INTERNAL MEDICINE | Facility: CLINIC | Age: 79
End: 2024-04-12
Payer: MEDICARE

## 2024-04-12 NOTE — TELEPHONE ENCOUNTER
Caller: Andrew Redmond    Relationship: Self    Best call back number: [phone]     What is the best time to reach you: ANY    Who are you requesting to speak with (clinical staff, provider,  specific staff member): CLINICAL ST\AFF    Do you know the name of the person who called:      What was the call regarding: PATIENT CALLED STATED HE IS NOT ABLE TO COMPLETE A BOWEL MOVEMENT.  HE HAS HAD CANCER BEFORE AND WANTS DR HARRINGTON TO CALL HIM BACK.  CALL AFTER 3 PM     Is it okay if the provider responds through MyChart:

## 2024-04-16 NOTE — PROGRESS NOTES
Chief Complaint   Patient presents with   • Sore Throat   • Difficulty Swallowing       Subjective   Andrew Redmond is a 73 y.o. male is being seen for an acute appointment for right ear pain with chewing and swallowing. He reports with chewing or swallowing he is having pain into right ear for the past week. No popping or cracking in ear. Associated decreased hearing. He has had recent dental work, but no tooth pain. He has allergies, but not current ly taking his Allegra D.      History of Present Illness     Current Outpatient Medications on File Prior to Visit   Medication Sig Dispense Refill   • aspirin 325 MG EC tablet Take 1 tablet by mouth Every 12 (Twelve) Hours.     • fexofenadine-pseudoephedrine (ALLEGRA-D)  MG per 12 hr tablet Take 1 tablet by mouth 2 (Two) Times a Day As Needed for Allergies.     • lamoTRIgine (LaMICtal) 100 MG tablet Take 100 mg by mouth Daily.     • lamoTRIgine (LAMICTAL) 200 MG tablet Take 200 mg by mouth Every Night.     • memantine (NAMENDA) 10 MG tablet Take 10 mg by mouth 2 (Two) Times a Day.     • polyethylene glycol (MIRALAX) powder Take 17 g by mouth Daily. (Patient taking differently: Take 17 g by mouth Daily As Needed.) 850 g 5   • rivastigmine (EXELON) 6 MG capsule Take 6 mg by mouth 2 (Two) Times a Day.     • [DISCONTINUED] acetaminophen (TYLENOL) 325 MG tablet Take 650 mg by mouth Every 6 (Six) Hours As Needed for Mild Pain .     • [DISCONTINUED] calcitonin, salmon, (MIACALCIN) 200 UNIT/ACT nasal spray 1 spray into each nostril Daily. 3.7 mL 3   • [DISCONTINUED] ondansetron (ZOFRAN) 4 MG tablet Take 1 tablet by mouth Every 6 (Six) Hours As Needed for Nausea or Vomiting.       No current facility-administered medications on file prior to visit.        The following portions of the patient's history were reviewed and updated as appropriate: allergies, current medications, past family history, past medical history, past social history, past surgical history and problem  list.    Review of Systems   Constitutional: Negative.    HENT: Positive for dental problem, ear pain and hearing loss.    Eyes: Negative.    Respiratory: Negative.    Cardiovascular: Negative.  Negative for chest pain, palpitations and leg swelling.   Endocrine: Negative.    Genitourinary: Negative.    Musculoskeletal: Positive for arthralgias.   Allergic/Immunologic: Negative.    Neurological: Negative.    Hematological: Negative.    Psychiatric/Behavioral: Negative.        Objective   Physical Exam   Constitutional: He is oriented to person, place, and time. He appears well-developed and well-nourished. No distress.   HENT:   Head: Normocephalic.   Right Ear: A middle ear effusion is present.   Left Ear: A middle ear effusion is present.   Neck: Neck supple. No thyromegaly present.   Cardiovascular: Normal rate, regular rhythm and normal heart sounds.   No murmur heard.  Musculoskeletal: He exhibits no edema.   Neurological: He is alert and oriented to person, place, and time.   Skin: Skin is warm and dry.   Psychiatric: He has a normal mood and affect. His behavior is normal.   Vitals reviewed.      Assessment/Plan      Diagnosis Plan   1. Acute serous otitis media, recurrence not specified, unspecified laterality  fexofenadine-pseudoephedrine (ALLEGRA-D)  MG per 12 hr tablet    fluticasone (FLONASE) 50 MCG/ACT nasal spray     Discussed Middle ear effusion related to recentt dental work.     Follow up as needed.          (V5) oriented

## 2024-05-15 RX ORDER — TAMSULOSIN HYDROCHLORIDE 0.4 MG/1
CAPSULE ORAL
Qty: 180 CAPSULE | Refills: 0 | Status: SHIPPED | OUTPATIENT
Start: 2024-05-15

## 2024-05-15 NOTE — TELEPHONE ENCOUNTER
Rx Refill Note  Requested Prescriptions     Pending Prescriptions Disp Refills    tamsulosin (FLOMAX) 0.4 MG capsule 24 hr capsule [Pharmacy Med Name: Tamsulosin HCl 0.4 MG Oral Capsule] 180 capsule 0     Sig: Take 2 capsules by mouth once daily      Last office visit with prescribing clinician: 3/8/2024   Last telemedicine visit with prescribing clinician: Visit date not found   Next office visit with prescribing clinician: 9/10/2024                         Would you like a call back once the refill request has been completed: [] Yes [] No    If the office needs to give you a call back, can they leave a voicemail: [] Yes [] No    Velia Hwang MA  05/15/24, 15:24 EDT

## 2024-08-12 ENCOUNTER — TELEPHONE (OUTPATIENT)
Dept: INTERNAL MEDICINE | Facility: CLINIC | Age: 79
End: 2024-08-12
Payer: MEDICARE

## 2024-08-12 DIAGNOSIS — F30.9 BIPOLAR I DISORDER, SINGLE MANIC EPISODE: Primary | ICD-10-CM

## 2024-08-12 DIAGNOSIS — G30.9 ALZHEIMER'S DEMENTIA WITHOUT BEHAVIORAL DISTURBANCE: ICD-10-CM

## 2024-08-12 DIAGNOSIS — F02.80 ALZHEIMER'S DEMENTIA WITHOUT BEHAVIORAL DISTURBANCE: ICD-10-CM

## 2024-08-12 DIAGNOSIS — G40.909 NONINTRACTABLE EPILEPSY WITHOUT STATUS EPILEPTICUS, UNSPECIFIED EPILEPSY TYPE: ICD-10-CM

## 2024-08-12 RX ORDER — TAMSULOSIN HYDROCHLORIDE 0.4 MG/1
CAPSULE ORAL
Qty: 180 CAPSULE | Refills: 0 | Status: SHIPPED | OUTPATIENT
Start: 2024-08-12

## 2024-08-12 NOTE — TELEPHONE ENCOUNTER
Rx Refill Note  Requested Prescriptions     Pending Prescriptions Disp Refills    tamsulosin (FLOMAX) 0.4 MG capsule 24 hr capsule [Pharmacy Med Name: Tamsulosin HCl 0.4 MG Oral Capsule] 180 capsule 0     Sig: Take 2 capsules by mouth once daily      Last office visit with prescribing clinician: 3/8/2024   Last telemedicine visit with prescribing clinician: Visit date not found   Next office visit with prescribing clinician: 9/10/2024                         Would you like a call back once the refill request has been completed: [] Yes [] No    If the office needs to give you a call back, can they leave a voicemail: [] Yes [] No    Velia Hwang MA  08/12/24, 12:50 EDT

## 2024-08-23 ENCOUNTER — TELEPHONE (OUTPATIENT)
Dept: INTERNAL MEDICINE | Facility: CLINIC | Age: 79
End: 2024-08-23

## 2024-08-28 ENCOUNTER — OFFICE VISIT (OUTPATIENT)
Dept: INTERNAL MEDICINE | Facility: CLINIC | Age: 79
End: 2024-08-28
Payer: MEDICARE

## 2024-08-28 ENCOUNTER — TELEPHONE (OUTPATIENT)
Dept: INTERNAL MEDICINE | Facility: CLINIC | Age: 79
End: 2024-08-28

## 2024-08-28 VITALS
WEIGHT: 144.8 LBS | HEART RATE: 83 BPM | TEMPERATURE: 98 F | SYSTOLIC BLOOD PRESSURE: 130 MMHG | DIASTOLIC BLOOD PRESSURE: 82 MMHG | BODY MASS INDEX: 21.95 KG/M2 | HEIGHT: 68 IN | OXYGEN SATURATION: 95 %

## 2024-08-28 DIAGNOSIS — H92.09 OTALGIA, UNSPECIFIED LATERALITY: Primary | ICD-10-CM

## 2024-08-28 PROCEDURE — 99212 OFFICE O/P EST SF 10 MIN: CPT | Performed by: INTERNAL MEDICINE

## 2024-08-28 PROCEDURE — 1126F AMNT PAIN NOTED NONE PRSNT: CPT | Performed by: INTERNAL MEDICINE

## 2024-08-28 NOTE — PROGRESS NOTES
Andrew Redmond is a 78 y.o. male, who presents with a chief complaint of   Chief Complaint   Patient presents with    Ear Problem     Having trouble with his right ear, feels like there is something wrong with it and can't get his hearing aid in it.           HPI   Pt here bc of ear pain on the right.  No fever.   \  The following portions of the patient's history were reviewed and updated as appropriate: allergies, current medications, past family history, past medical history, past social history, past surgical history and problem list.    Allergies: Patient has no known allergies.    Review of Systems   Constitutional: Negative.    HENT: Negative.     Eyes: Negative.    Respiratory: Negative.     Cardiovascular: Negative.    Gastrointestinal: Negative.    Endocrine: Negative.    Genitourinary: Negative.    Musculoskeletal: Negative.    Skin: Negative.    Allergic/Immunologic: Negative.    Neurological: Negative.    Hematological: Negative.    Psychiatric/Behavioral: Negative.     All other systems reviewed and are negative.            Wt Readings from Last 3 Encounters:   08/28/24 65.7 kg (144 lb 12.8 oz)   08/13/24 74.8 kg (165 lb)   06/21/24 74.8 kg (165 lb)     Temp Readings from Last 3 Encounters:   08/28/24 98 °F (36.7 °C) (Infrared)   08/13/24 98.4 °F (36.9 °C) (Infrared)   06/21/24 98.6 °F (37 °C) (Temporal)     BP Readings from Last 3 Encounters:   08/28/24 130/82   08/13/24 106/63   06/21/24 121/68     Pulse Readings from Last 3 Encounters:   08/28/24 83   08/13/24 76   06/21/24 73     Body mass index is 22.02 kg/m².  SpO2 Readings from Last 3 Encounters:   08/28/24 95%   08/13/24 96%   06/21/24 96%          Physical Exam  Vitals and nursing note reviewed.   Constitutional:       General: He is not in acute distress.     Appearance: He is well-developed.   HENT:      Head: Normocephalic and atraumatic.      Right Ear: External ear normal.      Left Ear: External ear normal.      Nose: Nose normal.    Eyes:      Conjunctiva/sclera: Conjunctivae normal.      Pupils: Pupils are equal, round, and reactive to light.   Cardiovascular:      Rate and Rhythm: Normal rate and regular rhythm.      Heart sounds: Normal heart sounds.   Pulmonary:      Effort: Pulmonary effort is normal. No respiratory distress.      Breath sounds: Normal breath sounds. No wheezing.   Musculoskeletal:         General: Normal range of motion.      Cervical back: Normal range of motion and neck supple.      Comments: Normal gait   Skin:     General: Skin is warm and dry.   Neurological:      Mental Status: He is alert and oriented to person, place, and time.   Psychiatric:         Behavior: Behavior normal.         Thought Content: Thought content normal.         Judgment: Judgment normal.         Results for orders placed or performed in visit on 03/08/24   Comprehensive Metabolic Panel    Specimen: Blood   Result Value Ref Range    Glucose 88 65 - 99 mg/dL    BUN 11 8 - 23 mg/dL    Creatinine 0.77 0.76 - 1.27 mg/dL    EGFR Result 91.6 >60.0 mL/min/1.73    BUN/Creatinine Ratio 14.3 7.0 - 25.0    Sodium 138 136 - 145 mmol/L    Potassium 4.4 3.5 - 5.2 mmol/L    Chloride 100 98 - 107 mmol/L    Total CO2 26.2 22.0 - 29.0 mmol/L    Calcium 8.8 8.6 - 10.5 mg/dL    Total Protein 6.2 6.0 - 8.5 g/dL    Albumin 4.4 3.5 - 5.2 g/dL    Globulin 1.8 gm/dL    A/G Ratio 2.4 g/dL    Total Bilirubin 0.5 0.0 - 1.2 mg/dL    Alkaline Phosphatase 160 (H) 39 - 117 U/L    AST (SGOT) 27 1 - 40 U/L    ALT (SGPT) 31 1 - 41 U/L   Hemoglobin A1c    Specimen: Blood   Result Value Ref Range    Hemoglobin A1C 5.90 (H) 4.80 - 5.60 %   Lipid Panel With LDL / HDL Ratio    Specimen: Blood   Result Value Ref Range    Total Cholesterol 154 0 - 200 mg/dL    Triglycerides 106 0 - 150 mg/dL    HDL Cholesterol 51 40 - 60 mg/dL    VLDL Cholesterol Anurag 19 5 - 40 mg/dL    LDL Chol Calc (NIH) 84 0 - 100 mg/dL    LDL/HDL RATIO 1.60    CBC & Differential    Specimen: Blood   Result Value  Ref Range    WBC 7.50 3.40 - 10.80 10*3/mm3    RBC 5.34 4.14 - 5.80 10*6/mm3    Hemoglobin 14.9 13.0 - 17.7 g/dL    Hematocrit 45.3 37.5 - 51.0 %    MCV 84.8 79.0 - 97.0 fL    MCH 27.9 26.6 - 33.0 pg    MCHC 32.9 31.5 - 35.7 g/dL    RDW 16.2 (H) 12.3 - 15.4 %    Platelets 315 140 - 450 10*3/mm3    Neutrophil Rel % 54.4 42.7 - 76.0 %    Lymphocyte Rel % 31.5 19.6 - 45.3 %    Monocyte Rel % 10.3 5.0 - 12.0 %    Eosinophil Rel % 2.1 0.3 - 6.2 %    Basophil Rel % 0.8 0.0 - 1.5 %    Neutrophils Absolute 4.08 1.70 - 7.00 10*3/mm3    Lymphocytes Absolute 2.36 0.70 - 3.10 10*3/mm3    Monocytes Absolute 0.77 0.10 - 0.90 10*3/mm3    Eosinophils Absolute 0.16 0.00 - 0.40 10*3/mm3    Basophils Absolute 0.06 0.00 - 0.20 10*3/mm3    Immature Granulocyte Rel % 0.9 (H) 0.0 - 0.5 %    Immature Grans Absolute 0.07 (H) 0.00 - 0.05 10*3/mm3    nRBC 0.0 0.0 - 0.2 /100 WBC     Result Review :                  Assessment and Plan    Diagnoses and all orders for this visit:    1. Otalgia, unspecified laterality (Primary)    Ear exam normal    BMI is within normal parameters. No other follow-up for BMI required.             Outpatient Medications Prior to Visit   Medication Sig Dispense Refill    alendronate (FOSAMAX) 70 MG tablet Take 1 tablet by mouth once a week 12 tablet 0    cephalexin (KEFLEX) 500 MG capsule Take 1 capsule by mouth 3 (Three) Times a Day. 30 capsule 0    lamoTRIgine (LaMICtal) 200 MG tablet Take 1 tablet by mouth Every Night. 1/2 pill at noon      memantine (NAMENDA) 10 MG tablet Take 1 tablet by mouth 2 (Two) Times a Day.      Misc. Devices (Rollator Ultra-Light) misc 1 each Daily. 1 each 0    OLANZapine (zyPREXA) 20 MG tablet Take 1 tablet by mouth every night at bedtime.      omeprazole (priLOSEC) 40 MG capsule Take 1 capsule by mouth Daily. 90 capsule 3    polyethylene glycol (MIRALAX) powder Take 17 g by mouth Daily. (Patient taking differently: Take 17 g by mouth Daily As Needed.) 850 g 5    rivastigmine (EXELON)  6 MG capsule Take 1 capsule by mouth 2 (Two) Times a Day.      tamsulosin (FLOMAX) 0.4 MG capsule 24 hr capsule Take 2 capsules by mouth once daily 180 capsule 0     No facility-administered medications prior to visit.     No orders of the defined types were placed in this encounter.    [unfilled]  There are no discontinued medications.      No follow-ups on file.    Patient was given instructions and counseling regarding her condition or for health maintenance advice. Please see specific information pulled into the AVS if appropriate.

## 2024-09-03 DIAGNOSIS — R73.03 PREDIABETES: Primary | ICD-10-CM

## 2024-09-03 DIAGNOSIS — R79.9 ABNORMAL FINDING OF BLOOD CHEMISTRY, UNSPECIFIED: ICD-10-CM

## 2024-09-03 DIAGNOSIS — Z12.5 PROSTATE CANCER SCREENING: Primary | ICD-10-CM

## 2024-09-03 DIAGNOSIS — G40.909 NONINTRACTABLE EPILEPSY WITHOUT STATUS EPILEPTICUS, UNSPECIFIED EPILEPSY TYPE: ICD-10-CM

## 2024-09-03 DIAGNOSIS — Z12.5 PROSTATE CANCER SCREENING: ICD-10-CM

## 2024-09-04 LAB
ALBUMIN SERPL-MCNC: 4.3 G/DL (ref 3.5–5.2)
ALBUMIN/GLOB SERPL: 2.2 G/DL
ALP SERPL-CCNC: 102 U/L (ref 39–117)
ALT SERPL-CCNC: 15 U/L (ref 1–41)
AST SERPL-CCNC: 18 U/L (ref 1–40)
BASOPHILS # BLD AUTO: 0.05 10*3/MM3 (ref 0–0.2)
BASOPHILS NFR BLD AUTO: 0.7 % (ref 0–1.5)
BILIRUB SERPL-MCNC: 0.6 MG/DL (ref 0–1.2)
BUN SERPL-MCNC: 11 MG/DL (ref 8–23)
BUN/CREAT SERPL: 14.5 (ref 7–25)
CALCIUM SERPL-MCNC: 9.1 MG/DL (ref 8.6–10.5)
CHLORIDE SERPL-SCNC: 99 MMOL/L (ref 98–107)
CHOLEST SERPL-MCNC: 154 MG/DL (ref 0–200)
CO2 SERPL-SCNC: 28.9 MMOL/L (ref 22–29)
CREAT SERPL-MCNC: 0.76 MG/DL (ref 0.76–1.27)
EGFRCR SERPLBLD CKD-EPI 2021: 92 ML/MIN/1.73
EOSINOPHIL # BLD AUTO: 0.16 10*3/MM3 (ref 0–0.4)
EOSINOPHIL NFR BLD AUTO: 2.2 % (ref 0.3–6.2)
ERYTHROCYTE [DISTWIDTH] IN BLOOD BY AUTOMATED COUNT: 15.4 % (ref 12.3–15.4)
GLOBULIN SER CALC-MCNC: 2 GM/DL
GLUCOSE SERPL-MCNC: 98 MG/DL (ref 65–99)
HBA1C MFR BLD: 5.6 % (ref 4.8–5.6)
HCT VFR BLD AUTO: 45.1 % (ref 37.5–51)
HDLC SERPL-MCNC: 63 MG/DL (ref 40–60)
HGB BLD-MCNC: 14.6 G/DL (ref 13–17.7)
IMM GRANULOCYTES # BLD AUTO: 0.07 10*3/MM3 (ref 0–0.05)
IMM GRANULOCYTES NFR BLD AUTO: 1 % (ref 0–0.5)
LDLC SERPL CALC-MCNC: 74 MG/DL (ref 0–100)
LDLC/HDLC SERPL: 1.15 {RATIO}
LYMPHOCYTES # BLD AUTO: 1.92 10*3/MM3 (ref 0.7–3.1)
LYMPHOCYTES NFR BLD AUTO: 26.3 % (ref 19.6–45.3)
MCH RBC QN AUTO: 28 PG (ref 26.6–33)
MCHC RBC AUTO-ENTMCNC: 32.4 G/DL (ref 31.5–35.7)
MCV RBC AUTO: 86.6 FL (ref 79–97)
MONOCYTES # BLD AUTO: 0.88 10*3/MM3 (ref 0.1–0.9)
MONOCYTES NFR BLD AUTO: 12.1 % (ref 5–12)
NEUTROPHILS # BLD AUTO: 4.22 10*3/MM3 (ref 1.7–7)
NEUTROPHILS NFR BLD AUTO: 57.7 % (ref 42.7–76)
NRBC BLD AUTO-RTO: 0 /100 WBC (ref 0–0.2)
PLATELET # BLD AUTO: 271 10*3/MM3 (ref 140–450)
POTASSIUM SERPL-SCNC: 4.6 MMOL/L (ref 3.5–5.2)
PROT SERPL-MCNC: 6.3 G/DL (ref 6–8.5)
PSA SERPL-MCNC: 1.42 NG/ML (ref 0–4)
RBC # BLD AUTO: 5.21 10*6/MM3 (ref 4.14–5.8)
SODIUM SERPL-SCNC: 138 MMOL/L (ref 136–145)
T4 FREE SERPL-MCNC: 1.19 NG/DL (ref 0.92–1.68)
TRIGL SERPL-MCNC: 93 MG/DL (ref 0–150)
TSH SERPL DL<=0.005 MIU/L-ACNC: 2.51 UIU/ML (ref 0.27–4.2)
VLDLC SERPL CALC-MCNC: 17 MG/DL (ref 5–40)
WBC # BLD AUTO: 7.3 10*3/MM3 (ref 3.4–10.8)

## 2024-09-10 ENCOUNTER — OFFICE VISIT (OUTPATIENT)
Dept: INTERNAL MEDICINE | Facility: CLINIC | Age: 79
End: 2024-09-10
Payer: MEDICARE

## 2024-09-10 VITALS
SYSTOLIC BLOOD PRESSURE: 120 MMHG | TEMPERATURE: 98 F | BODY MASS INDEX: 22.1 KG/M2 | DIASTOLIC BLOOD PRESSURE: 80 MMHG | HEIGHT: 68 IN | OXYGEN SATURATION: 97 % | WEIGHT: 145.8 LBS | HEART RATE: 72 BPM

## 2024-09-10 DIAGNOSIS — R79.9 ABNORMAL FINDING OF BLOOD CHEMISTRY, UNSPECIFIED: ICD-10-CM

## 2024-09-10 DIAGNOSIS — F30.9 BIPOLAR I DISORDER, SINGLE MANIC EPISODE: ICD-10-CM

## 2024-09-10 DIAGNOSIS — Z79.899 HIGH RISK MEDICATION USE: ICD-10-CM

## 2024-09-10 DIAGNOSIS — R73.03 PREDIABETES: Primary | ICD-10-CM

## 2024-09-10 DIAGNOSIS — R63.4 WEIGHT LOSS: ICD-10-CM

## 2024-09-10 PROCEDURE — 1160F RVW MEDS BY RX/DR IN RCRD: CPT | Performed by: INTERNAL MEDICINE

## 2024-09-10 PROCEDURE — 1126F AMNT PAIN NOTED NONE PRSNT: CPT | Performed by: INTERNAL MEDICINE

## 2024-09-10 PROCEDURE — G2211 COMPLEX E/M VISIT ADD ON: HCPCS | Performed by: INTERNAL MEDICINE

## 2024-09-10 PROCEDURE — 99214 OFFICE O/P EST MOD 30 MIN: CPT | Performed by: INTERNAL MEDICINE

## 2024-09-10 PROCEDURE — 1159F MED LIST DOCD IN RCRD: CPT | Performed by: INTERNAL MEDICINE

## 2024-09-10 NOTE — PROGRESS NOTES
Andrew Redmond is a 78 y.o. male, who presents with a chief complaint of   Chief Complaint   Patient presents with    Prediabetes     6 month follow up           HPI   Andrew Redmond is also being seen today for      77 year old gentleman with bipolar disorder, osteoporosis, and prediabetes who presents for follow-up.  He has lost about 20 pounds over the past several months.  He said he was eating frozen dinners and hamburgers.  He says he may have missed some meals.  He has gold trying to add generic glucerna shakes to his diet.  The shakes are expensive.  He does not want community resources like meals on wheels.     Osteoporosis - he is taking his fosamax weekly and calcium daily.  He occ has back pain but no falls. Uses walker at baseline to help with ambulation.  He is due for his dexa scan.      Dysphagia - doing well on omeprazle.      Bipolar disorder - on Zyprexa and Lamictal. No side effects from medication.  he is having trouble getting downtown [place] for an appt.  He doesn't drive and they wanted $140 for fare to the appt.  He would like to get these meds filled at our office.       Prediabetes -  A1c 5.9->5.6. States that he has cut down on large TV dinners. Stays active by walking.     Dementia -  On memantin and revastigmine.       BPH - he is taking flomax       The following portions of the patient's history were reviewed and updated as appropriate: allergies, current medications, past family history, past medical history, past social history, past surgical history and problem list.    Allergies: Patient has no known allergies.    Review of Systems   Constitutional: Negative.    HENT: Negative.     Eyes: Negative.    Respiratory: Negative.     Cardiovascular: Negative.    Gastrointestinal: Negative.    Endocrine: Negative.    Genitourinary: Negative.    Musculoskeletal: Negative.    Skin: Negative.    Allergic/Immunologic: Negative.    Neurological: Negative.    Hematological: Negative.     Psychiatric/Behavioral: Negative.     All other systems reviewed and are negative.            Wt Readings from Last 3 Encounters:   09/10/24 66.1 kg (145 lb 12.8 oz)   08/28/24 65.7 kg (144 lb 12.8 oz)   08/13/24 74.8 kg (165 lb)     Temp Readings from Last 3 Encounters:   09/10/24 98 °F (36.7 °C) (Infrared)   08/28/24 98 °F (36.7 °C) (Infrared)   08/13/24 98.4 °F (36.9 °C) (Infrared)     BP Readings from Last 3 Encounters:   09/10/24 120/80   08/28/24 130/82   08/13/24 106/63     Pulse Readings from Last 3 Encounters:   09/10/24 72   08/28/24 83   08/13/24 76     Body mass index is 22.17 kg/m².  SpO2 Readings from Last 3 Encounters:   09/10/24 97%   08/28/24 95%   08/13/24 96%          Physical Exam  Vitals and nursing note reviewed.   Constitutional:       General: He is not in acute distress.     Appearance: He is well-developed.   HENT:      Head: Normocephalic and atraumatic.      Right Ear: External ear normal.      Left Ear: External ear normal.      Nose: Nose normal.   Eyes:      Conjunctiva/sclera: Conjunctivae normal.      Pupils: Pupils are equal, round, and reactive to light.   Cardiovascular:      Rate and Rhythm: Normal rate and regular rhythm.      Heart sounds: Normal heart sounds.   Pulmonary:      Effort: Pulmonary effort is normal. No respiratory distress.      Breath sounds: Normal breath sounds. No wheezing.   Musculoskeletal:         General: Normal range of motion.      Cervical back: Normal range of motion and neck supple.      Comments: Normal gait   Skin:     General: Skin is warm and dry.   Neurological:      General: No focal deficit present.      Mental Status: He is alert and oriented to person, place, and time.   Psychiatric:         Mood and Affect: Mood normal.         Behavior: Behavior normal.         Thought Content: Thought content normal.         Judgment: Judgment normal.         Results for orders placed or performed in visit on 09/03/24   Comprehensive Metabolic Panel     Specimen: Blood   Result Value Ref Range    Glucose 98 65 - 99 mg/dL    BUN 11 8 - 23 mg/dL    Creatinine 0.76 0.76 - 1.27 mg/dL    EGFR Result 92.0 >60.0 mL/min/1.73    BUN/Creatinine Ratio 14.5 7.0 - 25.0    Sodium 138 136 - 145 mmol/L    Potassium 4.6 3.5 - 5.2 mmol/L    Chloride 99 98 - 107 mmol/L    Total CO2 28.9 22.0 - 29.0 mmol/L    Calcium 9.1 8.6 - 10.5 mg/dL    Total Protein 6.3 6.0 - 8.5 g/dL    Albumin 4.3 3.5 - 5.2 g/dL    Globulin 2.0 gm/dL    A/G Ratio 2.2 g/dL    Total Bilirubin 0.6 0.0 - 1.2 mg/dL    Alkaline Phosphatase 102 39 - 117 U/L    AST (SGOT) 18 1 - 40 U/L    ALT (SGPT) 15 1 - 41 U/L   Hemoglobin A1c    Specimen: Blood   Result Value Ref Range    Hemoglobin A1C 5.60 4.80 - 5.60 %   Lipid Panel With LDL / HDL Ratio    Specimen: Blood   Result Value Ref Range    Total Cholesterol 154 0 - 200 mg/dL    Triglycerides 93 0 - 150 mg/dL    HDL Cholesterol 63 (H) 40 - 60 mg/dL    VLDL Cholesterol Anurag 17 5 - 40 mg/dL    LDL Chol Calc (NIH) 74 0 - 100 mg/dL    LDL/HDL RATIO 1.15    T4, Free    Specimen: Blood   Result Value Ref Range    Free T4 1.19 0.92 - 1.68 ng/dL   TSH    Specimen: Blood   Result Value Ref Range    TSH 2.510 0.270 - 4.200 uIU/mL   PSA Screen   Result Value Ref Range    PSA 1.420 0.000 - 4.000 ng/mL   CBC & Differential    Specimen: Blood   Result Value Ref Range    WBC 7.30 3.40 - 10.80 10*3/mm3    RBC 5.21 4.14 - 5.80 10*6/mm3    Hemoglobin 14.6 13.0 - 17.7 g/dL    Hematocrit 45.1 37.5 - 51.0 %    MCV 86.6 79.0 - 97.0 fL    MCH 28.0 26.6 - 33.0 pg    MCHC 32.4 31.5 - 35.7 g/dL    RDW 15.4 12.3 - 15.4 %    Platelets 271 140 - 450 10*3/mm3    Neutrophil Rel % 57.7 42.7 - 76.0 %    Lymphocyte Rel % 26.3 19.6 - 45.3 %    Monocyte Rel % 12.1 (H) 5.0 - 12.0 %    Eosinophil Rel % 2.2 0.3 - 6.2 %    Basophil Rel % 0.7 0.0 - 1.5 %    Neutrophils Absolute 4.22 1.70 - 7.00 10*3/mm3    Lymphocytes Absolute 1.92 0.70 - 3.10 10*3/mm3    Monocytes Absolute 0.88 0.10 - 0.90 10*3/mm3     Eosinophils Absolute 0.16 0.00 - 0.40 10*3/mm3    Basophils Absolute 0.05 0.00 - 0.20 10*3/mm3    Immature Granulocyte Rel % 1.0 (H) 0.0 - 0.5 %    Immature Grans Absolute 0.07 (H) 0.00 - 0.05 10*3/mm3    nRBC 0.0 0.0 - 0.2 /100 WBC     Result Review :                  Assessment and Plan    Diagnoses and all orders for this visit:    1. Prediabetes (Primary)  -     Comprehensive Metabolic Panel; Future  -     CBC & Differential; Future  -     Hemoglobin A1c; Future  -     Lipid Panel With LDL / HDL Ratio; Future  -     TSH; Future  -     T4, Free; Future    2. Abnormal finding of blood chemistry, unspecified  -     Comprehensive Metabolic Panel; Future  -     CBC & Differential; Future  -     Hemoglobin A1c; Future  -     Lipid Panel With LDL / HDL Ratio; Future  -     TSH; Future  -     T4, Free; Future    3. High risk medication use    4. Bipolar I disorder, single manic episode    5. Weight loss  -     Comprehensive Metabolic Panel; Future  -     CBC & Differential; Future  -     Hemoglobin A1c; Future  -     Lipid Panel With LDL / HDL Ratio; Future  -     TSH; Future  -     T4, Free; Future     Reviewed current medication plan with patient today.  Continue current medications as listed below.  Encouraged healthy diet/exercise.  OV labs in    6months.  Pt to call sooner if any other issues arise.      Encouraged influenza vaccination if not already done      BMI is within normal parameters. No other follow-up for BMI required.             Outpatient Medications Prior to Visit   Medication Sig Dispense Refill    alendronate (FOSAMAX) 70 MG tablet Take 1 tablet by mouth once a week 12 tablet 0    cephalexin (KEFLEX) 500 MG capsule Take 1 capsule by mouth 3 (Three) Times a Day. 30 capsule 0    lamoTRIgine (LaMICtal) 200 MG tablet Take 1 tablet by mouth Every Night. 1/2 pill at noon      memantine (NAMENDA) 10 MG tablet Take 1 tablet by mouth 2 (Two) Times a Day.      Misc. Devices (Rollator Ultra-Light) misc 1 each  Daily. 1 each 0    OLANZapine (zyPREXA) 20 MG tablet Take 1 tablet by mouth every night at bedtime.      omeprazole (priLOSEC) 40 MG capsule Take 1 capsule by mouth Daily. 90 capsule 3    polyethylene glycol (MIRALAX) powder Take 17 g by mouth Daily. (Patient taking differently: Take 17 g by mouth Daily As Needed.) 850 g 5    rivastigmine (EXELON) 6 MG capsule Take 1 capsule by mouth 2 (Two) Times a Day.      tamsulosin (FLOMAX) 0.4 MG capsule 24 hr capsule Take 2 capsules by mouth once daily 180 capsule 0     No facility-administered medications prior to visit.     No orders of the defined types were placed in this encounter.    [unfilled]  There are no discontinued medications.      Return in about 6 months (around 3/10/2025) for Recheck, labs.    Patient was given instructions and counseling regarding her condition or for health maintenance advice. Please see specific information pulled into the AVS if appropriate.

## 2024-09-14 DIAGNOSIS — K22.89 PRESBYESOPHAGUS: ICD-10-CM

## 2024-09-14 DIAGNOSIS — R09.A2 GLOBUS SENSATION: ICD-10-CM

## 2024-09-16 RX ORDER — OMEPRAZOLE 40 MG/1
40 CAPSULE, DELAYED RELEASE ORAL DAILY
Qty: 90 CAPSULE | Refills: 0 | Status: SHIPPED | OUTPATIENT
Start: 2024-09-16

## 2024-09-24 ENCOUNTER — OFFICE VISIT (OUTPATIENT)
Dept: GASTROENTEROLOGY | Facility: CLINIC | Age: 79
End: 2024-09-24
Payer: MEDICARE

## 2024-09-24 VITALS
BODY MASS INDEX: 22.25 KG/M2 | SYSTOLIC BLOOD PRESSURE: 108 MMHG | WEIGHT: 146.8 LBS | HEIGHT: 68 IN | DIASTOLIC BLOOD PRESSURE: 72 MMHG

## 2024-09-24 DIAGNOSIS — K59.09 CHRONIC CONSTIPATION: Primary | ICD-10-CM

## 2024-09-24 DIAGNOSIS — K21.00 GASTROESOPHAGEAL REFLUX DISEASE WITH ESOPHAGITIS WITHOUT HEMORRHAGE: ICD-10-CM

## 2024-09-24 PROCEDURE — 1159F MED LIST DOCD IN RCRD: CPT

## 2024-09-24 PROCEDURE — 99213 OFFICE O/P EST LOW 20 MIN: CPT

## 2024-09-24 PROCEDURE — 1160F RVW MEDS BY RX/DR IN RCRD: CPT

## 2024-09-24 RX ORDER — OMEPRAZOLE 40 MG/1
40 CAPSULE, DELAYED RELEASE ORAL DAILY
Qty: 90 CAPSULE | Refills: 3 | Status: SHIPPED | OUTPATIENT
Start: 2024-09-24

## 2024-09-25 ENCOUNTER — HOSPITAL ENCOUNTER (EMERGENCY)
Facility: HOSPITAL | Age: 79
Discharge: HOME | End: 2024-09-26
Attending: EMERGENCY MEDICINE
Payer: MEDICARE

## 2024-09-25 ENCOUNTER — APPOINTMENT (OUTPATIENT)
Dept: CT IMAGING | Facility: HOSPITAL | Age: 79
End: 2024-09-25
Payer: MEDICARE

## 2024-09-25 VITALS
DIASTOLIC BLOOD PRESSURE: 85 MMHG | SYSTOLIC BLOOD PRESSURE: 182 MMHG | BODY MASS INDEX: 22.43 KG/M2 | OXYGEN SATURATION: 98 % | HEART RATE: 59 BPM | RESPIRATION RATE: 18 BRPM | HEIGHT: 68 IN | WEIGHT: 148 LBS | TEMPERATURE: 97.8 F

## 2024-09-25 DIAGNOSIS — S01.81XA LACERATION OF FOREHEAD, INITIAL ENCOUNTER: ICD-10-CM

## 2024-09-25 DIAGNOSIS — S02.2XXB OPEN FRACTURE OF NASAL BONE, INITIAL ENCOUNTER: ICD-10-CM
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
## 2024-09-25 PROCEDURE — 25010000002 TETANUS-DIPHTHERIA TOXOIDS (ADULT) PER 0.5 ML: Performed by: EMERGENCY MEDICINE

## 2024-09-25 PROCEDURE — 99284 EMERGENCY DEPT VISIT MOD MDM: CPT | Performed by: EMERGENCY MEDICINE

## 2024-09-25 PROCEDURE — 25010000002 BUPIVACAINE (PF) 0.5 % SOLUTION: Performed by: EMERGENCY MEDICINE

## 2024-09-25 PROCEDURE — 70450 CT HEAD/BRAIN W/O DYE: CPT

## 2024-09-25 PROCEDURE — 90471 IMMUNIZATION ADMIN: CPT

## 2024-09-25 PROCEDURE — 70486 CT MAXILLOFACIAL W/O DYE: CPT

## 2024-09-25 PROCEDURE — 90714 TD VACC NO PRESV 7 YRS+ IM: CPT | Performed by: EMERGENCY MEDICINE

## 2024-09-25 PROCEDURE — 72125 CT NECK SPINE W/O DYE: CPT

## 2024-09-25 RX ORDER — CEPHALEXIN 500 MG/1
500 CAPSULE ORAL 3 TIMES DAILY
Qty: 15 CAPSULE | Refills: 0 | Status: SHIPPED | OUTPATIENT
Start: 2024-09-25

## 2024-09-25 RX ORDER — BUPIVACAINE HYDROCHLORIDE 5 MG/ML
10 INJECTION, SOLUTION EPIDURAL; INTRACAUDAL ONCE
Status: COMPLETED | OUTPATIENT
Start: 2024-09-25 | End: 2024-09-25

## 2024-09-25 RX ORDER — CEPHALEXIN 500 MG/1
500 CAPSULE ORAL ONCE
Status: COMPLETED | OUTPATIENT
Start: 2024-09-25 | End: 2024-09-26

## 2024-09-25 RX ORDER — ACETAMINOPHEN 500 MG
1000 TABLET ORAL ONCE
Status: COMPLETED | OUTPATIENT
Start: 2024-09-25 | End: 2024-09-25

## 2024-09-25 RX ORDER — LIDOCAINE HYDROCHLORIDE 20 MG/ML
10 INJECTION, SOLUTION INFILTRATION; PERINEURAL ONCE
Status: COMPLETED | OUTPATIENT
Start: 2024-09-25 | End: 2024-09-25

## 2024-09-25 RX ADMIN — ACETAMINOPHEN 1000 MG: 500 TABLET ORAL at 21:08

## 2024-09-25 RX ADMIN — BUPIVACAINE HYDROCHLORIDE 10 ML: 5 INJECTION, SOLUTION EPIDURAL; INTRACAUDAL; PERINEURAL at 21:55

## 2024-09-25 RX ADMIN — CLOSTRIDIUM TETANI TOXOID ANTIGEN (FORMALDEHYDE INACTIVATED) AND CORYNEBACTERIUM DIPHTHERIAE TOXOID ANTIGEN (FORMALDEHYDE INACTIVATED) 0.5 ML: 5; 2 INJECTION, SUSPENSION INTRAMUSCULAR at 21:10

## 2024-09-25 RX ADMIN — LIDOCAINE HYDROCHLORIDE 10 ML: 20 INJECTION, SOLUTION INFILTRATION; PERINEURAL at 21:55

## 2024-09-25 NOTE — ED PROVIDER NOTES
Subjective   History of Present Illness    Chief complaint: Facial and head injury    Location: Forehead and nose    Quality/Severity: No loss of conscious    Timing/Onset/Duration: Just prior to arrival    Modifying Factors: No modifying factor    Associated Symptoms: Patient complains of headache.  No neck or back pain.  No chest pain or shortness of breath.  No abdominal pain.  No numbness, tingling, weakness, or change in bladder or bowel function.    Narrative: This 79-year-old white male tripped on his walker and sustained wounds to his face.  The patient does not know when he received his last tetanus shot.    PCP:Jessica Birch MD      Review of Systems      Past Medical History:   Diagnosis Date    Alzheimer disease     Alzheimer disease     Alzheimer disease 03/06/2019    Anxiety and depression     Bipolar 1 disorder     Colon polyp     Constipation     Depression     Diarrhea     Epilepsy     Epilepsy 03/06/2019    Erectile dysfunction     GERD (gastroesophageal reflux disease)     L1 vertebral fracture     Seizures     absence seizures.  well controlled.       No Known Allergies    Past Surgical History:   Procedure Laterality Date    CHOLECYSTECTOMY      COLONOSCOPY N/A 4/1/2016    Procedure: COLONOSCOPY with polypectomy;  Surgeon: Akiko Scales MD;  Location: [facility] OR;  Service:     GALLBLADDER SURGERY      HIP HEMIARTHROPLASTY Right 3/8/2019    Procedure: HIP HEMIARTHROPLASTY and all associated procedures;  Surgeon: Harrison Turk MD;  Location: [facility] OR;  Service: Orthopedics    LIVER SURGERY      TONSILLECTOMY         Family History   Problem Relation Age of Onset    Lung cancer Father     No Known Problems Mother     No Known Problems Maternal Grandmother     No Known Problems Maternal Grandfather     No Known Problems Paternal Grandmother     No Known Problems Paternal Grandfather     Colon polyps Neg Hx     Colon cancer Neg Hx        Social History     Socioeconomic History     Marital status:     Number of children: 0    Years of education: Ph.D   Tobacco Use    Smoking status: Former     Current packs/day: 0.00     Average packs/day: 2.0 packs/day for 20.0 years (40.0 ttl pk-yrs)     Types: Cigarettes     Start date: 2/3/1970     Quit date: 2/3/1990     Years since quittin.6     Passive exposure: Past    Smokeless tobacco: Former     Quit date:     Tobacco comments:     quit 30 yrs ago    Vaping Use    Vaping status: Never Used   Substance and Sexual Activity    Alcohol use: No    Drug use: No    Sexual activity: Defer           Objective   Physical Exam  Vitals (The temperature is 97.8 °F, pulse 59, respirations 18, /85, room air pulse ox 98%.) reviewed.   HENT:      Head: Normocephalic.      Right Ear: Tympanic membrane normal.      Left Ear: Tympanic membrane normal.      Nose: No rhinorrhea.      Comments: There is dried blood in the nares.  No active bleeding.  No obvious septal hematoma.     Mouth/Throat:      Mouth: Mucous membranes are moist.   Eyes:      Extraocular Movements: Extraocular movements intact.      Pupils: Pupils are equal, round, and reactive to light.   Neck:      Comments: No tenderness, deformity, or bony step-offs upon palpation of cervical, thoracic, lumbar sacrococcygeal spine.  Cardiovascular:      Rate and Rhythm: Normal rate and regular rhythm.      Pulses: Normal pulses.      Heart sounds: Normal heart sounds. No murmur heard.     No friction rub. No gallop.   Pulmonary:      Effort: Pulmonary effort is normal.      Breath sounds: Normal breath sounds.   Abdominal:      General: Abdomen is flat. Bowel sounds are normal. There is no distension.      Palpations: Abdomen is soft. There is no mass.      Tenderness: There is no abdominal tenderness. There is no right CVA tenderness, left CVA tenderness, guarding or rebound.      Hernia: No hernia is present.   Musculoskeletal:         General: No swelling or tenderness. Normal range of  motion.      Cervical back: Normal range of motion and neck supple. No tenderness.   Skin:     General: Skin is warm and dry.   Neurological:      General: No focal deficit present.      Mental Status: He is alert and oriented to person, place, and time.      Cranial Nerves: No cranial nerve deficit.      Sensory: No sensory deficit.      Motor: No weakness.      Coordination: Coordination normal.      Gait: Gait abnormal (At baseline, uses walker).       Procedures           ED Course      21:44 EDT, 09/25/24:  The physician on-call for oral maxillofacial trauma at [facility] has been paged out.  I spoke with Dr. Amy Michelle on-call for maxillofacial trauma.  The plan will be to have the patient follow-up with their clinic.  Patient can call [phone] tomorrow for a follow-up appointment on Monday.  The patient should not blow his nose for 2 weeks.  Patient will be given a prescription for something for pain and a prescription for Keflex.  The patient will be given a dose of Keflex here in the emergency department.  The patient should ice his face and nose for 20 minutes every 2-3 hours while he is awake for 2 to 3 days.  The patient should return to the emergency department if he has drainage, fever, confusion, vomiting, worse in any way at all.    22:21 EDT, 09/25/24:  The half centimeter nasal laceration was anesthetized with a total of 1 cc of a half-and-half mixture 0.5% Marcaine without epinephrine and 2% lidocaine without epinephrine.  There was good anesthetic effect.  The wound was copiously irrigated with normal saline, and meticulously explored.  There was no nerve involvement, no tendon involvement, no foreign bodies could be appreciated.  The wound was closed with Dermabond.    The 4 cm forehead laceration was anesthetized with a total of 3 cc of a half-and-half mixture of 0.5% Marcaine without epinephrine and 2% lidocaine without epinephrine.  There was good anesthetic effect.   The wound was stellate in shape.  Wound edges were sharply debrided.  It was copious irrigated with normal saline meticulously explored to the base in a bloodless field.  The subcutaneous layers were closed with 5-0 Vicryl's in a protracted sutures.  The skin was closed with ten 6-0 Ethilon simple directed sutures.  There was no nerve involvement, no tendon involvement, no foreign bodies could be appreciated.  There was no bony step-off or deformity.  Bacitracin ointment was applied to the forehead laceration.    22:27 EDT, 09/25/24:  Ice the nose for 20 minutes every 2 3 hours while awake for 2 to 3 days.  Take Tylenol or Motrin as needed as directed for pain.  Wash the wounds once a day with soap and water.  Apply Neosporin ointment and a sterile dressing to the forehead laceration for 3 days.  After 3 days, wash wound once a day with soap and water, pat dry, and apply sterile dressing until the sutures come out in 7 days.  Keep the nasal laceration covered with sterile dressing for 3 days.  Do not apply bacitracin ointment or Neosporin ointment to the nasal laceration.  Call the [facility] maxillofacial trauma clinic at [phone] in the morning for a follow-up appointment on Monday.  Sutures removed by the maxillofacial trauma clinic or your primary care provider in 7 days.  Return to emergency department if there is redness, drainage, fever, chills, worsening pain, confusion, vomiting, worse in any way at all.  Do not blow your nose for 2 weeks.                                       Medical Decision Making  Amount and/or Complexity of Data Reviewed  Radiology: ordered.    Risk  OTC drugs.  Prescription drug management.        Final diagnoses:   Fall, initial encounter   Open fracture of nasal bone, initial encounter   Laceration of forehead, initial encounter       ED Disposition  ED Disposition       None            No follow-up provider specified.       Medication List      No changes were made to your  prescriptions during this visit.       No orders to display     Labs Reviewed - No data to display  No results found.    Final diagnoses:   Fall, initial encounter   Open fracture of nasal bone, initial encounter   Laceration of forehead, initial encounter         ED Medications:  Medications - No data to display    New Medications:     Medication List        CONTINUE taking these medications      Rollator Ultra-Light misc  1 each Daily.            ASK your doctor about these medications      alendronate 70 MG tablet  Commonly known as: FOSAMAX  Take 1 tablet by mouth once a week     lamoTRIgine 200 MG tablet  Commonly known as: LaMICtal     memantine 10 MG tablet  Commonly known as: NAMENDA     OLANZapine 20 MG tablet  Commonly known as: zyPREXA     omeprazole 40 MG capsule  Commonly known as: priLOSEC  Take 1 capsule by mouth Daily.     polyethylene glycol 17 GM/SCOOP powder  Commonly known as: MIRALAX  Take 17 g by mouth Daily.     rivastigmine 6 MG capsule  Commonly known as: EXELON     tamsulosin 0.4 MG capsule 24 hr capsule  Commonly known as: FLOMAX  Take 2 capsules by mouth once daily              Stopped Medications:     Medication List        CONTINUE taking these medications      Rollator Ultra-Light misc  1 each Daily.            ASK your doctor about these medications      alendronate 70 MG tablet  Commonly known as: FOSAMAX  Take 1 tablet by mouth once a week     lamoTRIgine 200 MG tablet  Commonly known as: LaMICtal     memantine 10 MG tablet  Commonly known as: NAMENDA     OLANZapine 20 MG tablet  Commonly known as: zyPREXA     omeprazole 40 MG capsule  Commonly known as: priLOSEC  Take 1 capsule by mouth Daily.     polyethylene glycol 17 GM/SCOOP powder  Commonly known as: MIRALAX  Take 17 g by mouth Daily.     rivastigmine 6 MG capsule  Commonly known as: EXELON     tamsulosin 0.4 MG capsule 24 hr capsule  Commonly known as: FLOMAX  Take 2 capsules by mouth once daily                   Denzel  Pierre SUN MD  09/26/24 0020

## 2024-09-26 RX ADMIN — CEPHALEXIN 500 MG: 500 CAPSULE ORAL at 00:15

## 2024-09-26 NOTE — DISCHARGE INSTRUCTIONS
Wash the forehead laceration once a day with soap and water, pat dry, and apply Neosporin ointment for 3 days and a sterile dressing.  After 3 days, wash wound once a day with soap and water, pat dry, and apply a sterile dressing to the forehead laceration.  Wash the nasal laceration once a day with soap and water, pat dry, and keep a Band-Aid over it for 3 days.  Do not apply Neosporin or bacitracin to the nasal laceration.              Take the Keflex as prescribed.  Tylenol as needed as directed for pain.  Call the maxillofacial trauma clinic at [phone] in the morning for follow-up appointment on Monday.  The sutures should be removed in 7 days by the clinic or your primary care provider.  Ice your nose for 20 minutes every 2-3 hours while awake for 2 to 3 days.  Return to the emergency department if there is redness, drainage, change in mental status, nausea, vomiting fever, worse in any way at all.  Do not blow your nose for 2 weeks.

## 2024-11-07 ENCOUNTER — OFFICE VISIT (OUTPATIENT)
Dept: NEUROLOGY | Facility: CLINIC | Age: 79
End: 2024-11-07
Payer: MEDICARE

## 2024-11-07 VITALS
WEIGHT: 153.2 LBS | OXYGEN SATURATION: 97 % | DIASTOLIC BLOOD PRESSURE: 72 MMHG | SYSTOLIC BLOOD PRESSURE: 140 MMHG | HEART RATE: 80 BPM | BODY MASS INDEX: 23.29 KG/M2

## 2024-11-07 DIAGNOSIS — R41.3 MEMORY LOSS: ICD-10-CM

## 2024-11-07 DIAGNOSIS — G40.001 LOCALIZATION-RELATED (FOCAL) (PARTIAL) IDIOPATHIC EPILEPSY AND EPILEPTIC SYNDROMES WITH SEIZURES OF LOCALIZED ONSET, NOT INTRACTABLE, WITH STATUS EPILEPTICUS: Primary | ICD-10-CM

## 2024-11-07 NOTE — PROGRESS NOTES
Notes by MA:  Mr Redmond is here today as a referral from Dr Birch. He states that he has Alzheimer's disease and epilpesy      Subjective:     Dear Dr. Birch, thank you for referring your patient for neurological evaluation.  As you know, he is a 79-year-old gentleman carrying the diagnosis of dementia and epilepsy here to establish with neurology and for continued neurological care.  He tells me he was diagnosed with Alzheimer disease some 12 to 14 years ago and has been on Namenda and rivastigmine.  He takes 10 mg twice daily and 6 mg twice daily respectively.  He tolerates them well and has noticed significant improvement in stability over time on these medications.  He also has a history of left temporal lobe epilepsy.  He is on Lamictal and takes 100 mg at noon and 200 mg at night and is reasonably well-controlled on this with only a rare seizure occurring approximately every 5 years.  His most recent seizure was about 1 month ago.  He does not remember if he missed any doses of medication.  He was previously followed at the [facility] and psychiatry.  He has a history of bipolar disorder.  His most recent head CT performed because of a fall revealed no acute intracranial abnormalities.  A broken nose was noted.  Patient ID: Andrew Redmond is a 79 y.o. male.    Memory Loss  Pertinent negatives include no abdominal pain, arthralgias, chest pain, fatigue, headaches, joint swelling, myalgias, nausea, neck pain, numbness, vomiting or weakness.     The following portions of the patient's history were reviewed and updated as appropriate: allergies, current medications, past family history, past medical history, past social history, past surgical history, and problem list.    Review of Systems   Constitutional:  Negative for activity change, appetite change and fatigue.   HENT:  Negative for facial swelling, trouble swallowing and voice change.    Eyes:  Negative for photophobia, pain and visual disturbance.    Respiratory:  Negative for chest tightness, shortness of breath and wheezing.    Cardiovascular:  Negative for chest pain, palpitations and leg swelling.   Gastrointestinal:  Negative for abdominal pain, nausea and vomiting.   Endocrine: Negative for cold intolerance and heat intolerance.   Musculoskeletal:  Negative for arthralgias, back pain, gait problem, joint swelling, myalgias, neck pain and neck stiffness.   Neurological:  Positive for seizures. Negative for dizziness, tremors, syncope, facial asymmetry, speech difficulty, weakness, light-headedness, numbness and headaches.   Hematological:  Does not bruise/bleed easily.   Psychiatric/Behavioral:  Positive for confusion and decreased concentration. Negative for agitation, behavioral problems, dysphoric mood, hallucinations, self-injury, sleep disturbance and suicidal ideas. The patient is not nervous/anxious and is not hyperactive.         Objective:    Neurological Exam   Awake alert pleasant cooperative and cognitively sharp.  He is quickwitted and fully oriented.  His MoCA score today is 23 out of 30 with small deficits across the board.    Cranial nerves II through XII normal and symmetric.    Motor exam reveals normal symmetric tone bulk and power appropriate for age and activity level.    Sensory examination reveals symmetric sensation to primary modalities.    Tendon reflexes are suppressed but symmetric.  Toes are downgoing.    Coordination testing reveals smooth and accurate finger-nose-finger.  There is no tremor.  There is no significant ataxia.  No parkinsonian features.  Physical Exam  Neck is supple.  No cervical bruits.  Cardiac rhythm is regular.  Assessment/Plan:     Diagnoses and all orders for this visit:    1. Localization-related (focal) (partial) idiopathic epilepsy and epileptic syndromes with seizures of localized onset, not intractable, with status epilepticus (Primary)  -     Lamotrigine Level; Future    2. Memory loss      79-year-old gentleman here to establish with neurology for his history of cognitive impairment and partial onset epilepsy.  He reports a history of left temporal lobe onset epilepsy which has been mostly well-controlled.  He is currently on Lamictal and has been on Lamictal for many years and suffers about 1 seizure every 5 years, most recently 1 month ago.  However he is doing well on the medication without any problematic side effects and no toxic findings on exam.  Will continue that dose unchanged and arranging for a new baseline lamotrigine level for him.    Reported history of cognitive impairment.  Although he reports that he is diagnosed with Alzheimer disease, his history, cognitive evaluation and lack of progression over time argue fairly strongly against that diagnosis.  I discussed that with him at length.  Be that as it may, he is convinced that he has significant and lasting improvement with the combination of rivastigmine 6 mg twice daily and memantine 10 mg twice daily which we will continue unchanged.  He declines any biomarker testing for Alzheimer disease at this time.    We will try to acquire his [facility] notes for review.  Otherwise we will see him back in about 3 months time to see how he is doing and if seizure-free, extend his follow-up from that point.  Thank you very much for the opportunity to participate in the care of this pleasant gentleman.    60 minutes total patient care time including record review, examination, discussion and counseling, , and documentation.

## 2024-11-08 ENCOUNTER — PATIENT ROUNDING (BHMG ONLY) (OUTPATIENT)
Dept: NEUROLOGY | Facility: CLINIC | Age: 79
End: 2024-11-08
Payer: MEDICARE

## 2024-11-15 RX ORDER — TAMSULOSIN HYDROCHLORIDE 0.4 MG/1
CAPSULE ORAL
Qty: 180 CAPSULE | Refills: 0 | Status: SHIPPED | OUTPATIENT
Start: 2024-11-15

## 2024-11-15 NOTE — TELEPHONE ENCOUNTER
Caller: Nyla Andrew H    Relationship: Self    Best call back number: [phone]    Requested Prescriptions:   Requested Prescriptions     Pending Prescriptions Disp Refills    tamsulosin (FLOMAX) 0.4 MG capsule 24 hr capsule [Pharmacy Med Name: Tamsulosin HCl 0.4 MG Oral Capsule] 180 capsule 0     Sig: Take 2 capsules by mouth once daily        Pharmacy where request should be sent: [facility] [address] [place], [place] [address] [facility] [phone] [facility] [phone] FX     Last office visit with prescribing clinician: 9/10/2024   Last telemedicine visit with prescribing clinician: Visit date not found   Next office visit with prescribing clinician: 3/10/2025     Additional details provided by patient: PATIENT OUT OF MEDICATION    Does the patient have less than a 3 day supply:  [x] Yes  [] No    Would you like a call back once the refill request has been completed: [] Yes [x] No    If the office needs to give you a call back, can they leave a voicemail: [] Yes [x] No    Lorenzo Thibodeaux Rep   11/15/24 11:03 EST

## 2024-11-15 NOTE — TELEPHONE ENCOUNTER
Rx Refill Note  Requested Prescriptions     Pending Prescriptions Disp Refills    tamsulosin (FLOMAX) 0.4 MG capsule 24 hr capsule [Pharmacy Med Name: Tamsulosin HCl 0.4 MG Oral Capsule] 180 capsule 0     Sig: Take 2 capsules by mouth once daily      Last office visit with prescribing clinician: 9/10/2024   Last telemedicine visit with prescribing clinician: Visit date not found   Next office visit with prescribing clinician: 3/10/2025                         Would you like a call back once the refill request has been completed: [] Yes [] No    If the office needs to give you a call back, can they leave a voicemail: [] Yes [] No    Velia Hwang MA  11/15/24, 16:12 EST

## 2024-12-06 ENCOUNTER — OFFICE VISIT (OUTPATIENT)
Dept: INTERNAL MEDICINE | Facility: CLINIC | Age: 79
End: 2024-12-06
Payer: MEDICARE

## 2024-12-06 VITALS
BODY MASS INDEX: 22.94 KG/M2 | OXYGEN SATURATION: 98 % | HEART RATE: 95 BPM | DIASTOLIC BLOOD PRESSURE: 78 MMHG | TEMPERATURE: 98.6 F | SYSTOLIC BLOOD PRESSURE: 136 MMHG | HEIGHT: 68 IN | WEIGHT: 151.4 LBS

## 2024-12-06 DIAGNOSIS — R05.9 COUGH, UNSPECIFIED TYPE: ICD-10-CM

## 2024-12-06 DIAGNOSIS — R09.81 NASAL CONGESTION: ICD-10-CM

## 2024-12-06 DIAGNOSIS — J01.00 ACUTE MAXILLARY SINUSITIS, RECURRENCE NOT SPECIFIED: Primary | ICD-10-CM

## 2024-12-06 LAB
EXPIRATION DATE: NORMAL
INTERNAL CONTROL: NORMAL
Lab: NORMAL
SARS-COV-2 AG UPPER RESP QL IA.RAPID: NOT DETECTED

## 2024-12-06 PROCEDURE — 99213 OFFICE O/P EST LOW 20 MIN: CPT | Performed by: NURSE PRACTITIONER

## 2024-12-06 PROCEDURE — 1126F AMNT PAIN NOTED NONE PRSNT: CPT | Performed by: NURSE PRACTITIONER

## 2024-12-06 PROCEDURE — 87426 SARSCOV CORONAVIRUS AG IA: CPT | Performed by: NURSE PRACTITIONER

## 2024-12-06 RX ORDER — AMOXICILLIN 875 MG/1
875 TABLET, COATED ORAL 2 TIMES DAILY
Qty: 20 TABLET | Refills: 0 | Status: SHIPPED | OUTPATIENT
Start: 2024-12-06

## 2024-12-06 NOTE — PROGRESS NOTES
Chief Complaint   Patient presents with   • Nasal Congestion     Having some sinus congestion. Tried Allegra D and Mucinex. Did have some Diarrhea. Has been ongoing since about Monday.        Subjective     Andrew Redmond is a 79 y.o. male being seen for an acute visit for URI with cough and sore throat. He reports that he has had increasing nasal congestion and sore throat since Monday. He normally has congestion from mold allergy, but then developed a cough and dark mucous. He has tried Allegra D. He denies SOA or fever. No known exposures.     History of Present Illness     No Known Allergies      Current Outpatient Medications:   •  alendronate (FOSAMAX) 70 MG tablet, Take 1 tablet by mouth once a week, Disp: 12 tablet, Rfl: 0  •  lamoTRIgine (LaMICtal) 200 MG tablet, Take 1 tablet by mouth Every Night. 1/2 pill at noon, Disp: , Rfl:   •  memantine (NAMENDA) 10 MG tablet, Take 1 tablet by mouth 2 (Two) Times a Day., Disp: , Rfl:   •  Misc. Devices (Rollator Ultra-Light) misc, 1 each Daily., Disp: 1 each, Rfl: 0  •  OLANZapine (zyPREXA) 20 MG tablet, Take 1 tablet by mouth every night at bedtime., Disp: , Rfl:   •  omeprazole (priLOSEC) 40 MG capsule, Take 1 capsule by mouth Daily., Disp: 90 capsule, Rfl: 3  •  polyethylene glycol (MIRALAX) powder, Take 17 g by mouth Daily. (Patient taking differently: Take 17 g by mouth Daily As Needed.), Disp: 850 g, Rfl: 5  •  rivastigmine (EXELON) 6 MG capsule, Take 1 capsule by mouth 2 (Two) Times a Day., Disp: , Rfl:   •  tamsulosin (FLOMAX) 0.4 MG capsule 24 hr capsule, Take 2 capsules by mouth once daily, Disp: 180 capsule, Rfl: 0    The following portions of the patient's history were reviewed and updated as appropriate: allergies, current medications, past family history, past medical history, past social history, past surgical history, and problem list.    Review of Systems   Constitutional: Negative.  Negative for fatigue and fever.   HENT:  Positive for congestion,  postnasal drip, rhinorrhea, sinus pressure and sinus pain. Negative for sneezing and sore throat.    Respiratory:  Positive for cough. Negative for shortness of breath, wheezing and stridor.    Cardiovascular: Negative.  Negative for chest pain, palpitations and leg swelling.   Gastrointestinal: Negative.    Musculoskeletal:  Positive for arthralgias.   Allergic/Immunologic: Positive for environmental allergies.   Hematological: Negative.        Assessment     Physical Exam  Vitals reviewed.   Constitutional:       Appearance: Normal appearance. He is not ill-appearing.   HENT:      Head: Normocephalic.      Nose: Congestion and rhinorrhea present.      Right Sinus: Frontal sinus tenderness present.      Left Sinus: Frontal sinus tenderness present.      Mouth/Throat:      Pharynx: Posterior oropharyngeal erythema present.   Cardiovascular:      Rate and Rhythm: Normal rate and regular rhythm.      Pulses: Normal pulses.      Heart sounds: Normal heart sounds. No murmur heard.  Pulmonary:      Effort: Pulmonary effort is normal. No respiratory distress.      Breath sounds: Normal breath sounds. No stridor.   Musculoskeletal:      Right lower leg: No edema.      Left lower leg: No edema.   Skin:     General: Skin is warm and dry.   Neurological:      General: No focal deficit present.      Mental Status: He is alert and oriented to person, place, and time.   Psychiatric:         Mood and Affect: Mood normal.         Behavior: Behavior normal.         Thought Content: Thought content normal.       Plan     POC SARs Covid negative    Diagnoses and all orders for this visit:    1. Acute maxillary sinusitis, recurrence not specified (Primary)  -     amoxicillin (AMOXIL) 875 MG tablet; Take 1 tablet by mouth 2 (Two) Times a Day.  Dispense: 20 tablet; Refill: 0    2. Cough, unspecified type  -     POCT ROSSANA SARS-CoV-2 Antigen NAKUL    3. Nasal congestion  -     POCT ROSSANA SARS-CoV-2 Antigen NAKUL        Follow up as needed.

## 2024-12-09 DIAGNOSIS — M81.0 AGE-RELATED OSTEOPOROSIS WITHOUT CURRENT PATHOLOGICAL FRACTURE: ICD-10-CM

## 2024-12-11 DIAGNOSIS — M81.0 AGE-RELATED OSTEOPOROSIS WITHOUT CURRENT PATHOLOGICAL FRACTURE: ICD-10-CM

## 2024-12-11 NOTE — TELEPHONE ENCOUNTER
Caller: Andrew Redmond    Relationship: Self    Best call back number: [phone]     Requested Prescriptions:   Requested Prescriptions     Pending Prescriptions Disp Refills    alendronate (FOSAMAX) 70 MG tablet 12 tablet 0     Sig: Take 1 tablet by mouth 1 (One) Time Per Week.        Pharmacy where request should be sent: [facility] [address] [place], [place] [address] [facility] [phone] [facility] [phone] FX     Last office visit with prescribing clinician: 9/10/2024   Last telemedicine visit with prescribing clinician: Visit date not found   Next office visit with prescribing clinician: 3/10/2025     Additional details provided by patient:     Does the patient have less than a 3 day supply:  [x] Yes  [] No    Would you like a call back once the refill request has been completed: [] Yes [x] No    If the office needs to give you a call back, can they leave a voicemail: [x] Yes [] No    Lorenzo Hamilton Rep   12/11/24 15:26 EST

## 2024-12-13 RX ORDER — ALENDRONATE SODIUM 70 MG/1
70 TABLET ORAL WEEKLY
Qty: 12 TABLET | Refills: 0 | OUTPATIENT
Start: 2024-12-13

## 2024-12-13 RX ORDER — ALENDRONATE SODIUM 70 MG/1
70 TABLET ORAL WEEKLY
Qty: 12 TABLET | Refills: 0 | Status: SHIPPED | OUTPATIENT
Start: 2024-12-13

## 2024-12-13 NOTE — TELEPHONE ENCOUNTER
Rx Refill Note  Requested Prescriptions     Pending Prescriptions Disp Refills    alendronate (FOSAMAX) 70 MG tablet [Pharmacy Med Name: Alendronate Sodium 70 MG Oral Tablet] 12 tablet 0     Sig: Take 1 tablet by mouth once a week      Last office visit with prescribing clinician: 9/10/2024   Last telemedicine visit with prescribing clinician: Visit date not found   Next office visit with prescribing clinician: 12/11/2024                         Would you like a call back once the refill request has been completed: [] Yes [] No    If the office needs to give you a call back, can they leave a voicemail: [] Yes [] No    Velia Hwang MA  12/13/24, 15:07 EST

## 2024-12-17 ENCOUNTER — HOSPITAL ENCOUNTER (OUTPATIENT)
Dept: GENERAL RADIOLOGY | Facility: HOSPITAL | Age: 79
Discharge: HOME | End: 2024-12-17
Admitting: FAMILY MEDICINE
Payer: MEDICARE

## 2024-12-17 ENCOUNTER — TELEPHONE (OUTPATIENT)
Dept: NEUROLOGY | Facility: CLINIC | Age: 79
End: 2024-12-17
Payer: MEDICARE

## 2024-12-17 PROCEDURE — 73590 X-RAY EXAM OF LOWER LEG: CPT

## 2024-12-17 NOTE — TELEPHONE ENCOUNTER
LVM for patient to see if he had lamotrigine level drawn elsewhere or if he was going to have it drawn with [facility].

## 2024-12-20 ENCOUNTER — LAB (OUTPATIENT)
Dept: LAB | Facility: HOSPITAL | Age: 79
End: 2024-12-20
Payer: MEDICARE

## 2024-12-20 DIAGNOSIS — G40.001 LOCALIZATION-RELATED (FOCAL) (PARTIAL) IDIOPATHIC EPILEPSY AND EPILEPTIC SYNDROMES WITH SEIZURES OF LOCALIZED ONSET, NOT INTRACTABLE, WITH STATUS EPILEPTICUS: ICD-10-CM

## 2024-12-20 PROCEDURE — 36415 COLL VENOUS BLD VENIPUNCTURE: CPT

## 2024-12-20 PROCEDURE — 80175 DRUG SCREEN QUAN LAMOTRIGINE: CPT

## 2024-12-23 LAB — LAMOTRIGINE SERPL-MCNC: 8.4 UG/ML (ref 2–20)

## 2024-12-30 ENCOUNTER — TELEPHONE (OUTPATIENT)
Dept: NEUROLOGY | Facility: CLINIC | Age: 79
End: 2024-12-30
Payer: MEDICARE

## 2024-12-30 NOTE — TELEPHONE ENCOUNTER
LVM with results as follows:     HUB can RELAY      Lamotrigine level within normal limits. Keep planned follow-up.

## 2024-12-30 NOTE — TELEPHONE ENCOUNTER
----- Message from Fay Dickens sent at 12/30/2024 12:58 PM EST -----  Lamotrigine level within normal limits.  Keep planned follow-up.

## 2024-12-30 NOTE — TELEPHONE ENCOUNTER
LMTCB  waS CALLING WITH LAB RESULTS:  Lamotrigine level within normal limits.  Keep planned follow-up.

## 2024-12-31 RX ORDER — TAMSULOSIN HYDROCHLORIDE 0.4 MG/1
CAPSULE ORAL
Qty: 180 CAPSULE | Refills: 0 | Status: SHIPPED | OUTPATIENT
Start: 2024-12-31

## 2024-12-31 NOTE — TELEPHONE ENCOUNTER
Rx Refill Note  Requested Prescriptions     Pending Prescriptions Disp Refills    tamsulosin (FLOMAX) 0.4 MG capsule 24 hr capsule [Pharmacy Med Name: Tamsulosin HCl 0.4 MG Oral Capsule] 180 capsule 0     Sig: Take 2 capsules by mouth once daily      Last office visit with prescribing clinician: 9/10/2024   Last telemedicine visit with prescribing clinician: Visit date not found   Next office visit with prescribing clinician: 3/10/2025                         Would you like a call back once the refill request has been completed: [] Yes [] No    If the office needs to give you a call back, can they leave a voicemail: [] Yes [] No    Acacia Rowley MA  12/31/24, 18:10 EST

## 2025-02-07 ENCOUNTER — OFFICE VISIT (OUTPATIENT)
Dept: NEUROLOGY | Facility: CLINIC | Age: 80
End: 2025-02-07
Payer: MEDICARE

## 2025-02-07 VITALS
BODY MASS INDEX: 23.96 KG/M2 | SYSTOLIC BLOOD PRESSURE: 130 MMHG | HEART RATE: 70 BPM | OXYGEN SATURATION: 97 % | WEIGHT: 157.6 LBS | DIASTOLIC BLOOD PRESSURE: 76 MMHG

## 2025-02-07 DIAGNOSIS — R41.3 MEMORY LOSS: ICD-10-CM

## 2025-02-07 DIAGNOSIS — G40.001 LOCALIZATION-RELATED (FOCAL) (PARTIAL) IDIOPATHIC EPILEPSY AND EPILEPTIC SYNDROMES WITH SEIZURES OF LOCALIZED ONSET, NOT INTRACTABLE, WITH STATUS EPILEPTICUS: Primary | ICD-10-CM

## 2025-02-07 NOTE — PROGRESS NOTES
Notes by MA:  Mr Redmond is here today for a follow up appointment. He has not had any seizures and is tolerating his medication well.      Subjective:     Patient ID: Andrew Redmond is a 79 y.o. male.    Seizures     The following portions of the patient's history were reviewed and updated as appropriate: allergies, current medications, past family history, past medical history, past social history, past surgical history, and problem list.    Review of Systems   Neurological:  Positive for seizures.        Objective:    Neurological Exam   Cranial nerves II through XII normal and symmetric.     Motor exam reveals normal symmetric tone bulk and power appropriate for age and activity level.     Sensory examination reveals symmetric sensation to primary modalities.     Tendon reflexes are suppressed but symmetric.  Toes are downgoing.     Coordination testing reveals smooth and accurate finger-nose-finger.  There is no tremor.  There is no significant ataxia.  No parkinsonian features.  Physical Exam    Assessment/Plan:     Diagnoses and all orders for this visit:    1. Localization-related (focal) (partial) idiopathic epilepsy and epileptic syndromes with seizures of localized onset, not intractable, with status epilepticus (Primary)    2. Memory loss       Seizure-free on Lamictal 100 mg at noon and 200 mg at bedtime.  Tolerating it well and is not demonstrating any toxic findings.  We will continue that unchanged.  Lamotrigine level after our last visit was 8.4.    Please see notes from prior visit regarding my thoughts regarding his memory function.  No changes are being made to his memantine and rivastigmine.

## 2025-02-26 DIAGNOSIS — R63.4 WEIGHT LOSS: ICD-10-CM

## 2025-02-26 DIAGNOSIS — R79.9 ABNORMAL FINDING OF BLOOD CHEMISTRY, UNSPECIFIED: ICD-10-CM

## 2025-02-26 DIAGNOSIS — R73.03 PREDIABETES: ICD-10-CM

## 2025-03-04 LAB
ALBUMIN SERPL-MCNC: 3.9 G/DL (ref 3.5–5.2)
ALBUMIN/GLOB SERPL: 2 G/DL
ALP SERPL-CCNC: 134 U/L (ref 39–117)
ALT SERPL-CCNC: 21 U/L (ref 1–41)
AST SERPL-CCNC: 25 U/L (ref 1–40)
BASOPHILS # BLD AUTO: 0.06 10*3/MM3 (ref 0–0.2)
BASOPHILS NFR BLD AUTO: 1 % (ref 0–1.5)
BILIRUB SERPL-MCNC: 0.4 MG/DL (ref 0–1.2)
BUN SERPL-MCNC: 10 MG/DL (ref 8–23)
BUN/CREAT SERPL: 14.7 (ref 7–25)
CALCIUM SERPL-MCNC: 8.9 MG/DL (ref 8.6–10.5)
CHLORIDE SERPL-SCNC: 98 MMOL/L (ref 98–107)
CHOLEST SERPL-MCNC: 160 MG/DL (ref 0–200)
CO2 SERPL-SCNC: 29.7 MMOL/L (ref 22–29)
CREAT SERPL-MCNC: 0.68 MG/DL (ref 0.76–1.27)
EGFRCR SERPLBLD CKD-EPI 2021: 94.6 ML/MIN/1.73
EOSINOPHIL # BLD AUTO: 0.16 10*3/MM3 (ref 0–0.4)
EOSINOPHIL NFR BLD AUTO: 2.6 % (ref 0.3–6.2)
ERYTHROCYTE [DISTWIDTH] IN BLOOD BY AUTOMATED COUNT: 14.7 % (ref 12.3–15.4)
GLOBULIN SER CALC-MCNC: 2 GM/DL
GLUCOSE SERPL-MCNC: 121 MG/DL (ref 65–99)
HBA1C MFR BLD: 5.9 % (ref 4.8–5.6)
HCT VFR BLD AUTO: 43.8 % (ref 37.5–51)
HDLC SERPL-MCNC: 59 MG/DL (ref 40–60)
HGB BLD-MCNC: 14.7 G/DL (ref 13–17.7)
IMM GRANULOCYTES # BLD AUTO: 0.07 10*3/MM3 (ref 0–0.05)
IMM GRANULOCYTES NFR BLD AUTO: 1.2 % (ref 0–0.5)
LDLC SERPL CALC-MCNC: 83 MG/DL (ref 0–100)
LDLC/HDLC SERPL: 1.39 {RATIO}
LYMPHOCYTES # BLD AUTO: 1.8 10*3/MM3 (ref 0.7–3.1)
LYMPHOCYTES NFR BLD AUTO: 29.7 % (ref 19.6–45.3)
MCH RBC QN AUTO: 28.9 PG (ref 26.6–33)
MCHC RBC AUTO-ENTMCNC: 33.6 G/DL (ref 31.5–35.7)
MCV RBC AUTO: 86.1 FL (ref 79–97)
MONOCYTES # BLD AUTO: 0.54 10*3/MM3 (ref 0.1–0.9)
MONOCYTES NFR BLD AUTO: 8.9 % (ref 5–12)
NEUTROPHILS # BLD AUTO: 3.44 10*3/MM3 (ref 1.7–7)
NEUTROPHILS NFR BLD AUTO: 56.6 % (ref 42.7–76)
NRBC BLD AUTO-RTO: 0 /100 WBC (ref 0–0.2)
PLATELET # BLD AUTO: 274 10*3/MM3 (ref 140–450)
POTASSIUM SERPL-SCNC: 4.6 MMOL/L (ref 3.5–5.2)
PROT SERPL-MCNC: 5.9 G/DL (ref 6–8.5)
RBC # BLD AUTO: 5.09 10*6/MM3 (ref 4.14–5.8)
SODIUM SERPL-SCNC: 137 MMOL/L (ref 136–145)
T4 FREE SERPL-MCNC: 1.26 NG/DL (ref 0.92–1.68)
TRIGL SERPL-MCNC: 96 MG/DL (ref 0–150)
TSH SERPL DL<=0.005 MIU/L-ACNC: 2.92 UIU/ML (ref 0.27–4.2)
VLDLC SERPL CALC-MCNC: 18 MG/DL (ref 5–40)
WBC # BLD AUTO: 6.07 10*3/MM3 (ref 3.4–10.8)

## 2025-03-10 ENCOUNTER — OFFICE VISIT (OUTPATIENT)
Dept: INTERNAL MEDICINE | Facility: CLINIC | Age: 80
End: 2025-03-10
Payer: MEDICARE

## 2025-03-10 VITALS
TEMPERATURE: 98.3 F | DIASTOLIC BLOOD PRESSURE: 62 MMHG | WEIGHT: 134 LBS | OXYGEN SATURATION: 95 % | HEART RATE: 82 BPM | SYSTOLIC BLOOD PRESSURE: 110 MMHG | BODY MASS INDEX: 20.37 KG/M2

## 2025-03-10 DIAGNOSIS — M81.0 AGE-RELATED OSTEOPOROSIS WITHOUT CURRENT PATHOLOGICAL FRACTURE: ICD-10-CM

## 2025-03-10 DIAGNOSIS — Z79.899 HIGH RISK MEDICATION USE: ICD-10-CM

## 2025-03-10 DIAGNOSIS — R73.03 PREDIABETES: ICD-10-CM

## 2025-03-10 DIAGNOSIS — Z00.00 MEDICARE ANNUAL WELLNESS VISIT, SUBSEQUENT: Primary | ICD-10-CM

## 2025-03-10 PROCEDURE — 1159F MED LIST DOCD IN RCRD: CPT | Performed by: INTERNAL MEDICINE

## 2025-03-10 PROCEDURE — 1160F RVW MEDS BY RX/DR IN RCRD: CPT | Performed by: INTERNAL MEDICINE

## 2025-03-10 PROCEDURE — 1170F FXNL STATUS ASSESSED: CPT | Performed by: INTERNAL MEDICINE

## 2025-03-10 PROCEDURE — 1126F AMNT PAIN NOTED NONE PRSNT: CPT | Performed by: INTERNAL MEDICINE

## 2025-03-10 NOTE — PROGRESS NOTES
"Chief Complaint  Prediabetes (6 month f/u )    Subjective        Andrew Redmond presents to [facility]  History of Present Illness    The 10-year ASCVD risk score (Patt TOURE, et al., 2019) is: 29.5%    Values used to calculate the score:      Age: 79 years      Sex: Male      Is Non- : No      Diabetic: No      Tobacco smoker: No      Systolic Blood Pressure: 130 mmHg      Is BP treated: No      HDL Cholesterol: 59 mg/dL      Total Cholesterol: 160 mg/dL      Objective   Vital Signs:  Wt 60.8 kg (134 lb)   BMI 20.37 kg/m²   Estimated body mass index is 20.37 kg/m² as calculated from the following:    Height as of 12/17/24: 172.7 cm (68\").    Weight as of this encounter: 60.8 kg (134 lb).    BMI is within normal parameters. No other follow-up for BMI required.      Physical Exam   Result Review :  The following data was reviewed by: Truong Bedoya, Medical Student on 03/10/2025:  CMP          9/3/2024    11:11 3/3/2025    09:28   CMP   Glucose 98  121    BUN 11  10    Creatinine 0.76  0.68    EGFR 92.0  94.6    Sodium 138  137    Potassium 4.6  4.6    Chloride 99  98    Calcium 9.1  8.9    Total Protein 6.3  5.9    Albumin 4.3  3.9    Globulin 2.0  2.0    Total Bilirubin 0.6  0.4    Alkaline Phosphatase 102  134    AST (SGOT) 18  25    ALT (SGPT) 15  21    Albumin/Globulin Ratio 2.2  2.0    BUN/Creatinine Ratio 14.5  14.7      CBC w/diff          9/3/2024    11:11 3/3/2025    09:28   CBC w/Diff   WBC 7.30  6.07    RBC 5.21  5.09    Hemoglobin 14.6  14.7    Hematocrit 45.1  43.8    MCV 86.6  86.1    MCH 28.0  28.9    MCHC 32.4  33.6    RDW 15.4  14.7    Platelets 271  274    Neutrophil Rel % 57.7  56.6    Lymphocyte Rel % 26.3  29.7    Monocyte Rel % 12.1  8.9    Eosinophil Rel % 2.2  2.6    Basophil Rel % 0.7  1.0      Lipid Panel          9/3/2024    11:11 3/3/2025    09:28   Lipid Panel   Total Cholesterol 154  160    Triglycerides 93  96    HDL Cholesterol 63  59  "   VLDL Cholesterol 17  18    LDL Cholesterol  74  83    LDL/HDL Ratio 1.15  1.39      Most Recent A1C          3/3/2025    09:28   HGBA1C Most Recent   Hemoglobin A1C 5.90                Assessment and Plan   There are no diagnoses linked to this encounter.         Follow Up   No follow-ups on file.  Patient was given instructions and counseling regarding his condition or for health maintenance advice. Please see specific information pulled into the AVS if appropriate.     Truong Bedoya MD  Internal Medicine/Pediatrics, PGY-4

## 2025-03-10 NOTE — PROGRESS NOTES
Subjective   The ABCs of the Annual Wellness Visit  Medicare Wellness Visit      Andrew Redmond is a 79 y.o. patient who presents for a Medicare Wellness Visit.    The following portions of the patient's history were reviewed and   updated as appropriate: allergies, current medications, past family history, past medical history, past social history, past surgical history, and problem list.    Compared to one year ago, the patient's physical   health is the same.  Compared to one year ago, the patient's mental   health is the same.    Recent Hospitalizations:  He was not admitted to the hospital during the last year.     Current Medical Providers:  Patient Care Team:  Jessica Birch MD as PCP - General (Internal Medicine & Pediatrics)  Huan Romano MD as PCP - Family Medicine    Outpatient Medications Prior to Visit   Medication Sig Dispense Refill    alendronate (FOSAMAX) 70 MG tablet Take 1 tablet by mouth once a week 12 tablet 0    amoxicillin (AMOXIL) 875 MG tablet Take 1 tablet by mouth 2 (Two) Times a Day. 20 tablet 0    lamoTRIgine (LaMICtal) 200 MG tablet Take 1 tablet by mouth Every Night. 1/2 pill at noon      memantine (NAMENDA) 10 MG tablet Take 1 tablet by mouth 2 (Two) Times a Day.      Misc. Devices (Rollator Ultra-Light) misc 1 each Daily. 1 each 0    OLANZapine (zyPREXA) 20 MG tablet Take 1 tablet by mouth every night at bedtime.      omeprazole (priLOSEC) 40 MG capsule Take 1 capsule by mouth Daily. 90 capsule 3    polyethylene glycol (MIRALAX) powder Take 17 g by mouth Daily. (Patient taking differently: Take 17 g by mouth Daily As Needed.) 850 g 5    rivastigmine (EXELON) 6 MG capsule Take 1 capsule by mouth 2 (Two) Times a Day.      tamsulosin (FLOMAX) 0.4 MG capsule 24 hr capsule Take 2 capsules by mouth once daily 180 capsule 0     No facility-administered medications prior to visit.     No opioid medication identified on active medication list. I have reviewed chart for other  "potential  high risk medication/s and harmful drug interactions in the elderly.      Aspirin is not on active medication list.  Aspirin use is not indicated based on review of current medical condition/s. Risk of harm outweighs potential benefits.  .    Patient Active Problem List   Diagnosis    Alzheimer's dementia without behavioral disturbance    Bipolar I disorder, single manic episode    Epilepsy    Chronic constipation    Hemorrhoids    Polyp of colon    Erectile dysfunction    High risk medication use    Poor balance    Subdural hematoma    Seizure    Low energy    Closed compression fracture of L1 lumbar vertebra    Acute bilateral low back pain without sciatica    Weight loss    Displaced fracture of right femoral neck    Status post hip hemiarthroplasty    Subacromial bursitis of left shoulder joint    Closed displaced fracture of neck of right scapula    Closed nondisplaced fracture of right clavicle    Hip fracture due to osteoporosis, sequela    Acute serous otitis media    Nocturia    Infected sebaceous cyst of skin    Nasal congestion    Cough    Acute maxillary sinusitis     Advance Care Planning Advance Directive is on file.  ACP discussion was held with the patient during this visit. Patient has an advance directive in EMR which is still valid.             Objective   Vitals:    03/10/25 1056   BP: 110/62   BP Location: Right arm   Patient Position: Sitting   Cuff Size: Adult   Pulse: 82   Temp: 98.3 °F (36.8 °C)   TempSrc: Infrared   SpO2: 95%   Weight: 60.8 kg (134 lb)   PainSc: 0-No pain       Estimated body mass index is 20.37 kg/m² as calculated from the following:    Height as of 12/17/24: 172.7 cm (68\").    Weight as of this encounter: 60.8 kg (134 lb).    BMI is within normal parameters. No other follow-up for BMI required.           Does the patient have evidence of cognitive impairment? Yes  Lab Results   Component Value Date    CHLPL 160 03/03/2025    TRIG 96 03/03/2025    HDL 59 " 2025    LDL 83 2025    VLDL 18 2025    HGBA1C 5.90 (H) 2025                                                                                                Health  Risk Assessment    Smoking Status:  Social History     Tobacco Use   Smoking Status Former    Current packs/day: 0.00    Average packs/day: 2.0 packs/day for 20.0 years (40.0 ttl pk-yrs)    Types: Cigarettes    Start date: 2/3/1970    Quit date: 2/3/1990    Years since quittin.1    Passive exposure: Past   Smokeless Tobacco Former    Quit date:    Tobacco Comments    quit 30 yrs ago      Alcohol Consumption:  Social History     Substance and Sexual Activity   Alcohol Use No       Fall Risk Screen  STEADI Fall Risk Assessment was completed, and patient is at MODERATE risk for falls. Assessment completed on:3/10/2025    Depression Screening   Little interest or pleasure in doing things? Not at all   Feeling down, depressed, or hopeless? Not at all   PHQ-2 Total Score 0      Health Habits and Functional and Cognitive Screening:      3/10/2025    11:00 AM   Functional & Cognitive Status   Do you have difficulty preparing food and eating? No   Do you have difficulty bathing yourself, getting dressed or grooming yourself? No   Do you have difficulty using the toilet? No   Do you have difficulty moving around from place to place? No   Do you have trouble with steps or getting out of a bed or a chair? No   Current Diet Well Balanced Diet   Dental Exam Up to date   Eye Exam Up to date   Exercise (times per week) 0 times per week   Current Exercises Include No Regular Exercise   Do you need help using the phone?  No   Are you deaf or do you have serious difficulty hearing?  Yes   Do you need help to go to places out of walking distance? Yes   Do you need help shopping? No   Do you need help preparing meals?  No   Do you need help with housework?  No   Do you need help with laundry? No   Do you need help taking your medications? No    Do you need help managing money? No   Do you ever drive or ride in a car without wearing a seat belt? No   Have you felt unusual stress, anger or loneliness in the last month? No   Who do you live with? Alone   If you need help, do you have trouble finding someone available to you? No   Have you been bothered in the last four weeks by sexual problems? No   Do you have difficulty concentrating, remembering or making decisions? Yes           Age-appropriate Screening Schedule:  Refer to the list below for future screening recommendations based on patient's age, sex and/or medical conditions. Orders for these recommended tests are listed in the plan section. The patient has been provided with a written plan.    Health Maintenance List  Health Maintenance   Topic Date Due    ZOSTER VACCINE (1 of 2) Never done    RSV Vaccine - Adults (1 - 1-dose 75+ series) 09/10/2025 (Originally 9/25/2020)    COVID-19 Vaccine (7 - 2024-25 season) 06/12/2025    ANNUAL WELLNESS VISIT  03/10/2026    COLORECTAL CANCER SCREENING  04/01/2026    TDAP/TD VACCINES (5 - Tdap) 09/25/2034    HEPATITIS C SCREENING  Completed    INFLUENZA VACCINE  Completed    Pneumococcal Vaccine 50+  Completed                                                                                                                                                CMS Preventative Services Quick Reference  Risk Factors Identified During Encounter  Fall Risk-High or Moderate: Information on Fall Prevention Shared in After Visit Summary    The above risks/problems have been discussed with the patient.  Pertinent information has been shared with the patient in the After Visit Summary.  An After Visit Summary and PPPS were made available to the patient.    Follow Up:   Next Medicare Wellness visit to be scheduled in 1 year.         Additional E&M Note during same encounter follows:  Patient has additional, significant, and separately identifiable condition(s)/problem(s) that  require work above and beyond the Medicare Wellness Visit     Chief Complaint  Prediabetes (6 month f/u ) and Medicare Wellness-subsequent    Subjective   HPI  Andrew is also being seen today for additional medical problem/s.        Also here for f/u on chronic conditions including prediabetes. A1c increased to 5.9 from 5.6. No lifestyle changes.     No other issues today. No concerns with medications.     Osteoporosis - continues to take alendronate weekly. Last DEXA was 1 year ago. No recent falls or fractures.      Objective   Vital Signs:  /62 (BP Location: Right arm, Patient Position: Sitting, Cuff Size: Adult)   Pulse 82   Temp 98.3 °F (36.8 °C) (Infrared)   Wt 60.8 kg (134 lb)   SpO2 95%   BMI 20.37 kg/m²   Physical Exam  Constitutional:       Appearance: Normal appearance. He is normal weight.   HENT:      Head: Normocephalic and atraumatic.      Right Ear: External ear normal.      Left Ear: External ear normal.   Eyes:      Extraocular Movements: Extraocular movements intact.      Pupils: Pupils are equal, round, and reactive to light.   Cardiovascular:      Rate and Rhythm: Normal rate and regular rhythm.      Heart sounds: Normal heart sounds.   Pulmonary:      Effort: Pulmonary effort is normal.      Breath sounds: Normal breath sounds.   Neurological:      Mental Status: He is alert.      Comments: Gait normal, uses walker for ambulation. Mild tremor at baseline in BUE. Finger to nose with mild past pointing, otherwise normal.         The following data was reviewed by: Jessica Birch MD on 03/10/2025:  Data reviewed : Consultant notes Dr. Washington, 2/7/25, neurology,   CMP          9/3/2024    11:11 3/3/2025    09:28   CMP   Glucose 98  121    BUN 11  10    Creatinine 0.76  0.68    EGFR 92.0  94.6    Sodium 138  137    Potassium 4.6  4.6    Chloride 99  98    Calcium 9.1  8.9    Total Protein 6.3  5.9    Albumin 4.3  3.9    Globulin 2.0  2.0    Total Bilirubin 0.6  0.4    Alkaline  Phosphatase 102  134    AST (SGOT) 18  25    ALT (SGPT) 15  21    Albumin/Globulin Ratio 2.2  2.0    BUN/Creatinine Ratio 14.5  14.7      CBC          9/3/2024    11:11 3/3/2025    09:28   CBC   WBC 7.30  6.07    RBC 5.21  5.09    Hemoglobin 14.6  14.7    Hematocrit 45.1  43.8    MCV 86.6  86.1    MCH 28.0  28.9    MCHC 32.4  33.6    RDW 15.4  14.7    Platelets 271  274      Lipid Panel          9/3/2024    11:11 3/3/2025    09:28   Lipid Panel   Total Cholesterol 154  160    Triglycerides 93  96    HDL Cholesterol 63  59    VLDL Cholesterol 17  18    LDL Cholesterol  74  83    LDL/HDL Ratio 1.15  1.39      Most Recent A1C          3/3/2025    09:28   HGBA1C Most Recent   Hemoglobin A1C 5.90           Assessment and Plan      High risk medication use    Orders:    CBC & Differential; Future    Comprehensive Metabolic Panel; Future    Hemoglobin A1c; Future    Lipid Panel With LDL / HDL Ratio; Future    TSH; Future    T4, Free; Future       Diagnoses and all orders for this visit:    1. Medicare annual wellness visit, subsequent (Primary)    2. Prediabetes  -     CBC & Differential; Future  -     Comprehensive Metabolic Panel; Future  -     Hemoglobin A1c; Future  -     Lipid Panel With LDL / HDL Ratio; Future  -     TSH; Future  -     T4, Free; Future    3. Age-related osteoporosis without current pathological fracture - cont fosamax    4. High risk medication use - on lamictal, zyprexa, namenda, and exelon.  Continue regular monitoring of labs since pt on these meds.   -     CBC & Differential; Future  -     Comprehensive Metabolic Panel; Future  -     Hemoglobin A1c; Future  -     Lipid Panel With LDL / HDL Ratio; Future  -     TSH; Future  -     T4, Free; Future            Follow Up   Return in about 6 months (around 9/10/2025) for Recheck, labs.  Patient was given instructions and counseling regarding his condition or for health maintenance advice. Please see specific information pulled into the AVS if  appropriate.    Truong Bedoya MD  Internal Medicine/Pediatrics, PGY-4      I have seen and examined the patient independently.  I have reviewed the resident's findings as noted above and added to the note where appropriate.  Agree with above.    Jessica Birch MD  Attending Physician  Internal Medicine and Pediatrics

## 2025-03-10 NOTE — PATIENT INSTRUCTIONS
Medicare Wellness  Personal Prevention Plan of Service     Date of Office Visit:    Encounter Provider:  Jessica Birch MD  Place of Service:  [facility]  Patient Name: Andrew Redmond  :  1945    As part of the Medicare Wellness portion of your visit today, we are providing you with this personalized preventive plan of services (PPPS). This plan is based upon recommendations of the [place] Preventive Services Task Force (USPSTF) and the Advisory Committee on Immunization Practices (ACIP).    This lists the preventive care services that should be considered, and provides dates of when you are due. Items listed as completed are up-to-date and do not require any further intervention.    Health Maintenance   Topic Date Due    ZOSTER VACCINE (1 of 2) Never done    ANNUAL WELLNESS VISIT  2025    RSV Vaccine - Adults (1 - 1-dose 75+ series) 09/10/2025 (Originally 2020)    COVID-19 Vaccine (2024-25 season) 2025    COLORECTAL CANCER SCREENING  2026    TDAP/TD VACCINES (5 - Tdap) 2034    HEPATITIS C SCREENING  Completed    INFLUENZA VACCINE  Completed    Pneumococcal Vaccine 50+  Completed       No orders of the defined types were placed in this encounter.      Return in about 6 months (around 9/10/2025) for Recheck, labs.

## 2025-03-10 NOTE — ADDENDUM NOTE
Addended by: EDMUNDO HARRINGTON on: 3/10/2025 04:56 PM     Modules accepted: Orders, Level of Service

## 2025-05-16 ENCOUNTER — TELEPHONE (OUTPATIENT)
Dept: INTERNAL MEDICINE | Facility: CLINIC | Age: 80
End: 2025-05-16

## 2025-05-16 NOTE — TELEPHONE ENCOUNTER
Caller: Andrew Redmond    Relationship: Self    Best call back number: [phone]    What was the call regarding: PATIENT IS REQUESTING A REFERRAL FOR A COLONOSCOPY     PLEASE GIVE CALLBACK WHEN SCHEDULED WOULD LIKE TO DO AS SOON AS POSSIBLE

## 2025-05-19 DIAGNOSIS — Z12.11 SCREEN FOR COLON CANCER: Primary | ICD-10-CM

## 2025-05-21 RX ORDER — TAMSULOSIN HYDROCHLORIDE 0.4 MG/1
2 CAPSULE ORAL DAILY
Qty: 180 CAPSULE | Refills: 0 | Status: SHIPPED | OUTPATIENT
Start: 2025-05-21

## 2025-05-21 NOTE — TELEPHONE ENCOUNTER
Rx Refill Note  Requested Prescriptions     Pending Prescriptions Disp Refills    tamsulosin (FLOMAX) 0.4 MG capsule 24 hr capsule [Pharmacy Med Name: Tamsulosin HCl 0.4 MG Oral Capsule] 180 capsule 0     Sig: Take 2 capsules by mouth once daily      Last office visit with prescribing clinician: 3/10/2025   Last telemedicine visit with prescribing clinician: Visit date not found   Next office visit with prescribing clinician: 9/10/2025                         Would you like a call back once the refill request has been completed: [] Yes [] No    If the office needs to give you a call back, can they leave a voicemail: [] Yes [] No    Minnie Trevino MA  05/21/25, 08:33 EDT

## 2025-06-11 ENCOUNTER — TELEPHONE (OUTPATIENT)
Dept: GASTROENTEROLOGY | Facility: CLINIC | Age: 80
End: 2025-06-11
Payer: MEDICARE

## 2025-06-11 NOTE — TELEPHONE ENCOUNTER
Hub staff attempted to follow warm transfer process and was unsuccessful     Caller: Andrew Redmond    Relationship to patient: Self    Best call back number: [phone]      Patient is needing: PT STATED HE IS GOING TO FILL OUT THE QA AND SEND BACK IN TO GET SCHEDULED FOR SCOPE, BUT DOES NOT FEEL COMFORTABLE PUTTING HIS SSN ON PAPER SINCE HE WAS HACKED THROUGH THAT. CAN SHOW THAT IN OFFICE IF NEEDED.   PLEASE ADVISE

## 2025-08-01 ENCOUNTER — OFFICE VISIT (OUTPATIENT)
Dept: NEUROLOGY | Facility: CLINIC | Age: 80
End: 2025-08-01
Payer: MEDICARE

## 2025-08-01 VITALS
HEART RATE: 93 BPM | SYSTOLIC BLOOD PRESSURE: 110 MMHG | BODY MASS INDEX: 22.44 KG/M2 | OXYGEN SATURATION: 96 % | DIASTOLIC BLOOD PRESSURE: 58 MMHG | WEIGHT: 147.6 LBS

## 2025-08-01 DIAGNOSIS — G40.001 LOCALIZATION-RELATED (FOCAL) (PARTIAL) IDIOPATHIC EPILEPSY AND EPILEPTIC SYNDROMES WITH SEIZURES OF LOCALIZED ONSET, NOT INTRACTABLE, WITH STATUS EPILEPTICUS: Primary | ICD-10-CM

## 2025-08-01 DIAGNOSIS — R41.3 MEMORY LOSS: ICD-10-CM

## 2025-08-01 NOTE — PROGRESS NOTES
Notes by [facility]:  Mr Redmond is here today for a follow up appointment pertaining to seizures.      Subjective:     Patient ID: Andrew Redmond is a 79 y.o. male.    Seizures     The following portions of the patient's history were reviewed and updated as appropriate: allergies, past family history, past medical history, past social history, past surgical history, and problem list.    History of Present Illness  The patient is a 79-year-old gentleman here for follow-up of epilepsy and memory loss.    He experienced a single seizure episode some time ago, which he reports as not being severe. He was not ill at the time and does not recall missing any medication doses. Typically, he remains seizure-free. He has sufficient refills of his Lamictal and will contact us if he needs more.    He continues to take his prescribed memory medications without any issues. His primary concern is short-term memory loss. He also mentions a slight tremor, which he believes is normal for him.    MEDICATIONS  CURRENT MEDS:  Lamictal    Review of Systems   Neurological:  Positive for seizures.        Objective:    Neurological Exam   Cranial nerves II through XII normal and symmetric.     Motor exam reveals normal symmetric tone bulk and power appropriate for age and activity level.     Sensory examination reveals symmetric sensation to primary modalities.     Tendon reflexes are suppressed but symmetric.  Toes are downgoing.     Coordination testing reveals smooth and accurate finger-nose-finger.  There is no tremor.  There is no significant ataxia.  No parkinsonian features.  Physical Exam    Assessment/Plan:     There are no diagnoses linked to this encounter.     Assessment & Plan  1. Epilepsy.  He experienced one seizure a while back but has been otherwise seizure-free.  He denies missing any doses but has been previously well-controlled and informs me that he will not change doses of medications.  He takes 200 mg of Lamictal at night and  "100 mg at noon.  No changes to his medication regimen are necessary at this time. He is advised to contact the office if he needs additional refills or if there are any changes in his condition.    2. Memory loss.  He reports short-term memory issues but is otherwise in good shape. He is continuing his current memory medications without any issues.  Please see prior notes for my thoughts on his \"memory disorder\".    Follow-up  The patient will follow up in 1 year or sooner if needed.    Patient or patient representative verbalized consent for the use of Ambient Listening during the visit with  Sarwat Chavez MD for chart documentation. 8/1/2025  11:11 EDT          "

## 2025-08-18 RX ORDER — TAMSULOSIN HYDROCHLORIDE 0.4 MG/1
2 CAPSULE ORAL DAILY
Qty: 180 CAPSULE | Refills: 0 | Status: SHIPPED | OUTPATIENT
Start: 2025-08-18

## (undated) DEVICE — COAXIAL HIGH FLOW TIP WITH SOFT SHIELD

## (undated) DEVICE — GLV SURG NEOLON 2G PF LF 7.5 STRL

## (undated) DEVICE — Device

## (undated) DEVICE — INTENT TO BE USED WITH SUTURE MATERIAL FOR TISSUE CLOSURE: Brand: RICHARD-ALLAN® NEEDLE 1/2 CIRCLE TAPER

## (undated) DEVICE — DRSNG SURESITE WNDW 4X4.5

## (undated) DEVICE — MEDI-VAC YANK SUCT HNDL W/TPRD BULBOUS TIP: Brand: CARDINAL HEALTH

## (undated) DEVICE — SUT VIC 2/0 CT1 CR8 18IN J839D

## (undated) DEVICE — BOWL AND CEMENT CARTRIDGE WITH BREAKAWAY FEMORAL NOZZLE: Brand: ACM

## (undated) DEVICE — 3M™ IOBAN™ 2 ANTIMICROBIAL INCISE DRAPE 6651EZ: Brand: IOBAN™ 2

## (undated) DEVICE — SOL ISO/ALC RUB 70PCT 4OZ

## (undated) DEVICE — INTENDED FOR TISSUE SEPARATION, AND OTHER PROCEDURES THAT REQUIRE A SHARP SURGICAL BLADE TO PUNCTURE OR CUT.: Brand: BARD-PARKER ® STAINLESS STEEL BLADES

## (undated) DEVICE — SUT VIC 0 CT1 CR8 18IN J840D

## (undated) DEVICE — 1010 S-DRAPE TOWEL DRAPE 10/BX: Brand: STERI-DRAPE™

## (undated) DEVICE — SYS SKIN CLS DERMABOND PRINEO W/22CM MESH TP

## (undated) DEVICE — DRSNG TELFA PAD NONADH STR 1S 3X8IN

## (undated) DEVICE — DECANTER: Brand: UNBRANDED

## (undated) DEVICE — GLV SURG SENSICARE MICRO PF LF 8 STRL

## (undated) DEVICE — GLV SURG SENSICARE W/ALOE PF LF 7.5 STRL

## (undated) DEVICE — CONTAINER,SPECIMEN,OR STERILE,4OZ: Brand: MEDLINE

## (undated) DEVICE — APPL CHLORAPREP W/TINT 26ML ORNG

## (undated) DEVICE — FEMORAL CANAL PRESSURIZER WITHOUT HUB, MEDIUM

## (undated) DEVICE — GLV SURG SENSICARE MICRO PF LF 7.5 STRL

## (undated) DEVICE — DUAL CUT SAGITTAL BLADE

## (undated) DEVICE — HOOD: Brand: FLYTE

## (undated) DEVICE — 3M™ STERI-DRAPE™ U-DRAPE 1015: Brand: STERI-DRAPE™

## (undated) DEVICE — TUBING, SUCTION, 1/4" X 12', STRAIGHT: Brand: MEDLINE

## (undated) DEVICE — GLV SURG SENSICARE LT W/ALOE CUST FIT PF LF SZ7 STRL

## (undated) DEVICE — GLV SURG SENSICARE ORTHO PF LF 7 STRL

## (undated) DEVICE — PILLW ABD MD

## (undated) DEVICE — DRAPE,HIP,W/POUCHES,STERILE: Brand: MEDLINE

## (undated) DEVICE — DECANT BG O JET

## (undated) DEVICE — PK KN TOTL 90

## (undated) DEVICE — 3M™ STERI-DRAPE™ INSTRUMENT POUCH 1018: Brand: STERI-DRAPE™

## (undated) DEVICE — DRAPE,U/ SHT,SPLIT,PLAS,STERIL: Brand: MEDLINE

## (undated) DEVICE — SPNG GZ WOVN 4X4IN 12PLY 10/BX STRL